# Patient Record
Sex: FEMALE | Race: WHITE | Employment: OTHER | URBAN - METROPOLITAN AREA
[De-identification: names, ages, dates, MRNs, and addresses within clinical notes are randomized per-mention and may not be internally consistent; named-entity substitution may affect disease eponyms.]

---

## 2017-07-06 PROBLEM — R09.89 DECREASED RADIAL PULSE: Status: ACTIVE | Noted: 2017-07-06

## 2017-12-07 PROBLEM — N39.0 URINARY TRACT INFECTION WITHOUT HEMATURIA: Status: ACTIVE | Noted: 2017-12-07

## 2017-12-07 PROBLEM — R35.0 URINARY FREQUENCY: Status: ACTIVE | Noted: 2017-12-07

## 2017-12-07 PROBLEM — R30.0 DYSURIA: Status: ACTIVE | Noted: 2017-12-07

## 2018-05-16 ENCOUNTER — TELEPHONE (OUTPATIENT)
Dept: FAMILY MEDICINE CLINIC | Age: 73
End: 2018-05-16

## 2018-05-16 DIAGNOSIS — N39.0 URINARY TRACT INFECTION WITHOUT HEMATURIA, SITE UNSPECIFIED: Primary | ICD-10-CM

## 2018-05-16 RX ORDER — CEPHALEXIN 500 MG/1
500 CAPSULE ORAL 3 TIMES DAILY
Qty: 30 CAPSULE | Refills: 0 | Status: SHIPPED | OUTPATIENT
Start: 2018-05-16 | End: 2018-05-21

## 2018-05-21 ENCOUNTER — OFFICE VISIT (OUTPATIENT)
Dept: FAMILY MEDICINE CLINIC | Age: 73
End: 2018-05-21
Payer: MEDICAID

## 2018-05-21 VITALS
SYSTOLIC BLOOD PRESSURE: 122 MMHG | OXYGEN SATURATION: 98 % | BODY MASS INDEX: 30.55 KG/M2 | DIASTOLIC BLOOD PRESSURE: 74 MMHG | HEART RATE: 76 BPM | TEMPERATURE: 97.6 F | HEIGHT: 60 IN | WEIGHT: 155.6 LBS

## 2018-05-21 DIAGNOSIS — R30.0 DYSURIA: ICD-10-CM

## 2018-05-21 DIAGNOSIS — N39.0 URINARY TRACT INFECTION WITHOUT HEMATURIA, SITE UNSPECIFIED: Primary | ICD-10-CM

## 2018-05-21 DIAGNOSIS — R35.0 URINARY FREQUENCY: ICD-10-CM

## 2018-05-21 DIAGNOSIS — M06.9 RHEUMATOID ARTHRITIS INVOLVING BOTH HANDS, UNSPECIFIED RHEUMATOID FACTOR PRESENCE: ICD-10-CM

## 2018-05-21 LAB
BILIRUBIN, POC: NORMAL
BLOOD URINE, POC: NORMAL
CLARITY, POC: CLEAR
COLOR, POC: YELLOW
GLUCOSE URINE, POC: NORMAL
KETONES, POC: NORMAL
LEUKOCYTE EST, POC: NORMAL
NITRITE, POC: NORMAL
PH, POC: 5.5
PROTEIN, POC: 30
SPECIFIC GRAVITY, POC: 1.03
UROBILINOGEN, POC: 0.2

## 2018-05-21 PROCEDURE — 81003 URINALYSIS AUTO W/O SCOPE: CPT | Performed by: FAMILY MEDICINE

## 2018-05-21 PROCEDURE — 99213 OFFICE O/P EST LOW 20 MIN: CPT | Performed by: FAMILY MEDICINE

## 2018-05-21 RX ORDER — CHLORAL HYDRATE 500 MG
CAPSULE ORAL
Refills: 0 | Status: ON HOLD | COMMUNITY
Start: 2018-04-21 | End: 2019-11-03 | Stop reason: HOSPADM

## 2018-05-21 RX ORDER — AMOXICILLIN 250 MG/1
250 CAPSULE ORAL 3 TIMES DAILY
Qty: 30 CAPSULE | Refills: 0 | Status: SHIPPED | OUTPATIENT
Start: 2018-05-21 | End: 2018-05-31

## 2018-05-25 ASSESSMENT — ENCOUNTER SYMPTOMS
ORTHOPNEA: 0
BLURRED VISION: 0
HEARTBURN: 0
EYE PAIN: 0
EYE DISCHARGE: 0
EYE REDNESS: 0
DIARRHEA: 0
STRIDOR: 0
PHOTOPHOBIA: 0
CONSTIPATION: 0
HEMOPTYSIS: 0
SPUTUM PRODUCTION: 0
WHEEZING: 0
NAUSEA: 0
SHORTNESS OF BREATH: 0
SINUS PAIN: 0
SORE THROAT: 0
DOUBLE VISION: 0
BLOOD IN STOOL: 0
BACK PAIN: 0
ABDOMINAL PAIN: 0
VOMITING: 0
RESPIRATORY NEGATIVE: 1
EYES NEGATIVE: 1
COUGH: 0
GASTROINTESTINAL NEGATIVE: 1

## 2018-08-02 ENCOUNTER — OFFICE VISIT (OUTPATIENT)
Dept: VASCULAR SURGERY | Age: 73
End: 2018-08-02
Payer: MEDICAID

## 2018-08-02 DIAGNOSIS — R09.89 BRUIT OF LEFT CAROTID ARTERY: Primary | Chronic | ICD-10-CM

## 2018-08-02 DIAGNOSIS — Z98.890 S/P CAROTID ENDARTERECTOMY: ICD-10-CM

## 2018-08-02 PROCEDURE — 99213 OFFICE O/P EST LOW 20 MIN: CPT | Performed by: SURGERY

## 2018-08-02 NOTE — PROGRESS NOTES
Chief Complaint:   Chief Complaint   Patient presents with    1 Year Follow Up     STEPHANIE         HPI:  Patient came to the office, for evaluation of carotid bruit, post carotid endarterectomy, doing well, can walk short distances slowly, 1-2 blocks at a time, limited partly because of arthritis, denies symptoms of rest pain      Patient denies any focal lateralizing neurological symptoms like loss of speech, vision or loss of function of extremity        Allergies   Allergen Reactions    Iodine Shortness Of Breath    Sulfa Antibiotics Shortness Of Breath    Macrodantin [Nitrofurantoin]        Current Outpatient Prescriptions   Medication Sig Dispense Refill    adalimumab (HUMIRA) 40 MG/0.8ML injection Inject 40 mg into the skin once      metoprolol succinate (TOPROL XL) 50 MG extended release tablet TAKE 1 TABLET BY MOUTH DAILY 30 tablet 3    gabapentin (NEURONTIN) 300 MG capsule TAKE ONE CAPSULE BY MOUTH TWICE A  capsule 1    loratadine (CLARITIN) 10 MG tablet TAKE 1 TABLET BY MOUTH EVERY DAY 90 tablet 1    ASPIRIN LOW DOSE 81 MG EC tablet TAKE 1 TABLET BY MOUTH DAILY 30 tablet 9    hydroxychloroquine (PLAQUENIL) 200 MG tablet Take 200 mg by mouth 2 times daily       [employer]S CALCIUM 1500 (600 Ca) MG TABS tablet Take 600 mg by mouth 2 times daily       Cholecalciferol (VITAMIN D) 2000 units TABS tablet Take 2,000 Units by mouth daily       latanoprost (XALATAN) 0.005 % ophthalmic solution INSTILL 1 DROP INTO BOTH EYES EVERY DAY 2 Bottle 1    Omega-3 Fatty Acids (FISH OIL) 1000 MG CAPS TAKE ONE CAPSULE BY MOUTH DAILY  0    ranitidine (ZANTAC) 150 MG tablet TAKE 1 TABLET BY MOUTH NIGHTLY 90 tablet 1    alendronate (FOSAMAX) 70 MG tablet Take 70 mg by mouth every 7 days       fluticasone (FLONASE) 50 MCG/ACT nasal spray 1 spray by Nasal route daily 1 Bottle 3    brinzolamide (AZOPT) 1 % ophthalmic suspension Place 1 drop into the left eye 3 times daily 1 Bottle 2     No current facility-administered tea daily    Drug use: No    Sexual activity: Not on file     Other Topics Concern    Not on file     Social History Narrative    No narrative on file       Review of Systems:    Eyes:  No blurring, diplopia or vision loss  Respiratory:  No cough, pleuritic chest pain, dyspnea, or wheezing  Cardiovascular: No angina, palpitations   Musculoskeletal:  No arthritis or weakness  Neurologic:  No paralysis, paresis, paresthesia, seizures or headach        Physical Exam:  General appearance:  Alert, awake, oriented x 3. No distress. Eyes:  Conjunctivae appear normal; PERRL  Neck:  No jugular venous distention, lymphadenopathy or thyromegaly. Carotid bruit noted  Lungs:  Clear to ausculation bilaterally. No rhonchi, crackles, wheezes  Heart:  Regular rate and rhythm. No rub or murmur  Abdomen:  Soft, non-tender. No masses, organomegaly. Musculoskeletal : No joint effusions, tenderness swelling    Neuro: Speech is intact. Moving all extremities. No focal motor or sensory deficits      Extremities:  Both feet are warm to touch. The color of both feet is normal.        Pulses Right  Left    Brachial 3 3    Radial    3=normal   Femoral 2 2  2=diminished   Popliteal    1=barely palpable   Dorsalis pedis    0=absent   Posterior tibial    4=aneurysmal             Other pertinent information:1. The past medical records were reviewed. Assessment:    1. Bruit of left carotid artery    2. S/P carotid endarterectomy              Plan:       Discussed with the patient, as the patient underwent endarterectomy, 6 years ago, has only mild disease in stable, recommended follow-up every 2 years, with instructions for the patient call me when necessary if she has any focal lateralizing neurological symptoms          Patient was instructed to continue walking program and to call if any worsening of symptoms and to call if any focal lateralizing neurological symptoms like loss of speech, vision or loss of function of extremity.

## 2018-09-14 ENCOUNTER — HOSPITAL ENCOUNTER (OUTPATIENT)
Dept: CARDIOLOGY | Age: 73
Discharge: HOME | End: 2018-09-14
Payer: MEDICAID

## 2018-09-14 DIAGNOSIS — R09.89 BRUIT OF LEFT CAROTID ARTERY: Chronic | ICD-10-CM

## 2018-09-14 PROCEDURE — 93880 EXTRACRANIAL BILAT STUDY: CPT

## 2018-09-17 ENCOUNTER — TELEPHONE (OUTPATIENT)
Dept: VASCULAR SURGERY | Age: 73
End: 2018-09-17

## 2018-09-17 ENCOUNTER — TELEPHONE (OUTPATIENT)
Dept: FAMILY MEDICINE CLINIC | Age: 73
End: 2018-09-17

## 2018-09-17 RX ORDER — BRINZOLAMIDE 10 MG/ML
1 SUSPENSION/ DROPS OPHTHALMIC 3 TIMES DAILY
Qty: 1 BOTTLE | Refills: 2 | Status: SHIPPED
Start: 2018-09-17 | End: 2022-10-23

## 2018-09-20 NOTE — PROCEDURES
[address]                   [address] [facility],  [facility]                                  VASCULAR REPORT    PATIENT NAME: Chun Crain                  :        1945  MED REC NO:   [id number]                            ROOM:  ACCOUNT NO:   [de-identified]                           ADMIT DATE: 2018  PROVIDER:     Marta Santiago MD    DATE OF PROCEDURE:  2018    CAROTID ULTRASOUND    INDICATION:  Carotid bruit post carotid endarterectomy. FINDINGS:  Duplex scanning of the right carotid artery revealed the patient  has mild intimal thickening with a peak internal carotid velocity of 957,  diastolic velocity of 30 cm/sec with a widely patent right carotid artery. Duplex scanning of the left carotid artery revealed moderate thickening  with a peak internal carotid velocity of 993, diastolic velocity of 40  cm/sec with 30% stenosis. IMPRESSION:  Widely patent right carotid artery associated with 30%  stenosis on the left side.         Solange Vazquez MD    D: 2018 7:19:57       T: 2018 13:02:50     [email or web address]  Job#: [id number]     Doc#: [id number]    CC:  Renetta Stein DO

## 2018-10-02 DIAGNOSIS — H26.9 CATARACT, UNSPECIFIED CATARACT TYPE, UNSPECIFIED LATERALITY: ICD-10-CM

## 2018-10-02 RX ORDER — LATANOPROST 50 UG/ML
SOLUTION/ DROPS OPHTHALMIC
Qty: 2 BOTTLE | Refills: 1 | Status: SHIPPED | OUTPATIENT
Start: 2018-10-02 | End: 2018-12-01 | Stop reason: SDUPTHER

## 2018-10-09 ENCOUNTER — HOSPITAL ENCOUNTER (OUTPATIENT)
Age: 73
Discharge: HOME | End: 2018-10-11
Payer: MEDICAID

## 2018-10-09 ENCOUNTER — OFFICE VISIT (OUTPATIENT)
Dept: FAMILY MEDICINE CLINIC | Age: 73
End: 2018-10-09
Payer: MEDICAID

## 2018-10-09 VITALS
HEART RATE: 74 BPM | HEIGHT: 60 IN | DIASTOLIC BLOOD PRESSURE: 64 MMHG | OXYGEN SATURATION: 97 % | RESPIRATION RATE: 18 BRPM | SYSTOLIC BLOOD PRESSURE: 110 MMHG | WEIGHT: 164.2 LBS | BODY MASS INDEX: 32.24 KG/M2

## 2018-10-09 DIAGNOSIS — M06.9 RHEUMATOID ARTHRITIS INVOLVING BOTH HANDS, UNSPECIFIED RHEUMATOID FACTOR PRESENCE: ICD-10-CM

## 2018-10-09 DIAGNOSIS — R53.83 FATIGUE, UNSPECIFIED TYPE: ICD-10-CM

## 2018-10-09 DIAGNOSIS — E78.9 LIPID DISORDER: Chronic | ICD-10-CM

## 2018-10-09 DIAGNOSIS — R73.01 IFG (IMPAIRED FASTING GLUCOSE): ICD-10-CM

## 2018-10-09 DIAGNOSIS — N39.0 URINARY TRACT INFECTION WITHOUT HEMATURIA, SITE UNSPECIFIED: ICD-10-CM

## 2018-10-09 DIAGNOSIS — Z12.31 SCREENING MAMMOGRAM, ENCOUNTER FOR: ICD-10-CM

## 2018-10-09 DIAGNOSIS — I10 ESSENTIAL HYPERTENSION: Chronic | ICD-10-CM

## 2018-10-09 DIAGNOSIS — M06.9 RHEUMATOID ARTHRITIS INVOLVING BOTH HANDS, UNSPECIFIED RHEUMATOID FACTOR PRESENCE: Primary | ICD-10-CM

## 2018-10-09 DIAGNOSIS — Z12.11 SCREEN FOR COLON CANCER: ICD-10-CM

## 2018-10-09 DIAGNOSIS — Z00.00 HEALTHCARE MAINTENANCE: ICD-10-CM

## 2018-10-09 LAB
ALBUMIN SERPL-MCNC: 4.1 G/DL (ref 3.5–5.2)
ALP BLD-CCNC: 66 U/L (ref 35–104)
ALT SERPL-CCNC: 11 U/L (ref 0–32)
ANION GAP SERPL CALCULATED.3IONS-SCNC: 18 MMOL/L (ref 7–16)
AST SERPL-CCNC: 19 U/L (ref 0–31)
BASOPHILS ABSOLUTE: 0.07 E9/L (ref 0–0.2)
BASOPHILS RELATIVE PERCENT: 1 % (ref 0–2)
BILIRUB SERPL-MCNC: 0.3 MG/DL (ref 0–1.2)
BILIRUBIN, POC: NEGATIVE
BLOOD URINE, POC: NEGATIVE
BUN BLDV-MCNC: 16 MG/DL (ref 8–23)
CALCIUM SERPL-MCNC: 9.1 MG/DL (ref 8.6–10.2)
CHLORIDE BLD-SCNC: 105 MMOL/L (ref 98–107)
CHOLESTEROL, TOTAL: 274 MG/DL (ref 0–199)
CLARITY, POC: CLEAR
CO2: 22 MMOL/L (ref 22–29)
COLOR, POC: YELLOW
CREAT SERPL-MCNC: 0.8 MG/DL (ref 0.5–1)
EOSINOPHILS ABSOLUTE: 0.17 E9/L (ref 0.05–0.5)
EOSINOPHILS RELATIVE PERCENT: 2.5 % (ref 0–6)
GFR AFRICAN AMERICAN: >60
GFR NON-AFRICAN AMERICAN: >60 ML/MIN/1.73
GLUCOSE BLD-MCNC: 96 MG/DL (ref 74–109)
GLUCOSE URINE, POC: NEGATIVE
HBA1C MFR BLD: 5.4 % (ref 4–5.6)
HBA1C MFR BLD: 5.5 %
HCT VFR BLD CALC: 41.6 % (ref 34–48)
HDLC SERPL-MCNC: 65 MG/DL
HEMOGLOBIN: 12.7 G/DL (ref 11.5–15.5)
IMMATURE GRANULOCYTES #: 0.01 E9/L
IMMATURE GRANULOCYTES %: 0.1 % (ref 0–5)
KETONES, POC: NEGATIVE
LDL CHOLESTEROL CALCULATED: 177 MG/DL (ref 0–99)
LEUKOCYTE EST, POC: NORMAL
LYMPHOCYTES ABSOLUTE: 2.45 E9/L (ref 1.5–4)
LYMPHOCYTES RELATIVE PERCENT: 36 % (ref 20–42)
MCH RBC QN AUTO: 29.7 PG (ref 26–35)
MCHC RBC AUTO-ENTMCNC: 30.5 % (ref 32–34.5)
MCV RBC AUTO: 97.2 FL (ref 80–99.9)
MONOCYTES ABSOLUTE: 0.69 E9/L (ref 0.1–0.95)
MONOCYTES RELATIVE PERCENT: 10.1 % (ref 2–12)
NEUTROPHILS ABSOLUTE: 3.42 E9/L (ref 1.8–7.3)
NEUTROPHILS RELATIVE PERCENT: 50.3 % (ref 43–80)
NITRITE, POC: NEGATIVE
PDW BLD-RTO: 15.1 FL (ref 11.5–15)
PH, POC: 5.5
PLATELET # BLD: 130 E9/L (ref 130–450)
PMV BLD AUTO: 12 FL (ref 7–12)
POTASSIUM SERPL-SCNC: 4.3 MMOL/L (ref 3.5–5)
PROTEIN, POC: NORMAL
RBC # BLD: 4.28 E12/L (ref 3.5–5.5)
SODIUM BLD-SCNC: 145 MMOL/L (ref 132–146)
SPECIFIC GRAVITY, POC: 1.02
TOTAL PROTEIN: 6.9 G/DL (ref 6.4–8.3)
TRIGL SERPL-MCNC: 160 MG/DL (ref 0–149)
TSH SERPL DL<=0.05 MIU/L-ACNC: 2.21 UIU/ML (ref 0.27–4.2)
UROBILINOGEN, POC: 0.2
VLDLC SERPL CALC-MCNC: 32 MG/DL
WBC # BLD: 6.8 E9/L (ref 4.5–11.5)

## 2018-10-09 PROCEDURE — 85025 COMPLETE CBC W/AUTO DIFF WBC: CPT

## 2018-10-09 PROCEDURE — 80061 LIPID PANEL: CPT

## 2018-10-09 PROCEDURE — 84443 ASSAY THYROID STIM HORMONE: CPT

## 2018-10-09 PROCEDURE — 81003 URINALYSIS AUTO W/O SCOPE: CPT | Performed by: FAMILY MEDICINE

## 2018-10-09 PROCEDURE — 99214 OFFICE O/P EST MOD 30 MIN: CPT | Performed by: FAMILY MEDICINE

## 2018-10-09 PROCEDURE — 83036 HEMOGLOBIN GLYCOSYLATED A1C: CPT

## 2018-10-09 PROCEDURE — 83036 HEMOGLOBIN GLYCOSYLATED A1C: CPT | Performed by: FAMILY MEDICINE

## 2018-10-09 PROCEDURE — 80053 COMPREHEN METABOLIC PANEL: CPT

## 2018-10-09 RX ORDER — CEPHALEXIN 500 MG/1
500 CAPSULE ORAL 3 TIMES DAILY
Qty: 30 CAPSULE | Refills: 0 | Status: SHIPPED | OUTPATIENT
Start: 2018-10-09 | End: 2019-03-13 | Stop reason: ALTCHOICE

## 2018-10-09 ASSESSMENT — PATIENT HEALTH QUESTIONNAIRE - PHQ9
1. LITTLE INTEREST OR PLEASURE IN DOING THINGS: 0
2. FEELING DOWN, DEPRESSED OR HOPELESS: 0
SUM OF ALL RESPONSES TO PHQ QUESTIONS 1-9: 0
SUM OF ALL RESPONSES TO PHQ QUESTIONS 1-9: 0
SUM OF ALL RESPONSES TO PHQ9 QUESTIONS 1 & 2: 0

## 2018-10-09 ASSESSMENT — ENCOUNTER SYMPTOMS
EYE PAIN: 0
WHEEZING: 0
SORE THROAT: 0
COUGH: 0
BACK PAIN: 0
SINUS PAIN: 0
HEMOPTYSIS: 0
RESPIRATORY NEGATIVE: 1
EYE DISCHARGE: 0
GASTROINTESTINAL NEGATIVE: 1
BLOOD IN STOOL: 0
DIARRHEA: 0
PHOTOPHOBIA: 0
EYES NEGATIVE: 1
CONSTIPATION: 0
ABDOMINAL PAIN: 0
BLURRED VISION: 0
ORTHOPNEA: 0
SHORTNESS OF BREATH: 0
EYE REDNESS: 0
SPUTUM PRODUCTION: 0
STRIDOR: 0
DOUBLE VISION: 0
HEARTBURN: 0

## 2018-10-09 NOTE — PATIENT INSTRUCTIONS
from front to back. When should you call for help? Call your doctor now or seek immediate medical care if:    · Symptoms such as fever, chills, nausea, or vomiting get worse or appear for the first time.     · You have new pain in your back just below your rib cage. This is called flank pain.     · There is new blood or pus in your urine.     · You have any problems with your antibiotic medicine.    Watch closely for changes in your health, and be sure to contact your doctor if:    · You are not getting better after taking an antibiotic for 2 days.     · Your symptoms go away but then come back. Where can you learn more? Go to [email or web address] and sign in to your [employer] account. Enter D933 in the [employer] box to learn more about \"Urinary Tract Infection in Women: Care Instructions. \"     If you do not have an account, please click on the \"Sign Up Now\" link. Current as of: May 12, 2017  Content Version: 11.7  © 2709-5669 [employer]. Care instructions adapted under license by [facility] ([facility]). If you have questions about a medical condition or this instruction, always ask your healthcare professional. [place] [address] any warranty or liability for your use of this information.

## 2018-10-09 NOTE — PROGRESS NOTES
distal, mid and proximal RCA.    HYSTERECTOMY      JOINT REPLACEMENT      right knee twice, left knee    JOINT REPLACEMENT Right     hip    KNEE ARTHROPLASTY Left 4-11-13    KNEE SURGERY      right knee x 2    SKIN BIOPSY      TONSILLECTOMY         Past Family Hx:  Reviewed with patient  History reviewed. No pertinent family history. Social Hx:  Reviewed with patient  Social History   Substance Use Topics    Smoking status: Former Smoker     Packs/day: 2.50     Years: 40.00     Types: Cigarettes     Quit date: 9/3/2000    Smokeless tobacco: Never Used    Alcohol use No      Comment: drinks 3 cups of tea daily       OBJECTIVE  /64   Pulse 74   Resp 18   Ht 5' (1.524 m)   Wt 164 lb 3.2 oz (74.5 kg)   SpO2 97%   Breastfeeding? No   BMI 32.07 kg/m²     Problem List:  Ruben Banerjee  does not have any pertinent problems on file. PHYS EX:  Physical Exam   Constitutional: She is oriented to person, place, and time. She appears well-developed and well-nourished. No distress. HENT:   Head: Normocephalic and atraumatic. Right Ear: External ear normal.   Left Ear: External ear normal.   Nose: Nose normal.   Mouth/Throat: Oropharynx is clear and moist. No oropharyngeal exudate. Eyes: Pupils are equal, round, and reactive to light. Conjunctivae and EOM are normal. Right eye exhibits no discharge. Left eye exhibits no discharge. No scleral icterus. Neck: Normal range of motion. Neck supple. No JVD present. No tracheal deviation present. No thyromegaly present. Cardiovascular: Normal rate, regular rhythm, normal heart sounds and intact distal pulses. Exam reveals no gallop and no friction rub. No murmur heard. Pulmonary/Chest: Effort normal and breath sounds normal. No stridor. No respiratory distress. She has no wheezes. She has no rales. She exhibits no tenderness. Abdominal: Soft. Bowel sounds are normal. She exhibits no distension and no mass. There is no tenderness.  There is no rebound TABLET BY MOUTH DAILY 30 tablet 9    brinzolamide (AZOPT) 1 % ophthalmic suspension Place 1 drop into the left eye 3 times daily 1 Bottle 2    metoprolol succinate (TOPROL XL) 50 MG extended release tablet TAKE 1 TABLET BY MOUTH DAILY 30 tablet 3    gabapentin (NEURONTIN) 300 MG capsule TAKE ONE CAPSULE BY MOUTH TWICE A  capsule 0    Omega-3 Fatty Acids (FISH OIL) 1000 MG CAPS TAKE ONE CAPSULE BY MOUTH DAILY  0    adalimumab (HUMIRA) 40 MG/0.8ML injection Inject 40 mg into the skin once      ranitidine (ZANTAC) 150 MG tablet TAKE 1 TABLET BY MOUTH NIGHTLY 90 tablet 1    loratadine (CLARITIN) 10 MG tablet TAKE 1 TABLET BY MOUTH EVERY DAY 90 tablet 1    hydroxychloroquine (PLAQUENIL) 200 MG tablet Take 200 mg by mouth 2 times daily       CVS CALCIUM 1500 (600 Ca) MG TABS tablet Take 600 mg by mouth 2 times daily       Cholecalciferol (VITAMIN D) 2000 units TABS tablet Take 2,000 Units by mouth daily       alendronate (FOSAMAX) 70 MG tablet Take 70 mg by mouth every 7 days       fluticasone (FLONASE) 50 MCG/ACT nasal spray 1 spray by Nasal route daily 1 Bottle 3     No facility-administered encounter medications on file as of 10/9/2018. Return in about 4 weeks (around 11/6/2018).         Reviewed recent labs related to Hien's current problems      Discussed importance of regular Health Maintenance follow up  Health Maintenance   Topic    DTaP/Tdap/Td vaccine (1 - Tdap)    Shingles Vaccine (1 of 2 - 2 Dose Series)    Colon Cancer Screen FIT/FOBT     Breast cancer screen     Flu vaccine (1)    Lipid screen     DEXA (modify frequency per FRAX score)     Pneumococcal low/med risk

## 2018-10-23 DIAGNOSIS — Z12.11 SCREEN FOR COLON CANCER: ICD-10-CM

## 2018-10-23 LAB
CONTROL: NORMAL
HEMOCCULT STL QL: NEGATIVE

## 2018-10-23 PROCEDURE — 82274 ASSAY TEST FOR BLOOD FECAL: CPT | Performed by: FAMILY MEDICINE

## 2018-11-01 ENCOUNTER — TELEPHONE (OUTPATIENT)
Dept: FAMILY MEDICINE CLINIC | Age: 73
End: 2018-11-01

## 2018-11-01 RX ORDER — AMOXICILLIN 500 MG/1
500 CAPSULE ORAL 3 TIMES DAILY
Qty: 30 CAPSULE | Refills: 0 | Status: SHIPPED | OUTPATIENT
Start: 2018-11-01 | End: 2018-11-11

## 2018-11-20 ENCOUNTER — TELEPHONE (OUTPATIENT)
Dept: CARDIOLOGY CLINIC | Age: 73
End: 2018-11-20

## 2018-12-01 DIAGNOSIS — H26.9 CATARACT, UNSPECIFIED CATARACT TYPE, UNSPECIFIED LATERALITY: ICD-10-CM

## 2018-12-03 RX ORDER — LATANOPROST 50 UG/ML
SOLUTION/ DROPS OPHTHALMIC
Qty: 2.5 ML | Refills: 1 | Status: SHIPPED | OUTPATIENT
Start: 2018-12-03 | End: 2019-01-24 | Stop reason: SDUPTHER

## 2019-01-02 ENCOUNTER — TELEPHONE (OUTPATIENT)
Dept: FAMILY MEDICINE CLINIC | Age: 74
End: 2019-01-02

## 2019-01-24 DIAGNOSIS — H26.9 CATARACT, UNSPECIFIED CATARACT TYPE, UNSPECIFIED LATERALITY: ICD-10-CM

## 2019-01-24 RX ORDER — LATANOPROST 50 UG/ML
SOLUTION/ DROPS OPHTHALMIC
Qty: 2.5 ML | Refills: 1 | Status: SHIPPED | OUTPATIENT
Start: 2019-01-24 | End: 2019-03-21 | Stop reason: SDUPTHER

## 2019-02-17 ENCOUNTER — APPOINTMENT (OUTPATIENT)
Dept: CT IMAGING | Age: 74
End: 2019-02-17
Payer: MEDICARE

## 2019-02-17 ENCOUNTER — HOSPITAL ENCOUNTER (EMERGENCY)
Age: 74
Discharge: HOME | End: 2019-02-17
Attending: EMERGENCY MEDICINE
Payer: MEDICARE

## 2019-02-17 VITALS
RESPIRATION RATE: 16 BRPM | BODY MASS INDEX: 29.45 KG/M2 | WEIGHT: 150 LBS | OXYGEN SATURATION: 97 % | SYSTOLIC BLOOD PRESSURE: 188 MMHG | TEMPERATURE: 97.6 F | HEART RATE: 72 BPM | DIASTOLIC BLOOD PRESSURE: 86 MMHG | HEIGHT: 60 IN

## 2019-02-17 DIAGNOSIS — N21.8 CALCULUS OF OTHER LOWER URINARY TRACT LOCATION: Primary | ICD-10-CM

## 2019-02-17 LAB
ALBUMIN SERPL-MCNC: 4 G/DL (ref 3.5–5.2)
ALP BLD-CCNC: 77 U/L (ref 35–104)
ALT SERPL-CCNC: 12 U/L (ref 0–32)
ANION GAP SERPL CALCULATED.3IONS-SCNC: 12 MMOL/L (ref 7–16)
AST SERPL-CCNC: 29 U/L (ref 0–31)
BACTERIA: NORMAL /HPF
BASOPHILS ABSOLUTE: 0.04 E9/L (ref 0–0.2)
BASOPHILS RELATIVE PERCENT: 0.5 % (ref 0–2)
BILIRUB SERPL-MCNC: 0.4 MG/DL (ref 0–1.2)
BILIRUBIN URINE: NEGATIVE
BLOOD, URINE: NORMAL
BUN BLDV-MCNC: 17 MG/DL (ref 8–23)
CALCIUM SERPL-MCNC: 9 MG/DL (ref 8.6–10.2)
CHLORIDE BLD-SCNC: 107 MMOL/L (ref 98–107)
CLARITY: CLEAR
CO2: 22 MMOL/L (ref 22–29)
COLOR: YELLOW
CREAT SERPL-MCNC: 1.1 MG/DL (ref 0.5–1)
EOSINOPHILS ABSOLUTE: 0.15 E9/L (ref 0.05–0.5)
EOSINOPHILS RELATIVE PERCENT: 2 % (ref 0–6)
GFR AFRICAN AMERICAN: 59
GFR NON-AFRICAN AMERICAN: 48 ML/MIN/1.73
GLUCOSE BLD-MCNC: 109 MG/DL (ref 74–99)
GLUCOSE URINE: NEGATIVE MG/DL
HCT VFR BLD CALC: 38.9 % (ref 34–48)
HEMOGLOBIN: 12.3 G/DL (ref 11.5–15.5)
IMMATURE GRANULOCYTES #: 0.02 E9/L
IMMATURE GRANULOCYTES %: 0.3 % (ref 0–5)
KETONES, URINE: NEGATIVE MG/DL
LACTIC ACID: 1.2 MMOL/L (ref 0.5–2.2)
LEUKOCYTE ESTERASE, URINE: NEGATIVE
LIPASE: 25 U/L (ref 13–60)
LYMPHOCYTES ABSOLUTE: 1.34 E9/L (ref 1.5–4)
LYMPHOCYTES RELATIVE PERCENT: 18.3 % (ref 20–42)
MCH RBC QN AUTO: 30.4 PG (ref 26–35)
MCHC RBC AUTO-ENTMCNC: 31.6 % (ref 32–34.5)
MCV RBC AUTO: 96 FL (ref 80–99.9)
MONOCYTES ABSOLUTE: 0.62 E9/L (ref 0.1–0.95)
MONOCYTES RELATIVE PERCENT: 8.4 % (ref 2–12)
NEUTROPHILS ABSOLUTE: 5.17 E9/L (ref 1.8–7.3)
NEUTROPHILS RELATIVE PERCENT: 70.5 % (ref 43–80)
NITRITE, URINE: NEGATIVE
PDW BLD-RTO: 14.2 FL (ref 11.5–15)
PH UA: 5.5 (ref 5–9)
PLATELET # BLD: 145 E9/L (ref 130–450)
PMV BLD AUTO: 12.1 FL (ref 7–12)
POTASSIUM REFLEX MAGNESIUM: 5 MMOL/L (ref 3.5–5)
PROTEIN UA: NEGATIVE MG/DL
RBC # BLD: 4.05 E12/L (ref 3.5–5.5)
RBC UA: NORMAL /HPF (ref 0–2)
SODIUM BLD-SCNC: 141 MMOL/L (ref 132–146)
SPECIFIC GRAVITY UA: 1.01 (ref 1–1.03)
TOTAL PROTEIN: 7.2 G/DL (ref 6.4–8.3)
TROPONIN: <0.01 NG/ML (ref 0–0.03)
UROBILINOGEN, URINE: 0.2 E.U./DL
WBC # BLD: 7.3 E9/L (ref 4.5–11.5)
WBC UA: NORMAL /HPF (ref 0–5)

## 2019-02-17 PROCEDURE — 96374 THER/PROPH/DIAG INJ IV PUSH: CPT

## 2019-02-17 PROCEDURE — 99284 EMERGENCY DEPT VISIT MOD MDM: CPT

## 2019-02-17 PROCEDURE — 36415 COLL VENOUS BLD VENIPUNCTURE: CPT

## 2019-02-17 PROCEDURE — 83605 ASSAY OF LACTIC ACID: CPT

## 2019-02-17 PROCEDURE — 74176 CT ABD & PELVIS W/O CONTRAST: CPT

## 2019-02-17 PROCEDURE — 6360000002 HC RX W HCPCS: Performed by: EMERGENCY MEDICINE

## 2019-02-17 PROCEDURE — 81001 URINALYSIS AUTO W/SCOPE: CPT

## 2019-02-17 PROCEDURE — 87088 URINE BACTERIA CULTURE: CPT

## 2019-02-17 PROCEDURE — 80053 COMPREHEN METABOLIC PANEL: CPT

## 2019-02-17 PROCEDURE — 85025 COMPLETE CBC W/AUTO DIFF WBC: CPT

## 2019-02-17 PROCEDURE — 84484 ASSAY OF TROPONIN QUANT: CPT

## 2019-02-17 PROCEDURE — 83690 ASSAY OF LIPASE: CPT

## 2019-02-17 PROCEDURE — 2580000003 HC RX 258: Performed by: EMERGENCY MEDICINE

## 2019-02-17 RX ORDER — NAPROXEN 500 MG/1
500 TABLET ORAL 2 TIMES DAILY
Qty: 10 TABLET | Refills: 0 | Status: SHIPPED | OUTPATIENT
Start: 2019-02-17 | End: 2019-03-13 | Stop reason: CLARIF

## 2019-02-17 RX ORDER — HYDROCODONE BITARTRATE AND ACETAMINOPHEN 5; 325 MG/1; MG/1
1 TABLET ORAL EVERY 6 HOURS PRN
Qty: 12 TABLET | Refills: 0 | Status: SHIPPED | OUTPATIENT
Start: 2019-02-17 | End: 2019-02-20

## 2019-02-17 RX ORDER — KETOROLAC TROMETHAMINE 30 MG/ML
15 INJECTION, SOLUTION INTRAMUSCULAR; INTRAVENOUS ONCE
Status: COMPLETED | OUTPATIENT
Start: 2019-02-17 | End: 2019-02-17

## 2019-02-17 RX ORDER — 0.9 % SODIUM CHLORIDE 0.9 %
1000 INTRAVENOUS SOLUTION INTRAVENOUS ONCE
Status: COMPLETED | OUTPATIENT
Start: 2019-02-17 | End: 2019-02-17

## 2019-02-17 RX ADMIN — SODIUM CHLORIDE 1000 ML: 9 INJECTION, SOLUTION INTRAVENOUS at 14:00

## 2019-02-17 RX ADMIN — KETOROLAC TROMETHAMINE 15 MG: 30 INJECTION, SOLUTION INTRAMUSCULAR at 14:00

## 2019-02-17 ASSESSMENT — PAIN DESCRIPTION - PAIN TYPE: TYPE: ACUTE PAIN

## 2019-02-17 ASSESSMENT — PAIN SCALES - GENERAL
PAINLEVEL_OUTOF10: 5
PAINLEVEL_OUTOF10: 7

## 2019-02-17 ASSESSMENT — PAIN DESCRIPTION - DESCRIPTORS: DESCRIPTORS: BURNING

## 2019-02-17 ASSESSMENT — PAIN DESCRIPTION - ORIENTATION: ORIENTATION: RIGHT

## 2019-02-17 ASSESSMENT — PAIN DESCRIPTION - LOCATION: LOCATION: FLANK

## 2019-02-19 LAB — URINE CULTURE, ROUTINE: NORMAL

## 2019-02-21 LAB
EKG ATRIAL RATE: 66 BPM
EKG P AXIS: 58 DEGREES
EKG P-R INTERVAL: 124 MS
EKG Q-T INTERVAL: 432 MS
EKG QRS DURATION: 128 MS
EKG QTC CALCULATION (BAZETT): 452 MS
EKG R AXIS: 137 DEGREES
EKG T AXIS: 49 DEGREES
EKG VENTRICULAR RATE: 66 BPM

## 2019-03-13 ENCOUNTER — OFFICE VISIT (OUTPATIENT)
Dept: FAMILY MEDICINE CLINIC | Age: 74
End: 2019-03-13
Payer: MEDICARE

## 2019-03-13 VITALS
BODY MASS INDEX: 31.22 KG/M2 | TEMPERATURE: 97.6 F | HEART RATE: 68 BPM | RESPIRATION RATE: 18 BRPM | OXYGEN SATURATION: 96 % | SYSTOLIC BLOOD PRESSURE: 116 MMHG | DIASTOLIC BLOOD PRESSURE: 78 MMHG | WEIGHT: 159 LBS | HEIGHT: 60 IN

## 2019-03-13 DIAGNOSIS — Z00.00 MEDICARE ANNUAL WELLNESS VISIT, INITIAL: Primary | ICD-10-CM

## 2019-03-13 DIAGNOSIS — I10 ESSENTIAL HYPERTENSION: Chronic | ICD-10-CM

## 2019-03-13 DIAGNOSIS — M06.9 RHEUMATOID ARTHRITIS INVOLVING BOTH HANDS, UNSPECIFIED RHEUMATOID FACTOR PRESENCE: ICD-10-CM

## 2019-03-13 DIAGNOSIS — Z00.00 ROUTINE GENERAL MEDICAL EXAMINATION AT A HEALTH CARE FACILITY: ICD-10-CM

## 2019-03-13 DIAGNOSIS — M79.7 FIBROMYALGIA: ICD-10-CM

## 2019-03-13 DIAGNOSIS — I65.23 BILATERAL CAROTID ARTERY STENOSIS: ICD-10-CM

## 2019-03-13 PROBLEM — R30.0 DYSURIA: Status: RESOLVED | Noted: 2017-12-07 | Resolved: 2019-03-13

## 2019-03-13 PROCEDURE — G0402 INITIAL PREVENTIVE EXAM: HCPCS | Performed by: FAMILY MEDICINE

## 2019-03-13 PROCEDURE — G8598 ASA/ANTIPLAT THER USED: HCPCS | Performed by: FAMILY MEDICINE

## 2019-03-13 PROCEDURE — 4040F PNEUMOC VAC/ADMIN/RCVD: CPT | Performed by: FAMILY MEDICINE

## 2019-03-13 RX ORDER — GABAPENTIN 300 MG/1
CAPSULE ORAL
Qty: 180 CAPSULE | Refills: 0 | Status: SHIPPED | OUTPATIENT
Start: 2019-03-13 | End: 2019-05-22 | Stop reason: SDUPTHER

## 2019-03-13 RX ORDER — METOPROLOL SUCCINATE 50 MG/1
50 TABLET, EXTENDED RELEASE ORAL DAILY
Qty: 90 TABLET | Refills: 1 | Status: ON HOLD
Start: 2019-03-13 | End: 2020-07-02 | Stop reason: HOSPADM

## 2019-03-13 ASSESSMENT — PATIENT HEALTH QUESTIONNAIRE - PHQ9
SUM OF ALL RESPONSES TO PHQ QUESTIONS 1-9: 2
1. LITTLE INTEREST OR PLEASURE IN DOING THINGS: 1
SUM OF ALL RESPONSES TO PHQ QUESTIONS 1-9: 2
SUM OF ALL RESPONSES TO PHQ QUESTIONS 1-9: 2
2. FEELING DOWN, DEPRESSED OR HOPELESS: 1
SUM OF ALL RESPONSES TO PHQ9 QUESTIONS 1 & 2: 2
SUM OF ALL RESPONSES TO PHQ QUESTIONS 1-9: 2

## 2019-03-13 ASSESSMENT — ANXIETY QUESTIONNAIRES: GAD7 TOTAL SCORE: 2

## 2019-03-13 ASSESSMENT — LIFESTYLE VARIABLES: HOW OFTEN DO YOU HAVE A DRINK CONTAINING ALCOHOL: 0

## 2019-03-21 DIAGNOSIS — H26.9 CATARACT, UNSPECIFIED CATARACT TYPE, UNSPECIFIED LATERALITY: ICD-10-CM

## 2019-03-21 RX ORDER — LATANOPROST 50 UG/ML
SOLUTION/ DROPS OPHTHALMIC
Qty: 2.5 ML | Refills: 1 | Status: SHIPPED | OUTPATIENT
Start: 2019-03-21 | End: 2019-05-26 | Stop reason: SDUPTHER

## 2019-05-22 DIAGNOSIS — M79.7 FIBROMYALGIA: ICD-10-CM

## 2019-05-23 RX ORDER — GABAPENTIN 300 MG/1
CAPSULE ORAL
Qty: 180 CAPSULE | Refills: 0 | Status: ON HOLD
Start: 2019-05-23 | End: 2020-06-30

## 2019-05-26 DIAGNOSIS — H26.9 CATARACT, UNSPECIFIED CATARACT TYPE, UNSPECIFIED LATERALITY: ICD-10-CM

## 2019-05-28 RX ORDER — LATANOPROST 50 UG/ML
SOLUTION/ DROPS OPHTHALMIC
Qty: 2.5 ML | Refills: 1 | Status: SHIPPED | OUTPATIENT
Start: 2019-05-28 | End: 2019-07-18 | Stop reason: SDUPTHER

## 2019-07-18 DIAGNOSIS — H26.9 CATARACT, UNSPECIFIED CATARACT TYPE, UNSPECIFIED LATERALITY: ICD-10-CM

## 2019-07-18 RX ORDER — LATANOPROST 50 UG/ML
SOLUTION/ DROPS OPHTHALMIC
Qty: 2.5 ML | Refills: 1 | Status: SHIPPED | OUTPATIENT
Start: 2019-07-18 | End: 2019-09-19 | Stop reason: SDUPTHER

## 2019-08-19 ENCOUNTER — TELEPHONE (OUTPATIENT)
Dept: FAMILY MEDICINE CLINIC | Age: 74
End: 2019-08-19

## 2019-08-19 DIAGNOSIS — N20.0 KIDNEY STONE: Primary | ICD-10-CM

## 2019-08-19 RX ORDER — TRAMADOL HYDROCHLORIDE 50 MG/1
50 TABLET ORAL EVERY 6 HOURS PRN
Qty: 28 TABLET | Refills: 0 | Status: SHIPPED | OUTPATIENT
Start: 2019-08-19 | End: 2019-08-26

## 2019-08-20 ENCOUNTER — TELEPHONE (OUTPATIENT)
Dept: FAMILY MEDICINE CLINIC | Age: 74
End: 2019-08-20

## 2019-08-20 NOTE — TELEPHONE ENCOUNTER
MA scheduled pt. She would like to know if anything at all can be called in to help with discomfort before her appointment.

## 2019-08-28 ENCOUNTER — OFFICE VISIT (OUTPATIENT)
Dept: FAMILY MEDICINE CLINIC | Age: 74
End: 2019-08-28
Payer: COMMERCIAL

## 2019-08-28 VITALS
WEIGHT: 162.2 LBS | DIASTOLIC BLOOD PRESSURE: 74 MMHG | HEART RATE: 69 BPM | BODY MASS INDEX: 31.84 KG/M2 | TEMPERATURE: 98.4 F | HEIGHT: 60 IN | SYSTOLIC BLOOD PRESSURE: 132 MMHG | OXYGEN SATURATION: 98 %

## 2019-08-28 DIAGNOSIS — R73.01 IFG (IMPAIRED FASTING GLUCOSE): ICD-10-CM

## 2019-08-28 DIAGNOSIS — M06.9 RHEUMATOID ARTHRITIS INVOLVING BOTH HANDS, UNSPECIFIED RHEUMATOID FACTOR PRESENCE: Primary | ICD-10-CM

## 2019-08-28 DIAGNOSIS — E78.2 MIXED HYPERLIPIDEMIA: ICD-10-CM

## 2019-08-28 DIAGNOSIS — N39.0 URINARY TRACT INFECTION WITH HEMATURIA, SITE UNSPECIFIED: ICD-10-CM

## 2019-08-28 DIAGNOSIS — R31.9 URINARY TRACT INFECTION WITH HEMATURIA, SITE UNSPECIFIED: ICD-10-CM

## 2019-08-28 DIAGNOSIS — I63.9 CEREBROVASCULAR ACCIDENT (CVA), UNSPECIFIED MECHANISM (HCC): ICD-10-CM

## 2019-08-28 DIAGNOSIS — R53.83 FATIGUE, UNSPECIFIED TYPE: ICD-10-CM

## 2019-08-28 DIAGNOSIS — I10 ESSENTIAL HYPERTENSION: ICD-10-CM

## 2019-08-28 LAB
BILIRUBIN, POC: NORMAL
BLOOD URINE, POC: NORMAL
CLARITY, POC: CLEAR
COLOR, POC: YELLOW
GLUCOSE URINE, POC: NORMAL
KETONES, POC: NORMAL
LEUKOCYTE EST, POC: NORMAL
NITRITE, POC: NORMAL
PH, POC: 5.5
PROTEIN, POC: 100
SPECIFIC GRAVITY, POC: 1.03
UROBILINOGEN, POC: 0.2

## 2019-08-28 PROCEDURE — 1090F PRES/ABSN URINE INCON ASSESS: CPT | Performed by: FAMILY MEDICINE

## 2019-08-28 PROCEDURE — G8400 PT W/DXA NO RESULTS DOC: HCPCS | Performed by: FAMILY MEDICINE

## 2019-08-28 PROCEDURE — 1123F ACP DISCUSS/DSCN MKR DOCD: CPT | Performed by: FAMILY MEDICINE

## 2019-08-28 PROCEDURE — G8598 ASA/ANTIPLAT THER USED: HCPCS | Performed by: FAMILY MEDICINE

## 2019-08-28 PROCEDURE — G8427 DOCREV CUR MEDS BY ELIG CLIN: HCPCS | Performed by: FAMILY MEDICINE

## 2019-08-28 PROCEDURE — G8417 CALC BMI ABV UP PARAM F/U: HCPCS | Performed by: FAMILY MEDICINE

## 2019-08-28 PROCEDURE — 81003 URINALYSIS AUTO W/O SCOPE: CPT | Performed by: FAMILY MEDICINE

## 2019-08-28 PROCEDURE — 1036F TOBACCO NON-USER: CPT | Performed by: FAMILY MEDICINE

## 2019-08-28 PROCEDURE — 3017F COLORECTAL CA SCREEN DOC REV: CPT | Performed by: FAMILY MEDICINE

## 2019-08-28 PROCEDURE — 4040F PNEUMOC VAC/ADMIN/RCVD: CPT | Performed by: FAMILY MEDICINE

## 2019-08-28 PROCEDURE — 99214 OFFICE O/P EST MOD 30 MIN: CPT | Performed by: FAMILY MEDICINE

## 2019-08-28 RX ORDER — PHENAZOPYRIDINE HYDROCHLORIDE 200 MG/1
200 TABLET, FILM COATED ORAL 3 TIMES DAILY PRN
Qty: 15 TABLET | Refills: 0 | Status: SHIPPED | OUTPATIENT
Start: 2019-08-28 | End: 2019-08-31

## 2019-08-28 RX ORDER — CEPHALEXIN 500 MG/1
500 CAPSULE ORAL 2 TIMES DAILY
Qty: 14 CAPSULE | Refills: 0 | Status: SHIPPED | OUTPATIENT
Start: 2019-08-28 | End: 2019-09-04

## 2019-08-28 ASSESSMENT — ENCOUNTER SYMPTOMS
APNEA: 0
CHEST TIGHTNESS: 0
CONSTIPATION: 0
TROUBLE SWALLOWING: 0
ABDOMINAL PAIN: 0
CHOKING: 0
SORE THROAT: 0
WHEEZING: 0
SINUS PRESSURE: 0
ALLERGIC/IMMUNOLOGIC NEGATIVE: 1
BLOOD IN STOOL: 0
RECTAL PAIN: 0
BACK PAIN: 1
EYE DISCHARGE: 0
GASTROINTESTINAL NEGATIVE: 1
STRIDOR: 0
COLOR CHANGE: 0
COUGH: 0
DIARRHEA: 0
ANAL BLEEDING: 0
VOICE CHANGE: 0
EYE PAIN: 0
EYE ITCHING: 0
VOMITING: 0
RHINORRHEA: 0
SINUS PAIN: 0
RESPIRATORY NEGATIVE: 1
PHOTOPHOBIA: 0
EYE REDNESS: 0
NAUSEA: 0
SHORTNESS OF BREATH: 0
FACIAL SWELLING: 0
ABDOMINAL DISTENTION: 0

## 2019-08-28 ASSESSMENT — PATIENT HEALTH QUESTIONNAIRE - PHQ9
SUM OF ALL RESPONSES TO PHQ QUESTIONS 1-9: 2
SUM OF ALL RESPONSES TO PHQ QUESTIONS 1-9: 2
SUM OF ALL RESPONSES TO PHQ9 QUESTIONS 1 & 2: 2
2. FEELING DOWN, DEPRESSED OR HOPELESS: 1
1. LITTLE INTEREST OR PLEASURE IN DOING THINGS: 1

## 2019-08-28 NOTE — PROGRESS NOTES
Vahid Prasad is a 75 y.o. female. HPI/Chief C/O:  Chief Complaint   Patient presents with    Nephrolithiasis     patient here to discuss kidney stones and urology referral; patient states she has pelvic pressure, not sure if its from the kidney stones or not     Allergies   Allergen Reactions    Iodine Shortness Of Breath    Sulfa Antibiotics Shortness Of Breath    Macrodantin [Nitrofurantoin]    The patient is here for a medication list and treatment planning review  We will go over our care planning goals as well as take care of all refills  We will set up labs as well     Hypertension   This is a chronic problem. The current episode started more than 1 year ago. The problem is controlled. Associated symptoms include malaise/fatigue. Pertinent negatives include no anxiety, blurred vision, chest pain, headaches, neck pain, orthopnea, palpitations, peripheral edema, PND, shortness of breath or sweats. Risk factors for coronary artery disease include post-menopausal state, family history and smoking/tobacco exposure. Past treatments include lifestyle changes and beta blockers. The current treatment provides significant improvement. Compliance problems include exercise and diet. Hypertensive end-organ damage includes CAD/MI and CVA. There is no history of angina, kidney disease, heart failure, left ventricular hypertrophy or PVD (H/O bilateral carotid stenosis ). There is no history of chronic renal disease, coarctation of the aorta, hyperaldosteronism, hypercortisolism, hyperparathyroidism, a hypertension causing med, pheochromocytoma, renovascular disease, sleep apnea or a thyroid problem. ROS:  Review of Systems   Constitutional: Positive for fatigue and malaise/fatigue. Negative for activity change, appetite change, chills, diaphoresis, fever and unexpected weight change. HENT: Negative.   Negative for congestion, dental problem, drooling, ear discharge, ear pain, facial swelling, hearing loss, mouth sores, nosebleeds, postnasal drip, rhinorrhea, sinus pressure, sinus pain, sneezing, sore throat, tinnitus, trouble swallowing and voice change. Eyes: Negative for blurred vision, photophobia, pain, discharge, redness, itching and visual disturbance. Respiratory: Negative. Negative for apnea, cough, choking, chest tightness, shortness of breath, wheezing and stridor. Cardiovascular: Negative. Negative for chest pain, palpitations, orthopnea, leg swelling and PND. Gastrointestinal: Negative. Negative for abdominal distention, abdominal pain, anal bleeding, blood in stool, constipation, diarrhea, nausea, rectal pain and vomiting. Endocrine: Negative. Negative for cold intolerance, heat intolerance, polydipsia, polyphagia and polyuria. Genitourinary: Positive for dysuria and frequency. Negative for decreased urine volume, difficulty urinating, enuresis, flank pain, genital sores, hematuria and urgency. Musculoskeletal: Positive for arthralgias, back pain, gait problem, joint swelling and myalgias. Negative for neck pain and neck stiffness. Skin: Negative. Negative for color change, pallor, rash and wound. Allergic/Immunologic: Negative. Negative for environmental allergies, food allergies and immunocompromised state. Neurological: Positive for weakness. Negative for dizziness, tremors, seizures, syncope, facial asymmetry, speech difficulty, light-headedness, numbness and headaches. Hematological: Negative. Negative for adenopathy. Does not bruise/bleed easily. Psychiatric/Behavioral: Negative. Past Medical/Surgical Hx;  Reviewed with patient      Diagnosis Date    Allergy     Arthritis     Bladder prolapse     CAD (coronary artery disease)     MI  2011    Carotid artery stenosis     Right carotid stenosis.     Carotid bruit 1/10/2013    Carotid stenosis, left 12/13/2012    Cerebrovascular accident (stroke) ([facility] [address]     pt states 13 yrs ago    Decreased distress. HENT:   Head: Normocephalic and atraumatic. Right Ear: External ear normal.   Left Ear: External ear normal.   Nose: Nose normal.   Mouth/Throat: Oropharynx is clear and moist. No oropharyngeal exudate. Eyes: Pupils are equal, round, and reactive to light. Conjunctivae and EOM are normal. Right eye exhibits no discharge. Left eye exhibits no discharge. No scleral icterus. Neck: Normal range of motion. Neck supple. No JVD present. No tracheal deviation present. No thyromegaly present. Cardiovascular: Normal rate, regular rhythm, normal heart sounds and intact distal pulses. Exam reveals no gallop and no friction rub. No murmur heard. Pulmonary/Chest: Effort normal and breath sounds normal. No stridor. No respiratory distress. She has no wheezes. She has no rales. She exhibits no tenderness. Abdominal: Soft. Bowel sounds are normal. She exhibits no distension and no mass. There is no tenderness. There is no rebound and no guarding. No hernia. Musculoskeletal: She exhibits tenderness and deformity. She exhibits no edema. Pt has RA hands. Lymphadenopathy:     She has no cervical adenopathy. Neurological: She is alert and oriented to person, place, and time. She has normal reflexes. She displays normal reflexes. A sensory deficit is present. No cranial nerve deficit. She exhibits normal muscle tone. Coordination normal.   Skin: Skin is warm. No rash noted. She is not diaphoretic. No erythema. No pallor. Nursing note and vitals reviewed. ASSESSMENT/PLAN  Kiley Nair was seen today for nephrolithiasis. Diagnoses and all orders for this visit:    Rheumatoid arthritis involving both hands, unspecified rheumatoid factor presence ([facility] [address]  -     Basic Metabolic Panel; Future  -     CBC Auto Differential; Future  --stable on current care planning  -- continue treatment as we are meeting goals       Essential hypertension . comnt  --patient is instructed on low to moderate sodium ( 2 to 2.5

## 2019-08-29 ASSESSMENT — ENCOUNTER SYMPTOMS
BLURRED VISION: 0
ORTHOPNEA: 0

## 2019-08-30 DIAGNOSIS — N18.9 CHRONIC KIDNEY DISEASE, UNSPECIFIED CKD STAGE: Primary | ICD-10-CM

## 2019-08-30 NOTE — PROGRESS NOTES
Tyrel Ferreira Hollins, DO  [place], [place]   Caller: Unspecified (Yesterday,  1:07 PM)             dontrell Shi   Set up renal sonogram both kidney

## 2019-09-11 ENCOUNTER — HOSPITAL ENCOUNTER (EMERGENCY)
Age: 74
Discharge: HOME | End: 2019-09-11
Payer: COMMERCIAL

## 2019-09-11 ENCOUNTER — HOSPITAL ENCOUNTER (OUTPATIENT)
Age: 74
Discharge: HOME | End: 2019-09-11
Payer: COMMERCIAL

## 2019-09-11 VITALS
HEART RATE: 92 BPM | WEIGHT: 160 LBS | TEMPERATURE: 98.1 F | DIASTOLIC BLOOD PRESSURE: 84 MMHG | BODY MASS INDEX: 31.41 KG/M2 | HEIGHT: 60 IN | RESPIRATION RATE: 16 BRPM | OXYGEN SATURATION: 95 % | SYSTOLIC BLOOD PRESSURE: 120 MMHG

## 2019-09-11 DIAGNOSIS — M06.9 RHEUMATOID ARTHRITIS INVOLVING BOTH HANDS, UNSPECIFIED RHEUMATOID FACTOR PRESENCE: ICD-10-CM

## 2019-09-11 DIAGNOSIS — I10 ESSENTIAL HYPERTENSION: ICD-10-CM

## 2019-09-11 DIAGNOSIS — E78.2 MIXED HYPERLIPIDEMIA: ICD-10-CM

## 2019-09-11 DIAGNOSIS — R73.01 IFG (IMPAIRED FASTING GLUCOSE): ICD-10-CM

## 2019-09-11 DIAGNOSIS — I63.9 CEREBROVASCULAR ACCIDENT (CVA), UNSPECIFIED MECHANISM (HCC): ICD-10-CM

## 2019-09-11 DIAGNOSIS — N30.00 ACUTE CYSTITIS WITHOUT HEMATURIA: Primary | ICD-10-CM

## 2019-09-11 DIAGNOSIS — R53.83 FATIGUE, UNSPECIFIED TYPE: ICD-10-CM

## 2019-09-11 LAB
ANION GAP SERPL CALCULATED.3IONS-SCNC: 11 MMOL/L (ref 7–16)
BACTERIA: ABNORMAL /HPF
BASOPHILS ABSOLUTE: 0.04 E9/L (ref 0–0.2)
BASOPHILS RELATIVE PERCENT: 0.7 % (ref 0–2)
BILIRUBIN URINE: ABNORMAL
BLOOD, URINE: NEGATIVE
BUN BLDV-MCNC: 14 MG/DL (ref 8–23)
CALCIUM SERPL-MCNC: 9.6 MG/DL (ref 8.6–10.2)
CHLORIDE BLD-SCNC: 107 MMOL/L (ref 98–107)
CHOLESTEROL, TOTAL: 316 MG/DL (ref 0–199)
CHP ED QC CHECK: YES
CLARITY: CLEAR
CO2: 26 MMOL/L (ref 22–29)
COLOR: YELLOW
CREAT SERPL-MCNC: 0.8 MG/DL (ref 0.5–1)
EOSINOPHILS ABSOLUTE: 0.14 E9/L (ref 0.05–0.5)
EOSINOPHILS RELATIVE PERCENT: 2.6 % (ref 0–6)
EPITHELIAL CELLS, UA: ABNORMAL /HPF
GFR AFRICAN AMERICAN: >60
GFR NON-AFRICAN AMERICAN: >60 ML/MIN/1.73
GLUCOSE BLD-MCNC: 101 MG/DL (ref 74–99)
GLUCOSE BLD-MCNC: 90 MG/DL
GLUCOSE URINE: 100 MG/DL
HBA1C MFR BLD: 5.1 % (ref 4–5.6)
HCT VFR BLD CALC: 39.6 % (ref 34–48)
HDLC SERPL-MCNC: 61 MG/DL
HEMOGLOBIN: 12.1 G/DL (ref 11.5–15.5)
IMMATURE GRANULOCYTES #: 0.01 E9/L
IMMATURE GRANULOCYTES %: 0.2 % (ref 0–5)
KETONES, URINE: NEGATIVE MG/DL
LDL CHOLESTEROL CALCULATED: 218 MG/DL (ref 0–99)
LEUKOCYTE ESTERASE, URINE: ABNORMAL
LYMPHOCYTES ABSOLUTE: 2.35 E9/L (ref 1.5–4)
LYMPHOCYTES RELATIVE PERCENT: 43.6 % (ref 20–42)
MCH RBC QN AUTO: 30.3 PG (ref 26–35)
MCHC RBC AUTO-ENTMCNC: 30.6 % (ref 32–34.5)
MCV RBC AUTO: 99 FL (ref 80–99.9)
METER GLUCOSE: 90 MG/DL (ref 74–99)
MONOCYTES ABSOLUTE: 0.46 E9/L (ref 0.1–0.95)
MONOCYTES RELATIVE PERCENT: 8.5 % (ref 2–12)
NEUTROPHILS ABSOLUTE: 2.39 E9/L (ref 1.8–7.3)
NEUTROPHILS RELATIVE PERCENT: 44.4 % (ref 43–80)
NITRITE, URINE: POSITIVE
PDW BLD-RTO: 14.1 FL (ref 11.5–15)
PH UA: 5.5 (ref 5–9)
PLATELET # BLD: 240 E9/L (ref 130–450)
PMV BLD AUTO: 10.7 FL (ref 7–12)
POTASSIUM SERPL-SCNC: 4.2 MMOL/L (ref 3.5–5)
PROTEIN UA: 30 MG/DL
RBC # BLD: 4 E12/L (ref 3.5–5.5)
RBC UA: ABNORMAL /HPF (ref 0–2)
RENAL EPITHELIAL, UA: ABNORMAL /HPF
SODIUM BLD-SCNC: 144 MMOL/L (ref 132–146)
SPECIFIC GRAVITY UA: >=1.03 (ref 1–1.03)
TRIGL SERPL-MCNC: 184 MG/DL (ref 0–149)
TSH SERPL DL<=0.05 MIU/L-ACNC: 1.67 UIU/ML (ref 0.27–4.2)
UROBILINOGEN, URINE: 4 E.U./DL
VLDLC SERPL CALC-MCNC: 37 MG/DL
WBC # BLD: 5.4 E9/L (ref 4.5–11.5)
WBC UA: >20 /HPF (ref 0–5)

## 2019-09-11 PROCEDURE — 99283 EMERGENCY DEPT VISIT LOW MDM: CPT

## 2019-09-11 PROCEDURE — 87077 CULTURE AEROBIC IDENTIFY: CPT

## 2019-09-11 PROCEDURE — 82962 GLUCOSE BLOOD TEST: CPT

## 2019-09-11 PROCEDURE — 87186 SC STD MICRODIL/AGAR DIL: CPT

## 2019-09-11 PROCEDURE — 83036 HEMOGLOBIN GLYCOSYLATED A1C: CPT

## 2019-09-11 PROCEDURE — 36415 COLL VENOUS BLD VENIPUNCTURE: CPT

## 2019-09-11 PROCEDURE — 87088 URINE BACTERIA CULTURE: CPT

## 2019-09-11 PROCEDURE — 84443 ASSAY THYROID STIM HORMONE: CPT

## 2019-09-11 PROCEDURE — 80061 LIPID PANEL: CPT

## 2019-09-11 PROCEDURE — 85025 COMPLETE CBC W/AUTO DIFF WBC: CPT

## 2019-09-11 PROCEDURE — 80048 BASIC METABOLIC PNL TOTAL CA: CPT

## 2019-09-11 PROCEDURE — 81001 URINALYSIS AUTO W/SCOPE: CPT

## 2019-09-11 RX ORDER — CEFDINIR 300 MG/1
300 CAPSULE ORAL 2 TIMES DAILY
Qty: 14 CAPSULE | Refills: 0 | Status: SHIPPED | OUTPATIENT
Start: 2019-09-11 | End: 2019-09-18

## 2019-09-11 ASSESSMENT — PAIN SCALES - GENERAL: PAINLEVEL_OUTOF10: 7

## 2019-09-11 ASSESSMENT — PAIN DESCRIPTION - ORIENTATION: ORIENTATION: LOWER

## 2019-09-11 ASSESSMENT — PAIN DESCRIPTION - PAIN TYPE: TYPE: ACUTE PAIN

## 2019-09-11 ASSESSMENT — PAIN - FUNCTIONAL ASSESSMENT: PAIN_FUNCTIONAL_ASSESSMENT: ACTIVITIES ARE NOT PREVENTED

## 2019-09-11 ASSESSMENT — PAIN DESCRIPTION - LOCATION: LOCATION: ABDOMEN

## 2019-09-11 ASSESSMENT — PAIN DESCRIPTION - PROGRESSION: CLINICAL_PROGRESSION: GRADUALLY WORSENING

## 2019-09-11 ASSESSMENT — PAIN DESCRIPTION - FREQUENCY: FREQUENCY: CONTINUOUS

## 2019-09-11 ASSESSMENT — PAIN DESCRIPTION - DESCRIPTORS: DESCRIPTORS: BURNING

## 2019-09-11 NOTE — ED PROVIDER NOTES
using voice recognition software.  Every effort was made to ensure accuracy; however, inadvertent computerized transcription errors may be present     Candace Lin PA-C  09/11/19 5040

## 2019-09-13 LAB
ORGANISM: ABNORMAL
URINE CULTURE, ROUTINE: ABNORMAL

## 2019-09-13 NOTE — RESULT ENCOUNTER NOTE
Urine culture report reviewed by Dr. Carver Severance. Patient notified of need to stop Omnicef and start Levaquin. Patient verbalized understanding.   Prescription for Levaquin 750 mg PO daily x 5 days called in to [facility] pharmacy by Dr. Carver Severance

## 2019-09-21 DIAGNOSIS — E78.2 MIXED HYPERLIPIDEMIA: Primary | ICD-10-CM

## 2019-09-21 RX ORDER — ROSUVASTATIN CALCIUM 10 MG/1
10 TABLET, COATED ORAL DAILY
Qty: 90 TABLET | Refills: 1 | Status: SHIPPED | OUTPATIENT
Start: 2019-09-21 | End: 2019-12-17 | Stop reason: SDUPTHER

## 2019-11-01 ENCOUNTER — APPOINTMENT (OUTPATIENT)
Dept: ULTRASOUND IMAGING | Age: 74
DRG: 694 | End: 2019-11-01
Payer: COMMERCIAL

## 2019-11-01 ENCOUNTER — HOSPITAL ENCOUNTER (OUTPATIENT)
Age: 74
Discharge: HOME | End: 2019-11-01
Payer: COMMERCIAL

## 2019-11-01 ENCOUNTER — APPOINTMENT (OUTPATIENT)
Dept: CT IMAGING | Age: 74
DRG: 694 | End: 2019-11-01
Payer: COMMERCIAL

## 2019-11-01 ENCOUNTER — HOSPITAL ENCOUNTER (INPATIENT)
Age: 74
LOS: 2 days | Discharge: HOME HEALTH | DRG: 694 | End: 2019-11-03
Attending: INTERNAL MEDICINE | Admitting: INTERNAL MEDICINE
Payer: COMMERCIAL

## 2019-11-01 DIAGNOSIS — M05.742 RHEUMATOID ARTHRITIS INVOLVING BOTH HANDS WITH POSITIVE RHEUMATOID FACTOR (HCC): Primary | ICD-10-CM

## 2019-11-01 DIAGNOSIS — M05.741 RHEUMATOID ARTHRITIS INVOLVING BOTH HANDS WITH POSITIVE RHEUMATOID FACTOR (HCC): Primary | ICD-10-CM

## 2019-11-01 DIAGNOSIS — N13.2 HYDRONEPHROSIS WITH URINARY OBSTRUCTION DUE TO RENAL CALCULUS: ICD-10-CM

## 2019-11-01 DIAGNOSIS — M79.605 LEFT LEG PAIN: ICD-10-CM

## 2019-11-01 PROBLEM — N13.9 ACUTE UNILATERAL OBSTRUCTIVE UROPATHY: Status: ACTIVE | Noted: 2019-11-01

## 2019-11-01 LAB
ALBUMIN SERPL-MCNC: 4.9 G/DL (ref 3.5–5.2)
ALP BLD-CCNC: 67 U/L (ref 35–104)
ALT SERPL-CCNC: 23 U/L (ref 0–32)
ANION GAP SERPL CALCULATED.3IONS-SCNC: 13 MMOL/L (ref 7–16)
AST SERPL-CCNC: 35 U/L (ref 0–31)
BACTERIA: ABNORMAL /HPF
BASOPHILS ABSOLUTE: 0.03 E9/L (ref 0–0.2)
BASOPHILS RELATIVE PERCENT: 0.6 % (ref 0–2)
BILIRUB SERPL-MCNC: 0.5 MG/DL (ref 0–1.2)
BILIRUBIN URINE: NEGATIVE
BLOOD, URINE: ABNORMAL
BUN BLDV-MCNC: 12 MG/DL (ref 8–23)
CALCIUM SERPL-MCNC: 10.2 MG/DL (ref 8.6–10.2)
CHLORIDE BLD-SCNC: 106 MMOL/L (ref 98–107)
CLARITY: ABNORMAL
CO2: 26 MMOL/L (ref 22–29)
COLOR: YELLOW
CREAT SERPL-MCNC: 0.8 MG/DL (ref 0.5–1)
EOSINOPHILS ABSOLUTE: 0.06 E9/L (ref 0.05–0.5)
EOSINOPHILS RELATIVE PERCENT: 1.2 % (ref 0–6)
EPITHELIAL CELLS, UA: ABNORMAL /HPF
GFR AFRICAN AMERICAN: >60
GFR NON-AFRICAN AMERICAN: >60 ML/MIN/1.73
GLUCOSE BLD-MCNC: 118 MG/DL (ref 74–99)
GLUCOSE URINE: NEGATIVE MG/DL
HCT VFR BLD CALC: 37.5 % (ref 34–48)
HEMOGLOBIN: 12 G/DL (ref 11.5–15.5)
IMMATURE GRANULOCYTES #: 0.01 E9/L
IMMATURE GRANULOCYTES %: 0.2 % (ref 0–5)
KETONES, URINE: NEGATIVE MG/DL
LEUKOCYTE ESTERASE, URINE: NEGATIVE
LYMPHOCYTES ABSOLUTE: 1.61 E9/L (ref 1.5–4)
LYMPHOCYTES RELATIVE PERCENT: 31.4 % (ref 20–42)
MCH RBC QN AUTO: 30.7 PG (ref 26–35)
MCHC RBC AUTO-ENTMCNC: 32 % (ref 32–34.5)
MCV RBC AUTO: 95.9 FL (ref 80–99.9)
MONOCYTES ABSOLUTE: 0.54 E9/L (ref 0.1–0.95)
MONOCYTES RELATIVE PERCENT: 10.5 % (ref 2–12)
NEUTROPHILS ABSOLUTE: 2.87 E9/L (ref 1.8–7.3)
NEUTROPHILS RELATIVE PERCENT: 56.1 % (ref 43–80)
NITRITE, URINE: NEGATIVE
PDW BLD-RTO: 14 FL (ref 11.5–15)
PH UA: 5.5 (ref 5–9)
PLATELET # BLD: 171 E9/L (ref 130–450)
PMV BLD AUTO: 10.6 FL (ref 7–12)
POTASSIUM REFLEX MAGNESIUM: 4.2 MMOL/L (ref 3.5–5)
PROTEIN UA: ABNORMAL MG/DL
RBC # BLD: 3.91 E12/L (ref 3.5–5.5)
RBC UA: >20 /HPF (ref 0–2)
SODIUM BLD-SCNC: 145 MMOL/L (ref 132–146)
SPECIFIC GRAVITY UA: 1.01 (ref 1–1.03)
TOTAL PROTEIN: 8.2 G/DL (ref 6.4–8.3)
UROBILINOGEN, URINE: 0.2 E.U./DL
WBC # BLD: 5.1 E9/L (ref 4.5–11.5)
WBC UA: ABNORMAL /HPF (ref 0–5)

## 2019-11-01 PROCEDURE — 80053 COMPREHEN METABOLIC PANEL: CPT

## 2019-11-01 PROCEDURE — A0425 GROUND MILEAGE: HCPCS

## 2019-11-01 PROCEDURE — G0378 HOSPITAL OBSERVATION PER HR: HCPCS

## 2019-11-01 PROCEDURE — 99285 EMERGENCY DEPT VISIT HI MDM: CPT

## 2019-11-01 PROCEDURE — 96365 THER/PROPH/DIAG IV INF INIT: CPT

## 2019-11-01 PROCEDURE — 36415 COLL VENOUS BLD VENIPUNCTURE: CPT

## 2019-11-01 PROCEDURE — 81001 URINALYSIS AUTO W/SCOPE: CPT

## 2019-11-01 PROCEDURE — 6360000002 HC RX W HCPCS: Performed by: PHYSICIAN ASSISTANT

## 2019-11-01 PROCEDURE — A0428 BLS: HCPCS

## 2019-11-01 PROCEDURE — 85025 COMPLETE CBC W/AUTO DIFF WBC: CPT

## 2019-11-01 PROCEDURE — 2580000003 HC RX 258: Performed by: PHYSICIAN ASSISTANT

## 2019-11-01 PROCEDURE — 2060000000 HC ICU INTERMEDIATE R&B

## 2019-11-01 PROCEDURE — 74176 CT ABD & PELVIS W/O CONTRAST: CPT

## 2019-11-01 PROCEDURE — 93971 EXTREMITY STUDY: CPT

## 2019-11-01 RX ORDER — ASPIRIN 81 MG/1
81 TABLET ORAL DAILY
Status: DISCONTINUED | OUTPATIENT
Start: 2019-11-02 | End: 2019-11-03 | Stop reason: HOSPADM

## 2019-11-01 RX ORDER — SODIUM CHLORIDE 0.9 % (FLUSH) 0.9 %
10 SYRINGE (ML) INJECTION EVERY 12 HOURS SCHEDULED
Status: DISCONTINUED | OUTPATIENT
Start: 2019-11-01 | End: 2019-11-03 | Stop reason: HOSPADM

## 2019-11-01 RX ORDER — POTASSIUM CHLORIDE 7.45 MG/ML
10 INJECTION INTRAVENOUS PRN
Status: DISCONTINUED | OUTPATIENT
Start: 2019-11-01 | End: 2019-11-03 | Stop reason: HOSPADM

## 2019-11-01 RX ORDER — MAGNESIUM SULFATE 1 G/100ML
1 INJECTION INTRAVENOUS PRN
Status: DISCONTINUED | OUTPATIENT
Start: 2019-11-01 | End: 2019-11-03 | Stop reason: HOSPADM

## 2019-11-01 RX ORDER — DORZOLAMIDE HCL 20 MG/ML
1 SOLUTION/ DROPS OPHTHALMIC 3 TIMES DAILY
Status: DISCONTINUED | OUTPATIENT
Start: 2019-11-01 | End: 2019-11-03 | Stop reason: HOSPADM

## 2019-11-01 RX ORDER — HYDROXYCHLOROQUINE SULFATE 200 MG/1
200 TABLET, FILM COATED ORAL 2 TIMES DAILY
Status: DISCONTINUED | OUTPATIENT
Start: 2019-11-01 | End: 2019-11-03 | Stop reason: HOSPADM

## 2019-11-01 RX ORDER — POTASSIUM CHLORIDE 20 MEQ/1
40 TABLET, EXTENDED RELEASE ORAL PRN
Status: DISCONTINUED | OUTPATIENT
Start: 2019-11-01 | End: 2019-11-03 | Stop reason: HOSPADM

## 2019-11-01 RX ORDER — ROSUVASTATIN CALCIUM 10 MG/1
10 TABLET, COATED ORAL NIGHTLY
Status: DISCONTINUED | OUTPATIENT
Start: 2019-11-01 | End: 2019-11-03 | Stop reason: HOSPADM

## 2019-11-01 RX ORDER — ONDANSETRON 2 MG/ML
4 INJECTION INTRAMUSCULAR; INTRAVENOUS EVERY 6 HOURS PRN
Status: DISCONTINUED | OUTPATIENT
Start: 2019-11-01 | End: 2019-11-03 | Stop reason: HOSPADM

## 2019-11-01 RX ORDER — SODIUM CHLORIDE 0.9 % (FLUSH) 0.9 %
10 SYRINGE (ML) INJECTION PRN
Status: DISCONTINUED | OUTPATIENT
Start: 2019-11-01 | End: 2019-11-03 | Stop reason: HOSPADM

## 2019-11-01 RX ORDER — LATANOPROST 50 UG/ML
1 SOLUTION/ DROPS OPHTHALMIC DAILY
Status: DISCONTINUED | OUTPATIENT
Start: 2019-11-02 | End: 2019-11-03 | Stop reason: HOSPADM

## 2019-11-01 RX ORDER — GABAPENTIN 300 MG/1
300 CAPSULE ORAL 2 TIMES DAILY
Status: DISCONTINUED | OUTPATIENT
Start: 2019-11-01 | End: 2019-11-03 | Stop reason: HOSPADM

## 2019-11-01 RX ORDER — CEFTRIAXONE 1 G/1
INJECTION, POWDER, FOR SOLUTION INTRAMUSCULAR; INTRAVENOUS
Status: COMPLETED
Start: 2019-11-01 | End: 2019-11-02

## 2019-11-01 RX ORDER — FAMOTIDINE 20 MG/1
40 TABLET, FILM COATED ORAL DAILY
Status: DISCONTINUED | OUTPATIENT
Start: 2019-11-02 | End: 2019-11-03 | Stop reason: HOSPADM

## 2019-11-01 RX ORDER — METOPROLOL SUCCINATE 50 MG/1
50 TABLET, EXTENDED RELEASE ORAL DAILY
Status: DISCONTINUED | OUTPATIENT
Start: 2019-11-02 | End: 2019-11-03 | Stop reason: HOSPADM

## 2019-11-01 RX ORDER — ACETAMINOPHEN 325 MG/1
650 TABLET ORAL EVERY 4 HOURS PRN
Status: DISCONTINUED | OUTPATIENT
Start: 2019-11-01 | End: 2019-11-03 | Stop reason: HOSPADM

## 2019-11-01 RX ADMIN — CEFTRIAXONE SODIUM 1 G: 1 INJECTION, POWDER, FOR SOLUTION INTRAMUSCULAR; INTRAVENOUS at 20:43

## 2019-11-01 ASSESSMENT — PAIN - FUNCTIONAL ASSESSMENT: PAIN_FUNCTIONAL_ASSESSMENT: PREVENTS OR INTERFERES SOME ACTIVE ACTIVITIES AND ADLS

## 2019-11-01 ASSESSMENT — PAIN DESCRIPTION - ORIENTATION: ORIENTATION: RIGHT;LEFT;MID;LOWER

## 2019-11-01 ASSESSMENT — PAIN DESCRIPTION - PAIN TYPE: TYPE: ACUTE PAIN

## 2019-11-01 ASSESSMENT — PAIN DESCRIPTION - DESCRIPTORS: DESCRIPTORS: THROBBING;ACHING;DISCOMFORT

## 2019-11-01 ASSESSMENT — PAIN SCALES - GENERAL: PAINLEVEL_OUTOF10: 7

## 2019-11-01 ASSESSMENT — PAIN DESCRIPTION - PROGRESSION: CLINICAL_PROGRESSION: GRADUALLY WORSENING

## 2019-11-01 ASSESSMENT — PAIN DESCRIPTION - FREQUENCY: FREQUENCY: INTERMITTENT

## 2019-11-01 ASSESSMENT — PAIN DESCRIPTION - LOCATION: LOCATION: BACK

## 2019-11-01 ASSESSMENT — PAIN DESCRIPTION - ONSET: ONSET: ON-GOING

## 2019-11-02 ENCOUNTER — APPOINTMENT (OUTPATIENT)
Dept: GENERAL RADIOLOGY | Age: 74
DRG: 694 | End: 2019-11-02
Payer: COMMERCIAL

## 2019-11-02 LAB
ANION GAP SERPL CALCULATED.3IONS-SCNC: 19 MMOL/L (ref 7–16)
BASOPHILS ABSOLUTE: 0.04 E9/L (ref 0–0.2)
BASOPHILS RELATIVE PERCENT: 0.6 % (ref 0–2)
BUN BLDV-MCNC: 11 MG/DL (ref 8–23)
C-REACTIVE PROTEIN: <0.1 MG/DL (ref 0–0.4)
CALCIUM SERPL-MCNC: 9.8 MG/DL (ref 8.6–10.2)
CHLORIDE BLD-SCNC: 103 MMOL/L (ref 98–107)
CO2: 21 MMOL/L (ref 22–29)
CREAT SERPL-MCNC: 0.9 MG/DL (ref 0.5–1)
EOSINOPHILS ABSOLUTE: 0.09 E9/L (ref 0.05–0.5)
EOSINOPHILS RELATIVE PERCENT: 1.4 % (ref 0–6)
GFR AFRICAN AMERICAN: >60
GFR NON-AFRICAN AMERICAN: >60 ML/MIN/1.73
GLUCOSE BLD-MCNC: 137 MG/DL (ref 74–99)
HCT VFR BLD CALC: 37.2 % (ref 34–48)
HEMOGLOBIN: 11.7 G/DL (ref 11.5–15.5)
IMMATURE GRANULOCYTES #: 0.02 E9/L
IMMATURE GRANULOCYTES %: 0.3 % (ref 0–5)
LYMPHOCYTES ABSOLUTE: 2.73 E9/L (ref 1.5–4)
LYMPHOCYTES RELATIVE PERCENT: 41.6 % (ref 20–42)
MAGNESIUM: 2 MG/DL (ref 1.6–2.6)
MCH RBC QN AUTO: 30.1 PG (ref 26–35)
MCHC RBC AUTO-ENTMCNC: 31.5 % (ref 32–34.5)
MCV RBC AUTO: 95.6 FL (ref 80–99.9)
MONOCYTES ABSOLUTE: 0.63 E9/L (ref 0.1–0.95)
MONOCYTES RELATIVE PERCENT: 9.6 % (ref 2–12)
NEUTROPHILS ABSOLUTE: 3.06 E9/L (ref 1.8–7.3)
NEUTROPHILS RELATIVE PERCENT: 46.5 % (ref 43–80)
PDW BLD-RTO: 14.1 FL (ref 11.5–15)
PLATELET # BLD: 170 E9/L (ref 130–450)
PMV BLD AUTO: 11.5 FL (ref 7–12)
POTASSIUM REFLEX MAGNESIUM: 3.5 MMOL/L (ref 3.5–5)
RBC # BLD: 3.89 E12/L (ref 3.5–5.5)
SEDIMENTATION RATE, ERYTHROCYTE: 25 MM/HR (ref 0–20)
SODIUM BLD-SCNC: 143 MMOL/L (ref 132–146)
WBC # BLD: 6.6 E9/L (ref 4.5–11.5)

## 2019-11-02 PROCEDURE — G0378 HOSPITAL OBSERVATION PER HR: HCPCS

## 2019-11-02 PROCEDURE — 2580000003 HC RX 258: Performed by: NURSE PRACTITIONER

## 2019-11-02 PROCEDURE — 6370000000 HC RX 637 (ALT 250 FOR IP): Performed by: NURSE PRACTITIONER

## 2019-11-02 PROCEDURE — 85025 COMPLETE CBC W/AUTO DIFF WBC: CPT

## 2019-11-02 PROCEDURE — 80048 BASIC METABOLIC PNL TOTAL CA: CPT

## 2019-11-02 PROCEDURE — 85651 RBC SED RATE NONAUTOMATED: CPT

## 2019-11-02 PROCEDURE — 6360000002 HC RX W HCPCS: Performed by: NURSE PRACTITIONER

## 2019-11-02 PROCEDURE — 6360000002 HC RX W HCPCS

## 2019-11-02 PROCEDURE — 86140 C-REACTIVE PROTEIN: CPT

## 2019-11-02 PROCEDURE — 2580000003 HC RX 258

## 2019-11-02 PROCEDURE — 96372 THER/PROPH/DIAG INJ SC/IM: CPT

## 2019-11-02 PROCEDURE — 83735 ASSAY OF MAGNESIUM: CPT

## 2019-11-02 PROCEDURE — 1200000000 HC SEMI PRIVATE

## 2019-11-02 PROCEDURE — 36415 COLL VENOUS BLD VENIPUNCTURE: CPT

## 2019-11-02 PROCEDURE — 74018 RADEX ABDOMEN 1 VIEW: CPT

## 2019-11-02 RX ADMIN — DORZOLAMIDE HYDROCHLORIDE 1 DROP: 20 SOLUTION/ DROPS OPHTHALMIC at 15:55

## 2019-11-02 RX ADMIN — ACETAMINOPHEN 650 MG: 325 TABLET, FILM COATED ORAL at 03:13

## 2019-11-02 RX ADMIN — Medication 10 ML: at 12:08

## 2019-11-02 RX ADMIN — METOPROLOL SUCCINATE 50 MG: 50 TABLET, EXTENDED RELEASE ORAL at 12:07

## 2019-11-02 RX ADMIN — HYDROXYCHLOROQUINE SULFATE 200 MG: 200 TABLET ORAL at 12:06

## 2019-11-02 RX ADMIN — GABAPENTIN 300 MG: 300 CAPSULE ORAL at 22:11

## 2019-11-02 RX ADMIN — DORZOLAMIDE HYDROCHLORIDE 1 DROP: 20 SOLUTION/ DROPS OPHTHALMIC at 01:17

## 2019-11-02 RX ADMIN — CEFTRIAXONE: 1 INJECTION, POWDER, FOR SOLUTION INTRAMUSCULAR; INTRAVENOUS at 03:04

## 2019-11-02 RX ADMIN — DORZOLAMIDE HYDROCHLORIDE 1 DROP: 20 SOLUTION/ DROPS OPHTHALMIC at 12:07

## 2019-11-02 RX ADMIN — WATER: 1 INJECTION INTRAMUSCULAR; INTRAVENOUS; SUBCUTANEOUS at 03:04

## 2019-11-02 RX ADMIN — ASPIRIN 81 MG: 81 TABLET, COATED ORAL at 12:06

## 2019-11-02 RX ADMIN — GABAPENTIN 300 MG: 300 CAPSULE ORAL at 01:17

## 2019-11-02 RX ADMIN — ROSUVASTATIN CALCIUM 10 MG: 10 TABLET, FILM COATED ORAL at 22:11

## 2019-11-02 RX ADMIN — POTASSIUM CHLORIDE 40 MEQ: 20 TABLET, EXTENDED RELEASE ORAL at 16:01

## 2019-11-02 RX ADMIN — Medication 10 ML: at 01:17

## 2019-11-02 RX ADMIN — DORZOLAMIDE HYDROCHLORIDE 1 DROP: 20 SOLUTION/ DROPS OPHTHALMIC at 22:11

## 2019-11-02 RX ADMIN — ENOXAPARIN SODIUM 40 MG: 40 INJECTION SUBCUTANEOUS at 12:07

## 2019-11-02 RX ADMIN — ACETAMINOPHEN 650 MG: 325 TABLET, FILM COATED ORAL at 22:15

## 2019-11-02 RX ADMIN — ACETAMINOPHEN 650 MG: 325 TABLET, FILM COATED ORAL at 15:55

## 2019-11-02 RX ADMIN — Medication 10 ML: at 22:16

## 2019-11-02 RX ADMIN — GABAPENTIN 300 MG: 300 CAPSULE ORAL at 12:07

## 2019-11-02 RX ADMIN — FAMOTIDINE 40 MG: 20 TABLET ORAL at 12:07

## 2019-11-02 ASSESSMENT — PAIN SCALES - GENERAL
PAINLEVEL_OUTOF10: 5
PAINLEVEL_OUTOF10: 5
PAINLEVEL_OUTOF10: 7
PAINLEVEL_OUTOF10: 10
PAINLEVEL_OUTOF10: 8

## 2019-11-02 ASSESSMENT — PAIN DESCRIPTION - LOCATION
LOCATION: BACK;SHOULDER
LOCATION: BACK;ARM;SHOULDER

## 2019-11-02 ASSESSMENT — PAIN DESCRIPTION - ORIENTATION
ORIENTATION: RIGHT
ORIENTATION: RIGHT

## 2019-11-02 ASSESSMENT — ENCOUNTER SYMPTOMS
ABDOMINAL PAIN: 0
DIARRHEA: 0
RESPIRATORY NEGATIVE: 1
COLOR CHANGE: 1
VOMITING: 0
CONSTIPATION: 0
EYES NEGATIVE: 1

## 2019-11-02 ASSESSMENT — PAIN DESCRIPTION - PAIN TYPE
TYPE: ACUTE PAIN
TYPE: ACUTE PAIN

## 2019-11-02 ASSESSMENT — PAIN DESCRIPTION - DESCRIPTORS
DESCRIPTORS: ACHING;DISCOMFORT;THROBBING
DESCRIPTORS: ACHING;DISCOMFORT

## 2019-11-02 ASSESSMENT — PAIN - FUNCTIONAL ASSESSMENT: PAIN_FUNCTIONAL_ASSESSMENT: PREVENTS OR INTERFERES SOME ACTIVE ACTIVITIES AND ADLS

## 2019-11-02 ASSESSMENT — PAIN DESCRIPTION - ONSET
ONSET: ON-GOING
ONSET: ON-GOING

## 2019-11-02 ASSESSMENT — PAIN DESCRIPTION - PROGRESSION: CLINICAL_PROGRESSION: GRADUALLY WORSENING

## 2019-11-02 ASSESSMENT — PAIN DESCRIPTION - FREQUENCY: FREQUENCY: INTERMITTENT

## 2019-11-03 VITALS
DIASTOLIC BLOOD PRESSURE: 65 MMHG | HEIGHT: 60 IN | RESPIRATION RATE: 16 BRPM | BODY MASS INDEX: 31.41 KG/M2 | OXYGEN SATURATION: 96 % | TEMPERATURE: 97 F | HEART RATE: 58 BPM | SYSTOLIC BLOOD PRESSURE: 127 MMHG | WEIGHT: 160 LBS

## 2019-11-03 LAB
ANION GAP SERPL CALCULATED.3IONS-SCNC: 15 MMOL/L (ref 7–16)
BASOPHILS ABSOLUTE: 0.04 E9/L (ref 0–0.2)
BASOPHILS RELATIVE PERCENT: 0.8 % (ref 0–2)
BUN BLDV-MCNC: 11 MG/DL (ref 8–23)
CALCIUM SERPL-MCNC: 9.7 MG/DL (ref 8.6–10.2)
CHLORIDE BLD-SCNC: 107 MMOL/L (ref 98–107)
CO2: 21 MMOL/L (ref 22–29)
CREAT SERPL-MCNC: 0.8 MG/DL (ref 0.5–1)
EOSINOPHILS ABSOLUTE: 0.12 E9/L (ref 0.05–0.5)
EOSINOPHILS RELATIVE PERCENT: 2.3 % (ref 0–6)
GFR AFRICAN AMERICAN: >60
GFR NON-AFRICAN AMERICAN: >60 ML/MIN/1.73
GLUCOSE BLD-MCNC: 96 MG/DL (ref 74–99)
HCT VFR BLD CALC: 39.9 % (ref 34–48)
HEMOGLOBIN: 12.5 G/DL (ref 11.5–15.5)
IMMATURE GRANULOCYTES #: 0.01 E9/L
IMMATURE GRANULOCYTES %: 0.2 % (ref 0–5)
LYMPHOCYTES ABSOLUTE: 1.87 E9/L (ref 1.5–4)
LYMPHOCYTES RELATIVE PERCENT: 35.3 % (ref 20–42)
MCH RBC QN AUTO: 30.2 PG (ref 26–35)
MCHC RBC AUTO-ENTMCNC: 31.3 % (ref 32–34.5)
MCV RBC AUTO: 96.4 FL (ref 80–99.9)
MONOCYTES ABSOLUTE: 0.47 E9/L (ref 0.1–0.95)
MONOCYTES RELATIVE PERCENT: 8.9 % (ref 2–12)
NEUTROPHILS ABSOLUTE: 2.78 E9/L (ref 1.8–7.3)
NEUTROPHILS RELATIVE PERCENT: 52.5 % (ref 43–80)
PDW BLD-RTO: 14.3 FL (ref 11.5–15)
PLATELET # BLD: 169 E9/L (ref 130–450)
PMV BLD AUTO: 11.2 FL (ref 7–12)
POTASSIUM REFLEX MAGNESIUM: 3.7 MMOL/L (ref 3.5–5)
RBC # BLD: 4.14 E12/L (ref 3.5–5.5)
SODIUM BLD-SCNC: 143 MMOL/L (ref 132–146)
WBC # BLD: 5.3 E9/L (ref 4.5–11.5)

## 2019-11-03 PROCEDURE — 80048 BASIC METABOLIC PNL TOTAL CA: CPT

## 2019-11-03 PROCEDURE — 6370000000 HC RX 637 (ALT 250 FOR IP): Performed by: NURSE PRACTITIONER

## 2019-11-03 PROCEDURE — 6360000002 HC RX W HCPCS: Performed by: NURSE PRACTITIONER

## 2019-11-03 PROCEDURE — 85025 COMPLETE CBC W/AUTO DIFF WBC: CPT

## 2019-11-03 PROCEDURE — G0378 HOSPITAL OBSERVATION PER HR: HCPCS

## 2019-11-03 PROCEDURE — 96372 THER/PROPH/DIAG INJ SC/IM: CPT

## 2019-11-03 PROCEDURE — 2580000003 HC RX 258: Performed by: NURSE PRACTITIONER

## 2019-11-03 PROCEDURE — 36415 COLL VENOUS BLD VENIPUNCTURE: CPT

## 2019-11-03 RX ORDER — FAMOTIDINE 40 MG/1
40 TABLET, FILM COATED ORAL DAILY
Qty: 60 TABLET | Refills: 3 | Status: SHIPPED | OUTPATIENT
Start: 2019-11-04

## 2019-11-03 RX ADMIN — Medication 10 ML: at 08:18

## 2019-11-03 RX ADMIN — FAMOTIDINE 40 MG: 20 TABLET ORAL at 08:18

## 2019-11-03 RX ADMIN — ACETAMINOPHEN 650 MG: 325 TABLET, FILM COATED ORAL at 09:45

## 2019-11-03 RX ADMIN — DORZOLAMIDE HYDROCHLORIDE 1 DROP: 20 SOLUTION/ DROPS OPHTHALMIC at 08:25

## 2019-11-03 RX ADMIN — ENOXAPARIN SODIUM 40 MG: 40 INJECTION SUBCUTANEOUS at 08:17

## 2019-11-03 RX ADMIN — ASPIRIN 81 MG: 81 TABLET, COATED ORAL at 08:17

## 2019-11-03 RX ADMIN — METOPROLOL SUCCINATE 50 MG: 50 TABLET, EXTENDED RELEASE ORAL at 08:18

## 2019-11-03 RX ADMIN — HYDROXYCHLOROQUINE SULFATE 200 MG: 200 TABLET ORAL at 08:17

## 2019-11-03 RX ADMIN — GABAPENTIN 300 MG: 300 CAPSULE ORAL at 08:17

## 2019-11-03 ASSESSMENT — ENCOUNTER SYMPTOMS
NAUSEA: 0
BACK PAIN: 1
ABDOMINAL PAIN: 0
RESPIRATORY NEGATIVE: 1
VOMITING: 0

## 2019-11-03 ASSESSMENT — PAIN SCALES - GENERAL: PAINLEVEL_OUTOF10: 0

## 2019-11-04 ENCOUNTER — PREP FOR PROCEDURE (OUTPATIENT)
Dept: UROLOGY | Age: 74
End: 2019-11-04

## 2019-11-04 ENCOUNTER — CARE COORDINATION (OUTPATIENT)
Dept: CARE COORDINATION | Age: 74
End: 2019-11-04

## 2019-11-04 ENCOUNTER — TELEPHONE (OUTPATIENT)
Dept: FAMILY MEDICINE CLINIC | Age: 74
End: 2019-11-04

## 2019-11-04 RX ORDER — SODIUM CHLORIDE 0.9 % (FLUSH) 0.9 %
10 SYRINGE (ML) INJECTION EVERY 12 HOURS SCHEDULED
Status: CANCELLED | OUTPATIENT
Start: 2019-11-04

## 2019-11-04 RX ORDER — SODIUM CHLORIDE 9 MG/ML
INJECTION, SOLUTION INTRAVENOUS CONTINUOUS
Status: CANCELLED | OUTPATIENT
Start: 2019-11-04

## 2019-11-04 RX ORDER — SODIUM CHLORIDE 0.9 % (FLUSH) 0.9 %
10 SYRINGE (ML) INJECTION PRN
Status: CANCELLED | OUTPATIENT
Start: 2019-11-04

## 2019-11-05 RX ORDER — OMEGA-3 FATTY ACIDS CAP DELAYED RELEASE 1000 MG 1000 MG
1000 CAPSULE DELAYED RELEASE ORAL DAILY
COMMUNITY

## 2019-11-07 ENCOUNTER — ANESTHESIA (OUTPATIENT)
Dept: OPERATING ROOM | Age: 74
End: 2019-11-07
Payer: COMMERCIAL

## 2019-11-07 ENCOUNTER — ANESTHESIA EVENT (OUTPATIENT)
Dept: OPERATING ROOM | Age: 74
End: 2019-11-07
Payer: COMMERCIAL

## 2019-11-07 ENCOUNTER — HOSPITAL ENCOUNTER (OUTPATIENT)
Age: 74
Setting detail: OUTPATIENT SURGERY
Discharge: HOME | End: 2019-11-07
Attending: UROLOGY | Admitting: UROLOGY
Payer: COMMERCIAL

## 2019-11-07 VITALS
WEIGHT: 160 LBS | TEMPERATURE: 97.2 F | DIASTOLIC BLOOD PRESSURE: 91 MMHG | BODY MASS INDEX: 31.41 KG/M2 | RESPIRATION RATE: 20 BRPM | HEART RATE: 92 BPM | HEIGHT: 60 IN | SYSTOLIC BLOOD PRESSURE: 152 MMHG | OXYGEN SATURATION: 99 %

## 2019-11-07 VITALS
SYSTOLIC BLOOD PRESSURE: 128 MMHG | RESPIRATION RATE: 5 BRPM | OXYGEN SATURATION: 100 % | DIASTOLIC BLOOD PRESSURE: 85 MMHG

## 2019-11-07 DIAGNOSIS — N13.2 HYDRONEPHROSIS CONCURRENT WITH AND DUE TO CALCULI OF KIDNEY AND URETER: ICD-10-CM

## 2019-11-07 DIAGNOSIS — Z01.818 PREOP TESTING: Primary | ICD-10-CM

## 2019-11-07 LAB
EKG ATRIAL RATE: 64 BPM
EKG P AXIS: 52 DEGREES
EKG P-R INTERVAL: 132 MS
EKG Q-T INTERVAL: 452 MS
EKG QRS DURATION: 122 MS
EKG QTC CALCULATION (BAZETT): 466 MS
EKG R AXIS: 78 DEGREES
EKG T AXIS: 21 DEGREES
EKG VENTRICULAR RATE: 64 BPM

## 2019-11-07 PROCEDURE — 7100000011 HC PHASE II RECOVERY - ADDTL 15 MIN: Performed by: UROLOGY

## 2019-11-07 PROCEDURE — 3600000013 HC SURGERY LEVEL 3 ADDTL 15MIN: Performed by: UROLOGY

## 2019-11-07 PROCEDURE — 2500000003 HC RX 250 WO HCPCS

## 2019-11-07 PROCEDURE — 3700000000 HC ANESTHESIA ATTENDED CARE: Performed by: UROLOGY

## 2019-11-07 PROCEDURE — 6360000002 HC RX W HCPCS: Performed by: NURSE PRACTITIONER

## 2019-11-07 PROCEDURE — 7100000000 HC PACU RECOVERY - FIRST 15 MIN: Performed by: UROLOGY

## 2019-11-07 PROCEDURE — 87088 URINE BACTERIA CULTURE: CPT

## 2019-11-07 PROCEDURE — 93005 ELECTROCARDIOGRAM TRACING: CPT | Performed by: NURSE PRACTITIONER

## 2019-11-07 PROCEDURE — 2580000003 HC RX 258

## 2019-11-07 PROCEDURE — C1758 CATHETER, URETERAL: HCPCS | Performed by: UROLOGY

## 2019-11-07 PROCEDURE — 3700000001 HC ADD 15 MINUTES (ANESTHESIA): Performed by: UROLOGY

## 2019-11-07 PROCEDURE — 93010 ELECTROCARDIOGRAM REPORT: CPT | Performed by: INTERNAL MEDICINE

## 2019-11-07 PROCEDURE — 7100000010 HC PHASE II RECOVERY - FIRST 15 MIN: Performed by: UROLOGY

## 2019-11-07 PROCEDURE — 7100000001 HC PACU RECOVERY - ADDTL 15 MIN: Performed by: UROLOGY

## 2019-11-07 PROCEDURE — 3600000003 HC SURGERY LEVEL 3 BASE: Performed by: UROLOGY

## 2019-11-07 PROCEDURE — C2617 STENT, NON-COR, TEM W/O DEL: HCPCS | Performed by: UROLOGY

## 2019-11-07 PROCEDURE — 6360000002 HC RX W HCPCS

## 2019-11-07 PROCEDURE — 6370000000 HC RX 637 (ALT 250 FOR IP): Performed by: UROLOGY

## 2019-11-07 PROCEDURE — 2500000003 HC RX 250 WO HCPCS: Performed by: ANESTHESIOLOGY

## 2019-11-07 DEVICE — UNIVERSA FIRM URETERAL STENT AND POSITIONER WITH HYDROPHILIC COATING
Type: IMPLANTABLE DEVICE | Site: URETER | Status: FUNCTIONAL
Brand: UNIVERSA

## 2019-11-07 RX ORDER — MEPERIDINE HYDROCHLORIDE 50 MG/ML
12.5 INJECTION INTRAMUSCULAR; INTRAVENOUS; SUBCUTANEOUS EVERY 5 MIN PRN
Status: DISCONTINUED | OUTPATIENT
Start: 2019-11-07 | End: 2019-11-07 | Stop reason: HOSPADM

## 2019-11-07 RX ORDER — DEXAMETHASONE SODIUM PHOSPHATE 10 MG/ML
INJECTION INTRAMUSCULAR; INTRAVENOUS PRN
Status: DISCONTINUED | OUTPATIENT
Start: 2019-11-07 | End: 2019-11-07 | Stop reason: SDUPTHER

## 2019-11-07 RX ORDER — EPHEDRINE SULFATE/0.9% NACL/PF 50 MG/5 ML
SYRINGE (ML) INTRAVENOUS PRN
Status: DISCONTINUED | OUTPATIENT
Start: 2019-11-07 | End: 2019-11-07 | Stop reason: SDUPTHER

## 2019-11-07 RX ORDER — SODIUM CHLORIDE 0.9 % (FLUSH) 0.9 %
10 SYRINGE (ML) INJECTION EVERY 12 HOURS SCHEDULED
Status: DISCONTINUED | OUTPATIENT
Start: 2019-11-07 | End: 2019-11-07 | Stop reason: HOSPADM

## 2019-11-07 RX ORDER — MORPHINE SULFATE 2 MG/ML
1 INJECTION, SOLUTION INTRAMUSCULAR; INTRAVENOUS EVERY 5 MIN PRN
Status: DISCONTINUED | OUTPATIENT
Start: 2019-11-07 | End: 2019-11-07 | Stop reason: HOSPADM

## 2019-11-07 RX ORDER — OXYCODONE HYDROCHLORIDE AND ACETAMINOPHEN 5; 325 MG/1; MG/1
1 TABLET ORAL EVERY 6 HOURS PRN
Status: DISCONTINUED | OUTPATIENT
Start: 2019-11-07 | End: 2019-11-07 | Stop reason: HOSPADM

## 2019-11-07 RX ORDER — PROPOFOL 10 MG/ML
INJECTION, EMULSION INTRAVENOUS PRN
Status: DISCONTINUED | OUTPATIENT
Start: 2019-11-07 | End: 2019-11-07 | Stop reason: SDUPTHER

## 2019-11-07 RX ORDER — HYDROCODONE BITARTRATE AND ACETAMINOPHEN 5; 325 MG/1; MG/1
1 TABLET ORAL EVERY 4 HOURS PRN
Qty: 10 TABLET | Refills: 0 | Status: SHIPPED | OUTPATIENT
Start: 2019-11-07 | End: 2019-11-14

## 2019-11-07 RX ORDER — LABETALOL HYDROCHLORIDE 5 MG/ML
10 INJECTION, SOLUTION INTRAVENOUS ONCE
Status: COMPLETED | OUTPATIENT
Start: 2019-11-07 | End: 2019-11-07

## 2019-11-07 RX ORDER — ONDANSETRON 2 MG/ML
4 INJECTION INTRAMUSCULAR; INTRAVENOUS EVERY 6 HOURS PRN
Status: CANCELLED | OUTPATIENT
Start: 2019-11-07

## 2019-11-07 RX ORDER — SODIUM CHLORIDE 0.9 % (FLUSH) 0.9 %
10 SYRINGE (ML) INJECTION PRN
Status: DISCONTINUED | OUTPATIENT
Start: 2019-11-07 | End: 2019-11-07 | Stop reason: HOSPADM

## 2019-11-07 RX ORDER — GLYCOPYRROLATE 1 MG/5 ML
SYRINGE (ML) INTRAVENOUS PRN
Status: DISCONTINUED | OUTPATIENT
Start: 2019-11-07 | End: 2019-11-07 | Stop reason: SDUPTHER

## 2019-11-07 RX ORDER — HYDROCODONE BITARTRATE AND ACETAMINOPHEN 5; 325 MG/1; MG/1
2 TABLET ORAL PRN
Status: DISCONTINUED | OUTPATIENT
Start: 2019-11-07 | End: 2019-11-07 | Stop reason: HOSPADM

## 2019-11-07 RX ORDER — CEPHALEXIN 500 MG/1
500 CAPSULE ORAL 2 TIMES DAILY
Qty: 10 CAPSULE | Refills: 1 | Status: SHIPPED | OUTPATIENT
Start: 2019-11-07 | End: 2019-11-12

## 2019-11-07 RX ORDER — MORPHINE SULFATE 2 MG/ML
2 INJECTION, SOLUTION INTRAMUSCULAR; INTRAVENOUS EVERY 4 HOURS PRN
Status: DISCONTINUED | OUTPATIENT
Start: 2019-11-07 | End: 2019-11-07 | Stop reason: HOSPADM

## 2019-11-07 RX ORDER — SUCCINYLCHOLINE/SOD CL,ISO/PF 100 MG/5ML
SYRINGE (ML) INTRAVENOUS PRN
Status: DISCONTINUED | OUTPATIENT
Start: 2019-11-07 | End: 2019-11-07 | Stop reason: SDUPTHER

## 2019-11-07 RX ORDER — SODIUM CHLORIDE 9 MG/ML
INJECTION, SOLUTION INTRAVENOUS CONTINUOUS PRN
Status: DISCONTINUED | OUTPATIENT
Start: 2019-11-07 | End: 2019-11-07 | Stop reason: SDUPTHER

## 2019-11-07 RX ORDER — FENTANYL CITRATE 50 UG/ML
INJECTION, SOLUTION INTRAMUSCULAR; INTRAVENOUS PRN
Status: DISCONTINUED | OUTPATIENT
Start: 2019-11-07 | End: 2019-11-07 | Stop reason: SDUPTHER

## 2019-11-07 RX ORDER — CEFAZOLIN SODIUM 2 G/50ML
2 SOLUTION INTRAVENOUS
Status: COMPLETED | OUTPATIENT
Start: 2019-11-07 | End: 2019-11-07

## 2019-11-07 RX ORDER — ONDANSETRON 2 MG/ML
INJECTION INTRAMUSCULAR; INTRAVENOUS PRN
Status: DISCONTINUED | OUTPATIENT
Start: 2019-11-07 | End: 2019-11-07 | Stop reason: SDUPTHER

## 2019-11-07 RX ORDER — ATROPA BELLADONNA AND OPIUM 16.2; 6 MG/1; MG/1
SUPPOSITORY RECTAL PRN
Status: DISCONTINUED | OUTPATIENT
Start: 2019-11-07 | End: 2019-11-07 | Stop reason: ALTCHOICE

## 2019-11-07 RX ORDER — LABETALOL HYDROCHLORIDE 5 MG/ML
INJECTION, SOLUTION INTRAVENOUS
Status: COMPLETED
Start: 2019-11-07 | End: 2019-11-07

## 2019-11-07 RX ORDER — PHENAZOPYRIDINE HYDROCHLORIDE 200 MG/1
200 TABLET, FILM COATED ORAL 2 TIMES DAILY PRN
Qty: 10 TABLET | Refills: 2 | Status: ON HOLD
Start: 2019-11-07 | End: 2020-07-02 | Stop reason: HOSPADM

## 2019-11-07 RX ORDER — MORPHINE SULFATE 2 MG/ML
2 INJECTION, SOLUTION INTRAMUSCULAR; INTRAVENOUS EVERY 5 MIN PRN
Status: DISCONTINUED | OUTPATIENT
Start: 2019-11-07 | End: 2019-11-07 | Stop reason: HOSPADM

## 2019-11-07 RX ORDER — SODIUM CHLORIDE 9 MG/ML
INJECTION, SOLUTION INTRAVENOUS CONTINUOUS
Status: DISCONTINUED | OUTPATIENT
Start: 2019-11-07 | End: 2019-11-07 | Stop reason: HOSPADM

## 2019-11-07 RX ORDER — PROMETHAZINE HYDROCHLORIDE 25 MG/ML
6.25 INJECTION, SOLUTION INTRAMUSCULAR; INTRAVENOUS EVERY 10 MIN PRN
Status: DISCONTINUED | OUTPATIENT
Start: 2019-11-07 | End: 2019-11-07 | Stop reason: HOSPADM

## 2019-11-07 RX ORDER — HYDROCODONE BITARTRATE AND ACETAMINOPHEN 5; 325 MG/1; MG/1
1 TABLET ORAL PRN
Status: DISCONTINUED | OUTPATIENT
Start: 2019-11-07 | End: 2019-11-07 | Stop reason: HOSPADM

## 2019-11-07 RX ADMIN — PROPOFOL 150 MG: 10 INJECTION, EMULSION INTRAVENOUS at 11:04

## 2019-11-07 RX ADMIN — LABETALOL HYDROCHLORIDE 10 MG: 5 INJECTION INTRAVENOUS at 09:35

## 2019-11-07 RX ADMIN — Medication 0.2 MG: at 11:41

## 2019-11-07 RX ADMIN — CEFAZOLIN SODIUM 2 G: 2 SOLUTION INTRAVENOUS at 11:00

## 2019-11-07 RX ADMIN — ONDANSETRON HYDROCHLORIDE 4 MG: 2 INJECTION, SOLUTION INTRAMUSCULAR; INTRAVENOUS at 11:04

## 2019-11-07 RX ADMIN — Medication 20 MG: at 11:16

## 2019-11-07 RX ADMIN — FENTANYL CITRATE 50 MCG: 50 INJECTION, SOLUTION INTRAMUSCULAR; INTRAVENOUS at 11:04

## 2019-11-07 RX ADMIN — FENTANYL CITRATE 50 MCG: 50 INJECTION, SOLUTION INTRAMUSCULAR; INTRAVENOUS at 11:35

## 2019-11-07 RX ADMIN — SODIUM CHLORIDE: 9 INJECTION, SOLUTION INTRAVENOUS at 11:00

## 2019-11-07 RX ADMIN — LABETALOL HYDROCHLORIDE 10 MG: 5 INJECTION, SOLUTION INTRAVENOUS at 09:35

## 2019-11-07 RX ADMIN — Medication 10 MG: at 11:37

## 2019-11-07 RX ADMIN — Medication 20 MG: at 11:25

## 2019-11-07 RX ADMIN — Medication 60 MG: at 11:05

## 2019-11-07 RX ADMIN — DEXAMETHASONE SODIUM PHOSPHATE 10 MG: 10 INJECTION INTRAMUSCULAR; INTRAVENOUS at 11:04

## 2019-11-07 RX ADMIN — LABETALOL HYDROCHLORIDE 10 MG: 5 INJECTION INTRAVENOUS at 13:30

## 2019-11-07 ASSESSMENT — PAIN SCALES - GENERAL
PAINLEVEL_OUTOF10: 2
PAINLEVEL_OUTOF10: 3
PAINLEVEL_OUTOF10: 2
PAINLEVEL_OUTOF10: 8

## 2019-11-07 ASSESSMENT — PAIN DESCRIPTION - PAIN TYPE
TYPE: SURGICAL PAIN
TYPE: SURGICAL PAIN
TYPE: CHRONIC PAIN
TYPE: SURGICAL PAIN

## 2019-11-07 ASSESSMENT — PULMONARY FUNCTION TESTS
PIF_VALUE: 23
PIF_VALUE: 20
PIF_VALUE: 10
PIF_VALUE: 0
PIF_VALUE: 0
PIF_VALUE: 19
PIF_VALUE: 11
PIF_VALUE: 11
PIF_VALUE: 1
PIF_VALUE: 19
PIF_VALUE: 11
PIF_VALUE: 11
PIF_VALUE: 19
PIF_VALUE: 0
PIF_VALUE: 11
PIF_VALUE: 0
PIF_VALUE: 17
PIF_VALUE: 11
PIF_VALUE: 2
PIF_VALUE: 21
PIF_VALUE: 17
PIF_VALUE: 1
PIF_VALUE: 0
PIF_VALUE: 11
PIF_VALUE: 17
PIF_VALUE: 11
PIF_VALUE: 0
PIF_VALUE: 0
PIF_VALUE: 1
PIF_VALUE: 11
PIF_VALUE: 10
PIF_VALUE: 11
PIF_VALUE: 0
PIF_VALUE: 17
PIF_VALUE: 11
PIF_VALUE: 0
PIF_VALUE: 0
PIF_VALUE: 11
PIF_VALUE: 0
PIF_VALUE: 11
PIF_VALUE: 11
PIF_VALUE: 17
PIF_VALUE: 0
PIF_VALUE: 11
PIF_VALUE: 2
PIF_VALUE: 11
PIF_VALUE: 11
PIF_VALUE: 17
PIF_VALUE: 10
PIF_VALUE: 11
PIF_VALUE: 0
PIF_VALUE: 11
PIF_VALUE: 17
PIF_VALUE: 15
PIF_VALUE: 0
PIF_VALUE: 11
PIF_VALUE: 17
PIF_VALUE: 15
PIF_VALUE: 2
PIF_VALUE: 1
PIF_VALUE: 11
PIF_VALUE: 21
PIF_VALUE: 1
PIF_VALUE: 11
PIF_VALUE: 17
PIF_VALUE: 0
PIF_VALUE: 11
PIF_VALUE: 17

## 2019-11-07 ASSESSMENT — PAIN DESCRIPTION - ONSET: ONSET: ON-GOING

## 2019-11-07 ASSESSMENT — PAIN DESCRIPTION - ORIENTATION
ORIENTATION: LOWER
ORIENTATION: LEFT
ORIENTATION: LOWER
ORIENTATION: LOWER

## 2019-11-07 ASSESSMENT — PAIN DESCRIPTION - FREQUENCY: FREQUENCY: CONTINUOUS

## 2019-11-07 ASSESSMENT — PAIN DESCRIPTION - DESCRIPTORS
DESCRIPTORS: DISCOMFORT;PRESSURE
DESCRIPTORS: PRESSURE;DISCOMFORT
DESCRIPTORS: DISCOMFORT;CONSTANT

## 2019-11-07 ASSESSMENT — PAIN DESCRIPTION - LOCATION
LOCATION: ABDOMEN
LOCATION: ARM
LOCATION: ABDOMEN

## 2019-11-07 ASSESSMENT — ENCOUNTER SYMPTOMS: SHORTNESS OF BREATH: 1

## 2019-11-07 ASSESSMENT — PAIN DESCRIPTION - PROGRESSION: CLINICAL_PROGRESSION: NOT CHANGED

## 2019-11-09 LAB — URINE CULTURE, ROUTINE: NORMAL

## 2019-11-28 DIAGNOSIS — H26.9 CATARACT, UNSPECIFIED CATARACT TYPE, UNSPECIFIED LATERALITY: ICD-10-CM

## 2019-12-02 RX ORDER — LATANOPROST 50 UG/ML
SOLUTION/ DROPS OPHTHALMIC
Qty: 2.5 ML | Refills: 1 | Status: SHIPPED | OUTPATIENT
Start: 2019-12-02 | End: 2020-01-23

## 2020-02-11 ENCOUNTER — TELEPHONE (OUTPATIENT)
Dept: FAMILY MEDICINE CLINIC | Age: 75
End: 2020-02-11

## 2020-02-11 RX ORDER — CALCIUM CARBONATE 160(400)MG
1 TABLET,CHEWABLE ORAL CONTINUOUS
Qty: 1 EACH | Refills: 0 | Status: SHIPPED | OUTPATIENT
Start: 2020-02-11

## 2020-02-14 RX ORDER — LATANOPROST 50 UG/ML
SOLUTION/ DROPS OPHTHALMIC
Qty: 2.5 ML | Refills: 0 | Status: SHIPPED | OUTPATIENT
Start: 2020-02-14

## 2020-03-09 RX ORDER — LATANOPROST 50 UG/ML
SOLUTION/ DROPS OPHTHALMIC
Qty: 2.5 ML | Refills: 0 | OUTPATIENT
Start: 2020-03-09

## 2020-06-04 ENCOUNTER — OFFICE VISIT (OUTPATIENT)
Dept: VASCULAR SURGERY | Age: 75
End: 2020-06-04
Payer: COMMERCIAL

## 2020-06-04 ENCOUNTER — TELEPHONE (OUTPATIENT)
Dept: VASCULAR SURGERY | Age: 75
End: 2020-06-04

## 2020-06-04 VITALS — HEIGHT: 60 IN | RESPIRATION RATE: 16 BRPM | BODY MASS INDEX: 29.45 KG/M2 | WEIGHT: 150 LBS

## 2020-06-04 PROBLEM — I73.9 PVD (PERIPHERAL VASCULAR DISEASE) WITH CLAUDICATION (HCC): Status: ACTIVE | Noted: 2020-06-04

## 2020-06-04 PROCEDURE — 3017F COLORECTAL CA SCREEN DOC REV: CPT | Performed by: SURGERY

## 2020-06-04 PROCEDURE — 1123F ACP DISCUSS/DSCN MKR DOCD: CPT | Performed by: SURGERY

## 2020-06-04 PROCEDURE — 1090F PRES/ABSN URINE INCON ASSESS: CPT | Performed by: SURGERY

## 2020-06-04 PROCEDURE — G8417 CALC BMI ABV UP PARAM F/U: HCPCS | Performed by: SURGERY

## 2020-06-04 PROCEDURE — G8427 DOCREV CUR MEDS BY ELIG CLIN: HCPCS | Performed by: SURGERY

## 2020-06-04 PROCEDURE — 99215 OFFICE O/P EST HI 40 MIN: CPT | Performed by: SURGERY

## 2020-06-04 PROCEDURE — 1036F TOBACCO NON-USER: CPT | Performed by: SURGERY

## 2020-06-04 PROCEDURE — 4040F PNEUMOC VAC/ADMIN/RCVD: CPT | Performed by: SURGERY

## 2020-06-04 PROCEDURE — G8400 PT W/DXA NO RESULTS DOC: HCPCS | Performed by: SURGERY

## 2020-06-04 RX ORDER — DOXYCYCLINE HYCLATE 100 MG/1
100 CAPSULE ORAL 2 TIMES DAILY
Qty: 20 CAPSULE | Refills: 0 | Status: SHIPPED | OUTPATIENT
Start: 2020-06-04 | End: 2020-06-14

## 2020-06-04 RX ORDER — CILOSTAZOL 100 MG/1
100 TABLET ORAL 2 TIMES DAILY
Qty: 60 TABLET | Refills: 11 | Status: SHIPPED
Start: 2020-06-04 | End: 2021-10-12

## 2020-06-04 NOTE — PROGRESS NOTES
TABLET BY MOUTH EVERY DAY. 90 tablet 0    phenazopyridine (PYRIDIUM) 200 MG tablet Take 1 tablet by mouth 2 times daily as needed for Pain 10 tablet 2    Multiple Vitamins-Minerals (VITAMIN D3 COMPLETE PO) Take 2,000 Units by mouth      Omega-3 Fatty Acids (FISH OIL) 1000 MG CPDR Take 1,000 mg by mouth daily STOP PREOP MED      famotidine (PEPCID) 40 MG tablet Take 1 tablet by mouth daily 60 tablet 3    Tofacitinib Citrate (XELJANZ PO) Take 11 mg by mouth       aspirin 81 MG EC tablet TAKE 1 TABLET BY MOUTH DAILY 90 tablet 3    gabapentin (NEURONTIN) 300 MG capsule TAKE 1 CAPSULE BY MOUTH TWICE A  capsule 0    metoprolol succinate (TOPROL XL) 50 MG extended release tablet Take 1 tablet by mouth daily 90 tablet 1    brinzolamide (AZOPT) 1 % ophthalmic suspension Place 1 drop into the left eye 3 times daily 1 Bottle 2    hydroxychloroquine (PLAQUENIL) 200 MG tablet Take 200 mg by mouth 2 times daily       Cholecalciferol (VITAMIN D) 2000 units TABS tablet Take 2,000 Units by mouth daily        No current facility-administered medications for this visit. Past Medical History:   Diagnosis Date    Allergy     Arthritis     Bladder prolapse     CAD (coronary artery disease)     MI  2011    Carotid artery stenosis     Right carotid stenosis.     Carotid bruit 1/10/2013    Carotid stenosis, left 12/13/2012    Cerebrovascular accident (stroke) ([address]     pt states 13 yrs ago    Decreased radial pulse 7/6/2017    Gastric reflux     Glaucoma     History of blood transfusion     Hyperlipemia     Hypertension     Insomnia     PVD (peripheral vascular disease) with claudication ([address] 6/4/2020    Restless leg syndrome     S/P carotid endarterectomy 1/10/2013    Sinusitis        Past Surgical History:   Procedure Laterality Date    ANTERIOR FIXATION OF THORACIC SPINE      BACK SURGERY      cervical c5-c6    CAROTID ENDARTERECTOMY  1998    right    CAROTID ENDARTERECTOMY  12/18/2012 Both feet are warm to touch. The color of both feet is normal.    Patient has mild swelling of the left leg mainly below the knee with some rash particular over the anterolateral compartment, both the feet normal color and warm to touch, patient with clinical evaluation no significant compromise of blood flow noted    Pulses Right  Left    Brachial 3 3    Radial    3=normal   Femoral 1-2 1-2  2=diminished   Popliteal    1=barely palpable   Dorsalis pedis 1 0  0=absent   Posterior tibial    4=aneurysmal             Other pertinent information:1. The past medical records were reviewed. 2.  Venous ultrasound study, normal no evidence of deep vein thrombosis lower left leg    2. The ankle-brachial index on the Doppler study revealed on the right side, 0.67, left 0.27    Assessment:    1. Bruit of left carotid artery    2. PVD (peripheral vascular disease) with claudication ([address]    3.  S/P carotid endarterectomy              Plan:       I had a long detailed discussion with patient, all options, risks benefits and alternatives were explained, the clinical findings namely the foot being warm, normal color, does not correlate well with the decreased ankle-brachial index, patient was recommended complete formal lower extremity artery Doppler study with pulse volume recordings and toe tracings at [facility] in view of underlying known history of carotid artery disease with history of carotid enterectomy, patient also recommended carotid ultrasound and to call me PRN if any increasing symptoms    Patient also recommended, if the rash does not improve over the next few weeks, to discuss with he PCP, regarding possible dermatology evaluation    Because of slight erythema and tenderness, patient recommended doxycycline along with a trial of Pletal pending complete vascular work-up and to call me PRN if any increasing symptoms          Patient was instructed to continue walking program and to call if any

## 2020-06-29 ENCOUNTER — HOSPITAL ENCOUNTER (OUTPATIENT)
Age: 75
Setting detail: OBSERVATION
Discharge: HOME | End: 2020-07-02
Attending: EMERGENCY MEDICINE | Admitting: INTERNAL MEDICINE
Payer: COMMERCIAL

## 2020-06-29 ENCOUNTER — APPOINTMENT (OUTPATIENT)
Dept: INTERVENTIONAL RADIOLOGY/VASCULAR | Age: 75
End: 2020-06-29
Payer: COMMERCIAL

## 2020-06-29 ENCOUNTER — APPOINTMENT (OUTPATIENT)
Dept: ULTRASOUND IMAGING | Age: 75
End: 2020-06-29
Payer: COMMERCIAL

## 2020-06-29 PROBLEM — I73.9 PAD (PERIPHERAL ARTERY DISEASE) (HCC): Status: ACTIVE | Noted: 2020-06-29

## 2020-06-29 LAB
ALBUMIN SERPL-MCNC: 4.1 G/DL (ref 3.5–5.2)
ALP BLD-CCNC: 60 U/L (ref 35–104)
ALT SERPL-CCNC: 9 U/L (ref 0–32)
ANION GAP SERPL CALCULATED.3IONS-SCNC: 12 MMOL/L (ref 7–16)
APTT: 29.7 SEC (ref 24.5–35.1)
AST SERPL-CCNC: 23 U/L (ref 0–31)
BASOPHILS ABSOLUTE: 0.03 E9/L (ref 0–0.2)
BASOPHILS RELATIVE PERCENT: 0.6 % (ref 0–2)
BILIRUB SERPL-MCNC: 0.4 MG/DL (ref 0–1.2)
BUN BLDV-MCNC: 15 MG/DL (ref 8–23)
CALCIUM SERPL-MCNC: 9.4 MG/DL (ref 8.6–10.2)
CHLORIDE BLD-SCNC: 110 MMOL/L (ref 98–107)
CO2: 22 MMOL/L (ref 22–29)
CREAT SERPL-MCNC: 0.9 MG/DL (ref 0.5–1)
EOSINOPHILS ABSOLUTE: 0.11 E9/L (ref 0.05–0.5)
EOSINOPHILS RELATIVE PERCENT: 2.1 % (ref 0–6)
GFR AFRICAN AMERICAN: >60
GFR NON-AFRICAN AMERICAN: >60 ML/MIN/1.73
GLUCOSE BLD-MCNC: 121 MG/DL (ref 74–99)
HCT VFR BLD CALC: 32.3 % (ref 34–48)
HEMOGLOBIN: 10 G/DL (ref 11.5–15.5)
IMMATURE GRANULOCYTES #: 0.02 E9/L
IMMATURE GRANULOCYTES %: 0.4 % (ref 0–5)
INR BLD: 1.1
LACTIC ACID: 2 MMOL/L (ref 0.5–2.2)
LYMPHOCYTES ABSOLUTE: 0.76 E9/L (ref 1.5–4)
LYMPHOCYTES RELATIVE PERCENT: 14.5 % (ref 20–42)
MCH RBC QN AUTO: 29.4 PG (ref 26–35)
MCHC RBC AUTO-ENTMCNC: 31 % (ref 32–34.5)
MCV RBC AUTO: 95 FL (ref 80–99.9)
MONOCYTES ABSOLUTE: 0.41 E9/L (ref 0.1–0.95)
MONOCYTES RELATIVE PERCENT: 7.8 % (ref 2–12)
NEUTROPHILS ABSOLUTE: 3.91 E9/L (ref 1.8–7.3)
NEUTROPHILS RELATIVE PERCENT: 74.6 % (ref 43–80)
PDW BLD-RTO: 19.5 FL (ref 11.5–15)
PLATELET # BLD: 207 E9/L (ref 130–450)
PMV BLD AUTO: 9.7 FL (ref 7–12)
POTASSIUM REFLEX MAGNESIUM: 4.1 MMOL/L (ref 3.5–5)
PROTHROMBIN TIME: 12.5 SEC (ref 9.3–12.4)
RBC # BLD: 3.4 E12/L (ref 3.5–5.5)
SODIUM BLD-SCNC: 144 MMOL/L (ref 132–146)
TOTAL PROTEIN: 7 G/DL (ref 6.4–8.3)
WBC # BLD: 5.2 E9/L (ref 4.5–11.5)

## 2020-06-29 PROCEDURE — 80053 COMPREHEN METABOLIC PANEL: CPT

## 2020-06-29 PROCEDURE — 85610 PROTHROMBIN TIME: CPT

## 2020-06-29 PROCEDURE — 93923 UPR/LXTR ART STDY 3+ LVLS: CPT

## 2020-06-29 PROCEDURE — 85025 COMPLETE CBC W/AUTO DIFF WBC: CPT

## 2020-06-29 PROCEDURE — 93971 EXTREMITY STUDY: CPT

## 2020-06-29 PROCEDURE — 83605 ASSAY OF LACTIC ACID: CPT

## 2020-06-29 PROCEDURE — 85730 THROMBOPLASTIN TIME PARTIAL: CPT

## 2020-06-29 PROCEDURE — G0378 HOSPITAL OBSERVATION PER HR: HCPCS

## 2020-06-29 PROCEDURE — 99285 EMERGENCY DEPT VISIT HI MDM: CPT

## 2020-06-29 PROCEDURE — 99214 OFFICE O/P EST MOD 30 MIN: CPT | Performed by: SURGERY

## 2020-06-29 NOTE — ED NOTES
FIRST PROVIDER CONTACT ASSESSMENT NOTE      Department of Emergency Medicine   ED  First Provider Note   6/29/20  1:19 PM EDT    Chief Complaint: Foot Pain (left foot swelling/numbness x  a few months and rash x 1 month. )      History of Present Illness:    Cali Mccollum is a 75 y.o. female who presents to the ED by visiting nurse for swelling to LLE and redness. No fever or chills. No nausea or vomiting. ni injury/trauma. Focused Screening Exam:  Constitutional:  Alert, appears stated age and is in no distress.       *ALLERGIES*     Iodine; Macrodantin [nitrofurantoin]; and Sulfa antibiotics     ED Triage Vitals   BP Temp Temp Source Pulse Resp SpO2 Height Weight   06/29/20 1307 06/29/20 1301 06/29/20 1301 06/29/20 1301 06/29/20 1301 06/29/20 1301 06/29/20 1301 06/29/20 1301   (!) 161/84 97.9 °F (36.6 °C) Temporal 79 18 94 % 5' (1.524 m) 160 lb (72.6 kg)        Initial Plan of Care:  Initiate Treatment-Testing, Proceed toTreatment Area When Bed Available for ED Attending/MLP to Continue Care    -----------------END OF FIRST PROVIDER CONTACT ASSESSMENT NOTE--------------  Electronically signed by GRACE Granados CNP   DD: 6/29/20     GRACE Granados CNP  06/29/20 1322

## 2020-06-29 NOTE — ED NOTES
Bed: 18B-18  Expected date:   Expected time:   Means of arrival:   Comments:  triage     Jimena Martin RN  06/29/20 191

## 2020-06-29 NOTE — ED PROVIDER NOTES
HPI:  6/29/20,   Time: 7:27 PM EDT         Saeed Ceja is a 75 y.o. female presenting to the ED for Left Foot pain , beginning several months ago. The complaint has been persistent, severe in severity, and worsened by walking. Patient states she is having pain for several weeks to months is gotten worse over the last several days. She has known history of peripheral vascular disease restless leg syndrome. She was sent in by visiting physicians for evaluation of her numbness. She did have an outpatient order for arterial Dopplers of the legs. She denies any fevers or chills or shortness of breath. She has no nausea or vomiting. States symptoms are worse with walking. ROS:   Pertinent positives and negatives are stated within HPI, all other systems reviewed and are negative.  --------------------------------------------- PAST HISTORY ---------------------------------------------  Past Medical History:  has a past medical history of Allergy, Arthritis, Bladder prolapse, CAD (coronary artery disease), Carotid artery stenosis, Carotid bruit, Carotid stenosis, left, Cerebrovascular accident (stroke) ([facility] [address] Decreased radial pulse, Gastric reflux, Glaucoma, History of blood transfusion, Hyperlipemia, Hypertension, Insomnia, PVD (peripheral vascular disease) with claudication ([facility] [address] Restless leg syndrome, S/P carotid endarterectomy, and Sinusitis. Past Surgical History:  has a past surgical history that includes Cataract removal; Hysterectomy; knee surgery; skin biopsy; Carpal tunnel release; Dental surgery; Tonsillectomy; Coronary angioplasty with stent (09/06/2011); Carotid endarterectomy (1998); Diagnostic Cardiac Cath Lab Procedure (9/6/11); Anterior Fixation of Thoracic Spine; Cervical disc surgery (2002); Carotid endarterectomy (12/18/2012); Knee Arthroplasty (Left, 4-11-13); back surgery; Cystoscopy; joint replacement; joint replacement (Right);  Lithotripsy (Right, 11/7/2019); and Lithotripsy. Social History:  reports that she quit smoking about 19 years ago. Her smoking use included cigarettes. She has a 100.00 pack-year smoking history. She has never used smokeless tobacco. She reports that she does not drink alcohol or use drugs. Family History: family history is not on file. The patients home medications have been reviewed.     Allergies: Iodine; Macrodantin [nitrofurantoin]; and Sulfa antibiotics    -------------------------------------------------- RESULTS -------------------------------------------------  All laboratory and radiology results have been personally reviewed by myself   LABS:  Results for orders placed or performed during the hospital encounter of 06/29/20   CBC Auto Differential   Result Value Ref Range    WBC 5.2 4.5 - 11.5 E9/L    RBC 3.40 (L) 3.50 - 5.50 E12/L    Hemoglobin 10.0 (L) 11.5 - 15.5 g/dL    Hematocrit 32.3 (L) 34.0 - 48.0 %    MCV 95.0 80.0 - 99.9 fL    MCH 29.4 26.0 - 35.0 pg    MCHC 31.0 (L) 32.0 - 34.5 %    RDW 19.5 (H) 11.5 - 15.0 fL    Platelets 489 901 - 387 E9/L    MPV 9.7 7.0 - 12.0 fL    Neutrophils % 74.6 43.0 - 80.0 %    Immature Granulocytes % 0.4 0.0 - 5.0 %    Lymphocytes % 14.5 (L) 20.0 - 42.0 %    Monocytes % 7.8 2.0 - 12.0 %    Eosinophils % 2.1 0.0 - 6.0 %    Basophils % 0.6 0.0 - 2.0 %    Neutrophils Absolute 3.91 1.80 - 7.30 E9/L    Immature Granulocytes # 0.02 E9/L    Lymphocytes Absolute 0.76 (L) 1.50 - 4.00 E9/L    Monocytes Absolute 0.41 0.10 - 0.95 E9/L    Eosinophils Absolute 0.11 0.05 - 0.50 E9/L    Basophils Absolute 0.03 0.00 - 0.20 E9/L   Comprehensive Metabolic Panel w/ Reflex to MG   Result Value Ref Range    Sodium 144 132 - 146 mmol/L    Potassium reflex Magnesium 4.1 3.5 - 5.0 mmol/L    Chloride 110 (H) 98 - 107 mmol/L    CO2 22 22 - 29 mmol/L    Anion Gap 12 7 - 16 mmol/L    Glucose 121 (H) 74 - 99 mg/dL    BUN 15 8 - 23 mg/dL    CREATININE 0.9 0.5 - 1.0 mg/dL    GFR Non-African American >60 >=60 mL/min/1.73 GFR African American >60     Calcium 9.4 8.6 - 10.2 mg/dL    Total Protein 7.0 6.4 - 8.3 g/dL    Alb 4.1 3.5 - 5.2 g/dL    Total Bilirubin 0.4 0.0 - 1.2 mg/dL    Alkaline Phosphatase 60 35 - 104 U/L    ALT 9 0 - 32 U/L    AST 23 0 - 31 U/L   Protime-INR   Result Value Ref Range    Protime 12.5 (H) 9.3 - 12.4 sec    INR 1.1    APTT   Result Value Ref Range    aPTT 29.7 24.5 - 35.1 sec   Lactic Acid, Plasma   Result Value Ref Range    Lactic Acid 2.0 0.5 - 2.2 mmol/L       RADIOLOGY:  Interpreted by Radiologist.  VL LOWER EXTREMITY ARTERIAL SEGMENTAL PRESSURES W PPG BILATERAL   Final Result      US DUP LOWER EXTREMITY LEFT RUDI   Final Result   No evidence of deep venous thrombosis in the left lower extremity.             ------------------------- NURSING NOTES AND VITALS REVIEWED ---------------------------   The nursing notes within the ED encounter and vital signs as below have been reviewed. BP (!) 154/73   Pulse 78   Temp 97.6 °F (36.4 °C)   Resp 17   Ht 5' (1.524 m)   Wt 160 lb (72.6 kg)   SpO2 98%   BMI 31.25 kg/m²   Oxygen Saturation Interpretation: Normal      ---------------------------------------------------PHYSICAL EXAM--------------------------------------      Constitutional/General: Alert and oriented x3, well appearing, non toxic in NAD  Head: NC/AT  Eyes: PERRL, EOMI  Mouth: Oropharynx clear, handling secretions, no trismus  Neck: Supple, full ROM, no meningeal signs  Pulmonary: Lungs clear to auscultation bilaterally, no wheezes, rales, or rhonchi. Not in respiratory distress  Cardiovascular:  Regular rate and rhythm, no murmurs, gallops, or rubs. Abdomen: Soft, non tender, non distended,   Extremities: Moves all extremities x 4. Warm and well perfused, patient has a Doppler monophasic pulse to the dorsalis pedis the left leg. Skin: warm and dry without rash  Neurologic: GCS 15, no focal deficits patient is able to move her toes.   Psych: Normal Affect      ------------------------------ ED COURSE/MEDICAL DECISION MAKING----------------------  Medications - No data to display      Medical Decision Making:    Patient presents with chronic claudication symptoms in left leg. Arterial Doppler shows worsening ABIs. Consultation was made with on-call vascular recommend admission to medicine for arterial studies. This chronic problem patient does not require IV heparin. She is currently on aspirin and Pletal.     Not candidate for Critical Care     Counseling: The emergency provider has spoken with the patient and discussed todays results, in addition to providing specific details for the plan of care and counseling regarding the diagnosis and prognosis.   Questions are answered at this time and they are agreeable with the plan.      --------------------------------- IMPRESSION AND DISPOSITION ---------------------------------    IMPRESSION  1. PVD [facility]peripheral vascular disease) ([facility]    2. Claudication ([facility]        DISPOSITION  Disposition: Admit to telemetry  Patient condition is stable               Bhavik Shah DO  06/29/20 [id number]

## 2020-06-30 ENCOUNTER — APPOINTMENT (OUTPATIENT)
Dept: ULTRASOUND IMAGING | Age: 75
End: 2020-06-30
Payer: COMMERCIAL

## 2020-06-30 LAB
ANION GAP SERPL CALCULATED.3IONS-SCNC: 13 MMOL/L (ref 7–16)
BASOPHILS ABSOLUTE: 0.01 E9/L (ref 0–0.2)
BASOPHILS RELATIVE PERCENT: 0.2 % (ref 0–2)
BUN BLDV-MCNC: 13 MG/DL (ref 8–23)
CALCIUM SERPL-MCNC: 9.3 MG/DL (ref 8.6–10.2)
CHLORIDE BLD-SCNC: 109 MMOL/L (ref 98–107)
CO2: 22 MMOL/L (ref 22–29)
CREAT SERPL-MCNC: 0.8 MG/DL (ref 0.5–1)
EOSINOPHILS ABSOLUTE: 0.1 E9/L (ref 0.05–0.5)
EOSINOPHILS RELATIVE PERCENT: 2.2 % (ref 0–6)
GFR AFRICAN AMERICAN: >60
GFR NON-AFRICAN AMERICAN: >60 ML/MIN/1.73
GLUCOSE BLD-MCNC: 102 MG/DL (ref 74–99)
HCT VFR BLD CALC: 30.7 % (ref 34–48)
HEMOGLOBIN: 9.6 G/DL (ref 11.5–15.5)
IMMATURE GRANULOCYTES #: 0.01 E9/L
IMMATURE GRANULOCYTES %: 0.2 % (ref 0–5)
LYMPHOCYTES ABSOLUTE: 1.34 E9/L (ref 1.5–4)
LYMPHOCYTES RELATIVE PERCENT: 30 % (ref 20–42)
MCH RBC QN AUTO: 29.3 PG (ref 26–35)
MCHC RBC AUTO-ENTMCNC: 31.3 % (ref 32–34.5)
MCV RBC AUTO: 93.6 FL (ref 80–99.9)
MONOCYTES ABSOLUTE: 0.61 E9/L (ref 0.1–0.95)
MONOCYTES RELATIVE PERCENT: 13.6 % (ref 2–12)
NEUTROPHILS ABSOLUTE: 2.4 E9/L (ref 1.8–7.3)
NEUTROPHILS RELATIVE PERCENT: 53.8 % (ref 43–80)
PDW BLD-RTO: 19.4 FL (ref 11.5–15)
PLATELET # BLD: 185 E9/L (ref 130–450)
PMV BLD AUTO: 10 FL (ref 7–12)
POTASSIUM REFLEX MAGNESIUM: 3.8 MMOL/L (ref 3.5–5)
RBC # BLD: 3.28 E12/L (ref 3.5–5.5)
SODIUM BLD-SCNC: 144 MMOL/L (ref 132–146)
WBC # BLD: 4.5 E9/L (ref 4.5–11.5)

## 2020-06-30 PROCEDURE — 93880 EXTRACRANIAL BILAT STUDY: CPT

## 2020-06-30 PROCEDURE — 96372 THER/PROPH/DIAG INJ SC/IM: CPT

## 2020-06-30 PROCEDURE — G0378 HOSPITAL OBSERVATION PER HR: HCPCS

## 2020-06-30 PROCEDURE — 2580000003 HC RX 258: Performed by: INTERNAL MEDICINE

## 2020-06-30 PROCEDURE — 6370000000 HC RX 637 (ALT 250 FOR IP): Performed by: SURGERY

## 2020-06-30 PROCEDURE — 6360000002 HC RX W HCPCS: Performed by: INTERNAL MEDICINE

## 2020-06-30 PROCEDURE — 80048 BASIC METABOLIC PNL TOTAL CA: CPT

## 2020-06-30 PROCEDURE — 6370000000 HC RX 637 (ALT 250 FOR IP): Performed by: INTERNAL MEDICINE

## 2020-06-30 PROCEDURE — 85025 COMPLETE CBC W/AUTO DIFF WBC: CPT

## 2020-06-30 PROCEDURE — 36415 COLL VENOUS BLD VENIPUNCTURE: CPT

## 2020-06-30 RX ORDER — FAMOTIDINE 20 MG/1
40 TABLET, FILM COATED ORAL DAILY
Status: DISCONTINUED | OUTPATIENT
Start: 2020-06-30 | End: 2020-07-02 | Stop reason: HOSPADM

## 2020-06-30 RX ORDER — ROSUVASTATIN CALCIUM 10 MG/1
10 TABLET, COATED ORAL DAILY
Status: DISCONTINUED | OUTPATIENT
Start: 2020-06-30 | End: 2020-07-02 | Stop reason: HOSPADM

## 2020-06-30 RX ORDER — ASPIRIN 81 MG/1
81 TABLET ORAL DAILY
Status: DISCONTINUED | OUTPATIENT
Start: 2020-06-30 | End: 2020-07-02 | Stop reason: HOSPADM

## 2020-06-30 RX ORDER — LOPERAMIDE HYDROCHLORIDE 2 MG/1
2 CAPSULE ORAL 4 TIMES DAILY PRN
Status: DISCONTINUED | OUTPATIENT
Start: 2020-06-30 | End: 2020-07-02 | Stop reason: HOSPADM

## 2020-06-30 RX ORDER — PROMETHAZINE HYDROCHLORIDE 25 MG/1
12.5 TABLET ORAL EVERY 6 HOURS PRN
Status: DISCONTINUED | OUTPATIENT
Start: 2020-06-30 | End: 2020-07-02 | Stop reason: HOSPADM

## 2020-06-30 RX ORDER — GABAPENTIN 300 MG/1
300 CAPSULE ORAL 3 TIMES DAILY
COMMUNITY
End: 2022-10-23

## 2020-06-30 RX ORDER — TOFACITINIB 11 MG/1
11 TABLET, FILM COATED, EXTENDED RELEASE ORAL DAILY
COMMUNITY

## 2020-06-30 RX ORDER — ONDANSETRON 2 MG/ML
4 INJECTION INTRAMUSCULAR; INTRAVENOUS EVERY 6 HOURS PRN
Status: DISCONTINUED | OUTPATIENT
Start: 2020-06-30 | End: 2020-07-02 | Stop reason: HOSPADM

## 2020-06-30 RX ORDER — POLYETHYLENE GLYCOL 3350 17 G/17G
17 POWDER, FOR SOLUTION ORAL DAILY PRN
Status: DISCONTINUED | OUTPATIENT
Start: 2020-06-30 | End: 2020-07-02 | Stop reason: HOSPADM

## 2020-06-30 RX ORDER — METOPROLOL SUCCINATE 50 MG/1
50 TABLET, EXTENDED RELEASE ORAL DAILY
Status: DISCONTINUED | OUTPATIENT
Start: 2020-06-30 | End: 2020-07-01

## 2020-06-30 RX ORDER — GABAPENTIN 300 MG/1
300 CAPSULE ORAL 2 TIMES DAILY
Status: DISCONTINUED | OUTPATIENT
Start: 2020-06-30 | End: 2020-07-01

## 2020-06-30 RX ORDER — ACETAMINOPHEN 650 MG/1
650 SUPPOSITORY RECTAL EVERY 6 HOURS PRN
Status: DISCONTINUED | OUTPATIENT
Start: 2020-06-30 | End: 2020-07-02 | Stop reason: HOSPADM

## 2020-06-30 RX ORDER — CILOSTAZOL 100 MG/1
100 TABLET ORAL 2 TIMES DAILY
Status: DISCONTINUED | OUTPATIENT
Start: 2020-06-30 | End: 2020-07-02 | Stop reason: HOSPADM

## 2020-06-30 RX ORDER — HYDROXYCHLOROQUINE SULFATE 200 MG/1
200 TABLET, FILM COATED ORAL 2 TIMES DAILY
Status: DISCONTINUED | OUTPATIENT
Start: 2020-06-30 | End: 2020-07-02 | Stop reason: HOSPADM

## 2020-06-30 RX ORDER — ACETAMINOPHEN 325 MG/1
650 TABLET ORAL EVERY 6 HOURS PRN
Status: DISCONTINUED | OUTPATIENT
Start: 2020-06-30 | End: 2020-07-02 | Stop reason: HOSPADM

## 2020-06-30 RX ORDER — SODIUM CHLORIDE 0.9 % (FLUSH) 0.9 %
10 SYRINGE (ML) INJECTION PRN
Status: DISCONTINUED | OUTPATIENT
Start: 2020-06-30 | End: 2020-07-02 | Stop reason: HOSPADM

## 2020-06-30 RX ORDER — SODIUM CHLORIDE 0.9 % (FLUSH) 0.9 %
10 SYRINGE (ML) INJECTION EVERY 12 HOURS SCHEDULED
Status: DISCONTINUED | OUTPATIENT
Start: 2020-06-30 | End: 2020-07-02 | Stop reason: HOSPADM

## 2020-06-30 RX ADMIN — PREDNISONE 50 MG: 20 TABLET ORAL at 19:34

## 2020-06-30 RX ADMIN — METOPROLOL SUCCINATE 50 MG: 50 TABLET, EXTENDED RELEASE ORAL at 08:38

## 2020-06-30 RX ADMIN — CILOSTAZOL 100 MG: 100 TABLET ORAL at 08:38

## 2020-06-30 RX ADMIN — FAMOTIDINE 40 MG: 20 TABLET, FILM COATED ORAL at 08:37

## 2020-06-30 RX ADMIN — GABAPENTIN 300 MG: 300 CAPSULE ORAL at 08:38

## 2020-06-30 RX ADMIN — Medication 10 ML: at 08:38

## 2020-06-30 RX ADMIN — ROSUVASTATIN 10 MG: 10 TABLET, FILM COATED ORAL at 20:48

## 2020-06-30 RX ADMIN — ENOXAPARIN SODIUM 40 MG: 40 INJECTION SUBCUTANEOUS at 08:37

## 2020-06-30 RX ADMIN — ASPIRIN 81 MG: 81 TABLET ORAL at 08:38

## 2020-06-30 RX ADMIN — GABAPENTIN 300 MG: 300 CAPSULE ORAL at 20:48

## 2020-06-30 RX ADMIN — HYDROXYCHLOROQUINE SULFATE 200 MG: 200 TABLET, FILM COATED ORAL at 08:37

## 2020-06-30 RX ADMIN — Medication 10 ML: at 20:51

## 2020-06-30 RX ADMIN — HYDROXYCHLOROQUINE SULFATE 200 MG: 200 TABLET, FILM COATED ORAL at 20:48

## 2020-06-30 ASSESSMENT — PAIN SCALES - GENERAL
PAINLEVEL_OUTOF10: 10
PAINLEVEL_OUTOF10: 9
PAINLEVEL_OUTOF10: 9

## 2020-06-30 ASSESSMENT — PAIN DESCRIPTION - DESCRIPTORS
DESCRIPTORS: ACHING
DESCRIPTORS: ACHING

## 2020-06-30 ASSESSMENT — PAIN DESCRIPTION - FREQUENCY
FREQUENCY: CONTINUOUS

## 2020-06-30 ASSESSMENT — PAIN DESCRIPTION - ONSET
ONSET: ON-GOING

## 2020-06-30 ASSESSMENT — PAIN DESCRIPTION - PAIN TYPE
TYPE: CHRONIC PAIN

## 2020-06-30 ASSESSMENT — PAIN DESCRIPTION - ORIENTATION
ORIENTATION: LEFT

## 2020-06-30 ASSESSMENT — PAIN DESCRIPTION - PROGRESSION
CLINICAL_PROGRESSION: NOT CHANGED

## 2020-06-30 ASSESSMENT — PAIN DESCRIPTION - LOCATION
LOCATION: ARM;SHOULDER
LOCATION: SHOULDER
LOCATION: SHOULDER

## 2020-06-30 ASSESSMENT — PAIN - FUNCTIONAL ASSESSMENT
PAIN_FUNCTIONAL_ASSESSMENT: ACTIVITIES ARE NOT PREVENTED
PAIN_FUNCTIONAL_ASSESSMENT: PREVENTS OR INTERFERES SOME ACTIVE ACTIVITIES AND ADLS
PAIN_FUNCTIONAL_ASSESSMENT: ACTIVITIES ARE NOT PREVENTED

## 2020-06-30 NOTE — H&P
[id number] [facility]  History and Physical      CHIEF COMPLAINT: Left foot pain      HISTORY OF PRESENT ILLNESS:      The patient is a 75 y.o. female patient of Dr. LEYVA history of CAD, PVD, hypertension who presents with left foot pain. Patient notes months of worsening left foot pain. This been getting particularly bad the last few days. Patient has history of PAD and restless leg syndrome. She was sent in by her PCP for further evaluation and treatment. Worse at night with tingling burning pain. No relief. States No fevers chills vomiting diarrhea. No cold exposure. Past Medical History:    Past Medical History:   Diagnosis Date    Allergy     Arthritis     Bladder prolapse     CAD (coronary artery disease)     MI  2011    Carotid artery stenosis     Right carotid stenosis.     Carotid bruit 1/10/2013    Carotid stenosis, left 12/13/2012    Cerebrovascular accident (stroke) ([address]     pt states 13 yrs ago    Decreased radial pulse 7/6/2017    Gastric reflux     Glaucoma     History of blood transfusion     Hyperlipemia     Hypertension     Insomnia     PVD [facility]peripheral vascular disease) [facility]audication [facility] 6/4/2020    Restless leg syndrome     S/P carotid endarterectomy 1/10/2013    Sinusitis        Past Surgical History:    Past Surgical History:   Procedure Laterality Date    ANTERIOR FIXATION OF THORACIC SPINE      BACK SURGERY      cervical c5-c6    CAROTID ENDARTERECTOMY  1998    right    CAROTID ENDARTERECTOMY  12/18/2012    left carotid endarterectomy done by Dr. Nic Stubbs      left wrist    CATARACT REMOVAL      CERVICAL [address] SURGERY  2002    C5 and C6    CORONARY ANGIOPLASTY WITH STENT PLACEMENT  09/06/2011    Dr. Kimo Santa      in past for kidney stones    DENTAL SURGERY      DIAGNOSTIC CARDIAC CATH LAB PROCEDURE  9/6/11    Emergent cath/PTCA with deployment of 2 overlapping DE stents to the distal, mid and Iodine; Macrodantin [nitrofurantoin]; and Sulfa antibiotics    Social History:    reports that she quit smoking about 19 years ago. Her smoking use included cigarettes. She has a 100.00 pack-year smoking history. She has never used smokeless tobacco. She reports that she does not drink alcohol or use drugs. Family History:   family history is not on file. REVIEW OF SYSTEMS:  As above in the HPI, otherwise negative    PHYSICAL EXAM:    Vitals:  BP (!) 178/100 Comment: andriy  Pulse 73   Temp 98.3 °F (36.8 °C) (Temporal)   Resp 16   Ht 5' (1.524 m)   Wt 165 lb (74.8 kg)   SpO2 95%   BMI 32.22 kg/m²     General:  Awake, alert, oriented X 3. Well developed, well nourished, well groomed. No apparent distress. HEENT:  Normocephalic, atraumatic. Pupils equal, round, reactive to light. No scleral icterus. No conjunctival injection. Normal lips, teeth, and gums. No nasal discharge. Neck:  Supple  Heart:  RRR, no murmurs, gallops, rubs  Lungs:  CTA bilaterally, bilat symmetrical expansion, no wheeze, rales, or rhonchi  Abdomen:   Bowel sounds present, soft, nontender, no masses, no organomegaly, no peritoneal signs  Extremities:  No clubbing, cyanosis, or edema  Skin:  Warm and dry, no open lesions or rash  Neuro:  Cranial nerves 2-12 intact, no focal deficits  Breast: deferred  Rectal: deferred  Genitalia:  deferred    LABS:    CBC with Differential:    Lab Results   Component Value Date    WBC 4.5 06/30/2020    RBC 3.28 06/30/2020    HGB 9.6 06/30/2020    HCT 30.7 06/30/2020     06/30/2020    MCV 93.6 06/30/2020    MCH 29.3 06/30/2020    MCHC 31.3 06/30/2020    RDW 19.4 06/30/2020    SEGSPCT 74 10/09/2013    LYMPHOPCT 30.0 06/30/2020    MONOPCT 13.6 06/30/2020    BASOPCT 0.2 06/30/2020    MONOSABS 0.61 06/30/2020    LYMPHSABS 1.34 06/30/2020    EOSABS 0.10 06/30/2020    BASOSABS 0.01 06/30/2020     CMP:    Lab Results   Component Value Date     06/30/2020    K 3.8 06/30/2020     06/30/2020    CO2 22 06/30/2020    BUN 13 06/30/2020    CREATININE 0.8 06/30/2020    GFRAA >60 06/30/2020    LABGLOM >60 06/30/2020    GLUCOSE 102 06/30/2020    GLUCOSE 92 09/07/2011    PROT 7.0 06/29/2020    LABALBU 4.1 06/29/2020    CALCIUM 9.3 06/30/2020    BILITOT 0.4 06/29/2020    ALKPHOS 60 06/29/2020    AST 23 06/29/2020    ALT 9 06/29/2020     Magnesium:    Lab Results   Component Value Date    MG 2.0 11/02/2019     Phosphorus:  No results found for: PHOS  PT/INR:    Lab Results   Component Value Date    PROTIME 12.5 06/29/2020    INR 1.1 06/29/2020     Last 3 Troponin:    Lab Results   Component Value Date    TROPONINI <0.01 02/17/2019    TROPONINI <0.01 10/09/2013    TROPONINI <0.01 12/06/2012    TROPONINI <0.01 12/05/2012    TROPONINI 0.54 09/06/2011     U/A:    Lab Results   Component Value Date    NITRITE neg 08/28/2019    COLORU Yellow 11/01/2019    PROTEINU TRACE 11/01/2019    PHUR 5.5 11/01/2019    WBCUA NONE 11/01/2019    WBCUA 10-20 11/04/2011    RBCUA >20 11/01/2019    RBCUA NONE 04/04/2013    BACTERIA RARE 11/01/2019    CLARITYU SL CLOUDY 11/01/2019    SPECGRAV 1.015 11/01/2019    LEUKOCYTESUR Negative 11/01/2019    UROBILINOGEN 0.2 11/01/2019    BILIRUBINUR Negative 11/01/2019    BILIRUBINUR neg 08/28/2019    BILIRUBINUR NEGATIVE 11/04/2011    BLOODU LARGE 11/01/2019    GLUCOSEU Negative 11/01/2019    GLUCOSEU NEGATIVE 11/04/2011     ABG:    Lab Results   Component Value Date    HCO3 21.6 12/18/2012    BE -3.0 12/18/2012    O2SAT 100.0 12/18/2012     HgBA1c:    Lab Results   Component Value Date    LABA1C 5.1 09/11/2019     FLP:    Lab Results   Component Value Date    TRIG 184 09/11/2019    HDL 61 09/11/2019    LDLCALC 218 09/11/2019    LABVLDL 37 09/11/2019     TSH:    Lab Results   Component Value Date    TSH 1.670 09/11/2019       VL LOWER EXTREMITY ARTERIAL SEGMENTAL PRESSURES W PPG BILATERAL   Final Result      US DUP LOWER EXTREMITY LEFT RUDI   Final Result   No evidence of deep venous thrombosis in the left lower extremity. VL DUP CAROTID BILATERAL    (Results Pending)       ASSESSMENT:      Active Problems:    Hypertension    Neuropathy    Incontinence    PAD (peripheral artery disease) ([facility] [address]  Resolved Problems:    * No resolved hospital problems.  *      PLAN:    39-year-old female history of PAD, CAD, hypertension admitted with PAD    Pain control  Left lower extremity duplex negative  Bilateral lower extremity arterial ultrasound demonstrating significant PAD left greater than right  Vascular surgery consult   Medications for other co morbidities cont as appropriate w dosage adjustments as necessary   PT/OT  DVT PPx  DC planning         Electronically signed by Hemanth Jordan MD on 6/30/2020 at 9:37 AM

## 2020-06-30 NOTE — CONSULTS
Vascular Surgery Consultation Note    Reason for Consult:  Left foot pain and neuropathy    HPI :    This is a 75 y.o. female who is admitted to the hospital for treatment of left foot swelling and erythema of of foot. Vascular surgery is consulted for evaluation and treatment of peripheral arterial disease. Pt has had neuropathy for the past year and erythema for over a month. They have 3/10 pain associated with this problem. She is known to Dr. Luna Allan for PAD, last seen in the office 6/4/2020. She was to get ABIs. She was started on Pletal and continues on a statin. She was given a course of antibiotics for the erythema for possible cellulitis without improvement. Denies fevers or chills. She has difficulty walking long distances 2/2 pain. Also c/o chronic low back pain. ABIs shows 0.53 on RLE and 0.28 on LLE, with diminished toe pressures  BLE DVT US is negative for DVT today. ROS : Negative if blank [], Positive if [x]  General Vascular   [] Fevers [x] Claudication (Blocks)   [] Chills [] Rest Pain   [] Weight Loss [] Tissue Loss   [] Chest Pain [] Clotting Disorder    [] SOB at rest [x] Leg Swelling   [] SOB with exertion [] DVT/PE      [] Nausea    [] Vomitting [] Stroke/TIA   [] Abdominal Pain [] Focal weakness   [] Melena [] Slurred Speech   [] Hematochezia [] Vision Changes   [] Hematuria    [] Dysuria [] Hx of Central Catheters   [] Wears Glasses/Contacts  [] Dialysis and If so date initiated   [] Blindness     [x] Right Hand Dominant   [] Difficulty swallowing        Past Medical History:   Diagnosis Date    Allergy     Arthritis     Bladder prolapse     CAD (coronary artery disease)     MI  2011    Carotid artery stenosis     Right carotid stenosis.     Carotid bruit 1/10/2013    Carotid stenosis, left 12/13/2012    Cerebrovascular accident (stroke) ([facility] [address]     pt states 13 yrs ago    Decreased radial pulse 7/6/2017    Gastric reflux     Glaucoma     History of blood transfusion     Hyperlipemia     Hypertension     Insomnia     PVD (peripheral vascular disease) with claudication ([facility] 6/4/2020    Restless leg syndrome     S/P carotid endarterectomy 1/10/2013    Sinusitis         Past Surgical History:   Procedure Laterality Date    ANTERIOR FIXATION OF THORACIC SPINE      BACK SURGERY      cervical c5-c6    CAROTID ENDARTERECTOMY  1998    right    CAROTID ENDARTERECTOMY  12/18/2012    left carotid endarterectomy done by Dr. Kamran Bond      left wrist    CATARACT REMOVAL      CERVICAL [address] SURGERY  2002    C5 and C6    CORONARY ANGIOPLASTY WITH STENT PLACEMENT  09/06/2011    Dr. Seferino Castro      in past for kidney stones    DENTAL SURGERY      DIAGNOSTIC CARDIAC CATH LAB PROCEDURE  9/6/11    Emergent cath/PTCA with deployment of 2 overlapping DE stents to the distal, mid and proximal RCA.    HYSTERECTOMY      JOINT REPLACEMENT      right knee twice, left knee    JOINT REPLACEMENT Right     hip    KNEE ARTHROPLASTY Left 4-11-13    KNEE SURGERY      right knee x 2    LITHOTRIPSY Right 11/7/2019    RIGHT ESWL EXTRACORPOREAL SHOCK WAVE LITHOTRIPSY CYSTOSCOPY STENT INSERTION performed by Facundo Royal MD at [id number] [facility]       Current Medications:               Allergies:  Iodine; Macrodantin [nitrofurantoin]; and Sulfa antibiotics    Social History     Socioeconomic History    Marital status:      Spouse name: Not on file    Number of children: Not on file    Years of education: Not on file    Highest education level: Not on file   Occupational History    Not on file   Social Needs    Financial resource strain: Not on file    Food insecurity     Worry: Not on file     Inability: Not on file    Transportation needs     Medical: Not on file     Non-medical: Not on file   Tobacco Use    Smoking status: Former Smoker     Packs/day: 2.50     Years: 40.00     Pack Varicose veins absent    Wounds absent, delayed ~ 3 secondscaprefill   4/5 Strength, neuropathy is present, in toes of L foot    R brachial  L brachial    R radial 1 L radial 1   R femoral 1 L femoral 1   R popliteal  L popliteal    R posterior tibial biphasic L posterior tibial monophasic   R dorsalis pedis biphasic L dorsalis pedis monophasic     LABS:    Lab Results   Component Value Date    WBC 5.2 06/29/2020    HGB 10.0 (L) 06/29/2020    HCT 32.3 (L) 06/29/2020     06/29/2020    PROTIME 12.5 (H) 06/29/2020    INR 1.1 06/29/2020    K 4.1 06/29/2020    BUN 15 06/29/2020    CREATININE 0.9 06/29/2020       RADIOLOGY:    Assesment/Plan  - Chronic PAD  Cont statin, Pletal  JAMEE reviewed  No acute surgical intervention indicated at this time. May need angiography    Plan to be d/w Dr. Albertina Barnhart who is on call for Dr. Viral Bautista DO    Patient was seen and examined. Agree as above. General: She is awake she is alert she is answering questions appropriately she is in no acute distress. Skin: Is warm and dry no change in turgor no jaundice no scleral icterus. No significant leg swelling. She has a little bit of a rash on the left leg. According to her history this is been present now for about a year she denies any current ulcers. HEENT head is normocephalic atraumatic trach is midline no jugular venous distention may be a faint right carotid bruit  Cardiac: Is currently regular rate and rhythm no murmur rub or gallop. Pulmonary: Clear to auscultation bilaterally no wheezes rales or rhonchi  Abdomen: Soft nontender no rebound or guarding. Positive bowel sounds  Extremities: Bilateral palpable brachial radial pulses. There are slightly weaker about 1+. Bilateral femorals are difficult to feel. She has signals present in the DPs and PTs bilaterally. Motor and sensation are intact. See skin examination no wounds are noted.   Neuro: Is grossly intact bilateral any gross cranial nerve deficit  Musculoskeletal: Normal range of motion no joint deformities. Assessment and plan. #1 peripheral vascular disease I reviewed her ABIs left is worse than right. These are indicative of multilevel disease. She has weakly palpable femoral pulses if it is even palpable at all in the left side. Were to get a CT angiography of the abdomen pelvis and runoff to further evaluate her inflow bilaterally. She will need to have a steroid prep secondary to her iodine allergy. I will also order carotid ultrasound secondary to finding of a bruit. This was discussed with the patient at length. She understands and wishes to proceed.     Jefry Mendez MD

## 2020-06-30 NOTE — PROGRESS NOTES
Notified Ho Garza, [id number] regarding new cardiology consult. Will await who is covering.   Karla White RN

## 2020-06-30 NOTE — PLAN OF CARE
Problem: Falls - Risk of:  Goal: Will remain free from falls  Description: Will remain free from falls  Outcome: Met This Shift  Goal: Absence of physical injury  Description: Absence of physical injury  Outcome: Met This Shift     Problem: Pain:  Goal: Control of chronic pain  Description: Control of chronic pain  Outcome: Ongoing

## 2020-07-01 ENCOUNTER — APPOINTMENT (OUTPATIENT)
Dept: NUCLEAR MEDICINE | Age: 75
End: 2020-07-01
Payer: COMMERCIAL

## 2020-07-01 ENCOUNTER — APPOINTMENT (OUTPATIENT)
Dept: NON INVASIVE DIAGNOSTICS | Age: 75
End: 2020-07-01
Payer: COMMERCIAL

## 2020-07-01 ENCOUNTER — APPOINTMENT (OUTPATIENT)
Dept: CT IMAGING | Age: 75
End: 2020-07-01
Payer: COMMERCIAL

## 2020-07-01 PROBLEM — I70.0 AORTO-ILIAC ATHEROSCLEROSIS (HCC): Status: ACTIVE | Noted: 2020-07-01

## 2020-07-01 PROBLEM — I70.8 AORTO-ILIAC ATHEROSCLEROSIS (HCC): Status: ACTIVE | Noted: 2020-07-01

## 2020-07-01 LAB
ABO/RH: NORMAL
ANTIBODY SCREEN: NORMAL
EKG ATRIAL RATE: 99 BPM
EKG P AXIS: 62 DEGREES
EKG P-R INTERVAL: 164 MS
EKG Q-T INTERVAL: 408 MS
EKG QRS DURATION: 130 MS
EKG QTC CALCULATION (BAZETT): 523 MS
EKG R AXIS: 93 DEGREES
EKG T AXIS: 4 DEGREES
EKG VENTRICULAR RATE: 99 BPM
LV EF: 91 %
LVEF MODALITY: NORMAL

## 2020-07-01 PROCEDURE — 93005 ELECTROCARDIOGRAM TRACING: CPT | Performed by: NURSE PRACTITIONER

## 2020-07-01 PROCEDURE — 97530 THERAPEUTIC ACTIVITIES: CPT

## 2020-07-01 PROCEDURE — 6370000000 HC RX 637 (ALT 250 FOR IP): Performed by: SURGERY

## 2020-07-01 PROCEDURE — 6360000004 HC RX CONTRAST MEDICATION: Performed by: RADIOLOGY

## 2020-07-01 PROCEDURE — 99205 OFFICE O/P NEW HI 60 MIN: CPT | Performed by: INTERNAL MEDICINE

## 2020-07-01 PROCEDURE — A9500 TC99M SESTAMIBI: HCPCS | Performed by: RADIOLOGY

## 2020-07-01 PROCEDURE — APPSS60 APP SPLIT SHARED TIME 46-60 MINUTES: Performed by: NURSE PRACTITIONER

## 2020-07-01 PROCEDURE — 97535 SELF CARE MNGMENT TRAINING: CPT

## 2020-07-01 PROCEDURE — 36415 COLL VENOUS BLD VENIPUNCTURE: CPT

## 2020-07-01 PROCEDURE — 6360000002 HC RX W HCPCS: Performed by: INTERNAL MEDICINE

## 2020-07-01 PROCEDURE — 97165 OT EVAL LOW COMPLEX 30 MIN: CPT

## 2020-07-01 PROCEDURE — 78452 HT MUSCLE IMAGE SPECT MULT: CPT

## 2020-07-01 PROCEDURE — G0378 HOSPITAL OBSERVATION PER HR: HCPCS

## 2020-07-01 PROCEDURE — 86901 BLOOD TYPING SEROLOGIC RH(D): CPT

## 2020-07-01 PROCEDURE — 3430000000 HC RX DIAGNOSTIC RADIOPHARMACEUTICAL: Performed by: RADIOLOGY

## 2020-07-01 PROCEDURE — 86900 BLOOD TYPING SEROLOGIC ABO: CPT

## 2020-07-01 PROCEDURE — 96372 THER/PROPH/DIAG INJ SC/IM: CPT

## 2020-07-01 PROCEDURE — 93017 CV STRESS TEST TRACING ONLY: CPT

## 2020-07-01 PROCEDURE — 75635 CT ANGIO ABDOMINAL ARTERIES: CPT

## 2020-07-01 PROCEDURE — 97161 PT EVAL LOW COMPLEX 20 MIN: CPT

## 2020-07-01 PROCEDURE — 93016 CV STRESS TEST SUPVJ ONLY: CPT | Performed by: INTERNAL MEDICINE

## 2020-07-01 PROCEDURE — 93010 ELECTROCARDIOGRAM REPORT: CPT | Performed by: INTERNAL MEDICINE

## 2020-07-01 PROCEDURE — 93018 CV STRESS TEST I&R ONLY: CPT | Performed by: INTERNAL MEDICINE

## 2020-07-01 PROCEDURE — 6370000000 HC RX 637 (ALT 250 FOR IP): Performed by: INTERNAL MEDICINE

## 2020-07-01 PROCEDURE — 2580000003 HC RX 258: Performed by: INTERNAL MEDICINE

## 2020-07-01 PROCEDURE — 86850 RBC ANTIBODY SCREEN: CPT

## 2020-07-01 PROCEDURE — 99214 OFFICE O/P EST MOD 30 MIN: CPT | Performed by: SURGERY

## 2020-07-01 RX ORDER — METOPROLOL SUCCINATE 100 MG/1
100 TABLET, EXTENDED RELEASE ORAL DAILY
Status: DISCONTINUED | OUTPATIENT
Start: 2020-07-02 | End: 2020-07-02 | Stop reason: HOSPADM

## 2020-07-01 RX ORDER — AMLODIPINE BESYLATE 5 MG/1
5 TABLET ORAL DAILY
Status: DISCONTINUED | OUTPATIENT
Start: 2020-07-01 | End: 2020-07-02 | Stop reason: HOSPADM

## 2020-07-01 RX ORDER — METOPROLOL SUCCINATE 50 MG/1
50 TABLET, EXTENDED RELEASE ORAL ONCE
Status: DISCONTINUED | OUTPATIENT
Start: 2020-07-01 | End: 2020-07-02 | Stop reason: HOSPADM

## 2020-07-01 RX ORDER — GABAPENTIN 300 MG/1
300 CAPSULE ORAL 3 TIMES DAILY
Status: DISCONTINUED | OUTPATIENT
Start: 2020-07-01 | End: 2020-07-02 | Stop reason: HOSPADM

## 2020-07-01 RX ADMIN — GABAPENTIN 300 MG: 300 CAPSULE ORAL at 16:55

## 2020-07-01 RX ADMIN — Medication 10 MILLICURIE: at 12:37

## 2020-07-01 RX ADMIN — ASPIRIN 81 MG: 81 TABLET ORAL at 09:53

## 2020-07-01 RX ADMIN — Medication 10 ML: at 20:07

## 2020-07-01 RX ADMIN — METOPROLOL SUCCINATE 50 MG: 50 TABLET, EXTENDED RELEASE ORAL at 09:48

## 2020-07-01 RX ADMIN — Medication 35 MILLICURIE: at 15:27

## 2020-07-01 RX ADMIN — Medication 10 ML: at 09:47

## 2020-07-01 RX ADMIN — IOPAMIDOL 120 ML: 755 INJECTION, SOLUTION INTRAVENOUS at 08:11

## 2020-07-01 RX ADMIN — HYDROXYCHLOROQUINE SULFATE 200 MG: 200 TABLET, FILM COATED ORAL at 09:47

## 2020-07-01 RX ADMIN — FAMOTIDINE 40 MG: 20 TABLET, FILM COATED ORAL at 09:48

## 2020-07-01 RX ADMIN — ROSUVASTATIN 10 MG: 10 TABLET, FILM COATED ORAL at 09:48

## 2020-07-01 RX ADMIN — GABAPENTIN 300 MG: 300 CAPSULE ORAL at 20:07

## 2020-07-01 RX ADMIN — AMLODIPINE BESYLATE 5 MG: 5 TABLET ORAL at 09:47

## 2020-07-01 RX ADMIN — ENOXAPARIN SODIUM 40 MG: 40 INJECTION SUBCUTANEOUS at 09:47

## 2020-07-01 RX ADMIN — CILOSTAZOL 100 MG: 100 TABLET ORAL at 20:07

## 2020-07-01 RX ADMIN — REGADENOSON 0.4 MG: 0.08 INJECTION, SOLUTION INTRAVENOUS at 14:30

## 2020-07-01 RX ADMIN — CILOSTAZOL 100 MG: 100 TABLET ORAL at 09:48

## 2020-07-01 RX ADMIN — HYDROXYCHLOROQUINE SULFATE 200 MG: 200 TABLET, FILM COATED ORAL at 20:08

## 2020-07-01 RX ADMIN — PREDNISONE 50 MG: 20 TABLET ORAL at 01:13

## 2020-07-01 RX ADMIN — PREDNISONE 50 MG: 20 TABLET ORAL at 07:02

## 2020-07-01 ASSESSMENT — PAIN SCALES - GENERAL: PAINLEVEL_OUTOF10: 0

## 2020-07-01 NOTE — CONSULTS
ULTRA-LIGHT) MISC 1 Device by Does not apply route continuous 2/11/20   Tay Dull Catterlin, DO   phenazopyridine (PYRIDIUM) 200 MG tablet Take 1 tablet by mouth 2 times daily as needed for Pain 11/7/19   Cris Whittington MD   Multiple Vitamins-Minerals (VITAMIN D3 COMPLETE PO) Take 2,000 Units by mouth    Historical Provider, MD   Omega-3 Fatty Acids (FISH OIL) 1000 MG CPDR Take 1,000 mg by mouth daily STOP PREOP MED    Historical Provider, MD   aspirin 81 MG EC tablet TAKE 1 TABLET BY MOUTH DAILY 7/19/19   Geronimo Ge MD   Cholecalciferol (VITAMIN D) 2000 units TABS tablet Take 2,000 Units by mouth daily  7/13/17   Historical Provider, MD       Current Medications:    Current Facility-Administered Medications: aspirin EC tablet 81 mg, 81 mg, Oral, Daily  cilostazol (PLETAL) tablet 100 mg, 100 mg, Oral, BID  famotidine (PEPCID) tablet 40 mg, 40 mg, Oral, Daily  gabapentin (NEURONTIN) capsule 300 mg, 300 mg, Oral, BID  hydroxychloroquine (PLAQUENIL) tablet 200 mg, 200 mg, Oral, BID  metoprolol succinate (TOPROL XL) extended release tablet 50 mg, 50 mg, Oral, Daily  rosuvastatin (CRESTOR) tablet 10 mg, 10 mg, Oral, Daily  sodium chloride flush 0.9 % injection 10 mL, 10 mL, Intravenous, 2 times per day  sodium chloride flush 0.9 % injection 10 mL, 10 mL, Intravenous, PRN  acetaminophen (TYLENOL) tablet 650 mg, 650 mg, Oral, Q6H PRN **OR** acetaminophen (TYLENOL) suppository 650 mg, 650 mg, Rectal, Q6H PRN  polyethylene glycol (GLYCOLAX) packet 17 g, 17 g, Oral, Daily PRN  promethazine (PHENERGAN) tablet 12.5 mg, 12.5 mg, Oral, Q6H PRN **OR** ondansetron (ZOFRAN) injection 4 mg, 4 mg, Intravenous, Q6H PRN  enoxaparin (LOVENOX) injection 40 mg, 40 mg, Subcutaneous, Daily  loperamide (IMODIUM) capsule 2 mg, 2 mg, Oral, 4x Daily PRN    Allergies:  Iodine; Macrodantin [nitrofurantoin]; Sulfa antibiotics; and 5-alpha reductase inhibitors   Iodine: SOB  Prednisone: SOB    Social History:    Ex heavy smoker Up to  2/12 ppd x 40 years quit in   ETOH: rarely  [employer] Drugs: Uses CBD oil from health store  Activity: Lives alone in 2 story home. Difficulty climbing steps due to back, hip and leg pain. No CO or SOB climbing steps. Ambulates with cane/rollator. Has aide come 2 times a week. Shops, runs errands, cleans, and does laundry  Code Status: Full Code      Family History:   Sister  age 76 in her sleep. Had CAD s/p PCI in her late 63's, tobacco use    REVIEW OF SYSTEMS:     · Constitutional: Denies fatigue, fevers, chills or night sweats  · Eyes: Denies visual changes or drainage  · ENT: Denies headaches or hearing loss. No mouth sores or sore throat. No epistaxis   · Cardiovascular: Denies chest pain, pressure or palpitations. LLE pedal swelling and pain. · Respiratory: Denies BENAVIDES, cough, orthopnea or PND. No hemoptysis   · Gastrointestinal: Denies hematemesis or anorexia. No hematochezia or melena    · Genitourinary: Denies urgency, dysuria or hematuria. · Musculoskeletal: Denies gait disturbance, weakness or joint complaints  · Integumentary: + rash LLE (ankel)Denies rash, hives or pruritis   · Neurological: Denies dizziness, headaches or seizures. No numbness or tingling  · Psychiatric: Denies anxiety or depression. · Endocrine: Denies temperature intolerance. No recent weight change. .  · Hematologic/Lymphatic: Denies abnormal bruising or bleeding. No swollen lymph nodes    PHYSICAL EXAM:   BP (!) 192/79   Pulse 88   Temp 99.2 °F (37.3 °C) (Temporal)   Resp 18   Ht 5' (1.524 m)   Wt 165 lb (74.8 kg)   SpO2 96%   BMI 32.22 kg/m²   CONST:  Well developed, obese elderly  female  who appears of stated age. Awake, alert and cooperative. No apparent distress. HEENT:   Head- Normocephalic, atraumatic   Eyes- Conjunctivae pink, anicteric  Throat- Oral mucosa pink and moist  Neck-  No stridor, trachea midline, no jugular venous distention. + carotid bruit.     CHEST: Chest symmetrical and non-tender to palpation. No accessory muscle use or intercostal retractions  RESPIRATORY: Lung sounds - crackles in the bases, on RA   CARDIOVASCULAR:     Heart Ausculation- Regular rate and rhythm, no murmur. No s3, s4 or rub   PV: No lower extremity edema. No varicosities. Pedal pulses palpable, no clubbing or cyanosis   ABDOMEN: Soft, non-tender to light palpation. Bowel sounds present. No palpable masses no organomegaly; no abdominal bruit  MS: Good muscle strength and tone. No atrophy or abnormal movements. : Deferred  SKIN: Warm and dry no statis dermatitis or ulcers   NEURO / PSYCH: Oriented to person, place and time. Speech clear and appropriate. Follows all commands. Pleasant affect     DATA:    ECG as per Dr Jacobs's interpretation  Tele strips: Non monitored    Diagnostic:    LLE Doppler Study 6/30/2020: No evidence of deep venous thrombosis in the left lower extremity    JAMEE's 6/30/2020: LLE 0.53 and RLE 0.28    Bilateral carotid Doppers 6/30/2020: Bilateral 50-69%    Intake/Output Summary (Last 24 hours) at 7/1/2020 0811  Last data filed at 7/1/2020 0058  Gross per 24 hour   Intake 210 ml   Output 0 ml   Net 210 ml       Labs:   CBC:   Recent Labs     06/29/20  1328 06/30/20  0431   WBC 5.2 4.5   HGB 10.0* 9.6*   HCT 32.3* 30.7*    185     BMP:   Recent Labs     06/29/20  1328 06/30/20  0431    144   K 4.1 3.8   CO2 22 22   BUN 15 13   CREATININE 0.9 0.8   LABGLOM >60 >60   CALCIUM 9.4 9.3     HgA1c:   Lab Results   Component Value Date    LABA1C 5.1 09/11/2019     PT/INR:   Recent Labs     06/29/20  1328   PROTIME 12.5*   INR 1.1     APTT:  Recent Labs     06/29/20  1328   APTT 29.7     FASTING LIPID PANEL:  Lab Results   Component Value Date    CHOL 316 09/11/2019    HDL 61 09/11/2019    LDLCALC 218 09/11/2019    TRIG 184 09/11/2019     LIVER PROFILE:  Recent Labs     06/29/20  1328   AST 23   ALT 9   LABALBU 4.1   A&P per Dr Jessy Gomes  Electronically signed by [facility].  BERNA Hand on 7/1/2020 at 8:11 AM     I personally and independently saw and examined patient and reviewed all done pertinent laboratory data, imaging studies, ECGs and rhythm strips. I have reviewed and agree with the APN history and physical exam as documented in the above note. Electronically signed by Leticia Bass MD on 7/1/2020 at 4:20 PM     Consulted for Cardiac evaluation prior to anticipated vascular surgery, may need iliac, femoral surgery.     IMPRESSION:  1. Class II Risk risk of major cardiac event  2. CAD with history of distal RCA stenting 2011 and negative stress in 2016 with normal EF (65%)  3. Severe PAD (JAMEE 0.53 on right and 0.28 on the left) with history of claudication and now with LLE symptoms at rest (LLE numbness)  4. Bilateral carotid disease s/p bilateral CEA's. Hx CVA. 5. Ex smoker quit in 2000  6. HTN: not adequately controlled  7. HLD on statin therapy  8. Anemia  9. Hx cervical diskectomy  10. Chronic lower back with history of injections  11. Arthritis on Plaquenil  12. RLS      PLAN:   1. Suggest adding Plavix  2. Check Lipid profile: intensify statin therapy  3. Increase Toprol XL to 100 mg QD  4. Agree with adding Norvasc  5. Monitor BP: consider adding ACE/ARB  6. Lexiscan MPS  7.  Further recommendations to follow     Electronically signed by Leticia Bass MD on 7/1/2020 at 10:58 AM

## 2020-07-01 NOTE — PROGRESS NOTES
Stress test negative ( as reviewed by me ( Dr. Isiah Cole )  [facility] for surgery from cardiology stand point.     Electronically signed by Jean Pierre King MD on 7/1/2020 at 4:45 PM

## 2020-07-01 NOTE — PROGRESS NOTES
Tolerance Fair  Good   Visual/  Perceptual Glasses: no  No complaints                Hand dominance: R   Strength ROM Additional Info:    RUE  Distal: 4-/5  Proximal: 3-/5  WFL   good  and wfl FMC/dexterity noted during ADL tasks       LUE Distal: 4-/5  Proximal: 2+/5 WFL   good  and wfl FMC/dexterity noted during ADL tasks       Hearing: WFL   Sensation: c/o numbness/tingling L foot  Tone: WFL   Edema: none noted                   Treatment: Upon arrival, patient lying in bed. Pt agreeable to OT session. Facilitation of bed mobility, functional transfers (various surfaces-EOB, chair, toilet w/ cues for safety/hand placement), standing tolerance tasks (addressing posture, balance and activity tolerance) and functional ambulation task with SPC (education/cuing on posture, AD management, body mechanics and safety). Therapist facilitated self-care retraining: UB/LB self-care tasks (gown, socks), simulated toileting task and standing grooming task while educating pt on modified techniques, posture, safety and energy conservation techniques. Skilled monitoring of HR, O2 sats and pts response to treatment. At end of session, patient seated in chair with call light and phone within reach, all lines and tubes intact. Comments: Pt demonstrated decreased independence w/ self-care tasks and functional mobility secondary to decreased activity tolerance, generalized weakness and mild safety awareness deficits. Pt would benefit from continued skilled OT to increase functional independence and quality of life. Eval Complexity: Low    Evaluation time includes thorough review of current medical information, gathering information on past medical history/social history and prior level of function, completion of standardized testing/informal observation of tasks, assessment of data, and development of POC/Goals.       Assessment of current deficits   Functional mobility [x]  ADLs [x] Strength [x]  Cognition []  Functional transfers  [x] IADLs [] Safety Awareness [x]  Endurance [x]  Fine Motor Coordination [] Balance [x] Vision/perception [] Sensation []   Gross Motor Coordination [] ROM [x] Delirium []                  Motor Control []    Plan of Care:   5-7 days  ADL retraining [x]   Equipment needs [x]   Neuromuscular re-education [x] Energy Conservation Techniques [x]  Functional Transfer training [x] Patient and/or Family Education [x]  Functional Mobility training [x]  Environmental Modifications [x]  Cognitive re-training []   Compensatory techniques for ADLs [x]  Splinting Needs []   Positioning to improve overall function [x]   Therapeutic Activity [x]  Therapeutic Exercise  [x]  Visual/Perceptual: []    Delirium prevention/treatment  []   Other:  []    Rehab Potential: Good for established goals    Patient / Family Goal:  Not stated     Patient and/or family were instructed on diagnosis, prognosis/goals and plan of care. Demonstrated fair+ understanding. [] Malnutrition indicators have been identified and nursing has been notified to ensure a dietitian consult is ordered.        Low Evaluation completed +    Treatment Time In:09:19            Treatment Time Out:09:29              Treatment Charges: Mins Units   Ther Ex  81880     Manual Therapy 29006     Thera Activities 44089     ADL/Home Mgt 16967 10 1   Neuro Re-ed 09854     Group Therapy      Orthotic manage/training  70771     Non-Billable Time     Total Timed Treatment 10 [address], OTR/L #[id number]

## 2020-07-01 NOTE — PROGRESS NOTES
Was called emergently to CT scan at 8431 for patient potentially having a reaction to iodinated contrast dye. Patient was found transferred back to her stretcher, sitting up, alert and oriented. Patient was premedicated with prednisone on the floor as she has a known allergy. She was identified with 2 identifiers. Patient complained of chest tightness that did not radiate anywhere. Lungs were at baseline, diminished bilaterally upon auscultation. See flow sheets for vital signs. Patient was hooked up to BP, pulse oximeter, and EKG leads and was oxygenating well on room air and sinus tachy. Patient states that the last time she had prednisone she had similar chest tightness as well. At 0830 patient stated the discomfort/tightness was dissipating. Patient denied nausea, urticaria, dyspnea, rigor, erythema, edema, within the 20 minutes of observation prior to her nurse, Mindy Flores RN coming down to also evaluate the patient. Patient was talking clearly, had no problems swallowing and chest tightness was her only complaint. Vital signs returned to baseline after 20 minutes of observation post CT scan. Bedside report was given to Mindy Flores RN and the patient left the CT suite in stable condition.

## 2020-07-01 NOTE — PROGRESS NOTES
Vascular        Chief complaint : Patient seen for evaluation of multiple vascular issues, came to the hospital for evaluation of vascular status of the legs, was found to have significant peripheral vascular disease based upon the ankle-brachial index that was done as an outpatient, in fact seen by me in the office  On 4 June, and ankle-brachial index done elsewhere, was abnormal, had some symptoms of early rest pain like tingling in the left foot but also had a rash of the left leg above ankle for the last several months, was recently seen by her PA, recommended her to go to the hospital emergency room and vascular service was consulted, patient was seen yesterday by Dr. Gabriel Corona, he reviewed the recent arterial Doppler study, recommended had a CTA of the aorta with runoff, because of history of contrast reaction, is being premedicated and finally did undergo CTA of aorta with runoff this morning, subsequently had some chest discomfort, tells me sometimes happens after IV contrast was given, seen by cardiology service, undergoing stress test in the next few hours    When I came to see the patient, patient was resting comfortably denies any pain in the left foot, concerned about the rash that has been going on for long time    Patient denies any chest pain or shortness of breath      Subjective: No new C/O    Objective:    BP (!) 159/78   Pulse 98   Temp 97.6 °F (36.4 °C) (Temporal)   Resp 18   Ht 5' (1.524 m)   Wt 165 lb (74.8 kg)   SpO2 97%   BMI 32.22 kg/m²     General: alert and oriented. Neck:  Carotid bruits: Right No  Left Yes    Respiratory:  No rales or rhonchi . Cardiovascular:  Normal Sinus Rhythm. No murmur. Abdomen:  Soft, no masses palpable. No tenderness. Extremities:  No lower extremity edema. Both the feet are warm to touch.     Patient does have a rash involving the skin just above the ankle, is not related to any vascular compromise etiology undetermined, remotely possible, vasculitis from rheumatoid arthritis    Pulses Right  Left    Radial       Brachial 3 3  3=normal   Femoral 1-2 0-1  2=diminished   Popliteal    1=barely palpable   Dorsalis pedis    0=absent   Posterior tibial 0 0  4=aneurysmal         Neuro: The speech is intact. Moving all extremities. No evidence of any acute focal lateralizing neurological deficit. Other pertinent information:     1. The history physical and cardiology consultation notes and the vascular consultation by Dr. Faustino Trent reviewed    2. The carotid ultrasound that was done was personally reviewed by me, significant atherosclerotic plaque involving right common carotid, and velocities consistent with 50% per stenosis in the right, again moderate atherosclerotic disease on the left side, consistent with a proximal 50% stenosis    3. The lower extremity artery Doppler study was personally reviewed by me    Bilateral iliac and femoral popliteal arterial occlusive disease,   severe on the left side, with an ankle-brachial index of 0.53 on the   right and 0.28 on the left, with significantly diminished pulse volume   recordings over the metatarsals bilaterally and markedly diminished   pulse volume recordings over the toes bilaterally, on the left side   being flat         4. The CTA of the aorta with runoff was personally reviewed, patient is a small aorta, complete occlusion of the left, iliac and external iliac artery with reconstitution of the external iliac just above the inguinal ligament, on the right side patient has approximately 40 to 50% areas of narrowing of the external iliac artery, bilaterally moderately severe femoral-popliteal arterial occlusive disease noted    Overall though, patient has small arteries including the aorta    5. Cardiac stress test is scheduled later today      Assessment:    1.   Significant peripheral vascular disease left more than the right side with complete occlusion left common iliac and left external iliac arteries with moderate right external carotid disease on the right side with moderately severe femoral-popliteal arterial occlusive disease with symptoms of early rest symptoms, tingling in the toes of the left foot    2. Rash, left leg above ankle and the distal calf etiology undetermined    3. Moderate bilateral carotid artery disease, 50% plus on the right, approximately 40 to 50% on the left, post left carotid enterectomy done many years ago    4.   History of tobacco use in the past, used to smoke 2 packs/day    Plan:    I had a long detailed discussion the patient, all options, risks benefits and alternatives explained, informed her, on my preliminary evaluation of the CTA of aorta with runoff, not a good situation for simple procedure at angioplasty and stent placement, may need bypass procedures and may make a decision once the results of the stress test are available    She was also informed of the results of the carotid ultrasound, we recommended once a year carotid ultrasound to monitor the carotid artery disease and call immediately if she has any focal lateralizing neurologic symptoms, explained    Again she was informed, the rash she has of the left leg above the ankle and the distal calf, not related to vascular issue    At the present time her vascular status is no worse than when I saw her a few weeks ago, as such will wait for results of cardiac input before making the decision    All her questions were answered      Carole Wolfe

## 2020-07-01 NOTE — CARE COORDINATION
SOCIAL WORK/DISCHARGE PLANNING;  Met with pt in room. She is alert and oriented, very pleasant. Pt reported that she lives alone in a one floor plan apartment. There are 6-7 steps up. Pt states there is a handrail  And she \"pulls\"herself up the steps. Per pt, she does not go out much and usually has someone with her when she does the steps. Pt has family in the area who are supportive. She is on the [employer] program and has services through [facility]. Pt has an aide that comes in twice a week, Tues&Thursday's and is there for about two hours to assist with chores as well as running errands and assisting with showers. Pt has a wheeled walker, cane, shower chair, CR,ERS unit and has a grab bar on the tub. She has [employer] and a nurse also comes out thru [facility]. Pt anticipates return home when medically ready. I spoke with her [address] and advised her of pt's admission. SW/CM will follow.     Pattie Pollard [facility]  [phone]

## 2020-07-01 NOTE — PROGRESS NOTES
Physical Therapy  Physical Therapy Initial Assessment     Name: Luz Elena Ramirez  :   MRN: [id number]    Referring Provider: Jovan Gonzales MD    Date of Service: 2020    Evaluating PT: Demi Santoyo PT, DPT, [id number]    Room #: 2244/5345-E  Diagnosis: PAD  PMHx/PSHx: Restless leg syndrome, HTN, glaucoma, OA, CVA, carotid stenosis, CAD, PVD  Precautions: Fall risk    SUBJECTIVE:    Pt lives alone in a single story apartment unit on the second floor. 3 stairs and 1 rail to enter building. 6 stairs and 2 rails to apartment level. Pt ambulated with [facility] or  prior to admission. OBJECTIVE:   Initial Evaluation  Date: 20 Treatment Date: Short Term/ Long Term   Goals   AM-PAC 6 Clicks      Was pt agreeable to Eval/treatment? Yes     Does pt have pain? 10/10 L arm pain     Bed Mobility  Rolling: NT  Supine to sit: SBA  Sit to supine: NT  Scooting: SBA to EOB  Rolling: Independent   Supine to sit: Independent   Sit to supine: Independent   Scooting: Independent    Transfers Sit to stand: SBA  Stand to sit: SBA  Stand pivot: SBA with [employer]  Sit to stand: Independent   Stand to sit: Independent   Stand pivot: Mod Independent with [employer]   Ambulation   100 feet with [employer] with SBA  400 feet with [employer] Mod Independent    Stair negotiation: ascended and descended Declined  6 step(s) with 1 rail(s) Mod Independent    ROM BUE: Refer to OT note  BLE: WFL     Strength BUE: Refer to OT note  BLE: WFL     Balance Sitting EOB: Independent   Dynamic Standing: SBA with [employer]  Dynamic Standing: Mod Independent with [employer]     Pt is A & O x: 4 to person, place, month/year, and situation. Sensation: Pt reports L foot numbness and tingling. Edema: Unremarkable. Patient education  Pt educated on PT role in acute care setting.     Patient response to education:   Pt verbalized understanding Pt demonstrated skill Pt requires further education in this area   Yes NA No     ASSESSMENT:    Comments:   Pt was supine in bed upon room entry, agreeable to PT evaluation with OT collaboration. Pt ambulates with slow gait speed and fair steadiness. Pt ambulated short distance in hallway. Pt has fair activity tolerance. Pt was encouraged to attempt stair negotiation however pt adamantly declined despite encouragement. Pt ambulated back to room and was seated in chair. Pt was left seated with all needs met at conclusion of session. Treatment:  Patient practiced and was instructed in the following treatment:     Therapeutic activities: Pt completed all therapeutic activities noted above. Pt was cued for hand placement during transfers. Pt was cued for [employer] approximation and technique when ambulating and pivoting. Pt's/family goals:   1. To return home. Patient and or family understand(s) diagnosis, prognosis, and plan of care. Yes. PLAN:    PT care will be provided in accordance with the objectives noted above. Exercises and functional mobility practice will be used as well as appropriate assistive devices or modalities to obtain goals. Patient and family education will also be administered as needed. Frequency of treatments: 2-5x/week x 2-3 days. Time in: 0920  Time out: 0940    Total Treatment Time 10 minutes     Evaluation Time includes thorough review of current medical information, gathering information on past medical history/social history and prior level of function, completion of standardized testing/informal observation of tasks, assessment of data and education on plan of care and goals.     CPT codes:  [x] Low Complexity PT evaluation 19399  [] Moderate Complexity PT evaluation 15958  [] High Complexity PT evaluation 79485  [] PT Re-evaluation 88457  [] Gait training 26757 0 minutes  [] Manual therapy 00114 0 minutes  [x] Therapeutic activities 46001 10 minutes  [] Therapeutic exercises 79867 0 minutes  [] Neuromuscular reeducation 41011 0 minutes     Anabell Chavez, PT, DPT  [id number]

## 2020-07-01 NOTE — CONSULTS
Patient seen and examined. Chart, labs, imaging studies, EKG and rhythm strips reviewed. Full consult to follow. Consulted for Cardiac evaluation prior to anticipated vascular surgery, may need iliac, femoral surgery. IMPRESSION:  1. Class II Risk risk of major cardiac event  2. CAD with history of distal RCA stenting 2011 and negative stress in 2016 with normal EF (65%)  3. Severe PAD (JAMEE 0.53 on right and 0.28 on the left) with history of claudication and now with LLE symptoms at rest (LLE numbness)  4. Bilateral carotid disease s/p bilateral CEA's. Hx CVA. 5. Ex smoker quit in 2000  6. HTN: not adequately controlled  7. HLD on statin therapy  8. Anemia  9. Hx cervical diskectomy  10. Chronic lower back with history of injections  11. Arthritis on Plaquenil  12. RLS     PLAN:   1. Suggest adding Plavix  2. Check Lipid profile: intensify statin therapy  3. Increase Toprol XL to 100 mg QD  4. Agree with adding Norvasc  5. Monitor BP: consider adding ACE/ARB  6. Lexiscan MPS  7.  Further recommendations to follow    Electronically signed by Geronimo Ge MD on 7/1/2020 at 10:58 AM

## 2020-07-01 NOTE — PROCEDURES
[facility] Nuclear Stress Test:    Cardiologist: Dr. Burnett Brain Scrocco    Date: 7/1/2020    Indications for study: Chest pain    1. No chest pain  2. No new arrhythmias  3. Non-diagnostic stress EKG  4.  Nuclear images pending    Chau Leslie MD  [facility]) Cardiology

## 2020-07-02 VITALS
OXYGEN SATURATION: 97 % | TEMPERATURE: 97.8 F | HEART RATE: 78 BPM | DIASTOLIC BLOOD PRESSURE: 68 MMHG | WEIGHT: 165 LBS | SYSTOLIC BLOOD PRESSURE: 118 MMHG | RESPIRATION RATE: 18 BRPM | HEIGHT: 60 IN | BODY MASS INDEX: 32.39 KG/M2

## 2020-07-02 PROCEDURE — 99214 OFFICE O/P EST MOD 30 MIN: CPT | Performed by: SURGERY

## 2020-07-02 PROCEDURE — 6370000000 HC RX 637 (ALT 250 FOR IP): Performed by: INTERNAL MEDICINE

## 2020-07-02 PROCEDURE — 2580000003 HC RX 258: Performed by: INTERNAL MEDICINE

## 2020-07-02 PROCEDURE — G0378 HOSPITAL OBSERVATION PER HR: HCPCS

## 2020-07-02 RX ORDER — AMLODIPINE BESYLATE 5 MG/1
5 TABLET ORAL DAILY
Qty: 30 TABLET | Refills: 3 | Status: SHIPPED | OUTPATIENT
Start: 2020-07-03

## 2020-07-02 RX ORDER — METOPROLOL SUCCINATE 100 MG/1
100 TABLET, EXTENDED RELEASE ORAL DAILY
Qty: 30 TABLET | Refills: 3 | Status: SHIPPED | OUTPATIENT
Start: 2020-07-03

## 2020-07-02 RX ADMIN — Medication 10 ML: at 09:40

## 2020-07-02 RX ADMIN — GABAPENTIN 300 MG: 300 CAPSULE ORAL at 14:54

## 2020-07-02 RX ADMIN — HYDROXYCHLOROQUINE SULFATE 200 MG: 200 TABLET, FILM COATED ORAL at 09:52

## 2020-07-02 RX ADMIN — FAMOTIDINE 40 MG: 20 TABLET, FILM COATED ORAL at 09:52

## 2020-07-02 RX ADMIN — GABAPENTIN 300 MG: 300 CAPSULE ORAL at 09:52

## 2020-07-02 RX ADMIN — AMLODIPINE BESYLATE 5 MG: 5 TABLET ORAL at 09:52

## 2020-07-02 RX ADMIN — METOPROLOL SUCCINATE 100 MG: 100 TABLET, EXTENDED RELEASE ORAL at 09:40

## 2020-07-02 RX ADMIN — ACETAMINOPHEN 650 MG: 325 TABLET ORAL at 09:52

## 2020-07-02 RX ADMIN — ROSUVASTATIN 10 MG: 10 TABLET, FILM COATED ORAL at 09:40

## 2020-07-02 ASSESSMENT — PAIN SCALES - GENERAL
PAINLEVEL_OUTOF10: 8
PAINLEVEL_OUTOF10: 6

## 2020-07-02 NOTE — PROGRESS NOTES
Vascular        Chief complaint : Patient seen for evaluation of multiple vascular issues, came to the hospital for evaluation of vascular status of the legs, was found to have significant peripheral vascular disease based upon the ankle-brachial index that was done as an outpatient, in fact seen by me in the office  On 4 June, and ankle-brachial index done elsewhere, was abnormal, had some symptoms of early rest pain like tingling in the left foot but also had a rash of the left leg above ankle for the last several months, was recently seen by her PA, recommended her to go to the hospital emergency room and vascular service was consulted, patient was seen yesterday by Dr. Srikanth Fuentes, he reviewed the recent arterial Doppler study, recommended had a CTA of the aorta with runoff, because of history of contrast reaction, is being premedicated and finally did undergo CTA of aorta with runoff this morning, subsequently had some chest discomfort, tells me sometimes happens after IV contrast was given, seen by cardiology service, undergoing stress test in the next few hours    When I came to see the patient, patient was resting comfortably denies any pain in the left foot, concerned about the rash that has been going on for long time    Patient denies any chest pain or shortness of breath      7/2/2020  · Patient overall doing well, did undergo a cardiac stress test yesterday    Patient is somewhat anxious regarding the results of CTA, what needs to be done in view of extensive peripheral vascular disease    Denies any significant symptoms involving left foot, today did describe some discomfort of the left hip joint area, in the past has undergone right hip surgery as well as bilateral total knee joint replacement surgery    Subjective: No new C/O    Objective:    /68   Pulse 78   Temp 97.8 °F (36.6 °C) (Temporal)   Resp 18   Ht 5' (1.524 m)   Wt 165 lb (74.8 kg)   SpO2 97%   BMI 32.22 kg/m² side patient has approximately 40 to 50% areas of narrowing of the external iliac artery, bilaterally moderately severe femoral-popliteal arterial occlusive disease noted    Overall though, patient has small arteries including the aorta    5. Cardiac stress test  results, satisfactory, normal, cardiology note reviewed, okay to proceed with vascular surgery      Assessment:    1. Significant peripheral vascular disease left more than the right side with complete occlusion left common iliac and left external iliac arteries with moderate right external carotid disease on the right side with moderately severe femoral-popliteal arterial occlusive disease with symptoms of early rest symptoms, tingling in the toes of the left foot    2. Rash, left leg above ankle and the distal calf etiology undetermined    3. Moderate bilateral carotid artery disease, 50% plus on the right, approximately 40 to 50% on the left, post left carotid enterectomy done many years ago    4.   History of tobacco use in the past, used to smoke 2 packs/day    Plan:    I had a long detailed discussion the patient, all options, risks benefits and alternatives explained, informed her, on my preliminary evaluation of the CTA of aorta with runoff, not a good situation for simple procedure at angioplasty and stent placement, may need open extensive bypass procedures She was also informed of the results of the carotid ultrasound, we recommended once a year carotid ultrasound to monitor the carotid artery disease and call immediately if she has any focal lateralizing neurologic symptoms, explained    I explained to her, I have reviewed the CTA of the aorta with runoff with Dr. Nahum Allen, unfortunately not a good situation with simple angioplasty stent placement, one option is right common femoral artery access, and attempt to cannulate the left common iliac artery next iliac artery, if the guidewire can be introduced into the true lumen into the common femoral artery on the left, consider placement of a long stent from just above the circumflex branches on the left all the way to the proximal common iliac artery    If not, patient will require axillofemoral bypass surgery versus aorto iliac, femoral bypass surgery    Patient has unusually small vessels include the distal aorta and its very small common femoral arteries bilaterally    I have informed her, that I will review the CTA again reviewed the situation with Dr. Nahum Allen make it recommendations    Again she was informed, the rash she has of the left leg above the ankle and the distal calf, not related to vascular issue    At the present time her vascular status is no worse than when I saw her a few weeks ago, as such, patient may be discharged and followed as an outpatient, earlier today I have discussed with hospitalist Dr. Yohannes Zuleta    Patient was instructed to call me early next week regarding follow-up arrangements and to call me immediately if any worsening of symptoms involving the left leg    All her questions were answered    I have also discussed extensively, from the patient's room with the patient's daughter, Alexandra Rankin who is a  explained her all the above findings, all her questions were answered    Kait Nieves

## 2020-07-02 NOTE — PROGRESS NOTES
Subjective:  Feeling better   No CP or SOB  No fever or chills   No uncontrolled pain  No vomiting or diarrhea     Objective:    /68   Pulse 78   Temp 97.8 °F (36.6 °C) (Temporal)   Resp 18   Ht 5' (1.524 m)   Wt 165 lb (74.8 kg)   SpO2 97%   BMI 32.22 kg/m²     24HR INTAKE/OUTPUT:      Intake/Output Summary (Last 24 hours) at 2020 1004  Last data filed at 2020 2113  Gross per 24 hour   Intake 480 ml   Output --   Net 480 ml       General appearance: NAD, conversant  Neck: FROM, supple   Lungs: Clear bilaterally no wheezes, no rhonchi, no crackles  CV: RRR, no MRGs; no Bruits  Abdomen: Soft, non-tender; no masses or HSM  Extremities: No edema, no cyanosis, no clubbing  Skin: Intact no rash, no lesions, no ulcers    Psych: Alert and oriented normal affect  Neuro: Nonfocal  Most Recent Labs  Lab Results   Component Value Date    WBC 4.5 2020    HGB 9.6 (L) 2020    HCT 30.7 (L) 2020     2020     2020    K 3.8 2020     (H) 2020    CREATININE 0.8 2020    BUN 13 2020    CO2 22 2020    GLUCOSE 102 (H) 2020    ALT 9 2020    AST 23 2020    INR 1.1 2020    TSH 1.670 2019    LABA1C 5.1 2019     No results for input(s): MG in the last 72 hours. Lab Results   Component Value Date    CALCIUM 9.3 2020        NM Cardiac Stress Test Nuclear Imaging   Final Result      No evidence of stress-induced left ventricular myocardial ischemia. CTA Abdominal Aorta W Bilat Runoff W Contrast   Final Result   1. Atherosclerotic cardiovascular disease involving aorta, renal,   visible and bilateral lower extremity arteries. 2. Right le-90% maximal stenosis of the right SFA. 2 vessel   runoff on the right via the anterior tibial and peroneal arteries. 3. Left leg: Proximal occlusion of the common iliac artery with   reconstitution of the left common femoral artery.  Multisegmental

## 2020-07-02 NOTE — DISCHARGE SUMMARY
Per Dr. Mcleod patient does not need to keep follow up with him. Left message for patient.    Physician Discharge Summary     Patient ID:  Juni Llanos  [id number]  66 y.o.  1945    Admit date: 6/29/2020    Discharge date and time:  7/2/2020    Discharge Diagnoses: Active Problems:    Hypertension    Neuropathy    Bilateral carotid artery disease (HCC)    S/P carotid endarterectomy    Carotid bruit    Incontinence    PVD (peripheral vascular disease) with claudication (HCC)    PVD (peripheral vascular disease) (HCC)    Aorto-iliac atherosclerosis (HCC)    Precordial pain  Resolved Problems:    * No resolved hospital problems. *      Consults: IP CONSULT TO VASCULAR SURGERY  IP CONSULT TO PRIMARY CARE PROVIDER  IP CONSULT TO CARDIOLOGY    Procedures: See below    Hospital Course: 79-year-old female history of PAD, CAD, hypertension admitted with PAD     Pain control  Left lower extremity duplex negative  Bilateral lower extremity arterial ultrasound demonstrating significant PAD left greater than right  Vascular surgery Consult appreciated --discussed case will discharge home with possible intervention in the future as outpatient  Cardiology cs-- stress test  Medications for other co morbidities cont as appropriate w dosage adjustments as necessary   PT/OT  DVT PPx  DC planning     Discharge Exam:  See progress note from today    Condition:  Stable    Disposition: home    Patient Instructions:   Current Discharge Medication List      START taking these medications    Details   amLODIPine (NORVASC) 5 MG tablet Take 1 tablet by mouth daily  Qty: 30 tablet, Refills: 3         CONTINUE these medications which have CHANGED    Details   metoprolol succinate (TOPROL XL) 100 MG extended release tablet Take 1 tablet by mouth daily  Qty: 30 tablet, Refills: 3         CONTINUE these medications which have NOT CHANGED    Details   gabapentin (NEURONTIN) 300 MG capsule Take 300 mg by mouth 3 times daily.       Tofacitinib Citrate ER (XELJANZ XR) 11 MG TB24 Take 11 mg by mouth Daily      cilostazol (PLETAL) 100 MG tablet Take 1 tablet by mouth 2 times daily  Qty: 60 tablet, Refills: 11      latanoprost (XALATAN) 0.005 % ophthalmic solution INSTILL 1 DROP INTO BOTH EYES EVERY DAY  Qty: 2.5 mL, Refills: 0    Associated Diagnoses: Cataract, unspecified cataract type, unspecified laterality      rosuvastatin (CRESTOR) 10 MG tablet TAKE 1 TABLET BY MOUTH EVERY DAY  Qty: 90 tablet, Refills: 0    Associated Diagnoses: Mixed hyperlipidemia      loratadine (CLARITIN) 10 MG tablet TAKE ONE TABLET BY MOUTH EVERY DAY. Qty: 90 tablet, Refills: 0    Associated Diagnoses: Sinusitis, unspecified chronicity, unspecified location      famotidine (PEPCID) 40 MG tablet Take 1 tablet by mouth daily  Qty: 60 tablet, Refills: 3      brinzolamide (AZOPT) 1 % ophthalmic suspension Place 1 drop into the left eye 3 times daily  Qty: 1 Bottle, Refills: 2      hydroxychloroquine (PLAQUENIL) 200 MG tablet Take 200 mg by mouth 2 times daily       Misc. Devices (ROLLATOR ULTRA-LIGHT) MISC 1 Device by Does not apply route continuous  Qty: 1 each, Refills: 0    Associated Diagnoses: Cerebrovascular accident (CVA), unspecified mechanism ([address] History of total knee replacement, unspecified laterality;  Neuropathy; Rheumatoid arthritis involving both hands with positive rheumatoid factor (HCC)      Multiple Vitamins-Minerals (VITAMIN D3 COMPLETE PO) Take 2,000 Units by mouth      Omega-3 Fatty Acids (FISH OIL) 1000 MG CPDR Take 1,000 mg by mouth daily STOP PREOP MED      aspirin 81 MG EC tablet TAKE 1 TABLET BY MOUTH DAILY  Qty: 90 tablet, Refills: 3      Cholecalciferol (VITAMIN D) 2000 units TABS tablet Take 2,000 Units by mouth daily          STOP taking these medications       phenazopyridine (PYRIDIUM) 200 MG tablet Comments:   Reason for Stopping:         Tofacitinib Citrate (XELJANZ PO) Comments:   Reason for Stopping:             Activity: activity as tolerated  Diet: cardiac diet    Follow-up with 1 week PCP 2 weeks vascular surgery      Signed:  Dio Yost MD  7/2/2020  3:48 PM

## 2020-07-02 NOTE — PROGRESS NOTES
Dr. Lauro Sommers notified that pt is cleared for surgery from a cardiology standpoint    Herlene Hodgkin, RN

## 2020-07-12 ENCOUNTER — TELEPHONE (OUTPATIENT)
Dept: VASCULAR SURGERY | Age: 75
End: 2020-07-12

## 2020-07-13 ENCOUNTER — TELEPHONE (OUTPATIENT)
Dept: VASCULAR SURGERY | Age: 75
End: 2020-07-13

## 2020-07-13 NOTE — TELEPHONE ENCOUNTER
Discussed the patient again today, informed that she does have an appointment to see me, but she will be bringing her daughter Nando Lynn

## 2020-07-16 ENCOUNTER — OFFICE VISIT (OUTPATIENT)
Dept: VASCULAR SURGERY | Age: 75
End: 2020-07-16
Payer: COMMERCIAL

## 2020-07-16 VITALS — RESPIRATION RATE: 16 BRPM | BODY MASS INDEX: 29.45 KG/M2 | HEIGHT: 60 IN | WEIGHT: 150 LBS

## 2020-07-16 PROBLEM — M79.605 ACUTE PAIN OF LEFT LOWER EXTREMITY: Status: RESOLVED | Noted: 2019-11-01 | Resolved: 2020-07-16

## 2020-07-16 PROBLEM — I70.229 ATHEROSCLEROSIS OF NATIVE ARTERIES OF EXTREMITY WITH REST PAIN (HCC): Status: ACTIVE | Noted: 2020-07-16

## 2020-07-16 PROBLEM — R35.0 URINARY FREQUENCY: Status: RESOLVED | Noted: 2017-12-07 | Resolved: 2020-07-16

## 2020-07-16 PROBLEM — N39.0 URINARY TRACT INFECTION WITHOUT HEMATURIA: Status: RESOLVED | Noted: 2017-12-07 | Resolved: 2020-07-16

## 2020-07-16 PROCEDURE — 1123F ACP DISCUSS/DSCN MKR DOCD: CPT | Performed by: SURGERY

## 2020-07-16 PROCEDURE — 99214 OFFICE O/P EST MOD 30 MIN: CPT | Performed by: SURGERY

## 2020-07-16 PROCEDURE — 3017F COLORECTAL CA SCREEN DOC REV: CPT | Performed by: SURGERY

## 2020-07-16 PROCEDURE — 1036F TOBACCO NON-USER: CPT | Performed by: SURGERY

## 2020-07-16 PROCEDURE — G8417 CALC BMI ABV UP PARAM F/U: HCPCS | Performed by: SURGERY

## 2020-07-16 PROCEDURE — 1090F PRES/ABSN URINE INCON ASSESS: CPT | Performed by: SURGERY

## 2020-07-16 PROCEDURE — 4040F PNEUMOC VAC/ADMIN/RCVD: CPT | Performed by: SURGERY

## 2020-07-16 PROCEDURE — G8400 PT W/DXA NO RESULTS DOC: HCPCS | Performed by: SURGERY

## 2020-07-16 PROCEDURE — G8427 DOCREV CUR MEDS BY ELIG CLIN: HCPCS | Performed by: SURGERY

## 2020-07-16 NOTE — PROGRESS NOTES
Chief Complaint:   Chief Complaint   Patient presents with    Follow-Up from Hospital         HPI: Patient came to the office, with her daughter, Mindy Flores, who is a schoolteacher, to discuss the results of the CTA of the aorta with runoff, and also treatment options available for increasing symptoms of pain and discomfort in the left foot and leg on and off with increasing difficulty walking, numbness mid to see the rest pain, but at nighttime has tingling of the foot and the toes    Patient can walk short distance slowly up to 3 to 4 minutes at a time    Patient denies any chest pain or shortness of breath    Patient is to smoke about 2 packs/day, quit smoking more than 10 years ago, has seen Dr. Rossy Dutton in the past      Patient denies any focal lateralizing neurological symptoms like loss of speech, vision or loss of function of extremity        Allergies   Allergen Reactions    Iodine Shortness Of Breath    Macrodantin [Nitrofurantoin] Shortness Of Breath    Prednisone Shortness Of Breath and Palpitations    Sulfa Antibiotics Shortness Of Breath    5-Alpha Reductase Inhibitors        Current Outpatient Medications   Medication Sig Dispense Refill    metoprolol succinate (TOPROL XL) 100 MG extended release tablet Take 1 tablet by mouth daily 30 tablet 3    amLODIPine (NORVASC) 5 MG tablet Take 1 tablet by mouth daily 30 tablet 3    gabapentin (NEURONTIN) 300 MG capsule Take 300 mg by mouth 3 times daily.  Tofacitinib Citrate ER (XELJANZ XR) 11 MG TB24 Take 11 mg by mouth Daily      cilostazol (PLETAL) 100 MG tablet Take 1 tablet by mouth 2 times daily 60 tablet 11    latanoprost (XALATAN) 0.005 % ophthalmic solution INSTILL 1 DROP INTO BOTH EYES EVERY DAY 2.5 mL 0    Misc.  Devices (ROLLATOR ULTRA-LIGHT) MISC 1 Device by Does not apply route continuous 1 each 0    rosuvastatin (CRESTOR) 10 MG tablet TAKE 1 TABLET BY MOUTH EVERY DAY 90 tablet 0    loratadine (CLARITIN) 10 MG tablet TAKE ONE TABLET BY MOUTH EVERY DAY. 90 tablet 0    Multiple Vitamins-Minerals (VITAMIN D3 COMPLETE PO) Take 2,000 Units by mouth      Omega-3 Fatty Acids (FISH OIL) 1000 MG CPDR Take 1,000 mg by mouth daily STOP PREOP MED      famotidine (PEPCID) 40 MG tablet Take 1 tablet by mouth daily 60 tablet 3    aspirin 81 MG EC tablet TAKE 1 TABLET BY MOUTH DAILY 90 tablet 3    brinzolamide (AZOPT) 1 % ophthalmic suspension Place 1 drop into the left eye 3 times daily 1 Bottle 2    hydroxychloroquine (PLAQUENIL) 200 MG tablet Take 200 mg by mouth 2 times daily       Cholecalciferol (VITAMIN D) 2000 units TABS tablet Take 2,000 Units by mouth daily        No current facility-administered medications for this visit. Past Medical History:   Diagnosis Date    Allergy     Aorto-iliac atherosclerosis ([address] 7/1/2020    Arthritis     Bladder prolapse     CAD (coronary artery disease)     MI  2011    Carotid artery stenosis     Right carotid stenosis.     Carotid bruit 1/10/2013    Carotid stenosis, left 12/13/2012    Cerebrovascular accident (stroke) ([address]     pt states 13 yrs ago    Decreased radial pulse 7/6/2017    Gastric reflux     Glaucoma     History of blood transfusion     Hyperlipemia     Hypertension     Insomnia     PVD [facility]peripheral vascular disease) [facility]audication [facility]) 6/4/2020    Restless leg syndrome     S/P carotid endarterectomy 1/10/2013    Sinusitis        Past Surgical History:   Procedure Laterality Date    ANTERIOR FIXATION OF THORACIC SPINE      BACK SURGERY      cervical c5-c6    CAROTID ENDARTERECTOMY  1998    right    CAROTID ENDARTERECTOMY  12/18/2012    left carotid endarterectomy done by Dr. Lupe Mann      left wrist    CATARACT REMOVAL      CERVICAL [address] SURGERY  2002    C5 and C6    CORONARY ANGIOPLASTY WITH STENT PLACEMENT  09/06/2011    Dr. Graham Poe      in past for kidney stones   [address] CATH LAB PROCEDURE  11    Emergent cath/PTCA with deployment of 2 overlapping DE stents to the distal, mid and proximal RCA.    HYSTERECTOMY      JOINT REPLACEMENT      right knee twice, left knee    JOINT REPLACEMENT Right     hip    KNEE ARTHROPLASTY Left 13    KNEE SURGERY      right knee x 2    LITHOTRIPSY Right 2019    RIGHT ESWL EXTRACORPOREAL SHOCK WAVE LITHOTRIPSY CYSTOSCOPY STENT INSERTION performed by Taras Salas MD at [address] [facility]         History reviewed. No pertinent family history.     Social History     Socioeconomic History    Marital status:      Spouse name: Not on file    Number of children: Not on file    Years of education: Not on file    Highest education level: Not on file   Occupational History    Not on file   Social Needs    Financial resource strain: Not on file    Food insecurity     Worry: Not on file     Inability: Not on file    Transportation needs     Medical: Not on file     Non-medical: Not on file   Tobacco Use    Smoking status: Former Smoker     Packs/day: 2.50     Years: 40.00     Pack years: 100.00     Types: Cigarettes     Last attempt to quit: 9/3/2000     Years since quittin.8    Smokeless tobacco: Never Used   Substance and Sexual Activity    Alcohol use: No     Comment: drinks 3 cups of tea daily    Drug use: No    Sexual activity: Not on file   Lifestyle    Physical activity     Days per week: Not on file     Minutes per session: Not on file    Stress: Not on file   Relationships    Social connections     Talks on phone: Not on file     Gets together: Not on file     Attends [employer] service: Not on file     Active member of club or organization: Not on file     Attends meetings of clubs or organizations: Not on file     Relationship status: Not on file    Intimate partner violence     Fear of current or ex partner: Not on file     Emotionally abused: Not on file     Physically abused: Not on file     Forced sexual activity: Not on file   Other Topics Concern    Not on file   Social History Narrative    Not on file       Review of Systems:  Skin:  No abnormal pigmentation or rash  Eyes:  No blurring, diplopia or vision loss  Ears/Nose/Throat:  No hearing loss or vertigo  Respiratory:  No cough, pleuritic chest pain, dyspnea, or wheezing. Cardiovascular: No angina, palpitations . Coronary artery disease with history of angioplasty and stent placement hypertension, hyperlipidemia, recently did undergo a stress test, normal  Gastrointestinal:  No nausea or vomiting; no abdominal pain or rectal bleeding  Musculoskeletal:  No arthritis or weakness. Neurologic:  No paralysis, paresis, paresthesia, seizures or headaches. History of stroke in the past, previous history of carotid endarterectomy  Hematologic/Lymphatic/Immunologic:  No anemia, abnormal bleeding/bruising, fever, chills or night sweats. Endocrine:  No heat or cold intolerance. No polyphagia, polydipsia or polyuria. Physical Exam:  General appearance:  Alert, awake, oriented x 3. No distress. Skin:  Warm and dry  Head:  Normocephalic. No masses, lesions or tenderness  Eyes:  Conjunctivae appear normal; PERRL  Ears:  External ears normal  Nose/Sinuses:  Septum midline, mucosa normal; no drainage  Oropharynx:  Clear, no exudate noted  Neck:  No jugular venous distention, lymphadenopathy or thyromegaly. Carotid bruit noted  Lungs:  Clear to ausculation bilaterally. No rhonchi, crackles, wheezes  Heart:  Regular rate and rhythm. No rub or murmur  Abdomen:  Soft, non-tender. No masses, organomegaly. Musculoskeletal : No joint effusions, tenderness swelling    Neuro: Speech is intact. Moving all extremities. No focal motor or sensory deficits      Extremities:  Both feet are warm to touch.  The color of both feet is normal.    Patient does have a slight dependent rubor of the left foot compared to the right side    Pulses

## 2020-07-24 RX ORDER — LATANOPROST 50 UG/ML
SOLUTION/ DROPS OPHTHALMIC
Qty: 2.5 ML | Refills: 0 | OUTPATIENT
Start: 2020-07-24

## 2020-09-01 ENCOUNTER — TELEPHONE (OUTPATIENT)
Dept: VASCULAR SURGERY | Age: 75
End: 2020-09-01

## 2020-09-03 RX ORDER — TRIAMCINOLONE ACETONIDE 1 MG/G
1 CREAM TOPICAL 2 TIMES DAILY
COMMUNITY
Start: 2020-08-04 | End: 2022-10-23

## 2020-09-03 NOTE — PROGRESS NOTES
[address] PRE-ADMISSION TESTING GENERAL INSTRUCTIONS- [facility]-phone number:[phone]    GENERAL INSTRUCTIONS  [x] Antibacterial Soap shower Night before and/or AM of Surgery  [x] Nothing by mouth after midnight, including gum, candy, mints, or water. [x] You may brush your teeth, gargle, but do NOT swallow water. [x] Jewelry, valuables or body piercing's should not be brought to the hospital. All body and/or tongue piercing's must be removed prior to arriving to hospital.  ALL hair pins must be removed. [x] Do not wear makeup, lotions, powders, deodorant. Nail polish as directed by the nurse. REMOVE ALL POLISH. [x] Bring insurance card and photo ID. [x] Transfusion Bracelet: Please bring with you to hospital, day of surgery     PARKING INSTRUCTIONS:   [x] Arrival Time: 5AM, SEPT. 13. Park on [address] [place] [address]. Enter the Main entrance. ONE person may come with you. WEAR A Mask. [x] To reach the [facility] from [address] [place] [address], upon entering the hospital, take elevator B to the 3rd floor. EDUCATION INSTRUCTIONS:      [x] Pre-admission Testing educational folder given  [x] Incentive Spirometry,coughing & deep breathing exercises reviewed. [x]Medication information sheet(s)   [x]Fluoroscopy-Xray used in surgery reviewed with patient. Educational pamphlet placed in chart. [x]Pain: Post-op pain is normal and to be expected. You will be asked to rate your pain from 0-10(a zero is not acceptable-education is needed). Your post-op pain goal is:5  [x] Ask your nurse for your pain medication. [x] Other: wear the same clothes to and home from hospital, loose, comfortable. MEDICATION INSTRUCTIONS:   [x]Bring a complete list of your medications, please write the last time you took the medicine, give this list to the nurse. [x] Take the following medications the morning of surgery with 1-2 ounces of water: AMLODIPINE, PEPCID, GABAPENTIN, METOPROLOL. USE EYE DROPS.     BRING EYE DROPS

## 2020-09-04 ENCOUNTER — HOSPITAL ENCOUNTER (OUTPATIENT)
Dept: PREADMISSION TESTING | Age: 75
Discharge: HOME | End: 2020-09-04
Payer: COMMERCIAL

## 2020-09-04 ENCOUNTER — HOSPITAL ENCOUNTER (OUTPATIENT)
Dept: GENERAL RADIOLOGY | Age: 75
Discharge: HOME | End: 2020-09-06
Payer: COMMERCIAL

## 2020-09-04 ENCOUNTER — PREP FOR PROCEDURE (OUTPATIENT)
Dept: VASCULAR SURGERY | Age: 75
End: 2020-09-04

## 2020-09-04 VITALS
WEIGHT: 160 LBS | BODY MASS INDEX: 31.41 KG/M2 | SYSTOLIC BLOOD PRESSURE: 164 MMHG | HEIGHT: 60 IN | DIASTOLIC BLOOD PRESSURE: 84 MMHG | HEART RATE: 76 BPM | RESPIRATION RATE: 20 BRPM | TEMPERATURE: 97.9 F | OXYGEN SATURATION: 100 %

## 2020-09-04 DIAGNOSIS — I70.8 ATHEROSCLEROSIS OF AORTIC BIFURCATION AND COMMON ILIAC ARTERIES (HCC): Primary | ICD-10-CM

## 2020-09-04 DIAGNOSIS — I70.0 ATHEROSCLEROSIS OF AORTIC BIFURCATION AND COMMON ILIAC ARTERIES (HCC): Primary | ICD-10-CM

## 2020-09-04 LAB
ABO/RH: NORMAL
ALBUMIN SERPL-MCNC: 4.8 G/DL (ref 3.5–5.2)
ALP BLD-CCNC: 57 U/L (ref 35–104)
ALT SERPL-CCNC: 13 U/L (ref 0–32)
ANION GAP SERPL CALCULATED.3IONS-SCNC: 12 MMOL/L (ref 7–16)
ANTIBODY SCREEN: NORMAL
APTT: 29.5 SEC (ref 24.5–35.1)
AST SERPL-CCNC: 24 U/L (ref 0–31)
BILIRUB SERPL-MCNC: 0.4 MG/DL (ref 0–1.2)
BUN BLDV-MCNC: 11 MG/DL (ref 8–23)
CALCIUM SERPL-MCNC: 10 MG/DL (ref 8.6–10.2)
CHLORIDE BLD-SCNC: 107 MMOL/L (ref 98–107)
CO2: 24 MMOL/L (ref 22–29)
CREAT SERPL-MCNC: 0.8 MG/DL (ref 0.5–1)
GFR AFRICAN AMERICAN: >60
GFR NON-AFRICAN AMERICAN: >60 ML/MIN/1.73
GLUCOSE BLD-MCNC: 107 MG/DL (ref 74–99)
HCT VFR BLD CALC: 34.8 % (ref 34–48)
HEMOGLOBIN: 11 G/DL (ref 11.5–15.5)
INR BLD: 1.1
MCH RBC QN AUTO: 31.3 PG (ref 26–35)
MCHC RBC AUTO-ENTMCNC: 31.6 % (ref 32–34.5)
MCV RBC AUTO: 98.9 FL (ref 80–99.9)
PDW BLD-RTO: 14.6 FL (ref 11.5–15)
PLATELET # BLD: 219 E9/L (ref 130–450)
PMV BLD AUTO: 10.3 FL (ref 7–12)
POTASSIUM REFLEX MAGNESIUM: 4.1 MMOL/L (ref 3.5–5)
PROTHROMBIN TIME: 12 SEC (ref 9.3–12.4)
RBC # BLD: 3.52 E12/L (ref 3.5–5.5)
SODIUM BLD-SCNC: 143 MMOL/L (ref 132–146)
TOTAL PROTEIN: 7.5 G/DL (ref 6.4–8.3)
WBC # BLD: 5.6 E9/L (ref 4.5–11.5)

## 2020-09-04 PROCEDURE — 86901 BLOOD TYPING SEROLOGIC RH(D): CPT

## 2020-09-04 PROCEDURE — 87081 CULTURE SCREEN ONLY: CPT

## 2020-09-04 PROCEDURE — 85730 THROMBOPLASTIN TIME PARTIAL: CPT

## 2020-09-04 PROCEDURE — 86850 RBC ANTIBODY SCREEN: CPT

## 2020-09-04 PROCEDURE — 85610 PROTHROMBIN TIME: CPT

## 2020-09-04 PROCEDURE — 36415 COLL VENOUS BLD VENIPUNCTURE: CPT

## 2020-09-04 PROCEDURE — 86900 BLOOD TYPING SEROLOGIC ABO: CPT

## 2020-09-04 PROCEDURE — 80053 COMPREHEN METABOLIC PANEL: CPT

## 2020-09-04 PROCEDURE — 85027 COMPLETE CBC AUTOMATED: CPT

## 2020-09-04 PROCEDURE — 71046 X-RAY EXAM CHEST 2 VIEWS: CPT

## 2020-09-04 PROCEDURE — 99214 OFFICE O/P EST MOD 30 MIN: CPT | Performed by: SURGERY

## 2020-09-04 RX ORDER — IPRATROPIUM BROMIDE AND ALBUTEROL SULFATE 2.5; .5 MG/3ML; MG/3ML
1 SOLUTION RESPIRATORY (INHALATION) ONCE
Status: CANCELLED | OUTPATIENT
Start: 2020-09-15

## 2020-09-04 RX ORDER — SODIUM CHLORIDE 0.9 % (FLUSH) 0.9 %
10 SYRINGE (ML) INJECTION EVERY 12 HOURS SCHEDULED
Status: CANCELLED | OUTPATIENT
Start: 2020-09-04

## 2020-09-04 RX ORDER — SODIUM CHLORIDE 0.9 % (FLUSH) 0.9 %
10 SYRINGE (ML) INJECTION PRN
Status: CANCELLED | OUTPATIENT
Start: 2020-09-04

## 2020-09-04 RX ORDER — SODIUM CHLORIDE 9 MG/ML
INJECTION, SOLUTION INTRAVENOUS CONTINUOUS
Status: CANCELLED | OUTPATIENT
Start: 2020-09-04

## 2020-09-04 NOTE — PROGRESS NOTES
CHEST XRAY DONE IN Located within [facility] TODAY ROUTED TO DR HILLIARD [facility].

## 2020-09-04 NOTE — H&P
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 Dotty Medina, not a good situation for particular angioplasty and stent placement, will require open bypass surgery, aorto iliac to aortofemoral bypass surgery, would recommend complete cardiac and pulmonary evaluation in anticipation of open major intra-abdominal surgery    Patient was seen by me, at the time of preadmission testing, for detail evaluation, explanation of the procedure 1 more time the patient and the family    Patient underwent cardiac evaluation including stress test by  and associates, pool to proceed with vascular intervention    Patient underwent pulmonary evaluation by Dr. Maurilio Ramírez and associates    Patient denies any focal lateralizing neurological symptoms like loss of speech, vision or loss of function of extremity    Patient can walk short distances slowly, and denies any symptoms of rest pain    Allergies   Allergen Reactions    Iodine Shortness Of Breath    Macrodantin [Nitrofurantoin] Shortness Of Breath    Prednisone Shortness Of Breath and Palpitations    Sulfa Antibiotics Shortness Of Breath    5-Alpha Reductase Inhibitors        No current facility-administered medications for this encounter.       Current Outpatient Medications   Medication Sig Dispense Refill    metoprolol succinate (TOPROL XL) 100 MG extended release tablet Take 1 tablet by mouth daily 30 tablet 3    amLODIPine (NORVASC) 5 MG tablet Take 1 tablet by mouth daily 30 tablet 3    gabapentin (NEURONTIN) 300 MG capsule Take 300 mg by mouth 3 times daily.  Tofacitinib Citrate ER (XELJANZ XR) 11 MG TB24 Take 11 mg by mouth Daily      cilostazol (PLETAL) 100 MG tablet Take 1 tablet by mouth 2 times daily 60 tablet 11    latanoprost (XALATAN) 0.005 % ophthalmic solution INSTILL 1 DROP INTO BOTH EYES EVERY DAY 2.5 mL 0    rosuvastatin (CRESTOR) 10 MG tablet TAKE 1 TABLET BY MOUTH EVERY DAY 90 tablet 0    famotidine (PEPCID) 40 MG tablet Take 1 tablet by mouth daily (Patient taking differently: Take 40 mg by mouth daily TAKES PRN) 60 tablet 3    aspirin 81 MG EC tablet TAKE 1 TABLET BY MOUTH DAILY 90 tablet 3    brinzolamide (AZOPT) 1 % ophthalmic suspension Place 1 drop into the left eye 3 times daily 1 Bottle 2    hydroxychloroquine (PLAQUENIL) 200 MG tablet Take 200 mg by mouth 2 times daily TAKES ONLY DAILY      aspirin 325 MG EC tablet Take 1 tablet by mouth daily      triamcinolone (KENALOG) 0.1 % cream Apply 1 applicator topically 2 times daily      Misc. Devices (ROLLATOR ULTRA-LIGHT) MISC 1 Device by Does not apply route continuous 1 each 0    loratadine (CLARITIN) 10 MG tablet TAKE ONE TABLET BY MOUTH EVERY DAY. 90 tablet 0    Multiple Vitamins-Minerals (VITAMIN D3 COMPLETE PO) Take 2,000 Units by mouth      Omega-3 Fatty Acids (FISH OIL) 1000 MG CPDR Take 1,000 mg by mouth daily STOP PREOP MED      Cholecalciferol (VITAMIN D) 2000 units TABS tablet Take 2,000 Units by mouth daily          Past Medical History:   Diagnosis Date    Aorto-iliac atherosclerosis ([address] 7/1/2020    Arthritis     Atherosclerosis of native arteries of extremity with rest pain ([address] 7/16/2020    Bladder prolapse     CAD (coronary artery disease)     MI  2011    Carotid artery stenosis     Right carotid stenosis.     Carotid bruit 1/10/2013    Carotid stenosis, left 12/13/2012    Cerebrovascular accident (stroke) ([address]     pt states 13 yrs ago    Decreased radial pulse 7/6/2017    Gastric reflux  Glaucoma     History of blood transfusion     Hyperlipemia     Hypertension     Insomnia     PVD (peripheral vascular disease) with claudication ([facility] 6/4/2020    Restless leg syndrome     Sinusitis        Past Surgical History:   Procedure Laterality Date    ANTERIOR FIXATION OF THORACIC SPINE      BACK SURGERY      cervical c5-c6    CARDIAC SURGERY      CAROTID ENDARTERECTOMY  1998    right    CAROTID ENDARTERECTOMY  12/18/2012    left carotid endarterectomy done by Dr. Olivia Beasley      left wrist    CATARACT REMOVAL      CERVICAL [address] SURGERY  2002    C5 and C6    CORONARY ANGIOPLASTY WITH STENT PLACEMENT  09/06/2011    Dr. Nguyen Cast      in past for kidney stones    DENTAL SURGERY      DIAGNOSTIC CARDIAC CATH LAB PROCEDURE  9/6/11    Emergent cath/PTCA with deployment of 2 overlapping DE stents to the distal, mid and proximal RCA.    HYSTERECTOMY      JOINT REPLACEMENT      right knee twice, left knee    JOINT REPLACEMENT Right     hip    KNEE ARTHROPLASTY Left 4-11-13    KNEE SURGERY      right knee x 2    LITHOTRIPSY Right 11/7/2019    RIGHT ESWL EXTRACORPOREAL SHOCK WAVE LITHOTRIPSY CYSTOSCOPY STENT INSERTION performed by Marcos Davidson MD at [id number] [facility]         History reviewed. No pertinent family history.     Social History     Socioeconomic History    Marital status:      Spouse name: Not on file    Number of children: Not on file    Years of education: Not on file    Highest education level: Not on file   Occupational History    Occupation: disabled- floral shop   Social Needs    Financial resource strain: Not on file    Food insecurity     Worry: Not on file     Inability: Not on file    Transportation needs     Medical: Not on file     Non-medical: Not on file   Tobacco Use    Smoking status: Former Smoker     Packs/day: 2.50     Years: 40.00     Pack years: 100.00 Types: Cigarettes     Last attempt to quit: 9/3/2000     Years since quittin.0    Smokeless tobacco: Never Used   Substance and Sexual Activity    Alcohol use: No     Comment: drinks 3 cups of tea daily    Drug use: No    Sexual activity: Not on file   Lifestyle    Physical activity     Days per week: Not on file     Minutes per session: Not on file    Stress: Not on file   Relationships    Social connections     Talks on phone: Not on file     Gets together: Not on file     Attends Restorationism service: Not on file     Active member of club or organization: Not on file     Attends meetings of clubs or organizations: Not on file     Relationship status: Not on file    Intimate partner violence     Fear of current or ex partner: Not on file     Emotionally abused: Not on file     Physically abused: Not on file     Forced sexual activity: Not on file   Other Topics Concern    Not on file   Social History Narrative    Not on file       Review of Systems:  Skin:  No abnormal pigmentation or rash  Eyes:  No blurring, diplopia or vision loss  Ears/Nose/Throat:  No hearing loss or vertigo  Respiratory:  No cough, pleuritic chest pain, dyspnea, or wheezing. Chronic obstructive lung disease, history of tobacco use  Cardiovascular: No angina, palpitations . Hypertension, hyperlipidemia, coronary artery disease with history of angioplasty and stent placement      gastrointestinal:  No nausea or vomiting; no abdominal pain or rectal bleeding  Musculoskeletal:  No arthritis or weakness. Neurologic:  No paralysis, paresis, paresthesia, seizures or headaches  Hematologic/Lymphatic/Immunologic:  No anemia, abnormal bleeding/bruising, fever, chills or night sweats. Endocrine:  No heat or cold intolerance. No polyphagia, polydipsia or polyuria. Physical Exam:  General appearance:  Alert, awake, oriented x 3. No distress. Skin:  Warm and dry  Head:  Normocephalic.   No masses, lesions or tenderness  Eyes: Conjunctivae appear normal; PERRL  Ears:  External ears normal  Nose/Sinuses:  Septum midline, mucosa normal; no drainage  Oropharynx:  Clear, no exudate noted  Neck:  No jugular venous distention, lymphadenopathy or thyromegaly. Carotid bruit noted  Lungs:  Clear to ausculation bilaterally. No rhonchi, crackles, wheezes  Heart:  Regular rate and rhythm. No rub or murmur  Abdomen:  Soft, non-tender. No masses, organomegaly. Musculoskeletal : No joint effusions, tenderness swelling    Neuro: Speech is intact. Moving all extremities. No focal motor or sensory deficits      Extremities:  Both feet are warm to touch. The color of both feet is normal.    Dependent rubor noted  Pulses Right  Left    Brachial 3 3    Radial    3=normal   Femoral 1-2 0  2=diminished   Popliteal    1=barely palpable   Dorsalis pedis 0 9  0=absent   Posterior tibial    4=aneurysmal             Other pertinent information:1. The past medical records were reviewed. 2.  Lower extremity artery Doppler study was personally reviewed    3 with a CT of the aorta with runoff was reviewed, complete occlusion left common iliac, external leg arteries with femoral-popliteal arterial occlusive disease bilaterally associate with moderate right external iliac artery occlusive disease on the right side, overall the all the arteries are small in size    Assessment:    1. Diffuse aortoiliac and femoral-popliteal arterial occlusive disease with symptoms of rest pain in the left foot    2. Coronary artery disease    3. Hypertension    4.   Chronic obstructive lung disease with history of tobacco use in the past          Plan:       I had a long detailed discussion the patient and her daughter David Potts, who came with the patient, all options, risks benefits and alternatives were explained, informed him, that have personally reviewed her CTA of the aorta with runoff, with Dr. Lidia Sheridan, not a good situation for long-term durable patency with angioplasty and stent placement particular with because of diffusely small vessels including distal small aorta    Option of axillofemoral bypass was discussed, finally as the patient was found stable from a cardiac and pulmonary perspective, decided to proceed with the aorto iliac possible by femoral bypass surgery    Clearly explained to them it is a major procedure, with the risks benefits alternatives and chances of major complications and patient does not want to lose leg, wants to proceed with the surgery      All the risks including bleeding, clotting, infection, arterial, venous, cardiopulmonary, gastrointestinal, renal, neurological complications etc. that can potentially lead to paralysis, loss of limb, loss of life, etc. were explained which they understand and consent for surgery. All questions answered. Patient was instructed to continue walking program and to call if any worsening of symptoms and to call if any focal lateralizing neurological symptoms like loss of speech, vision or loss of function of extremity. All the questions were answered.             Addendum:    No changes noted in the history and physical examination the patient since my last last evaluation of the patient    Rediscussed with the patient, CT revealed a very small aorta, infrarenally 10 mm, discussed with the nursing staff, regarding the availability of a smaller bifurcated graft    Kayli Chesterutant

## 2020-09-04 NOTE — PROGRESS NOTES
DR Derik Granger TO PAT TO SEE , EVALUATE AND SPEAK TO PATIENT. PATIENTS MEDICATIONS REVIEWED WITH DR. PATIENT GIVEN INSTRUCTIONS TO HOLD ASPIRIN AND PLETAL FOR 1 WEEK PRIOR TO OR. PATIENT ALSO INSTRUCTED TO HOLD XELJANZ 5 DAYS PRE OP. INSTRUCTED SHE MAY TAKE TYLENOL PRN FOR PAIN. PATIENTS DAUGHTER JOEY AND PATIENT GIVEN VERBAL AND WRITTEN INSTRUCTIONS. UNDERSTANDING VERBALIZED.

## 2020-09-06 LAB — MRSA CULTURE ONLY: NORMAL

## 2020-09-10 ENCOUNTER — HOSPITAL ENCOUNTER (OUTPATIENT)
Age: 75
Discharge: HOME | End: 2020-09-12
Payer: COMMERCIAL

## 2020-09-10 PROCEDURE — U0003 INFECTIOUS AGENT DETECTION BY NUCLEIC ACID (DNA OR RNA); SEVERE ACUTE RESPIRATORY SYNDROME CORONAVIRUS 2 (SARS-COV-2) (CORONAVIRUS DISEASE [COVID-19]), AMPLIFIED PROBE TECHNIQUE, MAKING USE OF HIGH THROUGHPUT TECHNOLOGIES AS DESCRIBED BY CMS-2020-01-R: HCPCS

## 2020-09-12 LAB
SARS-COV-2: NOT DETECTED
SOURCE: NORMAL

## 2020-09-14 ENCOUNTER — ANESTHESIA EVENT (OUTPATIENT)
Dept: OPERATING ROOM | Age: 75
DRG: 270 | End: 2020-09-14
Payer: COMMERCIAL

## 2020-09-15 ENCOUNTER — HOSPITAL ENCOUNTER (INPATIENT)
Age: 75
LOS: 9 days | Discharge: NURSING, REHAB OR LONG-TERM CARE | DRG: 270 | End: 2020-09-24
Attending: SURGERY | Admitting: SURGERY
Payer: COMMERCIAL

## 2020-09-15 ENCOUNTER — ANESTHESIA (OUTPATIENT)
Dept: OPERATING ROOM | Age: 75
DRG: 270 | End: 2020-09-15
Payer: COMMERCIAL

## 2020-09-15 ENCOUNTER — APPOINTMENT (OUTPATIENT)
Dept: GENERAL RADIOLOGY | Age: 75
DRG: 270 | End: 2020-09-15
Attending: SURGERY
Payer: COMMERCIAL

## 2020-09-15 VITALS — SYSTOLIC BLOOD PRESSURE: 135 MMHG | DIASTOLIC BLOOD PRESSURE: 72 MMHG | OXYGEN SATURATION: 98 % | TEMPERATURE: 92.1 F

## 2020-09-15 PROBLEM — I70.222 ATHEROSCLEROSIS OF NATIVE ARTERIES OF EXTREMITIES WITH REST PAIN, LEFT LEG (HCC): Status: ACTIVE | Noted: 2020-09-15

## 2020-09-15 LAB
ANION GAP SERPL CALCULATED.3IONS-SCNC: 11 MMOL/L (ref 7–16)
ANISOCYTOSIS: ABNORMAL
B.E.: -12.8 MMOL/L (ref -3–3)
B.E.: -12.9 MMOL/L (ref -3–3)
B.E.: -2.3 MMOL/L (ref -3–0)
B.E.: -9.5 MMOL/L (ref -3–3)
BASOPHILS ABSOLUTE: 0.02 E9/L (ref 0–0.2)
BASOPHILS RELATIVE PERCENT: 0.3 % (ref 0–2)
BUN BLDV-MCNC: 11 MG/DL (ref 8–23)
BURR CELLS: ABNORMAL
CALCIUM IONIZED: 1.04 MMOL/L (ref 1.15–1.33)
CALCIUM SERPL-MCNC: 6.8 MG/DL (ref 8.6–10.2)
CARDIOPULMONARY BYPASS: NO
CHLORIDE BLD-SCNC: 115 MMOL/L (ref 98–107)
CO2: 16 MMOL/L (ref 22–29)
COHB: 0.3 % (ref 0–1.5)
COHB: 0.3 % (ref 0–1.5)
COHB: 0.7 % (ref 0–1.5)
CREAT SERPL-MCNC: 0.7 MG/DL (ref 0.5–1)
CRITICAL: ABNORMAL
DATE ANALYZED: ABNORMAL
DATE OF COLLECTION: ABNORMAL
DEVICE: ABNORMAL
EOSINOPHILS ABSOLUTE: 0.01 E9/L (ref 0.05–0.5)
EOSINOPHILS RELATIVE PERCENT: 0.2 % (ref 0–6)
GFR AFRICAN AMERICAN: >60
GFR NON-AFRICAN AMERICAN: >60 ML/MIN/1.73
GLUCOSE BLD-MCNC: 151 MG/DL (ref 74–99)
HCO3 ARTERIAL: 23.4 MMOL/L (ref 22–26)
HCO3: 14.5 MMOL/L (ref 22–26)
HCO3: 15.1 MMOL/L (ref 22–26)
HCO3: 16.8 MMOL/L (ref 22–26)
HCT (EST): 27 % (ref 34–48)
HCT VFR BLD CALC: 25.4 % (ref 34–48)
HEMOGLOBIN: 7.8 G/DL (ref 11.5–15.5)
HGB, (EST): 9 G/DL (ref 11.5–15.5)
HHB: 1.9 % (ref 0–5)
HHB: 2.1 % (ref 0–5)
HHB: 3.4 % (ref 0–5)
IMMATURE GRANULOCYTES #: 0.03 E9/L
IMMATURE GRANULOCYTES %: 0.5 % (ref 0–5)
LAB: ABNORMAL
LACTIC ACID: 2.4 MMOL/L (ref 0.5–2.2)
LYMPHOCYTES ABSOLUTE: 0.43 E9/L (ref 1.5–4)
LYMPHOCYTES RELATIVE PERCENT: 7.5 % (ref 20–42)
Lab: ABNORMAL
MAGNESIUM: 1.5 MG/DL (ref 1.6–2.6)
MCH RBC QN AUTO: 31.3 PG (ref 26–35)
MCHC RBC AUTO-ENTMCNC: 30.7 % (ref 32–34.5)
MCV RBC AUTO: 102 FL (ref 80–99.9)
METER GLUCOSE: 182 MG/DL (ref 74–99)
METHB: 0.3 % (ref 0–1.5)
METHB: 0.4 % (ref 0–1.5)
METHB: 0.6 % (ref 0–1.5)
MODE: ABNORMAL
MONOCYTES ABSOLUTE: 0.45 E9/L (ref 0.1–0.95)
MONOCYTES RELATIVE PERCENT: 7.9 % (ref 2–12)
NEUTROPHILS ABSOLUTE: 4.78 E9/L (ref 1.8–7.3)
NEUTROPHILS RELATIVE PERCENT: 83.6 % (ref 43–80)
O2 SATURATION: 92.5 % (ref 92–98.5)
O2 SATURATION: 97.2 % (ref 92–98.5)
O2 SATURATION: 98.5 % (ref 92–98.5)
O2 SATURATION: 98.7 % (ref 92–98.5)
O2HB: 95.7 % (ref 94–97)
O2HB: 97 % (ref 94–97)
O2HB: 97.3 % (ref 94–97)
OPERATOR ID: 7278
OPERATOR ID: ABNORMAL
OVALOCYTES: ABNORMAL
PATIENT TEMP: 37 C
PCO2 ARTERIAL: 42.9 MMHG (ref 35–45)
PCO2: 38.4 MMHG (ref 35–45)
PCO2: 40 MMHG (ref 35–45)
PCO2: 43 MMHG (ref 35–45)
PDW BLD-RTO: 14.8 FL (ref 11.5–15)
PH BLOOD GAS: 7.16 (ref 7.35–7.45)
PH BLOOD GAS: 7.18 (ref 7.35–7.45)
PH BLOOD GAS: 7.26 (ref 7.35–7.45)
PH BLOOD GAS: 7.34 (ref 7.35–7.45)
PHOSPHORUS: 3.4 MG/DL (ref 2.5–4.5)
PLATELET # BLD: 108 E9/L (ref 130–450)
PMV BLD AUTO: 10.3 FL (ref 7–12)
PO2 ARTERIAL: 69.1 MMHG (ref 60–80)
PO2: 118.1 MMHG (ref 75–100)
PO2: 141 MMHG (ref 75–100)
PO2: 165.5 MMHG (ref 75–100)
POIKILOCYTES: ABNORMAL
POLYCHROMASIA: ABNORMAL
POTASSIUM SERPL-SCNC: 3.3 MMOL/L (ref 3.5–5.5)
POTASSIUM SERPL-SCNC: 3.58 MMOL/L (ref 3.5–5)
POTASSIUM SERPL-SCNC: 3.7 MMOL/L (ref 3.5–5)
RBC # BLD: 2.49 E12/L (ref 3.5–5.5)
SODIUM BLD-SCNC: 142 MMOL/L (ref 132–146)
SOURCE, BLOOD GAS: ABNORMAL
THB: 7.5 G/DL (ref 11.5–16.5)
THB: 9.1 G/DL (ref 11.5–16.5)
THB: 9.8 G/DL (ref 11.5–16.5)
TIME ANALYZED: 1426
TIME ANALYZED: 1555
TIME ANALYZED: 1855
WBC # BLD: 5.7 E9/L (ref 4.5–11.5)

## 2020-09-15 PROCEDURE — 94664 DEMO&/EVAL PT USE INHALER: CPT

## 2020-09-15 PROCEDURE — 88304 TISSUE EXAM BY PATHOLOGIST: CPT

## 2020-09-15 PROCEDURE — 86923 COMPATIBILITY TEST ELECTRIC: CPT

## 2020-09-15 PROCEDURE — 3600000005 HC SURGERY LEVEL 5 BASE: Performed by: SURGERY

## 2020-09-15 PROCEDURE — 2500000003 HC RX 250 WO HCPCS: Performed by: SURGERY

## 2020-09-15 PROCEDURE — C9248 INJ, CLEVIDIPINE BUTYRATE: HCPCS

## 2020-09-15 PROCEDURE — 3600000015 HC SURGERY LEVEL 5 ADDTL 15MIN: Performed by: SURGERY

## 2020-09-15 PROCEDURE — 84100 ASSAY OF PHOSPHORUS: CPT

## 2020-09-15 PROCEDURE — 2500000003 HC RX 250 WO HCPCS

## 2020-09-15 PROCEDURE — 83735 ASSAY OF MAGNESIUM: CPT

## 2020-09-15 PROCEDURE — 041C0J0 BYPASS RIGHT COMMON ILIAC ARTERY TO ABDOMINAL AORTA WITH SYNTHETIC SUBSTITUTE, OPEN APPROACH: ICD-10-PCS | Performed by: SURGERY

## 2020-09-15 PROCEDURE — P9041 ALBUMIN (HUMAN),5%, 50ML: HCPCS

## 2020-09-15 PROCEDURE — 2720000010 HC SURG SUPPLY STERILE: Performed by: SURGERY

## 2020-09-15 PROCEDURE — 6370000000 HC RX 637 (ALT 250 FOR IP): Performed by: SURGERY

## 2020-09-15 PROCEDURE — 82805 BLOOD GASES W/O2 SATURATION: CPT

## 2020-09-15 PROCEDURE — 82803 BLOOD GASES ANY COMBINATION: CPT

## 2020-09-15 PROCEDURE — 2580000003 HC RX 258: Performed by: SURGERY

## 2020-09-15 PROCEDURE — 3700000000 HC ANESTHESIA ATTENDED CARE: Performed by: SURGERY

## 2020-09-15 PROCEDURE — 2500000003 HC RX 250 WO HCPCS: Performed by: INTERNAL MEDICINE

## 2020-09-15 PROCEDURE — 3700000001 HC ADD 15 MINUTES (ANESTHESIA): Performed by: SURGERY

## 2020-09-15 PROCEDURE — 94150 VITAL CAPACITY TEST: CPT

## 2020-09-15 PROCEDURE — 82330 ASSAY OF CALCIUM: CPT

## 2020-09-15 PROCEDURE — 85025 COMPLETE CBC W/AUTO DIFF WBC: CPT

## 2020-09-15 PROCEDURE — 80048 BASIC METABOLIC PNL TOTAL CA: CPT

## 2020-09-15 PROCEDURE — 6360000002 HC RX W HCPCS

## 2020-09-15 PROCEDURE — 94640 AIRWAY INHALATION TREATMENT: CPT

## 2020-09-15 PROCEDURE — 6360000002 HC RX W HCPCS: Performed by: INTERNAL MEDICINE

## 2020-09-15 PROCEDURE — 2580000003 HC RX 258

## 2020-09-15 PROCEDURE — 71045 X-RAY EXAM CHEST 1 VIEW: CPT

## 2020-09-15 PROCEDURE — 2709999900 HC NON-CHARGEABLE SUPPLY: Performed by: SURGERY

## 2020-09-15 PROCEDURE — 83605 ASSAY OF LACTIC ACID: CPT

## 2020-09-15 PROCEDURE — 041C0JD BYPASS RIGHT COMMON ILIAC ARTERY TO RIGHT EXTERNAL ILIAC ARTERY WITH SYNTHETIC SUBSTITUTE, OPEN APPROACH: ICD-10-PCS | Performed by: SURGERY

## 2020-09-15 PROCEDURE — 36415 COLL VENOUS BLD VENIPUNCTURE: CPT

## 2020-09-15 PROCEDURE — 84132 ASSAY OF SERUM POTASSIUM: CPT

## 2020-09-15 PROCEDURE — APPSS60 APP SPLIT SHARED TIME 46-60 MINUTES: Performed by: NURSE PRACTITIONER

## 2020-09-15 PROCEDURE — 6360000002 HC RX W HCPCS: Performed by: SURGERY

## 2020-09-15 PROCEDURE — 88311 DECALCIFY TISSUE: CPT

## 2020-09-15 PROCEDURE — 2000000000 HC ICU R&B

## 2020-09-15 PROCEDURE — 2580000003 HC RX 258: Performed by: INTERNAL MEDICINE

## 2020-09-15 PROCEDURE — P9016 RBC LEUKOCYTES REDUCED: HCPCS

## 2020-09-15 PROCEDURE — C1768 GRAFT, VASCULAR: HCPCS | Performed by: SURGERY

## 2020-09-15 PROCEDURE — 02HV33Z INSERTION OF INFUSION DEVICE INTO SUPERIOR VENA CAVA, PERCUTANEOUS APPROACH: ICD-10-PCS | Performed by: ANESTHESIOLOGY

## 2020-09-15 PROCEDURE — 85347 COAGULATION TIME ACTIVATED: CPT

## 2020-09-15 PROCEDURE — 93005 ELECTROCARDIOGRAM TRACING: CPT | Performed by: SURGERY

## 2020-09-15 PROCEDURE — 82962 GLUCOSE BLOOD TEST: CPT

## 2020-09-15 PROCEDURE — 35638 BPG AORTOBI-ILIAC: CPT | Performed by: SURGERY

## 2020-09-15 PROCEDURE — 36430 TRANSFUSION BLD/BLD COMPNT: CPT

## 2020-09-15 DEVICE — GRAFT VASC SYNTH KNT DBL 14X7MMX50CM: Type: IMPLANTABLE DEVICE | Site: AORTA | Status: FUNCTIONAL

## 2020-09-15 RX ORDER — SODIUM CHLORIDE 9 MG/ML
250 INJECTION, SOLUTION INTRAVENOUS ONCE
Status: DISCONTINUED | OUTPATIENT
Start: 2020-09-15 | End: 2020-09-17

## 2020-09-15 RX ORDER — ONDANSETRON 2 MG/ML
INJECTION INTRAMUSCULAR; INTRAVENOUS PRN
Status: DISCONTINUED | OUTPATIENT
Start: 2020-09-15 | End: 2020-09-15 | Stop reason: SDUPTHER

## 2020-09-15 RX ORDER — SODIUM CHLORIDE 0.9 % (FLUSH) 0.9 %
10 SYRINGE (ML) INJECTION PRN
Status: DISCONTINUED | OUTPATIENT
Start: 2020-09-15 | End: 2020-09-15 | Stop reason: HOSPADM

## 2020-09-15 RX ORDER — MORPHINE SULFATE 4 MG/ML
4 INJECTION, SOLUTION INTRAMUSCULAR; INTRAVENOUS
Status: DISCONTINUED | OUTPATIENT
Start: 2020-09-15 | End: 2020-09-18

## 2020-09-15 RX ORDER — METOPROLOL SUCCINATE 50 MG/1
100 TABLET, EXTENDED RELEASE ORAL DAILY
Status: DISCONTINUED | OUTPATIENT
Start: 2020-09-16 | End: 2020-09-16

## 2020-09-15 RX ORDER — ASPIRIN 300 MG/1
300 SUPPOSITORY RECTAL DAILY
Status: DISCONTINUED | OUTPATIENT
Start: 2020-09-16 | End: 2020-09-19

## 2020-09-15 RX ORDER — IPRATROPIUM BROMIDE AND ALBUTEROL SULFATE 2.5; .5 MG/3ML; MG/3ML
1 SOLUTION RESPIRATORY (INHALATION) ONCE
Status: COMPLETED | OUTPATIENT
Start: 2020-09-15 | End: 2020-09-15

## 2020-09-15 RX ORDER — SODIUM CHLORIDE 9 MG/ML
INJECTION, SOLUTION INTRAVENOUS CONTINUOUS
Status: DISCONTINUED | OUTPATIENT
Start: 2020-09-15 | End: 2020-09-15

## 2020-09-15 RX ORDER — VECURONIUM BROMIDE 1 MG/ML
INJECTION, POWDER, LYOPHILIZED, FOR SOLUTION INTRAVENOUS PRN
Status: DISCONTINUED | OUTPATIENT
Start: 2020-09-15 | End: 2020-09-15 | Stop reason: SDUPTHER

## 2020-09-15 RX ORDER — MAGNESIUM SULFATE 1 G/100ML
1 INJECTION INTRAVENOUS PRN
Status: DISCONTINUED | OUTPATIENT
Start: 2020-09-15 | End: 2020-09-20

## 2020-09-15 RX ORDER — ALBUMIN, HUMAN INJ 5% 5 %
SOLUTION INTRAVENOUS PRN
Status: DISCONTINUED | OUTPATIENT
Start: 2020-09-15 | End: 2020-09-15 | Stop reason: SDUPTHER

## 2020-09-15 RX ORDER — HYDROMORPHONE HCL 110MG/55ML
PATIENT CONTROLLED ANALGESIA SYRINGE INTRAVENOUS PRN
Status: DISCONTINUED | OUTPATIENT
Start: 2020-09-15 | End: 2020-09-15 | Stop reason: SDUPTHER

## 2020-09-15 RX ORDER — IPRATROPIUM BROMIDE AND ALBUTEROL SULFATE 2.5; .5 MG/3ML; MG/3ML
1 SOLUTION RESPIRATORY (INHALATION)
Status: DISCONTINUED | OUTPATIENT
Start: 2020-09-15 | End: 2020-09-15 | Stop reason: SDUPTHER

## 2020-09-15 RX ORDER — EPHEDRINE SULFATE/0.9% NACL/PF 50 MG/5 ML
SYRINGE (ML) INTRAVENOUS PRN
Status: DISCONTINUED | OUTPATIENT
Start: 2020-09-15 | End: 2020-09-15 | Stop reason: SDUPTHER

## 2020-09-15 RX ORDER — FENTANYL CITRATE 50 UG/ML
INJECTION, SOLUTION INTRAMUSCULAR; INTRAVENOUS PRN
Status: DISCONTINUED | OUTPATIENT
Start: 2020-09-15 | End: 2020-09-15 | Stop reason: SDUPTHER

## 2020-09-15 RX ORDER — POTASSIUM CHLORIDE 29.8 MG/ML
20 INJECTION INTRAVENOUS PRN
Status: DISCONTINUED | OUTPATIENT
Start: 2020-09-15 | End: 2020-09-20

## 2020-09-15 RX ORDER — MIDAZOLAM HYDROCHLORIDE 1 MG/ML
INJECTION INTRAMUSCULAR; INTRAVENOUS PRN
Status: DISCONTINUED | OUTPATIENT
Start: 2020-09-15 | End: 2020-09-15 | Stop reason: SDUPTHER

## 2020-09-15 RX ORDER — LATANOPROST 50 UG/ML
1 SOLUTION/ DROPS OPHTHALMIC NIGHTLY
Status: DISCONTINUED | OUTPATIENT
Start: 2020-09-15 | End: 2020-09-24 | Stop reason: HOSPADM

## 2020-09-15 RX ORDER — OXYCODONE HYDROCHLORIDE AND ACETAMINOPHEN 5; 325 MG/1; MG/1
2 TABLET ORAL EVERY 4 HOURS PRN
Status: DISCONTINUED | OUTPATIENT
Start: 2020-09-15 | End: 2020-09-18

## 2020-09-15 RX ORDER — LIDOCAINE HYDROCHLORIDE 20 MG/ML
INJECTION, SOLUTION INTRAVENOUS PRN
Status: DISCONTINUED | OUTPATIENT
Start: 2020-09-15 | End: 2020-09-15 | Stop reason: SDUPTHER

## 2020-09-15 RX ORDER — PROPOFOL 10 MG/ML
INJECTION, EMULSION INTRAVENOUS PRN
Status: DISCONTINUED | OUTPATIENT
Start: 2020-09-15 | End: 2020-09-15 | Stop reason: SDUPTHER

## 2020-09-15 RX ORDER — DORZOLAMIDE HCL 20 MG/ML
1 SOLUTION/ DROPS OPHTHALMIC 3 TIMES DAILY
Status: DISCONTINUED | OUTPATIENT
Start: 2020-09-15 | End: 2020-09-24 | Stop reason: HOSPADM

## 2020-09-15 RX ORDER — MORPHINE SULFATE 2 MG/ML
2 INJECTION, SOLUTION INTRAMUSCULAR; INTRAVENOUS
Status: DISCONTINUED | OUTPATIENT
Start: 2020-09-15 | End: 2020-09-20

## 2020-09-15 RX ORDER — LABETALOL HYDROCHLORIDE 5 MG/ML
10 INJECTION, SOLUTION INTRAVENOUS
Status: DISCONTINUED | OUTPATIENT
Start: 2020-09-15 | End: 2020-09-24 | Stop reason: HOSPADM

## 2020-09-15 RX ORDER — DEXAMETHASONE SODIUM PHOSPHATE 10 MG/ML
INJECTION INTRAMUSCULAR; INTRAVENOUS PRN
Status: DISCONTINUED | OUTPATIENT
Start: 2020-09-15 | End: 2020-09-15 | Stop reason: SDUPTHER

## 2020-09-15 RX ORDER — SODIUM CHLORIDE 9 MG/ML
INJECTION, SOLUTION INTRAVENOUS CONTINUOUS PRN
Status: DISCONTINUED | OUTPATIENT
Start: 2020-09-15 | End: 2020-09-15 | Stop reason: SDUPTHER

## 2020-09-15 RX ORDER — SODIUM CHLORIDE 0.9 % (FLUSH) 0.9 %
10 SYRINGE (ML) INJECTION EVERY 12 HOURS SCHEDULED
Status: DISCONTINUED | OUTPATIENT
Start: 2020-09-15 | End: 2020-09-15 | Stop reason: HOSPADM

## 2020-09-15 RX ORDER — IPRATROPIUM BROMIDE AND ALBUTEROL SULFATE 2.5; .5 MG/3ML; MG/3ML
1 SOLUTION RESPIRATORY (INHALATION)
Status: DISCONTINUED | OUTPATIENT
Start: 2020-09-15 | End: 2020-09-24 | Stop reason: HOSPADM

## 2020-09-15 RX ORDER — 0.9 % SODIUM CHLORIDE 0.9 %
500 INTRAVENOUS SOLUTION INTRAVENOUS ONCE
Status: COMPLETED | OUTPATIENT
Start: 2020-09-15 | End: 2020-09-15

## 2020-09-15 RX ORDER — PHENYLEPHRINE HCL IN 0.9% NACL 1 MG/10 ML
SYRINGE (ML) INTRAVENOUS PRN
Status: DISCONTINUED | OUTPATIENT
Start: 2020-09-15 | End: 2020-09-15 | Stop reason: SDUPTHER

## 2020-09-15 RX ORDER — OXYCODONE HYDROCHLORIDE AND ACETAMINOPHEN 5; 325 MG/1; MG/1
1 TABLET ORAL EVERY 4 HOURS PRN
Status: DISCONTINUED | OUTPATIENT
Start: 2020-09-15 | End: 2020-09-24 | Stop reason: HOSPADM

## 2020-09-15 RX ORDER — SODIUM CHLORIDE 0.9 % (FLUSH) 0.9 %
10 SYRINGE (ML) INJECTION PRN
Status: DISCONTINUED | OUTPATIENT
Start: 2020-09-15 | End: 2020-09-24 | Stop reason: HOSPADM

## 2020-09-15 RX ORDER — ACETAMINOPHEN 325 MG/1
650 TABLET ORAL EVERY 4 HOURS PRN
Status: DISCONTINUED | OUTPATIENT
Start: 2020-09-15 | End: 2020-09-24 | Stop reason: HOSPADM

## 2020-09-15 RX ORDER — SODIUM CHLORIDE AND POTASSIUM CHLORIDE .9; .15 G/100ML; G/100ML
SOLUTION INTRAVENOUS CONTINUOUS
Status: DISCONTINUED | OUTPATIENT
Start: 2020-09-15 | End: 2020-09-15

## 2020-09-15 RX ORDER — HEPARIN SODIUM 10000 [USP'U]/ML
INJECTION, SOLUTION INTRAVENOUS; SUBCUTANEOUS PRN
Status: DISCONTINUED | OUTPATIENT
Start: 2020-09-15 | End: 2020-09-15 | Stop reason: SDUPTHER

## 2020-09-15 RX ORDER — HYDRALAZINE HYDROCHLORIDE 20 MG/ML
10 INJECTION INTRAMUSCULAR; INTRAVENOUS
Status: DISCONTINUED | OUTPATIENT
Start: 2020-09-15 | End: 2020-09-20

## 2020-09-15 RX ORDER — PROTAMINE SULFATE 10 MG/ML
INJECTION, SOLUTION INTRAVENOUS PRN
Status: DISCONTINUED | OUTPATIENT
Start: 2020-09-15 | End: 2020-09-15 | Stop reason: SDUPTHER

## 2020-09-15 RX ORDER — ALBUTEROL SULFATE 2.5 MG/3ML
2.5 SOLUTION RESPIRATORY (INHALATION) EVERY 6 HOURS PRN
Status: DISCONTINUED | OUTPATIENT
Start: 2020-09-15 | End: 2020-09-24 | Stop reason: HOSPADM

## 2020-09-15 RX ORDER — CEFAZOLIN SODIUM 1 G/3ML
INJECTION, POWDER, FOR SOLUTION INTRAMUSCULAR; INTRAVENOUS PRN
Status: DISCONTINUED | OUTPATIENT
Start: 2020-09-15 | End: 2020-09-15 | Stop reason: SDUPTHER

## 2020-09-15 RX ORDER — SODIUM CHLORIDE 0.9 % (FLUSH) 0.9 %
10 SYRINGE (ML) INJECTION EVERY 12 HOURS SCHEDULED
Status: DISCONTINUED | OUTPATIENT
Start: 2020-09-15 | End: 2020-09-24 | Stop reason: HOSPADM

## 2020-09-15 RX ORDER — NITROGLYCERIN 5 MG/ML
INJECTION, SOLUTION INTRAVENOUS PRN
Status: DISCONTINUED | OUTPATIENT
Start: 2020-09-15 | End: 2020-09-15 | Stop reason: SDUPTHER

## 2020-09-15 RX ORDER — AMLODIPINE BESYLATE 5 MG/1
5 TABLET ORAL DAILY
Status: DISCONTINUED | OUTPATIENT
Start: 2020-09-16 | End: 2020-09-24 | Stop reason: HOSPADM

## 2020-09-15 RX ADMIN — PROTAMINE SULFATE 50 MG: 10 INJECTION, SOLUTION INTRAVENOUS at 12:36

## 2020-09-15 RX ADMIN — Medication 5 MG: at 11:14

## 2020-09-15 RX ADMIN — FENTANYL CITRATE 100 MCG: 50 INJECTION, SOLUTION INTRAMUSCULAR; INTRAVENOUS at 07:44

## 2020-09-15 RX ADMIN — CLEVIPIDINE 1 MG/HR: 0.5 EMULSION INTRAVENOUS at 08:59

## 2020-09-15 RX ADMIN — DORZOLAMIDE HYDROCHLORIDE 1 DROP: 20 SOLUTION/ DROPS OPHTHALMIC at 21:08

## 2020-09-15 RX ADMIN — POTASSIUM CHLORIDE: 2 INJECTION, SOLUTION, CONCENTRATE INTRAVENOUS at 16:51

## 2020-09-15 RX ADMIN — Medication 5 MG: at 11:13

## 2020-09-15 RX ADMIN — HEPARIN SODIUM 2000 UNITS: 10000 INJECTION, SOLUTION INTRAVENOUS; SUBCUTANEOUS at 12:00

## 2020-09-15 RX ADMIN — FENTANYL CITRATE 50 MCG: 50 INJECTION, SOLUTION INTRAMUSCULAR; INTRAVENOUS at 09:48

## 2020-09-15 RX ADMIN — FENTANYL CITRATE 50 MCG: 50 INJECTION, SOLUTION INTRAMUSCULAR; INTRAVENOUS at 09:26

## 2020-09-15 RX ADMIN — Medication 5 MG: at 08:19

## 2020-09-15 RX ADMIN — Medication 100 MCG: at 12:48

## 2020-09-15 RX ADMIN — IPRATROPIUM BROMIDE AND ALBUTEROL SULFATE 1 AMPULE: 2.5; .5 SOLUTION RESPIRATORY (INHALATION) at 20:02

## 2020-09-15 RX ADMIN — Medication 5 MG: at 11:24

## 2020-09-15 RX ADMIN — Medication 10 ML: at 21:08

## 2020-09-15 RX ADMIN — SODIUM CHLORIDE: 9 INJECTION, SOLUTION INTRAVENOUS at 10:59

## 2020-09-15 RX ADMIN — Medication 100 MCG: at 13:44

## 2020-09-15 RX ADMIN — DORZOLAMIDE HYDROCHLORIDE 1 DROP: 20 SOLUTION/ DROPS OPHTHALMIC at 16:51

## 2020-09-15 RX ADMIN — MANNITOL 30 G: 15 INJECTION, SOLUTION INTRAVENOUS at 10:07

## 2020-09-15 RX ADMIN — Medication 100 MCG: at 12:26

## 2020-09-15 RX ADMIN — LIDOCAINE HYDROCHLORIDE 80 MG: 20 INJECTION, SOLUTION INTRAVENOUS at 07:44

## 2020-09-15 RX ADMIN — PROPOFOL 100 MG: 10 INJECTION, EMULSION INTRAVENOUS at 07:44

## 2020-09-15 RX ADMIN — IPRATROPIUM BROMIDE AND ALBUTEROL SULFATE 1 AMPULE: 2.5; .5 SOLUTION RESPIRATORY (INHALATION) at 14:41

## 2020-09-15 RX ADMIN — SODIUM CHLORIDE: 9 INJECTION, SOLUTION INTRAVENOUS at 12:47

## 2020-09-15 RX ADMIN — NITROGLYCERIN 50 MCG: 5 INJECTION, SOLUTION INTRAVENOUS at 08:52

## 2020-09-15 RX ADMIN — NITROGLYCERIN 50 MCG: 5 INJECTION, SOLUTION INTRAVENOUS at 08:56

## 2020-09-15 RX ADMIN — LATANOPROST 1 DROP: 50 SOLUTION OPHTHALMIC at 21:08

## 2020-09-15 RX ADMIN — Medication 5 MG: at 11:28

## 2020-09-15 RX ADMIN — Medication 300 MCG: at 11:55

## 2020-09-15 RX ADMIN — DEXAMETHASONE SODIUM PHOSPHATE 10 MG: 10 INJECTION INTRAMUSCULAR; INTRAVENOUS at 08:16

## 2020-09-15 RX ADMIN — FENTANYL CITRATE 50 MCG: 50 INJECTION, SOLUTION INTRAMUSCULAR; INTRAVENOUS at 08:08

## 2020-09-15 RX ADMIN — HYDROMORPHONE HYDROCHLORIDE 0.25 MG: 2 INJECTION, SOLUTION INTRAMUSCULAR; INTRAVENOUS; SUBCUTANEOUS at 14:17

## 2020-09-15 RX ADMIN — HYDROMORPHONE HYDROCHLORIDE 0.25 MG: 2 INJECTION, SOLUTION INTRAMUSCULAR; INTRAVENOUS; SUBCUTANEOUS at 14:02

## 2020-09-15 RX ADMIN — SODIUM CHLORIDE: 9 INJECTION, SOLUTION INTRAVENOUS at 06:49

## 2020-09-15 RX ADMIN — Medication 5 MG: at 11:17

## 2020-09-15 RX ADMIN — MIDAZOLAM 2 MG: 1 INJECTION INTRAMUSCULAR; INTRAVENOUS at 07:29

## 2020-09-15 RX ADMIN — VECURONIUM BROMIDE 10 MG: 10 INJECTION, POWDER, LYOPHILIZED, FOR SOLUTION INTRAVENOUS at 07:44

## 2020-09-15 RX ADMIN — Medication 50 MCG: at 11:44

## 2020-09-15 RX ADMIN — Medication 100 MCG: at 12:45

## 2020-09-15 RX ADMIN — SODIUM CHLORIDE: 9 INJECTION, SOLUTION INTRAVENOUS at 07:47

## 2020-09-15 RX ADMIN — FENTANYL CITRATE 50 MCG: 50 INJECTION, SOLUTION INTRAMUSCULAR; INTRAVENOUS at 08:36

## 2020-09-15 RX ADMIN — IPRATROPIUM BROMIDE AND ALBUTEROL SULFATE 1 AMPULE: 2.5; .5 SOLUTION RESPIRATORY (INHALATION) at 06:52

## 2020-09-15 RX ADMIN — FENTANYL CITRATE 50 MCG: 50 INJECTION, SOLUTION INTRAMUSCULAR; INTRAVENOUS at 08:39

## 2020-09-15 RX ADMIN — HEPARIN SODIUM 8000 UNITS: 10000 INJECTION, SOLUTION INTRAVENOUS; SUBCUTANEOUS at 10:12

## 2020-09-15 RX ADMIN — NITROGLYCERIN 25 MCG: 5 INJECTION, SOLUTION INTRAVENOUS at 08:46

## 2020-09-15 RX ADMIN — VECURONIUM BROMIDE 3 MG: 10 INJECTION, POWDER, LYOPHILIZED, FOR SOLUTION INTRAVENOUS at 10:36

## 2020-09-15 RX ADMIN — Medication 5 MG: at 11:36

## 2020-09-15 RX ADMIN — SODIUM CHLORIDE: 9 INJECTION, SOLUTION INTRAVENOUS at 07:14

## 2020-09-15 RX ADMIN — CEFAZOLIN 2000 MG: 1 INJECTION, POWDER, FOR SOLUTION INTRAMUSCULAR; INTRAVENOUS at 11:15

## 2020-09-15 RX ADMIN — ONDANSETRON HYDROCHLORIDE 4 MG: 2 INJECTION, SOLUTION INTRAMUSCULAR; INTRAVENOUS at 13:29

## 2020-09-15 RX ADMIN — NITROGLYCERIN 25 MCG: 5 INJECTION, SOLUTION INTRAVENOUS at 08:49

## 2020-09-15 RX ADMIN — VECURONIUM BROMIDE 3 MG: 10 INJECTION, POWDER, LYOPHILIZED, FOR SOLUTION INTRAVENOUS at 11:42

## 2020-09-15 RX ADMIN — HYDROMORPHONE HYDROCHLORIDE 0.5 MG: 2 INJECTION, SOLUTION INTRAMUSCULAR; INTRAVENOUS; SUBCUTANEOUS at 13:42

## 2020-09-15 RX ADMIN — Medication 100 MCG: at 12:27

## 2020-09-15 RX ADMIN — SUGAMMADEX 290 MG: 100 INJECTION, SOLUTION INTRAVENOUS at 13:39

## 2020-09-15 RX ADMIN — Medication 100 MCG: at 12:28

## 2020-09-15 RX ADMIN — Medication 5 MG: at 10:47

## 2020-09-15 RX ADMIN — POTASSIUM CHLORIDE: 2 INJECTION, SOLUTION, CONCENTRATE INTRAVENOUS at 20:18

## 2020-09-15 RX ADMIN — Medication 2 G: at 18:56

## 2020-09-15 RX ADMIN — SODIUM CHLORIDE 500 ML: 9 INJECTION, SOLUTION INTRAVENOUS at 15:01

## 2020-09-15 RX ADMIN — CEFAZOLIN 2000 MG: 1 INJECTION, POWDER, FOR SOLUTION INTRAMUSCULAR; INTRAVENOUS at 08:16

## 2020-09-15 RX ADMIN — ALBUMIN (HUMAN) 25 G: 12.5 INJECTION, SOLUTION INTRAVENOUS at 14:17

## 2020-09-15 ASSESSMENT — PULMONARY FUNCTION TESTS
PIF_VALUE: 24
PIF_VALUE: 2
PIF_VALUE: 25
PIF_VALUE: 27
PIF_VALUE: 20
PIF_VALUE: 23
PIF_VALUE: 25
PIF_VALUE: 27
PIF_VALUE: 26
PIF_VALUE: 25
PIF_VALUE: 23
PIF_VALUE: 23
PIF_VALUE: 25
PIF_VALUE: 26
PIF_VALUE: 26
PIF_VALUE: 2
PIF_VALUE: 26
PIF_VALUE: 26
PIF_VALUE: 25
PIF_VALUE: 26
PIF_VALUE: 28
PIF_VALUE: 22
PIF_VALUE: 2
PIF_VALUE: 25
PIF_VALUE: 27
PIF_VALUE: 23
PIF_VALUE: 26
PIF_VALUE: 26
PIF_VALUE: 23
PIF_VALUE: 26
PIF_VALUE: 26
PIF_VALUE: 27
PIF_VALUE: 26
PIF_VALUE: 25
PIF_VALUE: 11
PIF_VALUE: 3
PIF_VALUE: 20
PIF_VALUE: 23
PIF_VALUE: 27
PIF_VALUE: 22
PIF_VALUE: 21
PIF_VALUE: 21
PIF_VALUE: 24
PIF_VALUE: 26
PIF_VALUE: 1
PIF_VALUE: 27
PIF_VALUE: 29
PIF_VALUE: 24
PIF_VALUE: 23
PIF_VALUE: 25
PIF_VALUE: 24
PIF_VALUE: 23
PIF_VALUE: 20
PIF_VALUE: 23
PIF_VALUE: 24
PIF_VALUE: 26
PIF_VALUE: 20
PIF_VALUE: 27
PIF_VALUE: 19
PIF_VALUE: 25
PIF_VALUE: 21
PIF_VALUE: 25
PIF_VALUE: 21
PIF_VALUE: 27
PIF_VALUE: 0
PIF_VALUE: 24
PIF_VALUE: 2
PIF_VALUE: 27
PIF_VALUE: 24
PIF_VALUE: 25
PIF_VALUE: 24
PIF_VALUE: 21
PIF_VALUE: 25
PIF_VALUE: 26
PIF_VALUE: 23
PIF_VALUE: 24
PIF_VALUE: 23
PIF_VALUE: 24
PIF_VALUE: 26
PIF_VALUE: 25
PIF_VALUE: 22
PIF_VALUE: 23
PIF_VALUE: 23
PIF_VALUE: 22
PIF_VALUE: 23
PIF_VALUE: 23
PIF_VALUE: 25
PIF_VALUE: 27
PIF_VALUE: 25
PIF_VALUE: 24
PIF_VALUE: 25
PIF_VALUE: 22
PIF_VALUE: 22
PIF_VALUE: 24
PIF_VALUE: 26
PIF_VALUE: 3
PIF_VALUE: 2
PIF_VALUE: 24
PIF_VALUE: 23
PIF_VALUE: 22
PIF_VALUE: 22
PIF_VALUE: 23
PIF_VALUE: 24
PIF_VALUE: 22
PIF_VALUE: 29
PIF_VALUE: 26
PIF_VALUE: 23
PIF_VALUE: 21
PIF_VALUE: 23
PIF_VALUE: 19
PIF_VALUE: 26
PIF_VALUE: 22
PIF_VALUE: 21
PIF_VALUE: 24
PIF_VALUE: 24
PIF_VALUE: 28
PIF_VALUE: 25
PIF_VALUE: 25
PIF_VALUE: 27
PIF_VALUE: 20
PIF_VALUE: 24
PIF_VALUE: 24
PIF_VALUE: 27
PIF_VALUE: 24
PIF_VALUE: 23
PIF_VALUE: 24
PIF_VALUE: 23
PIF_VALUE: 22
PIF_VALUE: 26
PIF_VALUE: 22
PIF_VALUE: 23
PIF_VALUE: 2
PIF_VALUE: 23
PIF_VALUE: 24
PIF_VALUE: 20
PIF_VALUE: 18
PIF_VALUE: 19
PIF_VALUE: 24
PIF_VALUE: 23
PIF_VALUE: 23
PIF_VALUE: 25
PIF_VALUE: 24
PIF_VALUE: 18
PIF_VALUE: 2
PIF_VALUE: 24
PIF_VALUE: 24
PIF_VALUE: 0
PIF_VALUE: 25
PIF_VALUE: 23
PIF_VALUE: 23
PIF_VALUE: 25
PIF_VALUE: 24
PIF_VALUE: 24
PIF_VALUE: 27
PIF_VALUE: 24
PIF_VALUE: 22
PIF_VALUE: 24
PIF_VALUE: 26
PIF_VALUE: 25
PIF_VALUE: 25
PIF_VALUE: 23
PIF_VALUE: 25
PIF_VALUE: 22
PIF_VALUE: 23
PIF_VALUE: 23
PIF_VALUE: 26
PIF_VALUE: 19
PIF_VALUE: 21
PIF_VALUE: 25
PIF_VALUE: 22
PIF_VALUE: 26
PIF_VALUE: 23
PIF_VALUE: 23
PIF_VALUE: 28
PIF_VALUE: 3
PIF_VALUE: 28
PIF_VALUE: 27
PIF_VALUE: 23
PIF_VALUE: 24
PIF_VALUE: 12
PIF_VALUE: 26
PIF_VALUE: 25
PIF_VALUE: 22
PIF_VALUE: 29
PIF_VALUE: 5
PIF_VALUE: 26
PIF_VALUE: 23
PIF_VALUE: 26
PIF_VALUE: 23
PIF_VALUE: 20
PIF_VALUE: 21
PIF_VALUE: 23
PIF_VALUE: 26
PIF_VALUE: 25
PIF_VALUE: 23
PIF_VALUE: 27
PIF_VALUE: 22
PIF_VALUE: 27
PIF_VALUE: 25
PIF_VALUE: 25
PIF_VALUE: 20
PIF_VALUE: 24
PIF_VALUE: 26
PIF_VALUE: 20
PIF_VALUE: 27
PIF_VALUE: 23
PIF_VALUE: 24
PIF_VALUE: 26
PIF_VALUE: 27
PIF_VALUE: 20
PIF_VALUE: 24
PIF_VALUE: 24
PIF_VALUE: 27
PIF_VALUE: 23
PIF_VALUE: 26
PIF_VALUE: 26
PIF_VALUE: 25
PIF_VALUE: 9
PIF_VALUE: 23
PIF_VALUE: 25
PIF_VALUE: 28
PIF_VALUE: 25
PIF_VALUE: 27
PIF_VALUE: 0
PIF_VALUE: 26
PIF_VALUE: 25
PIF_VALUE: 23
PIF_VALUE: 24
PIF_VALUE: 24
PIF_VALUE: 25
PIF_VALUE: 24
PIF_VALUE: 26
PIF_VALUE: 23
PIF_VALUE: 2
PIF_VALUE: 21
PIF_VALUE: 22
PIF_VALUE: 23
PIF_VALUE: 21
PIF_VALUE: 23
PIF_VALUE: 24
PIF_VALUE: 23
PIF_VALUE: 19
PIF_VALUE: 23
PIF_VALUE: 2
PIF_VALUE: 23
PIF_VALUE: 2
PIF_VALUE: 24
PIF_VALUE: 23
PIF_VALUE: 23
PIF_VALUE: 27
PIF_VALUE: 19
PIF_VALUE: 27
PIF_VALUE: 25
PIF_VALUE: 27
PIF_VALUE: 22
PIF_VALUE: 27
PIF_VALUE: 27
PIF_VALUE: 19
PIF_VALUE: 26
PIF_VALUE: 3
PIF_VALUE: 22
PIF_VALUE: 23
PIF_VALUE: 23
PIF_VALUE: 27
PIF_VALUE: 23
PIF_VALUE: 22
PIF_VALUE: 24
PIF_VALUE: 11
PIF_VALUE: 25
PIF_VALUE: 16
PIF_VALUE: 19
PIF_VALUE: 19
PIF_VALUE: 26
PIF_VALUE: 28
PIF_VALUE: 25
PIF_VALUE: 23
PIF_VALUE: 23
PIF_VALUE: 24
PIF_VALUE: 23
PIF_VALUE: 23
PIF_VALUE: 27
PIF_VALUE: 25
PIF_VALUE: 26
PIF_VALUE: 2
PIF_VALUE: 23
PIF_VALUE: 26
PIF_VALUE: 23
PIF_VALUE: 25
PIF_VALUE: 23
PIF_VALUE: 23
PIF_VALUE: 2
PIF_VALUE: 24
PIF_VALUE: 26
PIF_VALUE: 20
PIF_VALUE: 27
PIF_VALUE: 19
PIF_VALUE: 23
PIF_VALUE: 22
PIF_VALUE: 2
PIF_VALUE: 22
PIF_VALUE: 24
PIF_VALUE: 26
PIF_VALUE: 15
PIF_VALUE: 26
PIF_VALUE: 23
PIF_VALUE: 26
PIF_VALUE: 23
PIF_VALUE: 23
PIF_VALUE: 1
PIF_VALUE: 14
PIF_VALUE: 24
PIF_VALUE: 23
PIF_VALUE: 21
PIF_VALUE: 25
PIF_VALUE: 21
PIF_VALUE: 3
PIF_VALUE: 28
PIF_VALUE: 26
PIF_VALUE: 3
PIF_VALUE: 22
PIF_VALUE: 19
PIF_VALUE: 2
PIF_VALUE: 27
PIF_VALUE: 24
PIF_VALUE: 26
PIF_VALUE: 24
PIF_VALUE: 28
PIF_VALUE: 23
PIF_VALUE: 23
PIF_VALUE: 26
PIF_VALUE: 24
PIF_VALUE: 25
PIF_VALUE: 23
PIF_VALUE: 1
PIF_VALUE: 23
PIF_VALUE: 20
PIF_VALUE: 26
PIF_VALUE: 25
PIF_VALUE: 42
PIF_VALUE: 26
PIF_VALUE: 23
PIF_VALUE: 25
PIF_VALUE: 26
PIF_VALUE: 21
PIF_VALUE: 26
PIF_VALUE: 11
PIF_VALUE: 23
PIF_VALUE: 20
PIF_VALUE: 22
PIF_VALUE: 21
PIF_VALUE: 23
PIF_VALUE: 21
PIF_VALUE: 25
PIF_VALUE: 22
PIF_VALUE: 24
PIF_VALUE: 24
PIF_VALUE: 19
PIF_VALUE: 24
PIF_VALUE: 24
PIF_VALUE: 25
PIF_VALUE: 25
PIF_VALUE: 11
PIF_VALUE: 25
PIF_VALUE: 26
PIF_VALUE: 27
PIF_VALUE: 26
PIF_VALUE: 26
PIF_VALUE: 18
PIF_VALUE: 24
PIF_VALUE: 23
PIF_VALUE: 23
PIF_VALUE: 24
PIF_VALUE: 20
PIF_VALUE: 23
PIF_VALUE: 25
PIF_VALUE: 18
PIF_VALUE: 26
PIF_VALUE: 23
PIF_VALUE: 11
PIF_VALUE: 1
PIF_VALUE: 23
PIF_VALUE: 19
PIF_VALUE: 21
PIF_VALUE: 26
PIF_VALUE: 25
PIF_VALUE: 21

## 2020-09-15 ASSESSMENT — PAIN DESCRIPTION - PAIN TYPE
TYPE: CHRONIC PAIN

## 2020-09-15 ASSESSMENT — PAIN SCALES - GENERAL
PAINLEVEL_OUTOF10: 6
PAINLEVEL_OUTOF10: 0
PAINLEVEL_OUTOF10: 3
PAINLEVEL_OUTOF10: 3
PAINLEVEL_OUTOF10: 6
PAINLEVEL_OUTOF10: 3

## 2020-09-15 ASSESSMENT — PAIN DESCRIPTION - ONSET
ONSET: ON-GOING

## 2020-09-15 ASSESSMENT — PAIN DESCRIPTION - LOCATION
LOCATION: BACK

## 2020-09-15 ASSESSMENT — PAIN DESCRIPTION - PROGRESSION
CLINICAL_PROGRESSION: NOT CHANGED

## 2020-09-15 ASSESSMENT — PAIN - FUNCTIONAL ASSESSMENT: PAIN_FUNCTIONAL_ASSESSMENT: 0-10

## 2020-09-15 NOTE — ANESTHESIA PROCEDURE NOTES
Central Venous Line:    A central venous line was placed using surface landmarks, in the OR for the following indication(s): central venous access. Sterility preparation included the following: hand hygiene performed prior to procedure, maximum sterile barriers used and sterile technique used to drape from head to toe. The patient was placed in Trendelenburg position. The subclavian vein was prepped. The site was prepped with Chloraprep. A 7.5 Fr (size), 20 (length), triple lumen was placed. During the procedure, the following specific steps were taken: target vein identified, needle advanced into vein and blood aspirated and guidewire advanced into vein. Intravenous verification was obtained by venous blood return. Post insertion care included: all ports aspirated, all ports flushed easily, guidewire removed intact, Biopatch applied, line sutured in place and dressing applied. During the procedure the patient experienced: patient tolerated procedure well with no complications.       Insertion site scrubbed per usage guidelines?: Yes  Skin prep agent dried for 3 minutes prior to procedure?:yes  Anesthesia type: general..No  Staffing  Anesthesiologist: Umer Rivera MD

## 2020-09-15 NOTE — BRIEF OP NOTE
Brief Postoperative Note      Patient: Josie Cushing  YOB: 1945  MRN: [id number]    Date of Procedure: 9/15/2020    Pre-Op Diagnosis: Aortoiliac, femoral-popliteal arterial occlusive disease with ischemic pain left foot    Post-Op Diagnosis: Same       Aorta to  common iliac bypass on the right, external iliac bypass on the left with 14 x 7 mm Dacron graft    Surgeon(s):  Nellie Morales MD    Assistant:  Surgical Assistant: Kings Raya    Anesthesia: General    Estimated Blood Loss (mL): 331    Complications: None    Specimens:   ID Type Source Tests Collected by Time Destination   A : aortic plaque Tissue Tissue SURGICAL PATHOLOGY Nellie Morales MD 9/15/2020 1323        Implants:  Implant Name Type Inv. Item Serial No.  Lot No. LRB No. Used Action   GRAFT VASC SYNTH KNT DBL [id number] - [id number] Vascular/Graft/Patch/Filter GRAFT VASC SYNTH KNT DBL [id number] GETINGE: MAQUET [id number] N/A 1 Implanted         Drains:   Urethral Catheter Non-latex;Straight-tip 16 fr (Active)   $ Urethral catheter insertion Inserted for procedure 09/15/20 0853   Catheter Indications Perioperative use in selected surgeries including but not limited to urologic, pelvic or need for intraoperative monitoring of urinary output due to prolonged surgery, large volume infusion or need for diuretic therapy in surgery 09/15/20 0853   Site Assessment Pink; No urethral drainage 09/15/20 0853   Urine Color Yellow 09/15/20 0853   Urine Appearance Clear 09/15/20 0853       [REMOVED] Ureteral Catheter Right ureter (Removed)       Findings: Densely calcified aorta and the iliac arteries, endarterectomy right was performed for suture placements    Electronically signed by Nellie Morales MD on 9/15/2020 at 2:18 PM

## 2020-09-15 NOTE — PROGRESS NOTES
Dr. Jodie Lester notified at [phone] of ABG results (see flowsheets) of blood gas ordered by Dr. Joshua Barreto. Dr. Jodie Lester is covering for [place]. No response or new orders. Contacted via perfect serve.

## 2020-09-15 NOTE — ANESTHESIA PRE PROCEDURE
Department of Anesthesiology  Preprocedure Note       Name:  Nick Alfaro   Age:  67 y.o.  :  1945                                          MRN:  [id number]         Date:  9/15/2020      Surgeon: Madison Alcazar):  Ashia Dorsey MD    Procedure: Procedure(s):  AORTOFEMORAL  BYPASS    Medications prior to admission:   Prior to Admission medications    Medication Sig Start Date End Date Taking? Authorizing Provider   metoprolol succinate (TOPROL XL) 100 MG extended release tablet Take 1 tablet by mouth daily 7/3/20  Yes Gilmar Ariza MD   amLODIPine [facility]) 5 MG tablet Take 1 tablet by mouth daily 7/3/20  Yes Gilmar Ariza MD   gabapentin (NEURONTIN) 300 MG capsule Take 300 mg by mouth 3 times daily. Yes Historical Provider, MD   Tofacitinib Citrate ER (XELJANZ XR) 11 MG TB24 Take 11 mg by mouth Daily   Yes Historical Provider, MD   cilostazol (PLETAL) 100 MG tablet Take 1 tablet by mouth 2 times daily 6/4/20 9/3/20 Yes Ashia Dorsey MD   latanoprost (XALATAN) 0.005 % ophthalmic solution INSTILL 1 DROP INTO BOTH EYES EVERY DAY 20  Yes SYSCO Catterlin, DO   rosuvastatin (CRESTOR) 10 MG tablet TAKE 1 TABLET BY MOUTH EVERY DAY 19  Yes SYSCO Catterlin, DO   aspirin 81 MG EC tablet TAKE 1 TABLET BY MOUTH DAILY 19  Yes Jeannie Egan MD   brinzolamide (AZOPT) 1 % ophthalmic suspension Place 1 drop into the left eye 3 times daily 18  Yes SYANNY Catterlin, DO   hydroxychloroquine (PLAQUENIL) 200 MG tablet Take 200 mg by mouth 2 times daily TAKES ONLY DAILY 17  Yes Historical Provider, MD   aspirin 325 MG EC tablet Take 1 tablet by mouth daily 20   Historical Provider, MD   triamcinolone (KENALOG) 0.1 % cream Apply 1 applicator topically 2 times daily 20   Historical Provider, MD   Misc.  Devices (ROLLATOR ULTRA-LIGHT) MISC 1 Device by Does not apply route continuous 20   Tyrel Díaz, DO   loratadine (CLARITIN) 10 MG tablet TAKE ONE TABLET BY MOUTH EVERY DAY. 12/2/19   Baxter Mix Catterlin, DO   Multiple Vitamins-Minerals (VITAMIN D3 COMPLETE PO) Take 2,000 Units by mouth    Historical Provider, MD   Omega-3 Fatty Acids (FISH OIL) 1000 MG CPDR Take 1,000 mg by mouth daily STOP PREOP MED    Historical Provider, MD   famotidine (PEPCID) 40 MG tablet Take 1 tablet by mouth daily  Patient taking differently: Take 40 mg by mouth daily TAKES PRN 11/4/19   Magaly Villarreal MD   Cholecalciferol (VITAMIN D) 2000 units TABS tablet Take 2,000 Units by mouth daily  7/13/17   Historical Provider, MD       Current medications:    Current Facility-Administered Medications   Medication Dose Route Frequency Provider Last Rate Last Dose    0.9 % sodium chloride infusion   Intravenous Continuous Noble Henry MD        ceFAZolin (ANCEF) 2 g in sterile water 20 mL IV syringe  2 g Intravenous On Call to [facility], MD        sodium chloride flush 0.9 % injection 10 mL  10 mL Intravenous 2 times per day Noble Henry MD        sodium chloride flush 0.9 % injection 10 mL  10 mL Intravenous PRN Noble Henry MD        ipratropium-albuterol (DUONEB) nebulizer solution 1 ampule  1 ampule Inhalation Once Noble Henry MD           Allergies:     Allergies   Allergen Reactions    Iodine Shortness Of Breath    Macrodantin [Nitrofurantoin] Shortness Of Breath    Prednisone Shortness Of Breath and Palpitations    Sulfa Antibiotics Shortness Of Breath    5-Alpha Reductase Inhibitors        Problem List:    Patient Active Problem List   Diagnosis Code    Hypertension I10    Glaucoma H40.9    Lipid disorder E78.9    Neuropathy G62.9    DJD (degenerative joint disease) M19.90    Bilateral carotid artery disease (HCC) I73.9    CAD (coronary artery disease) I25.10    Hyperlipemia E78.5    Cerebrovascular accident (stroke) ([facility] I63.9    Old myocardial infarction, greater than 8 weeks I25.2    Presence of drug coated stent in right coronary artery Z95.5    Accelerated hypertension I10    S/P carotid endarterectomy Z98.890    Carotid bruit R09.89    H/O total knee replacement Z96.659    Osteoarthritis of right hip M16.11    Fibromyalgia M79.7    Incontinence R32    Rheumatoid arthritis involving both hands ([facility]) M06.9    Diverticulosis of large intestine without diverticulitis K57.30    Second degree hemorrhoids K64.1    Decreased radial pulse R09.89    Acute unilateral obstructive uropathy N13.9    Hydronephrosis concurrent with and due to calculi of kidney and ureter N13.2    PVD (peripheral vascular disease) with claudication ([facility]) I73.9    Aorto-iliac atherosclerosis ([facility]) I70.0, I70.8    Atherosclerosis of native arteries of extremity with rest pain ([facility]) I70.229    Atherosclerosis of native arteries of extremities with rest pain, left leg ([facility]) F85.509       Past Medical History:        Diagnosis Date    Aorto-iliac atherosclerosis ([address] 7/1/2020    Arthritis     Atherosclerosis of native arteries of extremity with rest pain ([address] 7/16/2020    Bladder prolapse     CAD (coronary artery disease)     MI  2011    Carotid artery stenosis     Right carotid stenosis.     Carotid bruit 1/10/2013    Carotid stenosis, left 12/13/2012    Cerebrovascular accident (stroke) ([address]     pt states 13 yrs ago    Decreased radial pulse 7/6/2017    Gastric reflux     Glaucoma     History of blood transfusion     Hyperlipemia     Hypertension     Insomnia     PVD (peripheral vascular disease) with claudication ([address] 6/4/2020    Restless leg syndrome     Sinusitis        Past Surgical History:        Procedure Laterality Date    ANTERIOR FIXATION OF THORACIC SPINE      BACK SURGERY      cervical c5-c6    CARDIAC SURGERY      CAROTID ENDARTERECTOMY  1998    right    CAROTID ENDARTERECTOMY  12/18/2012    left carotid endarterectomy done by Dr. Jaylene Stiles      left wrist    CATARACT [address] SURGERY  2002    C5 and C6    CORONARY ANGIOPLASTY WITH STENT PLACEMENT  2011    Dr. Graham Poe      in past for kidney stones   [address] CATH LAB PROCEDURE  11    Emergent cath/PTCA with deployment of 2 overlapping DE stents to the distal, mid and proximal RCA.    HYSTERECTOMY      JOINT REPLACEMENT      right knee twice, left knee    JOINT REPLACEMENT Right     hip    KNEE ARTHROPLASTY Left 13    KNEE SURGERY      right knee x 2    LITHOTRIPSY Right 2019    RIGHT ESWL EXTRACORPOREAL SHOCK WAVE LITHOTRIPSY CYSTOSCOPY STENT INSERTION performed by Dionte Gonzalez MD at [address] [facility]         Social History:    Social History     Tobacco Use    Smoking status: Former Smoker     Packs/day: 2.50     Years: 40.00     Pack years: 100.00     Types: Cigarettes     Last attempt to quit: 9/3/2000     Years since quittin.0    Smokeless tobacco: Never Used   Substance Use Topics    Alcohol use: No     Comment: drinks 3 cups of tea daily                                Counseling given: Not Answered      Vital Signs (Current):   Vitals:    20 1224 09/15/20 0622 09/15/20 0627   BP:   (!) 187/78   Pulse:   68   Resp:   20   Temp:   36.6 °C (97.9 °F)   TempSrc:   Temporal   SpO2:   100%   Weight: 160 lb (72.6 kg) 160 lb (72.6 kg)    Height:  5' (1.524 m)                                               BP Readings from Last 3 Encounters:   09/15/20 (!) 187/78   20 (!) 164/84   20 122/60       NPO Status: Time of last liquid consumption: 2300                                                 Date of last liquid consumption: 20                        Date of last solid food consumption: 20    BMI:   Wt Readings from Last 3 Encounters:   09/15/20 160 lb (72.6 kg)   20 160 lb (72.6 kg)   20 163 lb (73.9 kg)     Body mass index is 31.25 kg/m².    CBC:   Lab Results   Component Value Date    WBC 5.6 2020    RBC 3.52 2020    HGB 11.0 2020    HCT 34.8 2020    MCV 98.9 2020    RDW 14.6 2020     2020       CMP:   Lab Results   Component Value Date     2020    K 4.1 2020     2020    CO2 24 2020    BUN 11 2020    CREATININE 0.8 2020    GFRAA >60 2020    LABGLOM >60 2020    GLUCOSE 107 2020    GLUCOSE 92 2011    PROT 7.5 2020    CALCIUM 10.0 2020    BILITOT 0.4 2020    ALKPHOS 57 2020    AST 24 2020    ALT 13 2020       POC Tests: No results for input(s): POCGLU, POCNA, POCK, POCCL, POCBUN, POCHEMO, POCHCT in the last 72 hours. Coags:   Lab Results   Component Value Date    PROTIME 12.0 2020    INR 1.1 2020    APTT 29.5 2020       HCG (If Applicable): No results found for: PREGTESTUR, PREGSERUM, HCG, HCGQUANT     ABGs: No results found for: PHART, PO2ART, FJZ9UVL, LKY6PFR, BEART, S1NSXNJG     Type & Screen (If Applicable):  No results found for: LABABO, LABRH    Drug/Infectious Status (If Applicable):  No results found for: HIV, HEPCAB    COVID-19 Screening (If Applicable):   Lab Results   Component Value Date    COVID19 Not Detected 09/10/2020     CXR 20  Impression         1. There may be a mild right lung bronchitis with a tiny focus of    atelectasis or infiltrate in the right lower lobe.              2. Chronic biapical pleural fibrosis. 3. Mild cardiomegaly. CTA Ab Aorta 20  Impression    1. Atherosclerotic cardiovascular disease involving aorta, renal,    visible and bilateral lower extremity arteries. 2. Right le-90% maximal stenosis of the right SFA. 2 vessel    runoff on the right via the anterior tibial and peroneal arteries. 3. Left leg: Proximal occlusion of the common iliac artery with    reconstitution of the left common femoral artery. Multisegmental    disease at the left SFA with maximum narrowing estimated 75% in the    mid calf. Three-vessel runoff on the left with diffuse disease at the    posterior tibial artery. 4. Suspected nonobstructing stones in both kidneys. 7/1/20 NM Myocardial Stress Test  FINDINGS:         There is a normal distribution of left ventricular myocardial activity    on both the LexiScan stress and rest SPECT myocardial perfusion    images. Gated study shows normal left ventricular wall motion and    myocardial thickening during systole. Resting left ventricular    ejection fraction is calculated at 91%.              Impression         No evidence of stress-induced left ventricular myocardial ischemia. EKG 7/1/20  Narrative & Impression     Normal sinus rhythm  Right bundle branch block  Abnormal ECG  When compared with ECG of 07-NOV-2019 09:01,  Significant changes have occurred  Confirmed by Onesimo Emerson ([id number]) on 7/1/2020 5:53:29 PM       US Carotid 6/30/20  Impression    Atherosclerotic disease. No hemodynamically significant stenosis is    identified    Estimated stenosis by NASCET criteria in the proximal right carotid    artery is between 50% to 69% . Estimated stenosis by NASCET criteria in the proximal left carotid    artery is between 50% to 69%.           Anesthesia Evaluation  Patient summary reviewed and Nursing notes reviewed no history of anesthetic complications:   Airway: Mallampati: III  TM distance: <3 FB   Neck ROM: limited  Mouth opening: < 3 FB Dental:    (+) upper dentures and lower dentures      Pulmonary:   (+) wheezes,  asthma:                           ROS comment: Pt currently receiving duo-neb tx, patients reports no SOB at this time    Cardiovascular:    (+) hypertension:, past MI: > 6 months, CAD:, CABG/stent (2011, stents x2 RCA):, hyperlipidemia      ECG reviewed  Rhythm: regular  Rate: normal  Echocardiogram reviewed  Stress test reviewed                Neuro/Psych: (+) CVA (pt states \"about 20 years ago\", pt states she lost some strenght left side, but has since regained): no interval change, neuromuscular disease (fibrmyalgia):,             GI/Hepatic/Renal:   (+) GERD:,           Endo/Other:    (+) blood dyscrasia (on aspirin, last taken 9/8/20): arthritis: rheumatoid. , .                 Abdominal:           Vascular:   + PVD, aortic or cerebral, . ROS comment: Aorto-iliac atherosclerosis   Atherosclerosis of native arteries of extremity with rest pain   Atherosclerosis of native arteries of extremities with rest pain, left leg    R CEA 1998  L CEA 2012    . Anesthesia Plan      general     ASA 4       Induction: intravenous. arterial line, CVP and BIS    Anesthetic plan and risks discussed with patient. Use of blood products discussed with patient whom. Plan discussed with CRNA and attending.                   Zeeshan Romero RN   9/15/2020

## 2020-09-15 NOTE — FLOWSHEET NOTE
Patient temperature increased by 1 degree. Pt was hypothermic upon [facility] admission and bear hugger was applied. Increase in temperature is needed and expected. No other signs or symptoms of transfusion reaction and no history of transfusion reaction with past blood transfusion. Transfusion continued.

## 2020-09-15 NOTE — CONSULTS
Inpatient Cardiology Post Operative Cardiac Consult Note    PATIENT IS BEING FOLLOWED FOR: Postoperative cardiac evaluation     Colton Cutler is a 67year old  female who was most recently seen in consultation with Dr. Darlene Aldrich on 07/01/2020. Her medical history includes ex heavy smoker quit in 2000, PVD with claudication, bilateral CEA's,  HTN, HLD, RLS, CAD s/p PCI in 2011, R carpal tunnel release, chronic back pain s/p injections, cervical discectomy 2002, PUD, and anemia. SUBJECTIVE: Denies chest pain and dyspnea. Complains of incisional pain. OBJECTIVE: No apparent distress     ROS:  Consist: Denies fevers, chills or night sweats  Heart: Denies chest pain, palpitations, lightheadedness, dizziness or syncope  Lungs: Denies SOB, cough, wheezing, orthopnea or PND  GI: Denies abdominal pain, vomiting or diarrhea    PHYSICAL EXAM:   /70   Pulse 54   Temp 97 °F (36.1 °C)   Resp 12   Ht 5' (1.524 m)   Wt 160 lb (72.6 kg)   SpO2 100%   BMI 31.25 kg/m²    CONST: Well developed, obese elderly female who appears stated age. Awake, alert and cooperative. No apparent distress  HEENT:   Head- Normocephalic, atraumatic   Eyes- Conjunctivae pink, anicteric  Throat- Oral mucosa pink and moist  Neck-  No stridor, trachea midline, NRB on, JVD not assessed. No carotid bruit  CHEST: Chest symmetrical and non-tender to palpation. No accessory muscle use or intercostal retractions  RESPIRATORY:  Lung sounds - clear throughout anterior and lateral fields   CARDIOVASCULAR:     Heart Inspection- shows no noted pulsations  Heart Palpation- no heaves or thrills; PMI is non-displaced   Heart Ausculation- Regular rate and rhythm, no murmur. No s3, s4 or rub   PV: No lower extremity edema. No varicosities. Pedal pulses palpable, no clubbing or cyanosis   ABDOMEN: Soft, obese, midline DSD CDI. Bowel sounds present. No palpable masses no organomegaly; no abdominal bruit  MS: Good muscle strength and tone.  No EKG: Ordered, not yet done      ASSESSMENT:   1. Aortoiliac, femoral-popliteal arterial occlusive disease with ischemic pain left foot S/p Aorta to  common iliac bypass on the right, external iliac bypass on the left with 14 x 7 mm Dacron graft (DOS)  2. CAD with history of distal RCA stenting 2011 and negative stress in 07/2020 with normal EF  3. Bilateral carotid disease s/p bilateral CEA's. Hx CVA. 4. Ex smoker quit in 2000  5. Post operative Hypotension, responding well to IVF and IV Albumin; Known Hx of HTN  6. HLD   7. Hx cervical diskectomy  8. Chronic lower back with history of injections  9. Arthritis on Plaquenil  10. RLS   11. Hypokalemia, supplemented with improvement            PLAN:  1. Continue current management per Vascular Surgery  2.  Will discuss case with Dr. Papa Galdamez     Electronically signed by GRACE Oliva CNP on 9/15/2020 at 3:23 PM

## 2020-09-15 NOTE — CONSULTS
Cristine Fay M.D.,[facility], D.O., F.A.C.O.I., Capo Aguilar M.D. Minal Bunch M.D., Michael Bocanegra M.D. Ketan Dang D.O. Patient:  Sera Parks 67 y.o. female MRN: [id number]     Date of Service: 9/15/2020      PULMONARY/CC CONSULTATION    Reason for Consultation: resp failure  Referring Physician: Shirlene Calero MD    No chief complaint on file. Code Status: Full Code        SUBJECTIVE:    HPI:  This is a 68 y/o F with hx of restrictive lung disease, kyphoscoliosis, previous tobacco use, vascular disease that is s/p elective aorto common iliac bypass on the right (9/15/20) with Dr. Carola Rivera. Patient asked to see for post-op pulmonary evaluation. Patient previously seen in office by my colleague, Dr. Tayo iLndsay, for pre-op clearance. After procedure patient admitted to CVICU. Currently on simple mask with ABG 7.16/43/118/15 97%. Intra-op blood loss 500 mL. Patient able to follow commands. Complaining of some back pain. Past Medical History:   Diagnosis Date    Aorto-iliac atherosclerosis ([address] 7/1/2020    Arthritis     Atherosclerosis of native arteries of extremity with rest pain ([address] 7/16/2020    Bladder prolapse     CAD (coronary artery disease)     MI  2011    Carotid artery stenosis     Right carotid stenosis.     Carotid bruit 1/10/2013    Carotid stenosis, left 12/13/2012    Cerebrovascular accident (stroke) ([address]     pt states 13 yrs ago    Decreased radial pulse 7/6/2017    Gastric reflux     Glaucoma     History of blood transfusion     Hyperlipemia     Hypertension     Insomnia     PVD (peripheral vascular disease) with claudication ([address] 6/4/2020    Restless leg syndrome     Sinusitis      Past Surgical History:   Procedure Laterality Date    ANTERIOR FIXATION OF THORACIC SPINE      BACK SURGERY      cervical c5-c6    CARDIAC SURGERY      CAROTID ENDARTERECTOMY  1998    right    CAROTID ENDARTERECTOMY  2012    left carotid endarterectomy done by Dr. Fartun Rehman      left wrist    CATARACT REMOVAL      CERVICAL [address] SURGERY  2002    C5 and C6    CORONARY ANGIOPLASTY WITH STENT PLACEMENT  2011    Dr. Huber Em      in past for kidney stones    DENTAL SURGERY      DIAGNOSTIC CARDIAC CATH LAB PROCEDURE  11    Emergent cath/PTCA with deployment of 2 overlapping DE stents to the distal, mid and proximal RCA.    HYSTERECTOMY      JOINT REPLACEMENT      right knee twice, left knee    JOINT REPLACEMENT Right     hip    KNEE ARTHROPLASTY Left 13    KNEE SURGERY      right knee x 2    LITHOTRIPSY Right 2019    RIGHT ESWL EXTRACORPOREAL SHOCK WAVE LITHOTRIPSY CYSTOSCOPY STENT INSERTION performed by Marlee Martin MD at [address] [facility]       History reviewed. No pertinent family history.       Social History:   Social History     Socioeconomic History    Marital status:      Spouse name: Not on file    Number of children: Not on file    Years of education: Not on file    Highest education level: Not on file   Occupational History    Occupation: disabled- floral shop   Social Needs    Financial resource strain: Not on file    Food insecurity     Worry: Not on file     Inability: Not on file    Transportation needs     Medical: Not on file     Non-medical: Not on file   Tobacco Use    Smoking status: Former Smoker     Packs/day: 2.50     Years: 40.00     Pack years: 100.00     Types: Cigarettes     Last attempt to quit: 9/3/2000     Years since quittin.0    Smokeless tobacco: Never Used   Substance and Sexual Activity    Alcohol use: No     Comment: drinks 3 cups of tea daily    Drug use: No    Sexual activity: Not on file   Lifestyle    Physical activity     Days per week: Not on file     Minutes per session: Not on file    Stress: Not on file   Relationships    Social connections     Talks on phone: Not on file     Gets together: Not on file     Attends Yazidism service: Not on file     Active member of club or organization: Not on file     Attends meetings of clubs or organizations: Not on file     Relationship status: Not on file    Intimate partner violence     Fear of current or ex partner: Not on file     Emotionally abused: Not on file     Physically abused: Not on file     Forced sexual activity: Not on file   Other Topics Concern    Not on file   Social History Narrative    Not on file         Vaccines:    Immunization History   Administered Date(s) Administered    Pneumococcal Conjugate 13-valent ([id number]) 11/20/2015    Pneumococcal Polysaccharide ([id number]) 06/17/2012        Home Meds: Medications Prior to Admission: metoprolol succinate (TOPROL XL) 100 MG extended release tablet, Take 1 tablet by mouth daily  amLODIPine (NORVASC) 5 MG tablet, Take 1 tablet by mouth daily  gabapentin (NEURONTIN) 300 MG capsule, Take 300 mg by mouth 3 times daily. Tofacitinib Citrate ER (XELJANZ XR) 11 MG TB24, Take 11 mg by mouth Daily  cilostazol (PLETAL) 100 MG tablet, Take 1 tablet by mouth 2 times daily  latanoprost (XALATAN) 0.005 % ophthalmic solution, INSTILL 1 DROP INTO BOTH EYES EVERY DAY  rosuvastatin (CRESTOR) 10 MG tablet, TAKE 1 TABLET BY MOUTH EVERY DAY  aspirin 81 MG EC tablet, TAKE 1 TABLET BY MOUTH DAILY  brinzolamide (AZOPT) 1 % ophthalmic suspension, Place 1 drop into the left eye 3 times daily  hydroxychloroquine (PLAQUENIL) 200 MG tablet, Take 200 mg by mouth 2 times daily TAKES ONLY DAILY  aspirin 325 MG EC tablet, Take 1 tablet by mouth daily  triamcinolone (KENALOG) 0.1 % cream, Apply 1 applicator topically 2 times daily  Misc. Devices (ROLLATOR ULTRA-LIGHT) MISC, 1 Device by Does not apply route continuous  loratadine (CLARITIN) 10 MG tablet, TAKE ONE TABLET BY MOUTH EVERY DAY.   Multiple Vitamins-Minerals (VITAMIN D3 COMPLETE PO), Take 2,000 Units by mouth  Omega-3 Fatty Acids (FISH OIL) 1000 MG CPDR, Take 1,000 mg by mouth daily STOP PREOP MED  famotidine (PEPCID) 40 MG tablet, Take 1 tablet by mouth daily (Patient taking differently: Take 40 mg by mouth daily TAKES PRN)  Cholecalciferol (VITAMIN D) 2000 units TABS tablet, Take 2,000 Units by mouth daily     CURRENT MEDS :  Scheduled Meds:   [START ON 9/16/2020] amLODIPine  5 mg Oral Daily    dorzolamide  1 drop Left Eye TID    latanoprost  1 drop Both Eyes Nightly    [START ON 9/16/2020] metoprolol succinate  100 mg Oral Daily    sodium chloride flush  10 mL Intravenous 2 times per day    ceFAZolin (ANCEF) IVPB  2 g Intravenous Q8H    ipratropium-albuterol  1 ampule Inhalation Q4H WA    [START ON 9/16/2020] aspirin  300 mg Rectal Daily       Continuous Infusions:   clevidipine      phenylephrine (MAJNU-SYNEPHRINE) 50mg/250mL infusion      0.9% NaCl with KCl 20 mEq         Allergies   Allergen Reactions    Iodine Shortness Of Breath    Macrodantin [Nitrofurantoin] Shortness Of Breath    Prednisone Shortness Of Breath and Palpitations    Sulfa Antibiotics Shortness Of Breath    5-Alpha Reductase Inhibitors        REVIEW OF SYSTEMS:  REVIEW OF SYMPTOMS:  Review of Systems   Unable to perform ROS: Acuity of condition          OBJECTIVE:   /70   Pulse 61   Temp 97 °F (36.1 °C)   Resp 16   Ht 5' (1.524 m)   Wt 160 lb (72.6 kg)   SpO2 100%   BMI 31.25 kg/m²   SpO2 Readings from Last 1 Encounters:   09/15/20 100%        I/O:    Intake/Output Summary (Last 24 hours) at 9/15/2020 1510  Last data filed at 9/15/2020 1343  Gross per 24 hour   Intake 3875 ml   Output 900 ml   Net 2975 ml     Vent Information  SpO2: 100 %                PHYSICAL EXAM:  Physical Exam  Constitutional:       Appearance: She is ill-appearing. HENT:      Head: Normocephalic and atraumatic. Eyes:      Conjunctiva/sclera: Conjunctivae normal.   Neck:      Musculoskeletal: Neck supple.       Trachea: No tracheal deviation. Cardiovascular:      Rate and Rhythm: Normal rate and regular rhythm. Heart sounds: Normal heart sounds. Pulmonary:      Effort: Pulmonary effort is normal.      Breath sounds: Decreased breath sounds present. Abdominal:      General: Bowel sounds are normal.      Palpations: Abdomen is soft. Tenderness: There is no abdominal tenderness. Musculoskeletal:         General: No swelling or deformity. Comments: +right femoral bandage   Lymphadenopathy:      Cervical: No cervical adenopathy. Skin:     General: Skin is warm and dry. Findings: No rash. Neurological:      General: No focal deficit present. Mental Status: She is lethargic. Psychiatric:         Behavior: Behavior normal.         Pulmonary Function Testing personally reviewed and interpreted. PERTINENT LAB RESULTS: Labs reviewed. DIAGNOSTICS: Pertinent imaging reviewed. ASSESSMENT:     1. S/p Aorto to common iliac bypass on right, external iliac bypass on the left (9/15/20)    2. Acute hypercapnic resp failure post-operative    3. Restrictive lung disease  *PFT (8/2020) mild to moderate ventilatory restriction with mild impairment in gas transfer    4. Metabolic and respiratory acidosis    5. Muscular deconditioning    6. kyphosis of cervicothoracic region    7. Hx of HTN, HLD, CAD, PAD, PVD      PLAN:      Repeat ABG, check BMP, CBC, lactic acid   Continue volume resuscitation, pending ABG and electrolytes may need bicarb gtt   Supplemental O2 for SpO2 > 92%   Pulmonary recruitment maneuvers with IS, EZPAP, PT/OT and ambulation when ok per surgery   Follow chest imaging   Bipap prn   Bronchodilators q4 WA and prn    GI/DVT - start pharmacologic when ok per surg    Discussed with Dr. Tamiko Jones. Will continue to follow along with you. Thank you for allowing me to participate in the care of Garrett Schmidt. Please feel free to call with questions.        CCT 35 min    Electronically signed by Shawanda Hobson DO on 9/15/2020 at 3:10 PM

## 2020-09-15 NOTE — PROGRESS NOTES
Admitted to Same Day Surgery. Preop instructions given to patient. COVID testing completed on: 9/10/20   Results of the test: not detected  The patient verbally confirms that they did adhere to the self-quarantine guidelines. No signs or symptoms expressed or observed.

## 2020-09-15 NOTE — PLAN OF CARE
Problem: Falls - Risk of:  Goal: Will remain free from falls  Description: Will remain free from falls  Outcome: Met This Shift  Goal: Absence of physical injury  Description: Absence of physical injury  Outcome: Met This Shift     Problem: Pain:  Goal: Pain level will decrease  Description: Pain level will decrease  Outcome: Not Met This Shift  Goal: Control of acute pain  Description: Control of acute pain  Outcome: Not Met This Shift  Goal: Control of chronic pain  Description: Control of chronic pain  Outcome: Not Met This Shift     Problem: Skin Integrity:  Goal: Will show no infection signs and symptoms  Description: Will show no infection signs and symptoms  Outcome: Met This Shift  Goal: Absence of new skin breakdown  Description: Absence of new skin breakdown  Outcome: Met This Shift

## 2020-09-15 NOTE — ANESTHESIA PROCEDURE NOTES
Arterial Line:    An arterial line was placed using surface landmarks, in the OR for the following indication(s): continuous blood pressure monitoring and blood sampling needed. A 20 gauge (size), 1 and 3/4 inch (length), Arrow (type) catheter was placed, Seldinger technique used, into the right radial artery, secured by tape and Tegaderm. Anesthesia type: Local  Local infiltration: Injection    Events:  patient tolerated procedure well with no complications.   Anesthesiologist: Narda Aguilar MD  Resident/CRNA: GRACE Alves CRNA  Performed: Resident/CRNA   Preanesthetic Checklist  Completed: patient identified, site marked, surgical consent, pre-op evaluation, timeout performed, IV checked, risks and benefits discussed, monitors and equipment checked, anesthesia consent given, oxygen available and patient being monitored

## 2020-09-16 ENCOUNTER — APPOINTMENT (OUTPATIENT)
Dept: GENERAL RADIOLOGY | Age: 75
DRG: 270 | End: 2020-09-16
Attending: SURGERY
Payer: COMMERCIAL

## 2020-09-16 PROBLEM — E83.42 HYPOMAGNESEMIA: Status: ACTIVE | Noted: 2020-09-16

## 2020-09-16 PROBLEM — J98.4 RESTRICTIVE LUNG DISEASE: Status: ACTIVE | Noted: 2020-09-16

## 2020-09-16 LAB
ALBUMIN SERPL-MCNC: 3.4 G/DL (ref 3.5–5.2)
ALP BLD-CCNC: 35 U/L (ref 35–104)
ALT SERPL-CCNC: 8 U/L (ref 0–32)
ANION GAP SERPL CALCULATED.3IONS-SCNC: 10 MMOL/L (ref 7–16)
ANION GAP SERPL CALCULATED.3IONS-SCNC: 13 MMOL/L (ref 7–16)
AST SERPL-CCNC: 23 U/L (ref 0–31)
B.E.: -4.7 MMOL/L (ref -3–3)
BILIRUB SERPL-MCNC: 0.4 MG/DL (ref 0–1.2)
BUN BLDV-MCNC: 10 MG/DL (ref 8–23)
BUN BLDV-MCNC: 10 MG/DL (ref 8–23)
CALCIUM IONIZED: 1.02 MMOL/L (ref 1.15–1.33)
CALCIUM SERPL-MCNC: 7 MG/DL (ref 8.6–10.2)
CALCIUM SERPL-MCNC: 7.2 MG/DL (ref 8.6–10.2)
CHLORIDE BLD-SCNC: 109 MMOL/L (ref 98–107)
CHLORIDE BLD-SCNC: 111 MMOL/L (ref 98–107)
CO2: 20 MMOL/L (ref 22–29)
CO2: 25 MMOL/L (ref 22–29)
COHB: 0.3 % (ref 0–1.5)
CREAT SERPL-MCNC: 0.7 MG/DL (ref 0.5–1)
CREAT SERPL-MCNC: 0.7 MG/DL (ref 0.5–1)
CRITICAL: ABNORMAL
DATE ANALYZED: ABNORMAL
DATE OF COLLECTION: ABNORMAL
EKG ATRIAL RATE: 35 BPM
EKG Q-T INTERVAL: 560 MS
EKG QRS DURATION: 80 MS
EKG QTC CALCULATION (BAZETT): 545 MS
EKG R AXIS: 68 DEGREES
EKG T AXIS: 75 DEGREES
EKG VENTRICULAR RATE: 57 BPM
GFR AFRICAN AMERICAN: >60
GFR AFRICAN AMERICAN: >60
GFR NON-AFRICAN AMERICAN: >60 ML/MIN/1.73
GFR NON-AFRICAN AMERICAN: >60 ML/MIN/1.73
GLUCOSE BLD-MCNC: 124 MG/DL (ref 74–99)
GLUCOSE BLD-MCNC: 148 MG/DL (ref 74–99)
HCO3: 19.4 MMOL/L (ref 22–26)
HCT VFR BLD CALC: 26.2 % (ref 34–48)
HEMOGLOBIN: 8.5 G/DL (ref 11.5–15.5)
HHB: 8 % (ref 0–5)
LAB: ABNORMAL
Lab: ABNORMAL
MAGNESIUM: 1.3 MG/DL (ref 1.6–2.6)
MAGNESIUM: 2.8 MG/DL (ref 1.6–2.6)
MCH RBC QN AUTO: 30.7 PG (ref 26–35)
MCHC RBC AUTO-ENTMCNC: 32.4 % (ref 32–34.5)
MCV RBC AUTO: 94.6 FL (ref 80–99.9)
METHB: 0.3 % (ref 0–1.5)
MODE: ABNORMAL
O2 CONTENT: 11.9 ML/DL
O2 SATURATION: 92 % (ref 92–98.5)
O2HB: 91.4 % (ref 94–97)
OPERATOR ID: ABNORMAL
PATIENT TEMP: 37 C
PCO2: 32.1 MMHG (ref 35–45)
PDW BLD-RTO: 16.2 FL (ref 11.5–15)
PH BLOOD GAS: 7.4 (ref 7.35–7.45)
PHOSPHORUS: 2.8 MG/DL (ref 2.5–4.5)
PLATELET # BLD: 110 E9/L (ref 130–450)
PMV BLD AUTO: 10.8 FL (ref 7–12)
PO2: 60.2 MMHG (ref 75–100)
POC ACT LR: 128 SECONDS
POC ACT LR: 147 SECONDS
POC ACT LR: 260 SECONDS
POC ACT LR: 283 SECONDS
POC ACT LR: 305 SECONDS
POC ACT LR: 319 SECONDS
POC ACT LR: 320 SECONDS
POTASSIUM SERPL-SCNC: 3.7 MMOL/L (ref 3.5–5)
POTASSIUM SERPL-SCNC: 3.7 MMOL/L (ref 3.5–5)
PRO-BNP: 1892 PG/ML (ref 0–450)
RBC # BLD: 2.77 E12/L (ref 3.5–5.5)
SODIUM BLD-SCNC: 144 MMOL/L (ref 132–146)
SODIUM BLD-SCNC: 144 MMOL/L (ref 132–146)
SOURCE, BLOOD GAS: ABNORMAL
THB: 9.2 G/DL (ref 11.5–16.5)
TIME ANALYZED: 428
TOTAL PROTEIN: 5 G/DL (ref 6.4–8.3)
WBC # BLD: 4.7 E9/L (ref 4.5–11.5)

## 2020-09-16 PROCEDURE — 6360000002 HC RX W HCPCS: Performed by: SURGERY

## 2020-09-16 PROCEDURE — 6360000002 HC RX W HCPCS: Performed by: INTERNAL MEDICINE

## 2020-09-16 PROCEDURE — 80048 BASIC METABOLIC PNL TOTAL CA: CPT

## 2020-09-16 PROCEDURE — 2580000003 HC RX 258: Performed by: SURGERY

## 2020-09-16 PROCEDURE — 83735 ASSAY OF MAGNESIUM: CPT

## 2020-09-16 PROCEDURE — 2000000000 HC ICU R&B

## 2020-09-16 PROCEDURE — 6370000000 HC RX 637 (ALT 250 FOR IP): Performed by: SURGERY

## 2020-09-16 PROCEDURE — 2580000003 HC RX 258: Performed by: INTERNAL MEDICINE

## 2020-09-16 PROCEDURE — 2500000003 HC RX 250 WO HCPCS: Performed by: INTERNAL MEDICINE

## 2020-09-16 PROCEDURE — P9045 ALBUMIN (HUMAN), 5%, 250 ML: HCPCS | Performed by: SURGERY

## 2020-09-16 PROCEDURE — 84100 ASSAY OF PHOSPHORUS: CPT

## 2020-09-16 PROCEDURE — 71045 X-RAY EXAM CHEST 1 VIEW: CPT

## 2020-09-16 PROCEDURE — 94640 AIRWAY INHALATION TREATMENT: CPT

## 2020-09-16 PROCEDURE — 82805 BLOOD GASES W/O2 SATURATION: CPT

## 2020-09-16 PROCEDURE — 36415 COLL VENOUS BLD VENIPUNCTURE: CPT

## 2020-09-16 PROCEDURE — 82330 ASSAY OF CALCIUM: CPT

## 2020-09-16 PROCEDURE — 85027 COMPLETE CBC AUTOMATED: CPT

## 2020-09-16 PROCEDURE — 94660 CPAP INITIATION&MGMT: CPT

## 2020-09-16 PROCEDURE — 80053 COMPREHEN METABOLIC PANEL: CPT

## 2020-09-16 PROCEDURE — 93010 ELECTROCARDIOGRAM REPORT: CPT | Performed by: INTERNAL MEDICINE

## 2020-09-16 PROCEDURE — 83880 ASSAY OF NATRIURETIC PEPTIDE: CPT

## 2020-09-16 RX ORDER — METOPROLOL TARTRATE 50 MG/1
50 TABLET, FILM COATED ORAL 2 TIMES DAILY
Status: DISCONTINUED | OUTPATIENT
Start: 2020-09-16 | End: 2020-09-24 | Stop reason: HOSPADM

## 2020-09-16 RX ORDER — ALBUMIN, HUMAN INJ 5% 5 %
25 SOLUTION INTRAVENOUS ONCE
Status: COMPLETED | OUTPATIENT
Start: 2020-09-16 | End: 2020-09-16

## 2020-09-16 RX ORDER — SODIUM CHLORIDE AND POTASSIUM CHLORIDE .9; .15 G/100ML; G/100ML
SOLUTION INTRAVENOUS CONTINUOUS
Status: DISCONTINUED | OUTPATIENT
Start: 2020-09-16 | End: 2020-09-17

## 2020-09-16 RX ORDER — 0.9 % SODIUM CHLORIDE 0.9 %
500 INTRAVENOUS SOLUTION INTRAVENOUS ONCE
Status: COMPLETED | OUTPATIENT
Start: 2020-09-16 | End: 2020-09-16

## 2020-09-16 RX ORDER — POTASSIUM CHLORIDE 29.8 MG/ML
20 INJECTION INTRAVENOUS ONCE
Status: COMPLETED | OUTPATIENT
Start: 2020-09-16 | End: 2020-09-16

## 2020-09-16 RX ADMIN — Medication 4 MG: at 01:05

## 2020-09-16 RX ADMIN — DORZOLAMIDE HYDROCHLORIDE 1 DROP: 20 SOLUTION/ DROPS OPHTHALMIC at 14:51

## 2020-09-16 RX ADMIN — POTASSIUM CHLORIDE: 2 INJECTION, SOLUTION, CONCENTRATE INTRAVENOUS at 05:40

## 2020-09-16 RX ADMIN — METOPROLOL TARTRATE 50 MG: 50 TABLET, FILM COATED ORAL at 12:21

## 2020-09-16 RX ADMIN — POTASSIUM CHLORIDE AND SODIUM CHLORIDE: 900; 150 INJECTION, SOLUTION INTRAVENOUS at 23:41

## 2020-09-16 RX ADMIN — Medication 4 MG: at 19:42

## 2020-09-16 RX ADMIN — Medication 2 G: at 03:45

## 2020-09-16 RX ADMIN — SODIUM CHLORIDE 500 ML: 9 INJECTION, SOLUTION INTRAVENOUS at 13:21

## 2020-09-16 RX ADMIN — Medication 4 MG: at 04:10

## 2020-09-16 RX ADMIN — Medication 4 MG: at 12:04

## 2020-09-16 RX ADMIN — MAGNESIUM SULFATE HEPTAHYDRATE 1 G: 1 INJECTION, SOLUTION INTRAVENOUS at 10:51

## 2020-09-16 RX ADMIN — SODIUM CHLORIDE, PRESERVATIVE FREE 10 ML: 5 INJECTION INTRAVENOUS at 21:24

## 2020-09-16 RX ADMIN — DORZOLAMIDE HYDROCHLORIDE 1 DROP: 20 SOLUTION/ DROPS OPHTHALMIC at 21:23

## 2020-09-16 RX ADMIN — ASPIRIN 300 MG: 300 SUPPOSITORY RECTAL at 09:45

## 2020-09-16 RX ADMIN — MAGNESIUM SULFATE HEPTAHYDRATE 1 G: 1 INJECTION, SOLUTION INTRAVENOUS at 07:53

## 2020-09-16 RX ADMIN — IPRATROPIUM BROMIDE AND ALBUTEROL SULFATE 1 AMPULE: 2.5; .5 SOLUTION RESPIRATORY (INHALATION) at 08:00

## 2020-09-16 RX ADMIN — POTASSIUM CHLORIDE AND SODIUM CHLORIDE: 900; 150 INJECTION, SOLUTION INTRAVENOUS at 15:51

## 2020-09-16 RX ADMIN — LATANOPROST 1 DROP: 50 SOLUTION OPHTHALMIC at 21:23

## 2020-09-16 RX ADMIN — SODIUM CHLORIDE, PRESERVATIVE FREE 10 ML: 5 INJECTION INTRAVENOUS at 06:52

## 2020-09-16 RX ADMIN — IPRATROPIUM BROMIDE AND ALBUTEROL SULFATE 1 AMPULE: 2.5; .5 SOLUTION RESPIRATORY (INHALATION) at 20:12

## 2020-09-16 RX ADMIN — Medication 10 ML: at 21:24

## 2020-09-16 RX ADMIN — SODIUM CHLORIDE 500 ML: 9 INJECTION, SOLUTION INTRAVENOUS at 21:40

## 2020-09-16 RX ADMIN — ALBUMIN (HUMAN) 25 G: 12.5 INJECTION, SOLUTION INTRAVENOUS at 17:34

## 2020-09-16 RX ADMIN — Medication 4 MG: at 07:11

## 2020-09-16 RX ADMIN — POTASSIUM CHLORIDE 20 MEQ: 400 INJECTION, SOLUTION INTRAVENOUS at 06:49

## 2020-09-16 RX ADMIN — Medication 2 G: at 10:57

## 2020-09-16 RX ADMIN — METOPROLOL TARTRATE 50 MG: 50 TABLET, FILM COATED ORAL at 21:26

## 2020-09-16 RX ADMIN — CALCIUM GLUCONATE 1 G: 98 INJECTION, SOLUTION INTRAVENOUS at 11:55

## 2020-09-16 RX ADMIN — MAGNESIUM SULFATE HEPTAHYDRATE 1 G: 1 INJECTION, SOLUTION INTRAVENOUS at 06:33

## 2020-09-16 RX ADMIN — DORZOLAMIDE HYDROCHLORIDE 1 DROP: 20 SOLUTION/ DROPS OPHTHALMIC at 09:53

## 2020-09-16 RX ADMIN — IPRATROPIUM BROMIDE AND ALBUTEROL SULFATE 1 AMPULE: 2.5; .5 SOLUTION RESPIRATORY (INHALATION) at 13:51

## 2020-09-16 RX ADMIN — MAGNESIUM SULFATE HEPTAHYDRATE 1 G: 1 INJECTION, SOLUTION INTRAVENOUS at 09:10

## 2020-09-16 ASSESSMENT — PAIN SCALES - GENERAL
PAINLEVEL_OUTOF10: 9
PAINLEVEL_OUTOF10: 4
PAINLEVEL_OUTOF10: 9
PAINLEVEL_OUTOF10: 9
PAINLEVEL_OUTOF10: 0
PAINLEVEL_OUTOF10: 8
PAINLEVEL_OUTOF10: 9
PAINLEVEL_OUTOF10: 7
PAINLEVEL_OUTOF10: 7

## 2020-09-16 ASSESSMENT — PAIN DESCRIPTION - ORIENTATION
ORIENTATION: RIGHT;UPPER
ORIENTATION: UPPER;MID
ORIENTATION: RIGHT;LEFT;OUTER;UPPER
ORIENTATION: MID;UPPER
ORIENTATION: MID;UPPER

## 2020-09-16 ASSESSMENT — PAIN DESCRIPTION - ONSET
ONSET: ON-GOING
ONSET: GRADUAL
ONSET: ON-GOING
ONSET: GRADUAL
ONSET: ON-GOING

## 2020-09-16 ASSESSMENT — PAIN DESCRIPTION - LOCATION
LOCATION: BACK
LOCATION: BACK;ABDOMEN
LOCATION: BACK;ABDOMEN
LOCATION: BACK
LOCATION: ABDOMEN

## 2020-09-16 ASSESSMENT — PAIN - FUNCTIONAL ASSESSMENT
PAIN_FUNCTIONAL_ASSESSMENT: PREVENTS OR INTERFERES SOME ACTIVE ACTIVITIES AND ADLS
PAIN_FUNCTIONAL_ASSESSMENT: PREVENTS OR INTERFERES WITH MANY ACTIVE NOT PASSIVE ACTIVITIES
PAIN_FUNCTIONAL_ASSESSMENT: PREVENTS OR INTERFERES SOME ACTIVE ACTIVITIES AND ADLS
PAIN_FUNCTIONAL_ASSESSMENT: PREVENTS OR INTERFERES WITH MANY ACTIVE NOT PASSIVE ACTIVITIES

## 2020-09-16 ASSESSMENT — PAIN DESCRIPTION - FREQUENCY
FREQUENCY: CONTINUOUS

## 2020-09-16 ASSESSMENT — PAIN DESCRIPTION - PAIN TYPE
TYPE: ACUTE PAIN
TYPE: CHRONIC PAIN
TYPE: SURGICAL PAIN;ACUTE PAIN
TYPE: SURGICAL PAIN;ACUTE PAIN

## 2020-09-16 ASSESSMENT — PAIN DESCRIPTION - DESCRIPTORS
DESCRIPTORS: ACHING;DISCOMFORT
DESCRIPTORS: ACHING;DISCOMFORT
DESCRIPTORS: ACHING;DISCOMFORT;SORE
DESCRIPTORS: ACHING;DISCOMFORT;SORE
DESCRIPTORS: ACHING;DISCOMFORT

## 2020-09-16 ASSESSMENT — PAIN DESCRIPTION - PROGRESSION
CLINICAL_PROGRESSION: GRADUALLY WORSENING
CLINICAL_PROGRESSION: NOT CHANGED
CLINICAL_PROGRESSION: GRADUALLY WORSENING
CLINICAL_PROGRESSION: NOT CHANGED

## 2020-09-16 NOTE — PROGRESS NOTES
Vascular:    Sequence of events noted    Patient alert oriented, and 2 L of oxygen, oxygen saturation 92%    Complaining of some incisional pain    Abdomen: Soft    Lower extremities both femoral pulses palpable    Both feet are warm to touch, range of motion ankles and toes is intact, stronger Doppler signals noted today over the dorsalis pedis bilaterally    Lab data reviewed, supplement magnesium, potassium as needed, monitor daily CBC CMP etc.    Discussed with the patient, all her questions were answered

## 2020-09-16 NOTE — OP NOTE
[address]                  [address] [place],  [facility]                                OPERATIVE REPORT    PATIENT NAME: Heather Graham                   :        1945  MED REC NO:   [id number]                            ROOM:       3813  ACCOUNT NO:   [de-identified]                           ADMIT DATE: 09/15/2020  PROVIDER:     José Johnson MD    DATE OF PROCEDURE:  09/15/2020    PREOPERATIVE DIAGNOSES:  Ischemic rest pain, left foot, associated with  severe iliac and femoropopliteal arterial occlusive disease. POSTOPERATIVE DIAGNOSES:  Ischemic rest pain, left foot, associated with  severe Aorta iliac and femoropopliteal arterial occlusive disease. OPERATION PERFORMED:  Aorta to right common iliac and aorta to left  external iliac bypass with 14 x 7 mm Dacron graft. SURGEON:  José Johnson MD    ASSISTANT:  China Rodriguez CFA, CST    OPERATIVE PROCEDURE:  With the patient in supine position, after  satisfactory induction of general endotracheal anesthesia and placement  of arterial line, Perez catheter and a central venous catheter, the  entire abdomen and both groins were prepped and draped in the usual  fashion. A longitudinal incision was made from the xiphoid down to  pubis, careful exploration revealed the patient had omental adhesion  stuck to the pelvis, which was carefully taken down. Next, the  patient's aorta was noted to be markedly, severely calcified. By blunt and sharp dissection, the aorta was dissected all the way up to  the renal vein and renal arteries and infrarenally, there was an area of  soft spot that could be applied for a clamping of the aorta; however,  distally, the aorta was severely calcified. The inferior mesenteric  artery was small, but patent. The left common iliac artery and the  external iliac arteries occluded.   Next, both the iliac arteries were  dissected free and the right common iliac artery was found to be soft  enough to be used for outflow. Even though the patient has some  external iliac artery disease, it is felt, considering her age and  multiple risk factors, to limit the procedure to least that will benefit  her the most and it was decided to use the left common iliac artery for  the outflow. The distal external iliac artery was mobilized all the way distally and  where there was approximately 2.5 to 3 cm artery available for  anastomosis and artery was dissected free. Distally, a large saphenous  vein that was coming off medially and another branch coming off  laterally were controlled. The patient was given heparin intravenously  along with mannitol. The aorta was cross-clamped infrarenally as well  as the occlusion of the right common iliac artery. A longitudinal  aortotomy was made proximal to the inferior mesenteric artery. The  aorta was found to be significantly and markedly calcified, partial  endarterectomy was performed. A 14 x 7 mm Dacron graft was selected,  was anastomosed end-to-side fashion with a running 2-0 Prolene suture. This was then being reinforced with Dacron pledgets. After the repair  was completed, the clamp was released and the anastomosis was found to  be intact. Next, the right limb of the graft was brought to the right  common iliac artery, it was anastomosed end-to-side fashion with a  running 4-0 Prolene suture and flow was restored and good pulse was felt  distally with good Doppler signal.  Next, the left limb of the graft was  brought to the distal external iliac artery where the external iliac  artery was occluded distal to the circumflex branches, longitudinal  arteriotomy of approximately 1.5 to 2 cm length was made. There was no  bleeding at all coming proximal in the external iliac artery because it  was completely occluded.   End-to-side anastomosis was performed with 5-0  Prolene suture and just prior to the completion of repair, the arteries  were flushed, repair was completed and flow was restored with good  Doppler signals noticed bilaterally in the external iliac arteries. The  color of bowel and colon was normal.  Doppler signal audible in the root  of mesentery of the colon. Hemostasis was secured. Incision was  thoroughly irrigated. The retroperitoneum was approximated using 2-0  Vicryl. The abdominal wall was closed in a single layered fashion using  #2 nylon suture which was interrupted in places, subcu with 3-0 Vicryl,  skin with staples. Estimated blood loss was approximately 500 mL and  some of the blood was re-transfused back to the patient that was  collected through a Cell Saver. At the end of the case, both feet were checked, the patient had improved  Doppler signals, particularly on the left side where it was hardly  audible, now has a stronger monophasic left dorsalis pedis Doppler  signal and color of the both feet is normal and warm to touch. The  patient was then transferred back to recovery room in stable condition. I have discussed with the patient's daughter, Floyd Stiles, in the waiting  area, explained of the situation, I explained to her that the patient  will be evaluated by her pulmonologist, cardiologist, and internal  medicine doctors also and will closely be monitored in the intensive  care unit. All other questions were answered.         Michele Milan MD    D: 09/15/2020 15:19:45       T: 09/15/2020 22:28:06     [email or web address]  Job#: [id number]     Doc#: [id number]    CC:

## 2020-09-16 NOTE — PLAN OF CARE
Problem: Falls - Risk of:  Goal: Will remain free from falls  Description: Will remain free from falls  Outcome: Met This Shift  Goal: Absence of physical injury  Description: Absence of physical injury  Outcome: Met This Shift     Problem: Pain:  Goal: Pain level will decrease  Description: Pain level will decrease  Outcome: Met This Shift  Goal: Control of acute pain  Description: Control of acute pain  Outcome: Met This Shift  Goal: Control of chronic pain  Description: Control of chronic pain  Outcome: Ongoing     Problem: Skin Integrity:  Goal: Will show no infection signs and symptoms  Description: Will show no infection signs and symptoms  Outcome: Ongoing  Goal: Absence of new skin breakdown  Description: Absence of new skin breakdown  Outcome: Ongoing

## 2020-09-16 NOTE — ANESTHESIA POSTPROCEDURE EVALUATION
Department of Anesthesiology  Postprocedure Note    Patient: Sravanthi Truong  MRN: [id number]  YOB: 1945  Date of evaluation: 9/15/2020  Time:  10:51 PM     Procedure Summary     Date:  09/15/20 Room / Location:  [address] [place] [address] / [facility]    Anesthesia Start:  0730 Anesthesia Stop:  9818    Procedure:  AORTO BIFEMORAL  BYPASS (N/A ) Diagnosis:  (PERIPHERAL VAS . DIS)    Surgeon:  Mallory Blackwell MD Responsible Provider:  Michelle Graham MD    Anesthesia Type:  general ASA Status:  4          Anesthesia Type: general    Pamela Phase I: Pamela Score: 10    Pamela Phase II:      Last vitals: Reviewed and per EMR flowsheets.        Anesthesia Post Evaluation    Patient location during evaluation: ICU  Patient participation: complete - patient participated  Level of consciousness: awake  Airway patency: patent  Nausea & Vomiting: no nausea and no vomiting  Complications: no  Cardiovascular status: hemodynamically stable  Respiratory status: acceptable  Hydration status: stable

## 2020-09-16 NOTE — CARE COORDINATION
Care coordination: Met with pt at bedside. She lives alone in a 2nd floor apt. There are approx 7-8 steps to apt. She has daughters in the area who assist her prn. Pt ambulates with a st cane and also has a ww. Currently on 2l o2. She does not have home o2, neb or cpap. She has [facility] aides through [facility]. Cm is Medora Ahumada [phone]. [facility] provide the aides q Tues and Thurs for 2 hrs. Per [facility], if skilled nursing or PT needed, it will depend on when pt is discharged as to if they can provide services. Discussed possible snf, but pt is not interested d/t covid pandemic. Pt states she already spoke with her kwasi. Yemi Nevarez and son in law Christine Allen, who she says he will assist her in getting up the steps to her apt when discharged. Will need therapy evals when medically appropriate to determine discharge needs.

## 2020-09-16 NOTE — PROGRESS NOTES
Pedro Luis Jolley M.D.,[facility]  Kinjal Muse D.O., F.A.C.O.I., Izzy Wise M.D. Alana Scott M.D., Isaac Millard M.D. Troy Abreu D.O. Daily Pulmonary Progress Note    Patient:  Nicole Morgan 67 y.o. female MRN: [id number]     Date of Service: 9/16/2020        Subjective      Patient was seen and examined. No events overnight. Patient did not sleep well, but otherwise doing okay. When taking off oxygen this morning dropped into 80s and placed back on 2 L. Objective   Vitals: BP (!) 133/56   Pulse 71   Temp 98.4 °F (36.9 °C) (Oral)   Resp 17   Ht 5' (1.524 m)   Wt 169 lb 12.8 oz (77 kg)   SpO2 96%   BMI 33.16 kg/m²     I/O:    Intake/Output Summary (Last 24 hours) at 9/16/2020 1102  Last data filed at 9/16/2020 0900  Gross per 24 hour   Intake 6814 ml   Output 1945 ml   Net 4869 ml       CURRENT MEDS :  Scheduled Meds:   calcium chloride IVPB  1 g Intravenous Once    amLODIPine  5 mg Oral Daily    dorzolamide  1 drop Left Eye TID    latanoprost  1 drop Both Eyes Nightly    metoprolol succinate  100 mg Oral Daily    sodium chloride flush  10 mL Intravenous 2 times per day    ipratropium-albuterol  1 ampule Inhalation Q4H WA    aspirin  300 mg Rectal Daily       Continuous Infusions:   clevidipine      phenylephrine (MANJU-SYNEPHRINE) 50mg/250mL infusion Stopped (09/15/20 1946)    sodium chloride      IV infusion builder 125 mL/hr at 09/16/20 0540       PRN Meds:  sodium chloride flush, acetaminophen, oxyCODONE-acetaminophen **OR** oxyCODONE-acetaminophen, morphine **OR** morphine, labetalol, hydrALAZINE, clevidipine, magnesium sulfate, potassium chloride, albuterol      Physical Exam:  Physical Exam  Constitutional:       Appearance: She is ill-appearing. HENT:      Head: Normocephalic and atraumatic. Eyes:      Conjunctiva/sclera: Conjunctivae normal.   Neck:      Musculoskeletal: Neck supple. Trachea: No tracheal deviation. on 9/16/2020 at 11:02 AM

## 2020-09-16 NOTE — CONSULTS
[address] [facility]  Initial Consult Note      REASON FOR CONSULTATION: Medical management    ATTENDING/ADMITTING PHYSICIAN: Dr Stephen Carr    HISTORY OF PRESENT ILLNESS:      The patient is a 67 y.o. female patient of Raymundo Sandhoff, APRN - CNP history of PAD, CAD, carotid stenosis, CVA, GERD who presents with PAD status post aorto to right common iliac and aorto to left external iliac bypass 9/15. Pt denies uncontrolled pain. No chest pain, trouble breathing, vomiting, or diarrhea. No fevers or chills. Patient states  chronic medical problems are well controlled. Patient states she is no longer smoking. .      Past Medical History:   Diagnosis Date    Aorto-iliac atherosclerosis ([address] 7/1/2020    Arthritis     Atherosclerosis of native arteries of extremity with rest pain ([address] 7/16/2020    Bladder prolapse     CAD (coronary artery disease)     MI  2011    Carotid artery stenosis     Right carotid stenosis.     Carotid bruit 1/10/2013    Carotid stenosis, left 12/13/2012    Cerebrovascular accident (stroke) ([address]     pt states 13 yrs ago    Decreased radial pulse 7/6/2017    Gastric reflux     Glaucoma     History of blood transfusion     Hyperlipemia     Hypertension     Insomnia     PVD (peripheral vascular disease) with claudication ([address] 6/4/2020    Restless leg syndrome     Sinusitis            Past Surgical History:   Procedure Laterality Date    ANTERIOR FIXATION OF THORACIC SPINE      AORTO-ILIAC BYPASS GRAFT N/A 9/15/2020    AORTO BIFEMORAL  BYPASS performed by Mehrdad Chandler MD at [facility]      cervical c5-c6   [facility]    right    CAROTID ENDARTERECTOMY  12/18/2012    left carotid endarterectomy done by Dr. Olivia Beasley      left wrist    CATARACT REMOVAL      CERVICAL [address] SURGERY  2002    C5 and C6    CORONARY ANGIOPLASTY WITH STENT PLACEMENT  09/06/2011    Dr. Mark Galdamez CYSTOSCOPY      in past for kidney stones    DENTAL SURGERY      DIAGNOSTIC CARDIAC CATH LAB PROCEDURE  9/6/11    Emergent cath/PTCA with deployment of 2 overlapping DE stents to the distal, mid and proximal RCA.    HYSTERECTOMY      JOINT REPLACEMENT      right knee twice, left knee    JOINT REPLACEMENT Right     hip    KNEE ARTHROPLASTY Left 4-11-13    KNEE SURGERY      right knee x 2    LITHOTRIPSY Right 11/7/2019    RIGHT ESWL EXTRACORPOREAL SHOCK WAVE LITHOTRIPSY CYSTOSCOPY STENT INSERTION performed by Christoph Alex MD at [address] [facility]         Medications Prior to Admission:    Medications Prior to Admission: metoprolol succinate (TOPROL XL) 100 MG extended release tablet, Take 1 tablet by mouth daily  amLODIPine (NORVASC) 5 MG tablet, Take 1 tablet by mouth daily  gabapentin (NEURONTIN) 300 MG capsule, Take 300 mg by mouth 3 times daily. Tofacitinib Citrate ER (XELJANZ XR) 11 MG TB24, Take 11 mg by mouth Daily  cilostazol (PLETAL) 100 MG tablet, Take 1 tablet by mouth 2 times daily  latanoprost (XALATAN) 0.005 % ophthalmic solution, INSTILL 1 DROP INTO BOTH EYES EVERY DAY  rosuvastatin (CRESTOR) 10 MG tablet, TAKE 1 TABLET BY MOUTH EVERY DAY  aspirin 81 MG EC tablet, TAKE 1 TABLET BY MOUTH DAILY  brinzolamide (AZOPT) 1 % ophthalmic suspension, Place 1 drop into the left eye 3 times daily  hydroxychloroquine (PLAQUENIL) 200 MG tablet, Take 200 mg by mouth 2 times daily TAKES ONLY DAILY  aspirin 325 MG EC tablet, Take 1 tablet by mouth daily  triamcinolone (KENALOG) 0.1 % cream, Apply 1 applicator topically 2 times daily  Misc. Devices (ROLLATOR ULTRA-LIGHT) MISC, 1 Device by Does not apply route continuous  loratadine (CLARITIN) 10 MG tablet, TAKE ONE TABLET BY MOUTH EVERY DAY.   Multiple Vitamins-Minerals (VITAMIN D3 COMPLETE PO), Take 2,000 Units by mouth  Omega-3 Fatty Acids (FISH OIL) 1000 MG CPDR, Take 1,000 mg by mouth daily STOP PREOP MCV 94.6 09/16/2020    MCH 30.7 09/16/2020    MCHC 32.4 09/16/2020    RDW 16.2 09/16/2020    SEGSPCT 74 10/09/2013    LYMPHOPCT 7.5 09/15/2020    MONOPCT 7.9 09/15/2020    BASOPCT 0.3 09/15/2020    MONOSABS 0.45 09/15/2020    LYMPHSABS 0.43 09/15/2020    EOSABS 0.01 09/15/2020    BASOSABS 0.02 09/15/2020     CMP:    Lab Results   Component Value Date     09/16/2020    K 3.7 09/16/2020    K 4.1 09/04/2020     09/16/2020    CO2 20 09/16/2020    BUN 10 09/16/2020    CREATININE 0.7 09/16/2020    GFRAA >60 09/16/2020    LABGLOM >60 09/16/2020    GLUCOSE 148 09/16/2020    GLUCOSE 92 09/07/2011    PROT 5.0 09/16/2020    LABALBU 3.4 09/16/2020    CALCIUM 7.0 09/16/2020    BILITOT 0.4 09/16/2020    ALKPHOS 35 09/16/2020    AST 23 09/16/2020    ALT 8 09/16/2020     Magnesium:    Lab Results   Component Value Date    MG 1.3 09/16/2020     Phosphorus:    Lab Results   Component Value Date    PHOS 2.8 09/16/2020     PT/INR:    Lab Results   Component Value Date    PROTIME 12.0 09/04/2020    INR 1.1 09/04/2020     PTT:    Lab Results   Component Value Date    APTT 29.5 09/04/2020   [APTT}  Last 3 Troponin:    Lab Results   Component Value Date    TROPONINI <0.01 02/17/2019    TROPONINI <0.01 10/09/2013    TROPONINI <0.01 12/06/2012    TROPONINI <0.01 12/05/2012    TROPONINI 0.54 09/06/2011     U/A:    Lab Results   Component Value Date    NITRITE neg 08/28/2019    COLORU Yellow 11/01/2019    PROTEINU TRACE 11/01/2019    PHUR 5.5 11/01/2019    WBCUA NONE 11/01/2019    WBCUA 10-20 11/04/2011    RBCUA >20 11/01/2019    RBCUA NONE 04/04/2013    BACTERIA RARE 11/01/2019    CLARITYU SL CLOUDY 11/01/2019    SPECGRAV 1.015 11/01/2019    LEUKOCYTESUR Negative 11/01/2019    UROBILINOGEN 0.2 11/01/2019    BILIRUBINUR Negative 11/01/2019    BILIRUBINUR neg 08/28/2019    BILIRUBINUR NEGATIVE 11/04/2011    BLOODU LARGE 11/01/2019    GLUCOSEU Negative 11/01/2019    GLUCOSEU NEGATIVE 11/04/2011     ABG:    Lab Results   Component Value Date    PH 7.400 09/16/2020    PCO2 32.1 09/16/2020    PO2 60.2 09/16/2020    HCO3 19.4 09/16/2020    BE -4.7 09/16/2020    O2SAT 92.0 09/16/2020     HgBA1c:    Lab Results   Component Value Date    LABA1C 5.1 09/11/2019     FLP:    Lab Results   Component Value Date    TRIG 184 09/11/2019    HDL 61 09/11/2019    LDLCALC 218 09/11/2019    LABVLDL 37 09/11/2019     TSH:    Lab Results   Component Value Date    TSH 1.670 09/11/2019       ASSESSMENT:      Principal Problem: Atherosclerosis of native arteries of extremity with rest pain ([address]  Active Problems:    Hypotension    CAD (coronary artery disease)    Aorto-iliac atherosclerosis (HCC)    Atherosclerosis of native arteries of extremities with rest pain, left leg (HCC)    Restrictive lung disease    Hypomagnesemia  Resolved Problems:    * No resolved hospital problems. *      PLAN:     67 y.o. female patient of GRACE Xiong - CNP history of PAD, CAD, carotid stenosis, CVA, GERD who presents with PAD status post aorto to right common iliac and aorto to left external iliac bypass 9/15. Supplemental O2 wean as tolerated  Nebulizers-  Replace electrolytes  IV fluid resuscitation  Pain control  Bowel regimen  Pressors wean as tolerated  Pulmonology consulted  Cardiology consulted  Medications for other co morbidities cont as appropriate w dosage adjustments as necessary     Thank you for allowing me to participate in the care of this patient.    Note that over 50 minutes was spent in evaluation of the patient, review of the chart and pertinent records, discussion with family/staff, etc    Paul Garvey MD  10:38 AM  9/16/2020

## 2020-09-17 ENCOUNTER — APPOINTMENT (OUTPATIENT)
Dept: GENERAL RADIOLOGY | Age: 75
DRG: 270 | End: 2020-09-17
Attending: SURGERY
Payer: COMMERCIAL

## 2020-09-17 ENCOUNTER — APPOINTMENT (OUTPATIENT)
Dept: INTERVENTIONAL RADIOLOGY/VASCULAR | Age: 75
DRG: 270 | End: 2020-09-17
Attending: SURGERY
Payer: COMMERCIAL

## 2020-09-17 LAB
ALBUMIN SERPL-MCNC: 3 G/DL (ref 3.5–5.2)
ALP BLD-CCNC: 31 U/L (ref 35–104)
ALT SERPL-CCNC: 6 U/L (ref 0–32)
ANION GAP SERPL CALCULATED.3IONS-SCNC: 8 MMOL/L (ref 7–16)
AST SERPL-CCNC: 29 U/L (ref 0–31)
BILIRUB SERPL-MCNC: 0.5 MG/DL (ref 0–1.2)
BUN BLDV-MCNC: 10 MG/DL (ref 8–23)
CALCIUM IONIZED: 1.06 MMOL/L (ref 1.15–1.33)
CALCIUM SERPL-MCNC: 7 MG/DL (ref 8.6–10.2)
CHLORIDE BLD-SCNC: 115 MMOL/L (ref 98–107)
CO2: 21 MMOL/L (ref 22–29)
CREAT SERPL-MCNC: 0.7 MG/DL (ref 0.5–1)
GFR AFRICAN AMERICAN: >60
GFR NON-AFRICAN AMERICAN: >60 ML/MIN/1.73
GLUCOSE BLD-MCNC: 110 MG/DL (ref 74–99)
HCT VFR BLD CALC: 22.1 % (ref 34–48)
HCT VFR BLD CALC: 26.1 % (ref 34–48)
HEMOGLOBIN: 6.8 G/DL (ref 11.5–15.5)
HEMOGLOBIN: 8 G/DL (ref 11.5–15.5)
MAGNESIUM: 2.6 MG/DL (ref 1.6–2.6)
MCH RBC QN AUTO: 30.1 PG (ref 26–35)
MCH RBC QN AUTO: 30.8 PG (ref 26–35)
MCHC RBC AUTO-ENTMCNC: 30.7 % (ref 32–34.5)
MCHC RBC AUTO-ENTMCNC: 30.8 % (ref 32–34.5)
MCV RBC AUTO: 100 FL (ref 80–99.9)
MCV RBC AUTO: 98.1 FL (ref 80–99.9)
PDW BLD-RTO: 17.3 FL (ref 11.5–15)
PDW BLD-RTO: 17.5 FL (ref 11.5–15)
PHOSPHORUS: 2 MG/DL (ref 2.5–4.5)
PLATELET # BLD: 73 E9/L (ref 130–450)
PLATELET # BLD: 83 E9/L (ref 130–450)
PLATELET CONFIRMATION: NORMAL
PLATELET CONFIRMATION: NORMAL
PMV BLD AUTO: 11.3 FL (ref 7–12)
PMV BLD AUTO: 9.3 FL (ref 7–12)
POTASSIUM SERPL-SCNC: 4.3 MMOL/L (ref 3.5–5)
PRO-BNP: 5998 PG/ML (ref 0–450)
RBC # BLD: 2.21 E12/L (ref 3.5–5.5)
RBC # BLD: 2.66 E12/L (ref 3.5–5.5)
SODIUM BLD-SCNC: 144 MMOL/L (ref 132–146)
TOTAL PROTEIN: 4.8 G/DL (ref 6.4–8.3)
WBC # BLD: 4.3 E9/L (ref 4.5–11.5)
WBC # BLD: 4.8 E9/L (ref 4.5–11.5)

## 2020-09-17 PROCEDURE — 83735 ASSAY OF MAGNESIUM: CPT

## 2020-09-17 PROCEDURE — 37799 UNLISTED PX VASCULAR SURGERY: CPT

## 2020-09-17 PROCEDURE — 6370000000 HC RX 637 (ALT 250 FOR IP): Performed by: SURGERY

## 2020-09-17 PROCEDURE — P9045 ALBUMIN (HUMAN), 5%, 250 ML: HCPCS | Performed by: SURGERY

## 2020-09-17 PROCEDURE — 6360000002 HC RX W HCPCS: Performed by: SURGERY

## 2020-09-17 PROCEDURE — 36415 COLL VENOUS BLD VENIPUNCTURE: CPT

## 2020-09-17 PROCEDURE — 99232 SBSQ HOSP IP/OBS MODERATE 35: CPT | Performed by: INTERNAL MEDICINE

## 2020-09-17 PROCEDURE — 80053 COMPREHEN METABOLIC PANEL: CPT

## 2020-09-17 PROCEDURE — 71045 X-RAY EXAM CHEST 1 VIEW: CPT

## 2020-09-17 PROCEDURE — 2700000000 HC OXYGEN THERAPY PER DAY

## 2020-09-17 PROCEDURE — 36430 TRANSFUSION BLD/BLD COMPNT: CPT

## 2020-09-17 PROCEDURE — 2500000003 HC RX 250 WO HCPCS: Performed by: SURGERY

## 2020-09-17 PROCEDURE — 83880 ASSAY OF NATRIURETIC PEPTIDE: CPT

## 2020-09-17 PROCEDURE — 6360000002 HC RX W HCPCS: Performed by: INTERNAL MEDICINE

## 2020-09-17 PROCEDURE — 93922 UPR/L XTREMITY ART 2 LEVELS: CPT

## 2020-09-17 PROCEDURE — 84100 ASSAY OF PHOSPHORUS: CPT

## 2020-09-17 PROCEDURE — 94640 AIRWAY INHALATION TREATMENT: CPT

## 2020-09-17 PROCEDURE — 2580000003 HC RX 258: Performed by: SURGERY

## 2020-09-17 PROCEDURE — 93308 TTE F-UP OR LMTD: CPT

## 2020-09-17 PROCEDURE — 94660 CPAP INITIATION&MGMT: CPT

## 2020-09-17 PROCEDURE — 2000000000 HC ICU R&B

## 2020-09-17 PROCEDURE — 82330 ASSAY OF CALCIUM: CPT

## 2020-09-17 PROCEDURE — 85027 COMPLETE CBC AUTOMATED: CPT

## 2020-09-17 RX ORDER — 0.9 % SODIUM CHLORIDE 0.9 %
20 INTRAVENOUS SOLUTION INTRAVENOUS ONCE
Status: DISCONTINUED | OUTPATIENT
Start: 2020-09-17 | End: 2020-09-20

## 2020-09-17 RX ORDER — ALBUMIN, HUMAN INJ 5% 5 %
25 SOLUTION INTRAVENOUS ONCE
Status: COMPLETED | OUTPATIENT
Start: 2020-09-17 | End: 2020-09-17

## 2020-09-17 RX ORDER — FUROSEMIDE 10 MG/ML
20 INJECTION INTRAMUSCULAR; INTRAVENOUS ONCE
Status: COMPLETED | OUTPATIENT
Start: 2020-09-17 | End: 2020-09-17

## 2020-09-17 RX ADMIN — IPRATROPIUM BROMIDE AND ALBUTEROL SULFATE 1 AMPULE: 2.5; .5 SOLUTION RESPIRATORY (INHALATION) at 19:54

## 2020-09-17 RX ADMIN — ALBUMIN (HUMAN) 25 G: 12.5 INJECTION, SOLUTION INTRAVENOUS at 13:06

## 2020-09-17 RX ADMIN — ALBUTEROL SULFATE 2.5 MG: 2.5 SOLUTION RESPIRATORY (INHALATION) at 23:43

## 2020-09-17 RX ADMIN — Medication 4 MG: at 00:14

## 2020-09-17 RX ADMIN — METOPROLOL TARTRATE 50 MG: 50 TABLET, FILM COATED ORAL at 20:24

## 2020-09-17 RX ADMIN — POTASSIUM CHLORIDE AND SODIUM CHLORIDE: 900; 150 INJECTION, SOLUTION INTRAVENOUS at 16:24

## 2020-09-17 RX ADMIN — Medication 10 ML: at 09:30

## 2020-09-17 RX ADMIN — POTASSIUM CHLORIDE AND SODIUM CHLORIDE: 900; 150 INJECTION, SOLUTION INTRAVENOUS at 09:11

## 2020-09-17 RX ADMIN — CALCIUM GLUCONATE 1 G: 98 INJECTION, SOLUTION INTRAVENOUS at 08:11

## 2020-09-17 RX ADMIN — Medication 4 MG: at 23:39

## 2020-09-17 RX ADMIN — AMLODIPINE BESYLATE 5 MG: 5 TABLET ORAL at 09:23

## 2020-09-17 RX ADMIN — DORZOLAMIDE HYDROCHLORIDE 1 DROP: 20 SOLUTION/ DROPS OPHTHALMIC at 09:23

## 2020-09-17 RX ADMIN — METOPROLOL TARTRATE 50 MG: 50 TABLET, FILM COATED ORAL at 09:23

## 2020-09-17 RX ADMIN — IPRATROPIUM BROMIDE AND ALBUTEROL SULFATE 1 AMPULE: 2.5; .5 SOLUTION RESPIRATORY (INHALATION) at 16:59

## 2020-09-17 RX ADMIN — LATANOPROST 1 DROP: 50 SOLUTION OPHTHALMIC at 21:00

## 2020-09-17 RX ADMIN — POTASSIUM PHOSPHATE, MONOBASIC AND POTASSIUM PHOSPHATE, DIBASIC: 224; 236 INJECTION, SOLUTION, CONCENTRATE INTRAVENOUS at 20:32

## 2020-09-17 RX ADMIN — Medication 4 MG: at 14:21

## 2020-09-17 RX ADMIN — Medication 10 ML: at 20:33

## 2020-09-17 RX ADMIN — FUROSEMIDE 20 MG: 10 INJECTION, SOLUTION INTRAMUSCULAR; INTRAVENOUS at 14:21

## 2020-09-17 RX ADMIN — DORZOLAMIDE HYDROCHLORIDE 1 DROP: 20 SOLUTION/ DROPS OPHTHALMIC at 13:57

## 2020-09-17 RX ADMIN — Medication 4 MG: at 09:30

## 2020-09-17 RX ADMIN — SODIUM CHLORIDE, PRESERVATIVE FREE 10 ML: 5 INJECTION INTRAVENOUS at 20:34

## 2020-09-17 RX ADMIN — IPRATROPIUM BROMIDE AND ALBUTEROL SULFATE 1 AMPULE: 2.5; .5 SOLUTION RESPIRATORY (INHALATION) at 08:35

## 2020-09-17 RX ADMIN — SODIUM CHLORIDE, PRESERVATIVE FREE 10 ML: 5 INJECTION INTRAVENOUS at 20:33

## 2020-09-17 RX ADMIN — ASPIRIN 300 MG: 300 SUPPOSITORY RECTAL at 09:23

## 2020-09-17 RX ADMIN — Medication 4 MG: at 20:24

## 2020-09-17 RX ADMIN — Medication 4 MG: at 03:38

## 2020-09-17 RX ADMIN — SODIUM CHLORIDE, PRESERVATIVE FREE 10 ML: 5 INJECTION INTRAVENOUS at 14:21

## 2020-09-17 ASSESSMENT — PAIN SCALES - GENERAL
PAINLEVEL_OUTOF10: 2
PAINLEVEL_OUTOF10: 6
PAINLEVEL_OUTOF10: 6
PAINLEVEL_OUTOF10: 8
PAINLEVEL_OUTOF10: 9
PAINLEVEL_OUTOF10: 2
PAINLEVEL_OUTOF10: 8
PAINLEVEL_OUTOF10: 7
PAINLEVEL_OUTOF10: 7
PAINLEVEL_OUTOF10: 6
PAINLEVEL_OUTOF10: 7
PAINLEVEL_OUTOF10: 0
PAINLEVEL_OUTOF10: 9
PAINLEVEL_OUTOF10: 0
PAINLEVEL_OUTOF10: 2

## 2020-09-17 ASSESSMENT — PAIN DESCRIPTION - DESCRIPTORS
DESCRIPTORS: DISCOMFORT
DESCRIPTORS: ACHING;DISCOMFORT
DESCRIPTORS: ACHING
DESCRIPTORS: ACHING;DISCOMFORT;SORE

## 2020-09-17 ASSESSMENT — PAIN DESCRIPTION - FREQUENCY
FREQUENCY: INTERMITTENT

## 2020-09-17 ASSESSMENT — PAIN DESCRIPTION - ORIENTATION
ORIENTATION: MID

## 2020-09-17 ASSESSMENT — PAIN DESCRIPTION - ONSET
ONSET: ON-GOING

## 2020-09-17 ASSESSMENT — PAIN DESCRIPTION - PROGRESSION
CLINICAL_PROGRESSION: NOT CHANGED
CLINICAL_PROGRESSION: NOT CHANGED
CLINICAL_PROGRESSION: GRADUALLY WORSENING

## 2020-09-17 ASSESSMENT — PAIN DESCRIPTION - LOCATION
LOCATION: ABDOMEN

## 2020-09-17 ASSESSMENT — PAIN DESCRIPTION - PAIN TYPE
TYPE: ACUTE PAIN;SURGICAL PAIN
TYPE: ACUTE PAIN
TYPE: ACUTE PAIN;SURGICAL PAIN
TYPE: ACUTE PAIN
TYPE: SURGICAL PAIN

## 2020-09-17 ASSESSMENT — PAIN - FUNCTIONAL ASSESSMENT
PAIN_FUNCTIONAL_ASSESSMENT: PREVENTS OR INTERFERES SOME ACTIVE ACTIVITIES AND ADLS

## 2020-09-17 NOTE — PLAN OF CARE
Problem: Falls - Risk of:  Goal: Will remain free from falls  Description: Will remain free from falls  9/17/2020 0503 by Maria L Lyon RN  Outcome: Met This Shift  9/16/2020 2011 by Pancho Bailey RN  Outcome: Met This Shift  Goal: Absence of physical injury  Description: Absence of physical injury  9/17/2020 0503 by Maria L Lyon RN  Outcome: Met This Shift  9/16/2020 2011 by Pancho Bailey RN  Outcome: Met This Shift     Problem: Pain:  Goal: Pain level will decrease  Description: Pain level will decrease  Outcome: Met This Shift  Goal: Control of acute pain  Description: Control of acute pain  9/17/2020 0503 by Maria L Lyon RN  Outcome: Met This Shift  9/16/2020 2011 by Pancho Bailey RN  Outcome: Not Met This Shift  Goal: Control of chronic pain  Description: Control of chronic pain  Outcome: Met This Shift     Problem: Skin Integrity:  Goal: Will show no infection signs and symptoms  Description: Will show no infection signs and symptoms  9/16/2020 2011 by Pancho Bailey RN  Outcome: Met This Shift  Goal: Absence of new skin breakdown  Description: Absence of new skin breakdown  9/16/2020 2011 by Pancho Bailey RN  Outcome: Met This Shift

## 2020-09-17 NOTE — PROGRESS NOTES
(Results Pending)   VL JAMEE BILATERAL LIMITED 1-2 LEVELS    (Results Pending)   XR CHEST PORTABLE    (Results Pending)       Lab Review   Lab Results   Component Value Date     09/17/2020    K 4.3 09/17/2020    K 4.1 09/04/2020     09/17/2020    CO2 21 09/17/2020    BUN 10 09/17/2020    CREATININE 0.7 09/17/2020    GLUCOSE 110 09/17/2020    GLUCOSE 92 09/07/2011    CALCIUM 7.0 09/17/2020     Lab Results   Component Value Date    WBC 4.8 09/17/2020    HGB 8.0 09/17/2020    HCT 26.1 09/17/2020    MCV 98.1 09/17/2020    PLT 73 09/17/2020     I have personally reviewed the laboratory, cardiac diagnostic and radiographic testing as outlined above:    Assessment:     1.  CAD: Status post PCI to RCA in 2011, doing fine, will continue current treatment  2. Peripheral vascular disease status post femoropopliteal bypass, bilateral CEAs  3. Hyperlipidemia: On statin  4. Anemia: Postop      Recommendations:     1.  will continue current treatment  2. Will transfuse to maintain hemoglobin more than 8 g/dL  3. Further cardiac recommendations will be forthcoming pending her clinical course and diagnostic test findings  Discussed with patient     electronically signed by Marcelina Argueta MD on 9/17/2020 at 2:18 PM  NOTE: This report was transcribed using voice recognition software.  Every effort was made to ensure accuracy; however, inadvertent computerized transcription errors may be present

## 2020-09-17 NOTE — PROGRESS NOTES
Sage Samano M.D.,[facility]  Donna Maldonado D.O., FGARCÍAOELIOT., Sena Murray M.D. Liset Michelle M.D., Lisa Leonard M.D. Phani Lopez D.O. Daily Pulmonary Progress Note    Patient:  Cali Mccollum 67 y.o. female MRN: [id number]     Date of Service: 9/17/2020        Subjective      No acute events   Ng in place with 150 cc out put  States shes tired, throat is sore from ng in place   Refused bipap       Objective   Vitals: BP 89/66   Pulse 80   Temp 97.8 °F (36.6 °C) (Oral)   Resp 19   Ht 5' (1.524 m)   Wt 181 lb 2 oz (82.2 kg)   SpO2 92%   BMI 35.37 kg/m²     I/O:    Intake/Output Summary (Last 24 hours) at 9/17/2020 1336  Last data filed at 9/17/2020 1200  Gross per 24 hour   Intake 5708.7 ml   Output 983 ml   Net 4725.7 ml       CURRENT MEDS :  Scheduled Meds:   sodium chloride  20 mL Intravenous Once    albumin human  25 g Intravenous Once    metoprolol tartrate  50 mg Oral BID    amLODIPine  5 mg Oral Daily    dorzolamide  1 drop Left Eye TID    latanoprost  1 drop Both Eyes Nightly    sodium chloride flush  10 mL Intravenous 2 times per day    ipratropium-albuterol  1 ampule Inhalation Q4H WA    aspirin  300 mg Rectal Daily       Continuous Infusions:   0.9% NaCl with KCl 20 mEq 135 mL/hr at 09/17/20 0911    clevidipine      phenylephrine (MANJU-SYNEPHRINE) 50mg/250mL infusion Stopped (09/15/20 1946)    IV infusion builder Stopped (09/16/20 1450)       PRN Meds:  sodium chloride flush, acetaminophen, oxyCODONE-acetaminophen **OR** oxyCODONE-acetaminophen, morphine **OR** morphine, labetalol, hydrALAZINE, clevidipine, magnesium sulfate, potassium chloride, albuterol      Physical Exam:    General:  Awake, alert, oriented X 3. Well developed, well nourished, well groomed. No apparent distress. HEENT:  Normocephalic, atraumatic. Pupils equal, round, reactive to light.   No scleral injection  Ng in place   Heart:  RRR, no murmurs, gallops, rubs  Lungs:  CTA bilaterally, bilat symmetrical expansion, no wheeze, rales, or rhonchi  Abdomen: Bowel sounds present, soft, nontender, no masses, no organomegaly, no peritoneal signs  Extremities:  No clubbing, cyanosis, or edema  Skin:  Warm and dry, no open lesions or rash  Neuro:  Cranial nerves 2-12 intact, no focal deficits    Pertinent/ New Labs and Imaging Studies         PERTINENT LAB RESULTS:     Bmp, cbc reviewed         DIAGNOSTICS:          Assessment:      1. S/p Aorto to common iliac bypass on right, external iliac bypass on the left (9/15/20)     2. Acute hypoxic and hypercapnic resp failure post-operative     3. Restrictive lung disease  *PFT (8/2020) mild to moderate ventilatory restriction with mild impairment in gas transfer     4. Hyperchloremic metabolic acidosis      5. Muscular deconditioning     6. kyphosis of cervicothoracic region     7. Hx of HTN, HLD, CAD, PAD, PVD      Plan:      Monitor bmp if acidosis recurs switch to 0.45% ns or lr to reduce nacl load   · Supplemental O2 for SpO2 > 92%  · Pulmonary recruitment maneuvers with IS, EZPAP  · PT/OT and ambulation when ok per surgery  · aspiration precautions  · Follow chest imaging  · Bipap prn  · Bronchodilators q4 WA and prn  · Dvt/gi prophylaxis per general surgery   · Remove raegan if there is no further need for pressors     Gely Szymanski M.D.    Pulmonary/Critical Care Medicine

## 2020-09-18 ENCOUNTER — APPOINTMENT (OUTPATIENT)
Dept: GENERAL RADIOLOGY | Age: 75
DRG: 270 | End: 2020-09-18
Attending: SURGERY
Payer: COMMERCIAL

## 2020-09-18 LAB
ALBUMIN SERPL-MCNC: 3.6 G/DL (ref 3.5–5.2)
ALP BLD-CCNC: 36 U/L (ref 35–104)
ALT SERPL-CCNC: 7 U/L (ref 0–32)
ANION GAP SERPL CALCULATED.3IONS-SCNC: 11 MMOL/L (ref 7–16)
AST SERPL-CCNC: 28 U/L (ref 0–31)
BILIRUB SERPL-MCNC: 0.7 MG/DL (ref 0–1.2)
BUN BLDV-MCNC: 11 MG/DL (ref 8–23)
CALCIUM IONIZED: 1.18 MMOL/L (ref 1.15–1.33)
CALCIUM SERPL-MCNC: 8 MG/DL (ref 8.6–10.2)
CHLORIDE BLD-SCNC: 115 MMOL/L (ref 98–107)
CO2: 21 MMOL/L (ref 22–29)
CREAT SERPL-MCNC: 0.7 MG/DL (ref 0.5–1)
FERRITIN: 221 NG/ML
FOLATE: 12.1 NG/ML (ref 4.8–24.2)
GFR AFRICAN AMERICAN: >60
GFR NON-AFRICAN AMERICAN: >60 ML/MIN/1.73
GLUCOSE BLD-MCNC: 101 MG/DL (ref 74–99)
HCT VFR BLD CALC: 27 % (ref 34–48)
HEMOGLOBIN: 7.9 G/DL (ref 11.5–15.5)
IRON SATURATION: 14 % (ref 15–50)
IRON: 25 MCG/DL (ref 37–145)
MAGNESIUM: 2.4 MG/DL (ref 1.6–2.6)
MCH RBC QN AUTO: 29.6 PG (ref 26–35)
MCHC RBC AUTO-ENTMCNC: 29.3 % (ref 32–34.5)
MCV RBC AUTO: 101.1 FL (ref 80–99.9)
PDW BLD-RTO: 18.1 FL (ref 11.5–15)
PHOSPHORUS: 3 MG/DL (ref 2.5–4.5)
PLATELET # BLD: 85 E9/L (ref 130–450)
PLATELET CONFIRMATION: NORMAL
PMV BLD AUTO: 10.7 FL (ref 7–12)
POTASSIUM SERPL-SCNC: 4.7 MMOL/L (ref 3.5–5)
PRO-BNP: 7895 PG/ML (ref 0–450)
RBC # BLD: 2.67 E12/L (ref 3.5–5.5)
SODIUM BLD-SCNC: 147 MMOL/L (ref 132–146)
TOTAL IRON BINDING CAPACITY: 181 MCG/DL (ref 250–450)
TOTAL PROTEIN: 5.6 G/DL (ref 6.4–8.3)
VITAMIN B-12: 319 PG/ML (ref 211–946)
WBC # BLD: 5.2 E9/L (ref 4.5–11.5)

## 2020-09-18 PROCEDURE — 82330 ASSAY OF CALCIUM: CPT

## 2020-09-18 PROCEDURE — 94660 CPAP INITIATION&MGMT: CPT

## 2020-09-18 PROCEDURE — 97530 THERAPEUTIC ACTIVITIES: CPT

## 2020-09-18 PROCEDURE — 82728 ASSAY OF FERRITIN: CPT

## 2020-09-18 PROCEDURE — 36415 COLL VENOUS BLD VENIPUNCTURE: CPT

## 2020-09-18 PROCEDURE — 6360000002 HC RX W HCPCS: Performed by: SURGERY

## 2020-09-18 PROCEDURE — 80053 COMPREHEN METABOLIC PANEL: CPT

## 2020-09-18 PROCEDURE — 83540 ASSAY OF IRON: CPT

## 2020-09-18 PROCEDURE — 85027 COMPLETE CBC AUTOMATED: CPT

## 2020-09-18 PROCEDURE — 83550 IRON BINDING TEST: CPT

## 2020-09-18 PROCEDURE — 2700000000 HC OXYGEN THERAPY PER DAY

## 2020-09-18 PROCEDURE — 6360000002 HC RX W HCPCS: Performed by: INTERNAL MEDICINE

## 2020-09-18 PROCEDURE — 2000000000 HC ICU R&B

## 2020-09-18 PROCEDURE — 6370000000 HC RX 637 (ALT 250 FOR IP): Performed by: SURGERY

## 2020-09-18 PROCEDURE — 83880 ASSAY OF NATRIURETIC PEPTIDE: CPT

## 2020-09-18 PROCEDURE — 2580000003 HC RX 258: Performed by: SURGERY

## 2020-09-18 PROCEDURE — 82607 VITAMIN B-12: CPT

## 2020-09-18 PROCEDURE — 97162 PT EVAL MOD COMPLEX 30 MIN: CPT

## 2020-09-18 PROCEDURE — 97535 SELF CARE MNGMENT TRAINING: CPT

## 2020-09-18 PROCEDURE — 99231 SBSQ HOSP IP/OBS SF/LOW 25: CPT | Performed by: INTERNAL MEDICINE

## 2020-09-18 PROCEDURE — 97166 OT EVAL MOD COMPLEX 45 MIN: CPT

## 2020-09-18 PROCEDURE — 94640 AIRWAY INHALATION TREATMENT: CPT

## 2020-09-18 PROCEDURE — 84100 ASSAY OF PHOSPHORUS: CPT

## 2020-09-18 PROCEDURE — 82746 ASSAY OF FOLIC ACID SERUM: CPT

## 2020-09-18 PROCEDURE — 71045 X-RAY EXAM CHEST 1 VIEW: CPT

## 2020-09-18 PROCEDURE — 83735 ASSAY OF MAGNESIUM: CPT

## 2020-09-18 RX ORDER — BISACODYL 10 MG
10 SUPPOSITORY, RECTAL RECTAL ONCE
Status: COMPLETED | OUTPATIENT
Start: 2020-09-18 | End: 2020-09-18

## 2020-09-18 RX ORDER — BUMETANIDE 0.25 MG/ML
1 INJECTION, SOLUTION INTRAMUSCULAR; INTRAVENOUS DAILY
Status: DISCONTINUED | OUTPATIENT
Start: 2020-09-19 | End: 2020-09-23

## 2020-09-18 RX ORDER — DIGOXIN 0.25 MG/ML
500 INJECTION INTRAMUSCULAR; INTRAVENOUS ONCE
Status: COMPLETED | OUTPATIENT
Start: 2020-09-18 | End: 2020-09-18

## 2020-09-18 RX ORDER — BUMETANIDE 0.25 MG/ML
2 INJECTION, SOLUTION INTRAMUSCULAR; INTRAVENOUS 2 TIMES DAILY
Status: DISCONTINUED | OUTPATIENT
Start: 2020-09-18 | End: 2020-09-18

## 2020-09-18 RX ORDER — FUROSEMIDE 10 MG/ML
40 INJECTION INTRAMUSCULAR; INTRAVENOUS ONCE
Status: COMPLETED | OUTPATIENT
Start: 2020-09-18 | End: 2020-09-18

## 2020-09-18 RX ADMIN — DORZOLAMIDE HYDROCHLORIDE 1 DROP: 20 SOLUTION/ DROPS OPHTHALMIC at 08:08

## 2020-09-18 RX ADMIN — DIGOXIN 500 MCG: 0.25 INJECTION INTRAMUSCULAR; INTRAVENOUS at 22:31

## 2020-09-18 RX ADMIN — LATANOPROST 1 DROP: 50 SOLUTION OPHTHALMIC at 20:23

## 2020-09-18 RX ADMIN — Medication 10 ML: at 20:24

## 2020-09-18 RX ADMIN — DORZOLAMIDE HYDROCHLORIDE 1 DROP: 20 SOLUTION/ DROPS OPHTHALMIC at 20:23

## 2020-09-18 RX ADMIN — FUROSEMIDE 40 MG: 10 INJECTION, SOLUTION INTRAVENOUS at 06:09

## 2020-09-18 RX ADMIN — AMLODIPINE BESYLATE 5 MG: 5 TABLET ORAL at 08:09

## 2020-09-18 RX ADMIN — ASPIRIN 300 MG: 300 SUPPOSITORY RECTAL at 08:08

## 2020-09-18 RX ADMIN — IPRATROPIUM BROMIDE AND ALBUTEROL SULFATE 1 AMPULE: 2.5; .5 SOLUTION RESPIRATORY (INHALATION) at 17:01

## 2020-09-18 RX ADMIN — IPRATROPIUM BROMIDE AND ALBUTEROL SULFATE 1 AMPULE: 2.5; .5 SOLUTION RESPIRATORY (INHALATION) at 13:28

## 2020-09-18 RX ADMIN — DORZOLAMIDE HYDROCHLORIDE 1 DROP: 20 SOLUTION/ DROPS OPHTHALMIC at 13:38

## 2020-09-18 RX ADMIN — BISACODYL 10 MG: 10 SUPPOSITORY RECTAL at 08:08

## 2020-09-18 RX ADMIN — Medication 4 MG: at 05:39

## 2020-09-18 RX ADMIN — MORPHINE SULFATE 2 MG: 2 INJECTION, SOLUTION INTRAMUSCULAR; INTRAVENOUS at 10:56

## 2020-09-18 RX ADMIN — Medication 10 ML: at 08:08

## 2020-09-18 RX ADMIN — METOPROLOL TARTRATE 50 MG: 50 TABLET, FILM COATED ORAL at 08:08

## 2020-09-18 RX ADMIN — IPRATROPIUM BROMIDE AND ALBUTEROL SULFATE 1 AMPULE: 2.5; .5 SOLUTION RESPIRATORY (INHALATION) at 09:50

## 2020-09-18 RX ADMIN — Medication 4 MG: at 02:40

## 2020-09-18 RX ADMIN — IPRATROPIUM BROMIDE AND ALBUTEROL SULFATE 1 AMPULE: 2.5; .5 SOLUTION RESPIRATORY (INHALATION) at 20:29

## 2020-09-18 RX ADMIN — METOPROLOL TARTRATE 50 MG: 50 TABLET, FILM COATED ORAL at 20:23

## 2020-09-18 RX ADMIN — MORPHINE SULFATE 2 MG: 2 INJECTION, SOLUTION INTRAMUSCULAR; INTRAVENOUS at 22:53

## 2020-09-18 ASSESSMENT — PAIN SCALES - GENERAL
PAINLEVEL_OUTOF10: 6
PAINLEVEL_OUTOF10: 0
PAINLEVEL_OUTOF10: 8
PAINLEVEL_OUTOF10: 0
PAINLEVEL_OUTOF10: 3
PAINLEVEL_OUTOF10: 5
PAINLEVEL_OUTOF10: 0
PAINLEVEL_OUTOF10: 3
PAINLEVEL_OUTOF10: 6
PAINLEVEL_OUTOF10: 5
PAINLEVEL_OUTOF10: 3

## 2020-09-18 ASSESSMENT — PAIN DESCRIPTION - ONSET
ONSET: ON-GOING

## 2020-09-18 ASSESSMENT — PAIN - FUNCTIONAL ASSESSMENT
PAIN_FUNCTIONAL_ASSESSMENT: PREVENTS OR INTERFERES SOME ACTIVE ACTIVITIES AND ADLS

## 2020-09-18 ASSESSMENT — PAIN DESCRIPTION - PAIN TYPE
TYPE: CHRONIC PAIN

## 2020-09-18 ASSESSMENT — PAIN DESCRIPTION - ORIENTATION
ORIENTATION: MID

## 2020-09-18 ASSESSMENT — PAIN DESCRIPTION - FREQUENCY
FREQUENCY: INTERMITTENT

## 2020-09-18 ASSESSMENT — PAIN DESCRIPTION - PROGRESSION
CLINICAL_PROGRESSION: NOT CHANGED
CLINICAL_PROGRESSION: RAPIDLY IMPROVING
CLINICAL_PROGRESSION: GRADUALLY IMPROVING
CLINICAL_PROGRESSION: NOT CHANGED
CLINICAL_PROGRESSION: GRADUALLY IMPROVING
CLINICAL_PROGRESSION: GRADUALLY IMPROVING
CLINICAL_PROGRESSION: RAPIDLY IMPROVING

## 2020-09-18 ASSESSMENT — PAIN DESCRIPTION - LOCATION
LOCATION: BACK

## 2020-09-18 ASSESSMENT — PAIN DESCRIPTION - DESCRIPTORS
DESCRIPTORS: ACHING;CONSTANT;DISCOMFORT
DESCRIPTORS: CONSTANT;ACHING;DISCOMFORT
DESCRIPTORS: ACHING;CONSTANT;DISCOMFORT

## 2020-09-18 NOTE — FLOWSHEET NOTE
Dr Di Small regarding clarifictaion potassium phosphates continuous order. Message read but no response. Order will infused as ordered.

## 2020-09-18 NOTE — PROGRESS NOTES
Vascular:    Sequence of events noted    Patient doing better, after Lasix given by the pulmonary consultants for fluid overload with elevated proBNP    Abdomen soft, incision healing well, bowel sounds present    Both the feet are warm to touch    Lab data, chest x-ray were reviewed    Discussed with the patient, encouraged deep breathing and coughing etc.    Later in the day, with the patient stable, will start her on oral feedings    Discussed with pulmonary critical care consultant, Dr. Liset Michelle

## 2020-09-18 NOTE — PLAN OF CARE
Problem: Falls - Risk of:  Goal: Will remain free from falls  Description: Will remain free from falls  9/18/2020 1612 by Digna Quiñonez RN  Outcome: Met This Shift  9/18/2020 0933 by Digna Quiñonez RN  Outcome: Met This Shift  Goal: Absence of physical injury  Description: Absence of physical injury  9/18/2020 1612 by Digna Quiñonez RN  Outcome: Met This Shift  9/18/2020 0933 by Digna Quiñonez RN  Outcome: Met This Shift

## 2020-09-18 NOTE — PROGRESS NOTES
Kevin Horton M.D.,[facility]  Carole Cruz D.O., F.A.C.O.I., Joie Parker M.D. Rodriguez Hopson M.D., Aj Marie M.D. Efra Bull D.O. Daily Pulmonary Progress Note    Patient:  Manoj Ceja 67 y.o. female MRN: [id number]     Date of Service: 9/18/2020        Subjective      Refused bipap most of the overnight   Desaturated with increased respiratory effort   Lasix given x 2 over night     Objective   Vitals: /67   Pulse 83   Temp 98.9 °F (37.2 °C) (Axillary)   Resp 21   Ht 5' (1.524 m)   Wt 181 lb (82.1 kg)   SpO2 96%   BMI 35.35 kg/m²     I/O:    Intake/Output Summary (Last 24 hours) at 9/18/2020 1127  Last data filed at 9/18/2020 1100  Gross per 24 hour   Intake 3077 ml   Output 3557 ml   Net -480 ml       CURRENT MEDS :  Scheduled Meds:   bumetanide  2 mg Intravenous BID    sodium chloride  20 mL Intravenous Once    metoprolol tartrate  50 mg Oral BID    amLODIPine  5 mg Oral Daily    dorzolamide  1 drop Left Eye TID    latanoprost  1 drop Both Eyes Nightly    sodium chloride flush  10 mL Intravenous 2 times per day    ipratropium-albuterol  1 ampule Inhalation Q4H WA    aspirin  300 mg Rectal Daily       Continuous Infusions:   IV infusion builder 25 mL/hr at 09/18/20 7197    clevidipine      phenylephrine (MANJU-SYNEPHRINE) 50mg/250mL infusion Stopped (09/15/20 1946)       PRN Meds:  sodium chloride flush, acetaminophen, oxyCODONE-acetaminophen **OR** [DISCONTINUED] oxyCODONE-acetaminophen, morphine **OR** [DISCONTINUED] morphine, labetalol, hydrALAZINE, clevidipine, magnesium sulfate, potassium chloride, albuterol      Physical Exam:    General:  Awake, alert, oriented X 3. Well developed, well nourished, well groomed. No apparent distress. HEENT:  Normocephalic, atraumatic. Pupils equal, round, reactive to light.   No scleral injection  Ng in place   Heart:  RRR, no murmurs, gallops, rubs  Lungs:  CTA bilaterally, bilat symmetrical expansion, no wheeze, rales, or rhonchi  Abdomen: Bowel sounds present, soft, nontender, no masses, no organomegaly, no peritoneal signs  Extremities:  No clubbing, cyanosis, or edema  Skin:  Warm and dry, no open lesions or rash  Neuro:  Cranial nerves 2-12 intact, no focal deficits    Pertinent/ New Labs and Imaging Studies         PERTINENT LAB RESULTS:     Bmp, cbc reviewed         DIAGNOSTICS:          Assessment:      1. S/p Aorto to common iliac bypass on right, external iliac bypass on the left (9/15/20)     2. Acute hypoxic and hypercapnic resp failure post-operative     3. Restrictive lung disease  *PFT (8/2020) mild to moderate ventilatory restriction with mild impairment in gas transfer     4. Hyperchloremic metabolic acidosis      5. Muscular deconditioning     6. kyphosis of cervicothoracic region     7. Hx of HTN, HLD, CAD, PAD, PVD      Plan:      Switch to bumex 1 mg daily reassess I/o's repeat cxr and bmp in am. Per vascular surgery I/o's are not counting insensible losses and blood loss so she is likely more closer to even.  Monitor I/o's    bipap as needed for respiratory support. · Supplemental O2 for SpO2 > 92%  · Pulmonary recruitment maneuvers with IS, EZPAP  · aspiration precautions  · Bronchodilators q4 WA and prn  · Dvt/gi prophylaxis per general surgery       Alana Scott M.D.    Pulmonary/Critical Care Medicine

## 2020-09-18 NOTE — PROGRESS NOTES
Subjective:  Feeling better   No CP or SOB  No fever or chills   No uncontrolled pain  No vomiting or diarrhea     Objective:    /67   Pulse 83   Temp 98.9 °F (37.2 °C) (Axillary)   Resp 21   Ht 5' (1.524 m)   Wt 181 lb (82.1 kg)   SpO2 96%   BMI 35.35 kg/m²     24HR INTAKE/OUTPUT:      Intake/Output Summary (Last 24 hours) at 9/18/2020 1156  Last data filed at 9/18/2020 1100  Gross per 24 hour   Intake 3077 ml   Output 3557 ml   Net -480 ml       General appearance: NAD, conversant  Neck: FROM, supple   Lungs: Clear bilaterally no wheezes, no rhonchi, no crackles  CV: RRR, no MRGs; normal carotid upstroke and amplitude without Bruits  Abdomen: Soft, non-tender; no masses or HSM  Extremities: No edema, no cyanosis, no clubbing  Skin: Intact no rash, no lesions, no ulcers    Psych: Alert and oriented normal affect  Neuro: Nonfocal  Most Recent Labs  Lab Results   Component Value Date    WBC 5.2 09/18/2020    HGB 7.9 (L) 09/18/2020    HCT 27.0 (L) 09/18/2020    PLT 85 (L) 09/18/2020     (H) 09/18/2020    K 4.7 09/18/2020     (H) 09/18/2020    CREATININE 0.7 09/18/2020    BUN 11 09/18/2020    CO2 21 (L) 09/18/2020    GLUCOSE 101 (H) 09/18/2020    ALT 7 09/18/2020    AST 28 09/18/2020    INR 1.1 09/04/2020    TSH 1.670 09/11/2019    LABA1C 5.1 09/11/2019     Recent Labs     09/18/20  0436   MG 2.4     Lab Results   Component Value Date    CALCIUM 8.0 (L) 09/18/2020    PHOS 3.0 09/18/2020        XR CHEST PORTABLE   Final Result   No interval change               VL JAMEE BILATERAL LIMITED 1-2 LEVELS   Final Result      XR CHEST PORTABLE   Final Result   from recent postop in the abdomen midline.  IMPRESSION:      Cardiomegaly with mild interstitial pulmonary edema which was not seen   on the prior radiograph      Small left-sided pleural effusion      Support lines and tubes are in satisfactory position         XR CHEST PORTABLE   Final Result      Nasogastric tube follows the expected contours of

## 2020-09-18 NOTE — PROGRESS NOTES
OCCUPATIONAL THERAPY INITIAL EVALUATION      Date:2020  Patient Name: Amie Salinas  MRN: [id number]  : 1945  Room: [address] [place] [address]    Referring Provider: Aren Baker MD    Evaluating OT: Talia Boston OTR/L [id number]    AM-PAC Daily Activity Raw Score:     Recommended Adaptive Equipment: TBA: ADL AE (arthritic adaptations for self care tasks)     Diagnosis: Atherosclerosis; LLE pain    Surgery/Procedures:  9/15 Aorta to right common iliac and aorta to left external iliac bypass     Pertinent Medical History: CAD, CVA, Glaucoma, HLD, HTN, PVD    Precautions:  Falls, BiPAP, ice chips only  (NPO)     Home Living: Pt lives alone  in a 2nd floor apt with 3+8 step(s) to enter and 1+2 rail(s)  Bathroom setup: tub/shower with seat; higher commode with rail  Equipment owned: home O2, [address], SPC, shower seat    Prior Level of Function: Pt reports Mod I with ADLs; Assist with bathing. Pt has [facility] aide 2 days. wk for 2 hours per chart. Pt requires assist with IADLs. SPC for ambulation. Driving: no    Pain Level: pt c/o 8/10 surgical site pain  this session; min c/o pain L shoulder with flexion      Cognition: A&O: 4/4    Follows 1-2 step commands appropriately with min redirection.    Memory: fair   Comprehension fair   Problem solving: fair-   Judgement/safety: fair-               Communication skills: WFL; difficult to understand due to Bipap           Vision: h/o glaucoma               Glasses:reading                                                   Hearing: min [place]     RASS: 0  CAM-ICU: (NT) Delirium    UE Assessment:  Hand Dominance: Right [x]  Left []     ROM Strength STM goal: PRN   R/L UE  Limited shoulder ROM due to arthritis: gross abd; gross elbow flexion; gross lateral pinch- poor prehension due to digits in ulnar deviation  2-/5 proximal  3-/5 grasp Educate pt on jt protection techniques to maintain prehension for light ADLs       Sensation: No c/o numbness or tingling in extremities   Tone: WNL   Edema: [facility]     Functional Assessment   Initial Eval Status  Date: 9/18 Treatment Status  Date: STG=LTG  5-14  days    Feeding Min A  Set up- using R hand to scoop ice chips from cup. Mod I with AE as needed  while seated up in chair to increase activity tolerance once cleared for diet . Grooming Max A  Decreased functional reach-Bipap and B UE deformities                         Mod A   while seated supported demonstrating G knowledge of adapted techniques as needed. UB dressing/bathing Dep                        Mod A       LB dressing/bathing Dep                        Max A   using AE as needed for safe reach/ energy conservation       Toileting Dep                        Mod A     Bed Mobility  Supine to sit: Max A with HOB elevated    Sit to supine: NT                        SBA  in prep of ADL tasks & transfers   Functional Transfers Sit to stand: Mod A    Stand to sit: Mod A                        SBA  sit<>stand/functional bathroom transfers using AD/DME as needed for balance and safety   Functional Mobility Mod A WW   SPT to chair (limited due to bipap)  2nd person assist with [employer] management                       SBA   functional/bathroom mobility using AD as needed & demonstrating G safety     Balance Sitting:     Static:  S    Dynamic:Min A  Standing: Mod A  S dynamic sitting balance; SBA dynamic standing balance  during ADL tasks & transfers   Endurance/Activity Tolerance   F tolerance with light activity.    G   tolerance with moderate activity/self care routine   Visual/  Perceptual   Grossly WFL;   H/o glaucoma                        Vitals:   Heart Rate at rest 88 bpm Heart Rate post session 83 bpm   SpO2 at rest 97% SpO2 post session 99%   Blood Pressure at rest 132/51 mmHg Blood Pressure post session 121/72 mmHg       Assessment of current deficits   Functional mobility [x]   ADLs [x] Strength [x]   Cognition []  Functional transfers  [x]  IADLs [x] Safety Awareness [x] Endurance [x]  Fine Motor Coordination [x]  Balance [x] Vision/perception [x]  Sensation [x]   Gross Motor Coordination [x]  ROM [x] Delirium []                   Communication []    Plan of Care: 1-5 tx/wk PRN   ADL retraining/AE recommendations to increase independence within precautions[x]     Energy conservation techniques to improve tolerance for safe ADL routine [x]  Functional transfer/mobility training for fall prevention & DME recommendations[x]             Patient/family education to increase safety and functional independence[x]   Environmental modifications for safe mobility and completion of ADLs[x]                         Cognitive retraining ex's to improve problem solving skills & safe participation in ADLs/IADLs[]        Sensory re-education techniques to improve extremity awareness, maintain skin integrity and improve hand function []                       Visual/Perceptual retraining ex's to improve body awareness and safety during transfers and ADLs[x]    Splinting/postioning needs to maintain joint/skin integrity and prevent contractures[x]    Therapeutic activity to improve functional skills[]                                       Therapeutic exercise to maintain B UE hand function[x]     Balance retraining ex's for postural control with dynamic challenges[x]     Neuromuscular re-education exercises[]                       Delirium prevention/treatment  [x]  Other:  []      Treatment: OT intervention provided to achieve goals:     Bed mobility: Instruction on precautions prior to bed mobility to facilitate safe transfers and ADLS. Pt required min cues with HOB elevated; can benefit from bed rails. Balance retraining: Performed sitting balance ex's EOB to improve righting reactions with postural changes during ADLS. Pt required Min A for trunk with dynamic challenges.      ADL retraining: Instruction on adapted techniques/possible use of AE to increase independence and safe reach during dressing/bathing activities due to arthritic contractures. Pt demonstrated poor tolerance for ADLs this am.    Functional transfer training:  Instruction on postural cues, hand placement/sequencing, & walker safety  to assist with balance and fall prevention during self care routine/bathroom transfers. Pt required 2nd person assist with medical lines and assist with walker mobility. Energy conservation: Education on breathing techniques, pacing, work simplification strategies & recommended bathroom DME for safety and energy conservation during self care tasks and activities of daily living. ROM/exercise: Review of B UE ROM/scapular mobilization ex's and coordination ex's to maintain functional use of B UE's. Pt demo Fair- follow through. Continue to educate. Comments: OK from RN to see patient. Upon arrival, patient supine in bed; agreeable to session with  Min encouragment. Pt demo fair tolerance with fiar understanding of education/techniques. At end of session, patient . Call light within reach, all lines and tubes intact. Pt instructed on use of call light for assistance and fall prevention. Line management and environmental modifications made prior to and end of session to ensure patient safety and to increase efficiency of session. Skilled monitoring of HR, O2 saturation, blood pressure and patient's response to activity performed throughout session. Overall, pt presents with decreased activity tolerance, functional ROM, dynamic balance, functional mobility limiting completion of ADLs and safety. Pt can benefit from continued skilled OT to increase safety and functional independence. Evaluation Complexity: Moderate    · History: Expanded chart review of consults, imaging, and psychosocial history related to current functional performance.    · Exam: 5+ performance deficits identified limiting functional independence and safe return home   · Assistance/Modification: Min/mod assistance or modifications required to perform tasks. May have comorbidities that affect occupational performance. Rehab Potential: Good for established goals    Patient / Family Goal: to return to PLOF    Patient and/or family were instructed/educated on diagnosis, prognosis/goals and plan of care. Pt demonstrated F understanding. [] Malnutrition indicators have been identified and nursing has been notified to ensure a dietitian consult is ordered. Time In:1015             Time Out: 1045         Total Treatment time: 25   Min Units   OT Eval Low 95648     OT Eval Medium 18095     OT Eval High 29424     OT Re-Eval [id number]     Therapeutic Ex 57352     Therapeutic Activities 32359 17 1   ADL/Self Care 30003 8 1   Orthotic Management 17633     Neuro Re-Ed 46716     Non-Billable Time     TOTAL TIMED TREATMENT 25 2      Evaluation time includes thorough review of current medical information, gathering information on past medical history/social history and prior level of function, completion of standardized testing/informal observation of tasks, assessment of data and development of POC/Goals.      Karla Vicente, OTR/L [id number]

## 2020-09-18 NOTE — PROGRESS NOTES
stand transfers 3/7   Ambulation 1/7   Total  10/35       Therapeutic Exercises:  NA    Patient education  Pt educated on safety    Patient response to education:   Pt verbalized understanding Pt demonstrated skill Pt requires further education in this area   x x x     ASSESSMENT:    Comments:  RN reported pt was stable for session. Pt was in bed upon arrival, agreeable to initial evaluation. Increased assistance required for bed mobility due to pain, body habitus and deconditioning. Pt reported dizziness at EOB but symptoms improved with time and vitals were WNL. Pt stood with flexed posture that continued into shuffled steps to chair. Assistance for [employer] when turning. Pt was left in chair with all needs met and call light in reach. All lines remained intact. Treatment:  Patient practiced and was instructed in the following treatment:     Bed mobility training - pt given verbal and tactile cues to facilitate proper sequencing and safety during supine>sit as well as provided with physical assistance to complete task    Sitting EOB for >8 minutes for upright tolerance, postural awareness and BLE ROM   Transfer training - pt was given verbal and tactile cues to facilitate proper hand placement, technique and safety during sit to stand and stand to sit as well as provided with physical assistance to complete task.  Gait training- pt was given verbal and tactile cues to facilitate safety, balance, posture and use of AD during ambulation as well as provided with physical assistance to complete task. Pt's/ family goals   1. Return home    Patient and or family understand(s) diagnosis, prognosis, and plan of care.   Yes    PLAN OF CARE:    Current Treatment Recommendations     [x] Strengthening     [] ROM   [x] Balance Training   [x] Endurance Training   [x] Transfer Training   [x] Gait Training   [x] Stair Training   [] Positioning   [x] Safety and Education Training   [x] Patient/Caregiver Education   [] HEP  [] Other      Frequency of treatments: 2-5x/week x 1-2 weeks. Time in  1005  Time out  1035    Total Treatment Time  25 minutes     Evaluation Time includes thorough review of current medical information, gathering information on past medical history/social history and prior level of function, completion of standardized testing/informal observation of tasks, assessment of data and education on plan of care and goals.     CPT codes:  [] Low Complexity PT evaluation 60210  [x] Moderate Complexity PT evaluation 04890  [] High Complexity PT evaluation 82931  [] PT Re-evaluation 81453  [] Gait training 32475 - minutes  [] Manual therapy 75373 - minutes  [x] Therapeutic activities 78996 25 minutes  [] Therapeutic exercises 10102 - minutes  [] Neuromuscular reeducation 36650 - minutes     Alyssa Wolf, PT, DPT  [id number]

## 2020-09-18 NOTE — FLOWSHEET NOTE
Partient sating 84% on 15L HFNC. Convinced patient to use bipap. Currenlty sating 92% and resting more comfortably.

## 2020-09-18 NOTE — PROGRESS NOTES
Subjective:  Feeling better   No CP or SOB  No fever or chills   No uncontrolled pain  No vomiting or diarrhea     Objective:    /89   Pulse 102   Temp 98.3 °F (36.8 °C) (Oral)   Resp 22   Ht 5' (1.524 m)   Wt 181 lb 2 oz (82.2 kg)   SpO2 90%   BMI 35.37 kg/m²     24HR INTAKE/OUTPUT:      Intake/Output Summary (Last 24 hours) at 9/17/2020 2104  Last data filed at 9/17/2020 2034  Gross per 24 hour   Intake 5582 ml   Output 2530 ml   Net 3052 ml       General appearance: NAD, conversant  Neck: FROM, supple   Lungs: Clear bilaterally no wheezes, no rhonchi, no crackles  CV: RRR, no MRGs; normal carotid upstroke and amplitude without Bruits  Abdomen: Soft, non-tender; no masses or HSM  Extremities: No edema, no cyanosis, no clubbing  Skin: Intact no rash, no lesions, no ulcers    Psych: Alert and oriented normal affect  Neuro: Nonfocal  Most Recent Labs  Lab Results   Component Value Date    WBC 4.8 09/17/2020    HGB 8.0 (L) 09/17/2020    HCT 26.1 (L) 09/17/2020    PLT 73 (L) 09/17/2020     09/17/2020    K 4.3 09/17/2020     (H) 09/17/2020    CREATININE 0.7 09/17/2020    BUN 10 09/17/2020    CO2 21 (L) 09/17/2020    GLUCOSE 110 (H) 09/17/2020    ALT 6 09/17/2020    AST 29 09/17/2020    INR 1.1 09/04/2020    TSH 1.670 09/11/2019    LABA1C 5.1 09/11/2019     Recent Labs     09/17/20  0445   MG 2.6     Lab Results   Component Value Date    CALCIUM 7.0 (L) 09/17/2020    PHOS 2.0 (L) 09/17/2020        VL JAMEE BILATERAL LIMITED 1-2 LEVELS   Final Result      XR CHEST PORTABLE   Final Result   from recent postop in the abdomen midline.  IMPRESSION:      Cardiomegaly with mild interstitial pulmonary edema which was not seen   on the prior radiograph      Small left-sided pleural effusion      Support lines and tubes are in satisfactory position         XR CHEST PORTABLE   Final Result      Nasogastric tube follows the expected contours of the esophagus into   stomach with distal tip overlying the left upper quadrant of the   abdomen. Left subclavian central venous catheter terminates over the superior   vena cava. Small left pleural effusion appears present. XR CHEST PORTABLE   Final Result   Mild opacity at the lung bases which is likely atelectasis. XR CHEST PORTABLE   Final Result      Newly placed left subclavian central venous catheter terminates over   the superior vena cava. No evidence of pneumothorax. Nasogastric tube follows the expected contours of the esophagus into   stomach with distal tip overlying the left upper quadrant of the   abdomen and distal sideholes overlying the esophagogastric junction. Worsening interstitial infiltrates. XR CHEST PORTABLE    (Results Pending)       Assessment    Principal Problem: Atherosclerosis of native arteries of extremity with rest pain ([address]  Active Problems:    Hypotension    CAD (coronary artery disease)    Aorto-iliac atherosclerosis (HCC)    Atherosclerosis of native arteries of extremities with rest pain, left leg (HCC)    Restrictive lung disease    Hypomagnesemia  Resolved Problems:    * No resolved hospital problems. *      Plan:   67 y.o. female patient of GRACE Isaac - CNP history of PAD, CAD, carotid stenosis, CVA, GERD who presents with PAD status post aorto to right common iliac and aorto to left external iliac bypass 9/15. Supplemental O2 wean as tolerated  Nebulizers-  Replace electrolytes  IV fluid resuscitation  Pain control  Bowel regimen  Pressors wean as tolerated  Pulmonology consulted  Cardiology consulted  Medications for other co morbidities cont as appropriate w dosage adjustments as necessary     Thank you for allowing me to participate in the care of this patient.        Electronically signed by Latasha Nelson MD on 9/17/2020 at 9:04 PM

## 2020-09-18 NOTE — FLOWSHEET NOTE
Dr Merlin Salter regarding patient saturation. She is currently 88-90% on 15L HFNC.amd tachypneic. She is refusing bipap. Crackles in the lung bases upon ausculation. Worked with patient on couching and deep breathing as well as SMI and barely pulled 500ml. Awaiting return call with orders. Message currenlty unread.

## 2020-09-19 LAB
ALBUMIN SERPL-MCNC: 3.3 G/DL (ref 3.5–5.2)
ALP BLD-CCNC: 55 U/L (ref 35–104)
ALT SERPL-CCNC: 10 U/L (ref 0–32)
ANION GAP SERPL CALCULATED.3IONS-SCNC: 12 MMOL/L (ref 7–16)
ANION GAP SERPL CALCULATED.3IONS-SCNC: 16 MMOL/L (ref 7–16)
AST SERPL-CCNC: 25 U/L (ref 0–31)
BILIRUB SERPL-MCNC: 0.9 MG/DL (ref 0–1.2)
BLOOD BANK DISPENSE STATUS: NORMAL
BLOOD BANK PRODUCT CODE: NORMAL
BPU ID: NORMAL
BUN BLDV-MCNC: 10 MG/DL (ref 8–23)
BUN BLDV-MCNC: 8 MG/DL (ref 8–23)
CALCIUM IONIZED: 1.32 MMOL/L (ref 1.15–1.33)
CALCIUM SERPL-MCNC: 8.8 MG/DL (ref 8.6–10.2)
CALCIUM SERPL-MCNC: 8.9 MG/DL (ref 8.6–10.2)
CHLORIDE BLD-SCNC: 103 MMOL/L (ref 98–107)
CHLORIDE BLD-SCNC: 110 MMOL/L (ref 98–107)
CO2: 23 MMOL/L (ref 22–29)
CO2: 25 MMOL/L (ref 22–29)
CREAT SERPL-MCNC: 0.6 MG/DL (ref 0.5–1)
CREAT SERPL-MCNC: 0.6 MG/DL (ref 0.5–1)
DESCRIPTION BLOOD BANK: NORMAL
GFR AFRICAN AMERICAN: >60
GFR AFRICAN AMERICAN: >60
GFR NON-AFRICAN AMERICAN: >60 ML/MIN/1.73
GFR NON-AFRICAN AMERICAN: >60 ML/MIN/1.73
GLUCOSE BLD-MCNC: 128 MG/DL (ref 74–99)
GLUCOSE BLD-MCNC: 131 MG/DL (ref 74–99)
HCT VFR BLD CALC: 25.6 % (ref 34–48)
HEMOGLOBIN: 7.9 G/DL (ref 11.5–15.5)
MAGNESIUM: 1.8 MG/DL (ref 1.6–2.6)
MAGNESIUM: 2 MG/DL (ref 1.6–2.6)
MCH RBC QN AUTO: 30.3 PG (ref 26–35)
MCHC RBC AUTO-ENTMCNC: 30.9 % (ref 32–34.5)
MCV RBC AUTO: 98.1 FL (ref 80–99.9)
PDW BLD-RTO: 17.4 FL (ref 11.5–15)
PHOSPHORUS: 2.2 MG/DL (ref 2.5–4.5)
PHOSPHORUS: 3.1 MG/DL (ref 2.5–4.5)
PLATELET # BLD: 98 E9/L (ref 130–450)
PLATELET CONFIRMATION: NORMAL
PMV BLD AUTO: 10.8 FL (ref 7–12)
POTASSIUM SERPL-SCNC: 3.5 MMOL/L (ref 3.5–5)
POTASSIUM SERPL-SCNC: 3.9 MMOL/L (ref 3.5–5)
PRO-BNP: 6286 PG/ML (ref 0–450)
RBC # BLD: 2.61 E12/L (ref 3.5–5.5)
SODIUM BLD-SCNC: 144 MMOL/L (ref 132–146)
SODIUM BLD-SCNC: 145 MMOL/L (ref 132–146)
TOTAL PROTEIN: 5.4 G/DL (ref 6.4–8.3)
WBC # BLD: 4.6 E9/L (ref 4.5–11.5)

## 2020-09-19 PROCEDURE — 6370000000 HC RX 637 (ALT 250 FOR IP): Performed by: SURGERY

## 2020-09-19 PROCEDURE — 2580000003 HC RX 258: Performed by: SURGERY

## 2020-09-19 PROCEDURE — 36415 COLL VENOUS BLD VENIPUNCTURE: CPT

## 2020-09-19 PROCEDURE — 2500000003 HC RX 250 WO HCPCS: Performed by: INTERNAL MEDICINE

## 2020-09-19 PROCEDURE — 6360000002 HC RX W HCPCS: Performed by: SURGERY

## 2020-09-19 PROCEDURE — 80053 COMPREHEN METABOLIC PANEL: CPT

## 2020-09-19 PROCEDURE — 84100 ASSAY OF PHOSPHORUS: CPT

## 2020-09-19 PROCEDURE — 83880 ASSAY OF NATRIURETIC PEPTIDE: CPT

## 2020-09-19 PROCEDURE — 94660 CPAP INITIATION&MGMT: CPT

## 2020-09-19 PROCEDURE — 83735 ASSAY OF MAGNESIUM: CPT

## 2020-09-19 PROCEDURE — 94640 AIRWAY INHALATION TREATMENT: CPT

## 2020-09-19 PROCEDURE — 2000000000 HC ICU R&B

## 2020-09-19 PROCEDURE — 80048 BASIC METABOLIC PNL TOTAL CA: CPT

## 2020-09-19 PROCEDURE — 2700000000 HC OXYGEN THERAPY PER DAY

## 2020-09-19 PROCEDURE — 85027 COMPLETE CBC AUTOMATED: CPT

## 2020-09-19 PROCEDURE — 82330 ASSAY OF CALCIUM: CPT

## 2020-09-19 RX ORDER — ASPIRIN 81 MG/1
81 TABLET, CHEWABLE ORAL DAILY
Status: DISCONTINUED | OUTPATIENT
Start: 2020-09-19 | End: 2020-09-24 | Stop reason: HOSPADM

## 2020-09-19 RX ADMIN — METOPROLOL TARTRATE 50 MG: 50 TABLET, FILM COATED ORAL at 08:37

## 2020-09-19 RX ADMIN — LATANOPROST 1 DROP: 50 SOLUTION OPHTHALMIC at 21:05

## 2020-09-19 RX ADMIN — MORPHINE SULFATE 2 MG: 2 INJECTION, SOLUTION INTRAMUSCULAR; INTRAVENOUS at 18:17

## 2020-09-19 RX ADMIN — AMLODIPINE BESYLATE 5 MG: 5 TABLET ORAL at 08:37

## 2020-09-19 RX ADMIN — MORPHINE SULFATE 2 MG: 2 INJECTION, SOLUTION INTRAMUSCULAR; INTRAVENOUS at 08:37

## 2020-09-19 RX ADMIN — Medication 10 ML: at 21:04

## 2020-09-19 RX ADMIN — DORZOLAMIDE HYDROCHLORIDE 1 DROP: 20 SOLUTION/ DROPS OPHTHALMIC at 12:28

## 2020-09-19 RX ADMIN — IPRATROPIUM BROMIDE AND ALBUTEROL SULFATE 1 AMPULE: 2.5; .5 SOLUTION RESPIRATORY (INHALATION) at 16:28

## 2020-09-19 RX ADMIN — POTASSIUM CHLORIDE 20 MEQ: 400 INJECTION, SOLUTION INTRAVENOUS at 22:12

## 2020-09-19 RX ADMIN — IPRATROPIUM BROMIDE AND ALBUTEROL SULFATE 1 AMPULE: 2.5; .5 SOLUTION RESPIRATORY (INHALATION) at 12:15

## 2020-09-19 RX ADMIN — IPRATROPIUM BROMIDE AND ALBUTEROL SULFATE 1 AMPULE: 2.5; .5 SOLUTION RESPIRATORY (INHALATION) at 20:19

## 2020-09-19 RX ADMIN — METOPROLOL TARTRATE 50 MG: 50 TABLET, FILM COATED ORAL at 21:04

## 2020-09-19 RX ADMIN — ASPIRIN 81 MG CHEWABLE TABLET 81 MG: 81 TABLET CHEWABLE at 18:19

## 2020-09-19 RX ADMIN — DORZOLAMIDE HYDROCHLORIDE 1 DROP: 20 SOLUTION/ DROPS OPHTHALMIC at 21:04

## 2020-09-19 RX ADMIN — POTASSIUM CHLORIDE 20 MEQ: 400 INJECTION, SOLUTION INTRAVENOUS at 21:27

## 2020-09-19 RX ADMIN — Medication 10 ML: at 10:07

## 2020-09-19 RX ADMIN — BUMETANIDE 1 MG: 0.25 INJECTION INTRAMUSCULAR; INTRAVENOUS at 08:37

## 2020-09-19 RX ADMIN — IPRATROPIUM BROMIDE AND ALBUTEROL SULFATE 1 AMPULE: 2.5; .5 SOLUTION RESPIRATORY (INHALATION) at 08:48

## 2020-09-19 ASSESSMENT — PAIN DESCRIPTION - PROGRESSION
CLINICAL_PROGRESSION: GRADUALLY IMPROVING

## 2020-09-19 ASSESSMENT — PAIN SCALES - GENERAL
PAINLEVEL_OUTOF10: 0
PAINLEVEL_OUTOF10: 0
PAINLEVEL_OUTOF10: 10
PAINLEVEL_OUTOF10: 6

## 2020-09-19 NOTE — PROGRESS NOTES
Patient is seen in follow-up for cardiac evaluation       Subjective:     Ms. Kristy Henriquez  feels okay today, no chest pain no shortness of breath  Laying in bed no apparent distress    ROS:  CONSTITUTIONAL:  negative for  fevers, chills  HEENT:  negative for earaches, nasal congestion and epistaxis  RESPIRATORY:  negative for  dry cough, cough with sputum,wheezing and hemoptysis  GASTROINTESTINAL:  negative for nausea, vomiting  MUSCULOSKELETAL:  negative for  myalgias, arthralgias  NEUROLOGICAL:  negative for visual disturbance, dysphagia    Medication side effects:none    Scheduled Meds:   [START ON 9/19/2020] bumetanide  1 mg Intravenous Daily    sodium chloride  20 mL Intravenous Once    metoprolol tartrate  50 mg Oral BID    amLODIPine  5 mg Oral Daily    dorzolamide  1 drop Left Eye TID    latanoprost  1 drop Both Eyes Nightly    sodium chloride flush  10 mL Intravenous 2 times per day    ipratropium-albuterol  1 ampule Inhalation Q4H WA    aspirin  300 mg Rectal Daily     Continuous Infusions:   IV infusion builder 25 mL/hr at 09/18/20 0818    clevidipine      phenylephrine (MANJU-SYNEPHRINE) 50mg/250mL infusion Stopped (09/15/20 1946)     PRN Meds:sodium chloride flush, acetaminophen, oxyCODONE-acetaminophen **OR** [DISCONTINUED] oxyCODONE-acetaminophen, morphine **OR** [DISCONTINUED] morphine, labetalol, hydrALAZINE, clevidipine, magnesium sulfate, potassium chloride, albuterol      Objective:      Physical Exam:   BP (!) 134/56   Pulse 93   Temp 97.6 °F (36.4 °C) (Temporal)   Resp 21   Ht 5' (1.524 m)   Wt 181 lb (82.1 kg)   SpO2 96%   BMI 35.35 kg/m²   CONSTITUTIONAL:  awake, alert, cooperative, no apparent distress, and appears stated age  HEAD:  normocepalic, without obvious abnormality, atraumatic  NECK:  Supple, symmetrical, trachea midline, no adenopathy, thyroid symmetric, not enlarged and no tenderness, skin normal  LUNGS:  No increased work of breathing, No accessory muscle use or intercostal retractions, good air exchange, clear to auscultation bilaterally, no crackles or wheezing  CARDIOVASCULAR:  Normal apical impulse, regular rate and rhythm, normal S1 and S2, no S3 or S4, 2/6 systolic murmur at the apex , no edema, no JVD, no carotid bruit. ABDOMEN:  Soft, nontender, no masses, no hepatomegaly, no splenomegaly, BS+  MUSCULOSKELETAL:  No clubbing no cyanosis, full range of motion noted  NEUROLOGIC:  Alert, awake,oriented x3  SKIN:  no bruising or bleeding, normal skin color, texture, turgor and no redness, warmth, or swelling      Cardiographics  I personally reviewed the telemetry monitor strips with the following interpretation: Sinus rhythm    Echocardiogram: not done    Imaging  XR CHEST PORTABLE   Final Result   No interval change               VL JAMEE BILATERAL LIMITED 1-2 LEVELS   Final Result      XR CHEST PORTABLE   Final Result   from recent postop in the abdomen midline. IMPRESSION:      Cardiomegaly with mild interstitial pulmonary edema which was not seen   on the prior radiograph      Small left-sided pleural effusion      Support lines and tubes are in satisfactory position         XR CHEST PORTABLE   Final Result      Nasogastric tube follows the expected contours of the esophagus into   stomach with distal tip overlying the left upper quadrant of the   abdomen. Left subclavian central venous catheter terminates over the superior   vena cava. Small left pleural effusion appears present. XR CHEST PORTABLE   Final Result   Mild opacity at the lung bases which is likely atelectasis. XR CHEST PORTABLE   Final Result      Newly placed left subclavian central venous catheter terminates over   the superior vena cava. No evidence of pneumothorax.       Nasogastric tube follows the expected contours of the esophagus into   stomach with distal tip overlying the left upper quadrant of the   abdomen and distal sideholes overlying the esophagogastric junction. Worsening interstitial infiltrates. Lab Review   Lab Results   Component Value Date     09/18/2020    K 4.7 09/18/2020    K 4.1 09/04/2020     09/18/2020    CO2 21 09/18/2020    BUN 11 09/18/2020    CREATININE 0.7 09/18/2020    GLUCOSE 101 09/18/2020    GLUCOSE 92 09/07/2011    CALCIUM 8.0 09/18/2020     Lab Results   Component Value Date    WBC 5.2 09/18/2020    HGB 7.9 09/18/2020    HCT 27.0 09/18/2020    .1 09/18/2020    PLT 85 09/18/2020     I have personally reviewed the laboratory, cardiac diagnostic and radiographic testing as outlined above:    Assessment:     1.  CAD: Status post PCI to RCA in 2011, doing fine, will continue current treatment  2. Peripheral vascular disease status post femoropopliteal bypass, bilateral CEAs  3. Hyperlipidemia: On statin  4. Anemia: Postop      Recommendations:     1.  will continue current treatment  2. Will transfuse to maintain hemoglobin more than 8 g/dL    No active cardiac problems, will see as needed    Discussed with patient     electronically signed by Lydia Valencia MD on 9/18/2020 at 11:14 PM  NOTE: This report was transcribed using voice recognition software.  Every effort was made to ensure accuracy; however, inadvertent computerized transcription errors may be present

## 2020-09-19 NOTE — PROGRESS NOTES
Vascular Surgery Progress Note    Pt is being seen in f/u today regarding rest pain, bypas  Subjective  Pt s/e. Svt overnight. Resolved with digoxin. Remains on high flow nc but overall she is feeling a bit better today. Not much appetite but tolerating po.  + flatulence. Had bm. Denies emesis. Pain is controlled.   Current Medications:    IV infusion builder 25 mL/hr at 09/18/20 5866    clevidipine      phenylephrine (MANJU-SYNEPHRINE) 50mg/250mL infusion Stopped (09/15/20 1946)      sodium chloride flush, acetaminophen, oxyCODONE-acetaminophen **OR** [DISCONTINUED] oxyCODONE-acetaminophen, morphine **OR** [DISCONTINUED] morphine, labetalol, hydrALAZINE, clevidipine, magnesium sulfate, potassium chloride, albuterol    bumetanide  1 mg Intravenous Daily    sodium chloride  20 mL Intravenous Once    metoprolol tartrate  50 mg Oral BID    amLODIPine  5 mg Oral Daily    dorzolamide  1 drop Left Eye TID    latanoprost  1 drop Both Eyes Nightly    sodium chloride flush  10 mL Intravenous 2 times per day    ipratropium-albuterol  1 ampule Inhalation Q4H WA    aspirin  300 mg Rectal Daily      PHYSICAL EXAM:    /69   Pulse 81   Temp 98.1 °F (36.7 °C) (Temporal)   Resp 20   Ht 5' (1.524 m)   Wt 181 lb (82.1 kg)   SpO2 96%   BMI 35.35 kg/m²     Intake/Output Summary (Last 24 hours) at 9/19/2020 1013  Last data filed at 9/19/2020 0900  Gross per 24 hour   Intake 890 ml   Output 1305 ml   Net -415 ml        Gen awake and alert  CVS S1S2  Resp + excursion   High flow NC 15 L   Abd soft, incision c/d/i, incisional ttp, slight distension  R LE DP and PT biphasic  L LE DP and PT weakly biphasic  LABS:    Lab Results   Component Value Date    WBC 4.6 09/19/2020    HGB 7.9 (L) 09/19/2020    HCT 25.6 (L) 09/19/2020    PLT 98 (L) 09/19/2020    PROTIME 12.0 09/04/2020    INR 1.1 09/04/2020    APTT 29.5 09/04/2020    K 3.9 09/19/2020    BUN 10 09/19/2020    CREATININE 0.6 09/19/2020     A/P s/p Aorotbiiliac bypass for rest pain 9/15/20  Vasc Exam stable  Neuro pain well controlled  CVS svt episode overnight - no in sinus after receiving digoxin  Resp encourage smi   Volume overload - received dose of bumex again   Duonebs,   FEN/Nut tolerating po but no appetite   adequate uo, cr stable  Heme hgb stable, platelets improving  Ok to make asa suppository po  DVT Risk increase activity   PUD Risk diet  Pt ot     Binta Villareal

## 2020-09-20 LAB
ALBUMIN SERPL-MCNC: 2.9 G/DL (ref 3.5–5.2)
ALP BLD-CCNC: 56 U/L (ref 35–104)
ALT SERPL-CCNC: 10 U/L (ref 0–32)
ANION GAP SERPL CALCULATED.3IONS-SCNC: 11 MMOL/L (ref 7–16)
AST SERPL-CCNC: 20 U/L (ref 0–31)
BILIRUB SERPL-MCNC: 0.9 MG/DL (ref 0–1.2)
BUN BLDV-MCNC: 7 MG/DL (ref 8–23)
CALCIUM IONIZED: 1.27 MMOL/L (ref 1.15–1.33)
CALCIUM SERPL-MCNC: 8.6 MG/DL (ref 8.6–10.2)
CHLORIDE BLD-SCNC: 109 MMOL/L (ref 98–107)
CO2: 26 MMOL/L (ref 22–29)
CREAT SERPL-MCNC: 0.6 MG/DL (ref 0.5–1)
GFR AFRICAN AMERICAN: >60
GFR NON-AFRICAN AMERICAN: >60 ML/MIN/1.73
GLUCOSE BLD-MCNC: 102 MG/DL (ref 74–99)
HCT VFR BLD CALC: 24.3 % (ref 34–48)
HEMOGLOBIN: 7.6 G/DL (ref 11.5–15.5)
MAGNESIUM: 1.8 MG/DL (ref 1.6–2.6)
MCH RBC QN AUTO: 30.3 PG (ref 26–35)
MCHC RBC AUTO-ENTMCNC: 31.3 % (ref 32–34.5)
MCV RBC AUTO: 96.8 FL (ref 80–99.9)
PDW BLD-RTO: 17.2 FL (ref 11.5–15)
PHOSPHORUS: 3.6 MG/DL (ref 2.5–4.5)
PLATELET # BLD: 100 E9/L (ref 130–450)
PMV BLD AUTO: 10.9 FL (ref 7–12)
POTASSIUM SERPL-SCNC: 3.7 MMOL/L (ref 3.5–5)
RBC # BLD: 2.51 E12/L (ref 3.5–5.5)
SODIUM BLD-SCNC: 146 MMOL/L (ref 132–146)
TOTAL PROTEIN: 5 G/DL (ref 6.4–8.3)
WBC # BLD: 3.7 E9/L (ref 4.5–11.5)

## 2020-09-20 PROCEDURE — P9047 ALBUMIN (HUMAN), 25%, 50ML: HCPCS | Performed by: INTERNAL MEDICINE

## 2020-09-20 PROCEDURE — 85027 COMPLETE CBC AUTOMATED: CPT

## 2020-09-20 PROCEDURE — 6370000000 HC RX 637 (ALT 250 FOR IP): Performed by: SURGERY

## 2020-09-20 PROCEDURE — 84100 ASSAY OF PHOSPHORUS: CPT

## 2020-09-20 PROCEDURE — 83735 ASSAY OF MAGNESIUM: CPT

## 2020-09-20 PROCEDURE — 80053 COMPREHEN METABOLIC PANEL: CPT

## 2020-09-20 PROCEDURE — 2500000003 HC RX 250 WO HCPCS: Performed by: INTERNAL MEDICINE

## 2020-09-20 PROCEDURE — 2700000000 HC OXYGEN THERAPY PER DAY

## 2020-09-20 PROCEDURE — 94660 CPAP INITIATION&MGMT: CPT

## 2020-09-20 PROCEDURE — 82330 ASSAY OF CALCIUM: CPT

## 2020-09-20 PROCEDURE — 2500000003 HC RX 250 WO HCPCS: Performed by: SURGERY

## 2020-09-20 PROCEDURE — 2140000000 HC CCU INTERMEDIATE R&B

## 2020-09-20 PROCEDURE — 2580000003 HC RX 258: Performed by: SURGERY

## 2020-09-20 PROCEDURE — 94640 AIRWAY INHALATION TREATMENT: CPT

## 2020-09-20 PROCEDURE — 6360000002 HC RX W HCPCS: Performed by: INTERNAL MEDICINE

## 2020-09-20 RX ORDER — ALBUMIN (HUMAN) 12.5 G/50ML
25 SOLUTION INTRAVENOUS ONCE
Status: COMPLETED | OUTPATIENT
Start: 2020-09-20 | End: 2020-09-20

## 2020-09-20 RX ORDER — BUMETANIDE 0.25 MG/ML
0.5 INJECTION, SOLUTION INTRAMUSCULAR; INTRAVENOUS ONCE
Status: COMPLETED | OUTPATIENT
Start: 2020-09-20 | End: 2020-09-20

## 2020-09-20 RX ADMIN — SODIUM CHLORIDE, PRESERVATIVE FREE 10 ML: 5 INJECTION INTRAVENOUS at 17:39

## 2020-09-20 RX ADMIN — OXYCODONE AND ACETAMINOPHEN 1 TABLET: 5; 325 TABLET ORAL at 16:21

## 2020-09-20 RX ADMIN — LATANOPROST 1 DROP: 50 SOLUTION OPHTHALMIC at 23:54

## 2020-09-20 RX ADMIN — METOPROLOL TARTRATE 50 MG: 50 TABLET, FILM COATED ORAL at 21:19

## 2020-09-20 RX ADMIN — AMLODIPINE BESYLATE 5 MG: 5 TABLET ORAL at 16:20

## 2020-09-20 RX ADMIN — BUMETANIDE 0.5 MG: 0.25 INJECTION INTRAMUSCULAR; INTRAVENOUS at 16:14

## 2020-09-20 RX ADMIN — OXYCODONE AND ACETAMINOPHEN 1 TABLET: 5; 325 TABLET ORAL at 10:09

## 2020-09-20 RX ADMIN — Medication 10 ML: at 09:29

## 2020-09-20 RX ADMIN — DORZOLAMIDE HYDROCHLORIDE 1 DROP: 20 SOLUTION/ DROPS OPHTHALMIC at 21:20

## 2020-09-20 RX ADMIN — POTASSIUM PHOSPHATE, MONOBASIC AND POTASSIUM PHOSPHATE, DIBASIC: 224; 236 INJECTION, SOLUTION, CONCENTRATE INTRAVENOUS at 14:16

## 2020-09-20 RX ADMIN — Medication 10 ML: at 21:19

## 2020-09-20 RX ADMIN — DORZOLAMIDE HYDROCHLORIDE 1 DROP: 20 SOLUTION/ DROPS OPHTHALMIC at 16:14

## 2020-09-20 RX ADMIN — METOPROLOL TARTRATE 50 MG: 50 TABLET, FILM COATED ORAL at 09:28

## 2020-09-20 RX ADMIN — ASPIRIN 81 MG CHEWABLE TABLET 81 MG: 81 TABLET CHEWABLE at 09:30

## 2020-09-20 RX ADMIN — BUMETANIDE 1 MG: 0.25 INJECTION INTRAMUSCULAR; INTRAVENOUS at 09:28

## 2020-09-20 RX ADMIN — OXYCODONE AND ACETAMINOPHEN 1 TABLET: 5; 325 TABLET ORAL at 21:23

## 2020-09-20 RX ADMIN — ALBUMIN (HUMAN) 25 G: 0.25 INJECTION, SOLUTION INTRAVENOUS at 15:28

## 2020-09-20 RX ADMIN — DORZOLAMIDE HYDROCHLORIDE 1 DROP: 20 SOLUTION/ DROPS OPHTHALMIC at 09:28

## 2020-09-20 RX ADMIN — IPRATROPIUM BROMIDE AND ALBUTEROL SULFATE 1 AMPULE: 2.5; .5 SOLUTION RESPIRATORY (INHALATION) at 13:40

## 2020-09-20 ASSESSMENT — PAIN DESCRIPTION - DESCRIPTORS: DESCRIPTORS: ACHING;DISCOMFORT;SORE

## 2020-09-20 ASSESSMENT — PAIN DESCRIPTION - ORIENTATION: ORIENTATION: MID;LOWER

## 2020-09-20 ASSESSMENT — PAIN DESCRIPTION - PAIN TYPE: TYPE: CHRONIC PAIN

## 2020-09-20 ASSESSMENT — PAIN SCALES - GENERAL
PAINLEVEL_OUTOF10: 0
PAINLEVEL_OUTOF10: 0
PAINLEVEL_OUTOF10: 8
PAINLEVEL_OUTOF10: 0
PAINLEVEL_OUTOF10: 8
PAINLEVEL_OUTOF10: 0
PAINLEVEL_OUTOF10: 6
PAINLEVEL_OUTOF10: 0

## 2020-09-20 ASSESSMENT — PAIN - FUNCTIONAL ASSESSMENT: PAIN_FUNCTIONAL_ASSESSMENT: PREVENTS OR INTERFERES SOME ACTIVE ACTIVITIES AND ADLS

## 2020-09-20 ASSESSMENT — PAIN DESCRIPTION - LOCATION: LOCATION: BACK

## 2020-09-20 ASSESSMENT — PAIN DESCRIPTION - PROGRESSION: CLINICAL_PROGRESSION: NOT CHANGED

## 2020-09-20 ASSESSMENT — PAIN DESCRIPTION - FREQUENCY: FREQUENCY: INTERMITTENT

## 2020-09-20 ASSESSMENT — PAIN DESCRIPTION - ONSET: ONSET: ON-GOING

## 2020-09-20 NOTE — PROGRESS NOTES
Vascular Surgery Progress Note    Pt is being seen in f/u today regarding rest pain, bypas  Subjective  Pt s/e. Breathing better today, remain on high flow nc but at 8 lNot much appetite but tolerating po.  + flatulence. No bm. .  Denies emesis. Pain is controlled.   Current Medications:    IV infusion builder 25 mL/hr at 09/19/20 2000    phenylephrine (MANJU-SYNEPHRINE) 50mg/250mL infusion Stopped (09/15/20 1946)      sodium chloride flush, acetaminophen, oxyCODONE-acetaminophen **OR** [DISCONTINUED] oxyCODONE-acetaminophen, morphine **OR** [DISCONTINUED] morphine, labetalol, hydrALAZINE, magnesium sulfate, potassium chloride, albuterol    aspirin  81 mg Oral Daily    bumetanide  1 mg Intravenous Daily    sodium chloride  20 mL Intravenous Once    metoprolol tartrate  50 mg Oral BID    amLODIPine  5 mg Oral Daily    dorzolamide  1 drop Left Eye TID    latanoprost  1 drop Both Eyes Nightly    sodium chloride flush  10 mL Intravenous 2 times per day    ipratropium-albuterol  1 ampule Inhalation Q4H WA      PHYSICAL EXAM:    /71   Pulse 80   Temp 97.8 °F (36.6 °C) (Temporal)   Resp 22   Ht 5' (1.524 m)   Wt 175 lb 0.7 oz (79.4 kg)   SpO2 95%   BMI 34.19 kg/m²     Intake/Output Summary (Last 24 hours) at 9/20/2020 0924  Last data filed at 9/20/2020 0700  Gross per 24 hour   Intake 620 ml   Output 1990 ml   Net -1370 ml        Gen awake and alert  CVS S1S2  Resp + excursion   High flow NC 8 L   Abd soft, incision c/d/i, incisional ttp, slight distension  R LE DP and PT biphasic  L LE DP and PT weakly biphasic  LABS:    Lab Results   Component Value Date    WBC 3.7 (L) 09/20/2020    HGB 7.6 (L) 09/20/2020    HCT 24.3 (L) 09/20/2020     (L) 09/20/2020    PROTIME 12.0 09/04/2020    INR 1.1 09/04/2020    APTT 29.5 09/04/2020    K 3.7 09/20/2020    BUN 7 (L) 09/20/2020    CREATININE 0.6 09/20/2020     A/P s/p Aorotbiiliac bypass for rest pain 9/15/20  Vasc Exam stable  Neuro pain well controlled  CVS no further episodes of svt  Resp encourage smi   Volume overload - received dose of bumex again   Duonebs  FEN/Nut tolerating po but no appetite   adequate uo, cr stable  Heme hgb stable, platelets stable  DVT Risk start subq lovenox likely tommorow, increase activity  PUD Risk diet  Pt ot   Will tx to floor    Saroj Mejia

## 2020-09-20 NOTE — PROGRESS NOTES
Christel Claude, M.D.,[facility]  Renae Aguilar D.O., F.A.C.O.I., Judy Benavidez M.D. Maryellen Love M.D., Zofia New M.D. Ernesto Avila D.O. Daily Pulmonary Progress Note    Patient:  Ramonita Harper 67 y.o. female MRN: [id number]     Date of Service: 9/20/2020        Subjective      Patient awake and alert sitting up in bed. She is doing much better, oxygenation has improved and she is saturating 94-96% on 6/L min. UOP last 24 hours :2290    Objective   Vitals: BP (!) 134/54   Pulse 63   Temp 97.7 °F (36.5 °C) (Axillary)   Resp 19   Ht 5' (1.524 m)   Wt 175 lb 0.7 oz (79.4 kg)   SpO2 98%   BMI 34.19 kg/m²     I/O:    Intake/Output Summary (Last 24 hours) at 9/20/2020 1127  Last data filed at 9/20/2020 1100  Gross per 24 hour   Intake 620 ml   Output 1890 ml   Net -1270 ml       CURRENT MEDS :  Scheduled Meds:   aspirin  81 mg Oral Daily    bumetanide  1 mg Intravenous Daily    sodium chloride  20 mL Intravenous Once    metoprolol tartrate  50 mg Oral BID    amLODIPine  5 mg Oral Daily    dorzolamide  1 drop Left Eye TID    latanoprost  1 drop Both Eyes Nightly    sodium chloride flush  10 mL Intravenous 2 times per day    ipratropium-albuterol  1 ampule Inhalation Q4H WA       Continuous Infusions:   IV infusion builder 25 mL/hr at 09/19/20 2000    phenylephrine (MANJU-SYNEPHRINE) 50mg/250mL infusion Stopped (09/15/20 1946)       PRN Meds:  sodium chloride flush, acetaminophen, oxyCODONE-acetaminophen **OR** [DISCONTINUED] oxyCODONE-acetaminophen, morphine **OR** [DISCONTINUED] morphine, labetalol, hydrALAZINE, magnesium sulfate, potassium chloride, albuterol      Physical Exam:    General:  Awake, alert, oriented X 3. Well developed, well nourished, well groomed. No apparent distress. HEENT:  Normocephalic, atraumatic. Pupils equal, round, reactive to light.   No scleral injection  Ng in place   Heart:  RRR, no murmurs, gallops, rubs  Lungs:  CTA bilaterally, bilat symmetrical expansion, no wheeze, rales, or rhonchi  Abdomen: Bowel sounds present, soft, nontender, no masses, no organomegaly, no peritoneal signs  Extremities:  No clubbing, cyanosis, or edema, patienty has significant rheumatoid changes in her hands  Skin:  Warm and dry, no open lesions or rash  Neuro:  Cranial nerves 2-12 intact, no focal deficits    Pertinent/ New Labs and Imaging Studies         PERTINENT LAB RESULTS:     Results for Renuka Truong (MRN [id number]) as of 9/19/2020 14:29   Ref.  Range 9/18/2020 04:36 9/18/2020 07:08 9/19/2020 03:42   Sodium Latest Ref Range: 132 - 146 mmol/L 147 (H)  145   Potassium Latest Ref Range: 3.5 - 5.0 mmol/L 4.7  3.9   Chloride Latest Ref Range: 98 - 107 mmol/L 115 (H)  110 (H)   CO2 Latest Ref Range: 22 - 29 mmol/L 21 (L)  23   BUN Latest Ref Range: 8 - 23 mg/dL 11  10   Creatinine Latest Ref Range: 0.5 - 1.0 mg/dL 0.7  0.6   Anion Gap Latest Ref Range: 7 - 16 mmol/L 11  12   Calcium, Ion Latest Ref Range: 1.15 - 1.33 mmol/L 1.18  1.32   GFR Non- Latest Ref Range: >=60 mL/min/1.73 >60  >60   GFR  Unknown >60  >60   Magnesium Latest Ref Range: 1.6 - 2.6 mg/dL 2.4  2.0   Glucose Latest Ref Range: 74 - 99 mg/dL 101 (H)  128 (H)   Calcium Latest Ref Range: 8.6 - 10.2 mg/dL 8.0 (L)  8.8   Phosphorus Latest Ref Range: 2.5 - 4.5 mg/dL 3.0  2.2 (L)   Total Protein Latest Ref Range: 6.4 - 8.3 g/dL 5.6 (L)  5.4 (L)   Pro-BNP Latest Ref Range: 0 - 450 pg/mL 7,895 (H)  6,286 (H)   Albumin Latest Ref Range: 3.5 - 5.2 g/dL 3.6  3.3 (L)   Alk Phos Latest Ref Range: 35 - 104 U/L 36  55   ALT Latest Ref Range: 0 - 32 U/L 7  10   AST Latest Ref Range: 0 - 31 U/L 28  25   Bilirubin Latest Ref Range: 0.0 - 1.2 mg/dL 0.7  0.9   WBC Latest Ref Range: 4.5 - 11.5 E9/L 5.2  4.6   RBC Latest Ref Range: 3.50 - 5.50 E12/L 2.67 (L)  2.61 (L)   Hemoglobin Quant Latest Ref Range: 11.5 - 15.5 g/dL 7.9 (L)  7.9 (L)   Hematocrit Latest Ref Range: 34.0 - 48.0 % 27.0 (L)  25.6 (L)   MCV Latest Ref Range: 80.0 - 99.9 fL 101.1 (H)  98.1   MCH Latest Ref Range: 26.0 - 35.0 pg 29.6  30.3   MCHC Latest Ref Range: 32.0 - 34.5 % 29.3 (L)  30.9 (L)   MPV Latest Ref Range: 7.0 - 12.0 fL 10.7  10.8   RDW Latest Ref Range: 11.5 - 15.0 fL 18.1 (H)  17.4 (H)   Platelet Count Latest Ref Range: 130 - 450 E9/L 85 (L)  98 (L)   Platelet Confirmation Unknown CONFIRMED  CONFIRMED         DIAGNOSTICS:          Assessment:      1. S/p Aorto to common iliac bypass on right, external iliac bypass on the left (9/15/20)     2. Acute hypoxic and hypercapnic resp failure post-operative     3. Restrictive lung disease  *PFT (8/2020) mild to moderate ventilatory restriction with mild impairment in gas transfer     4. Hyperchloremic metabolic acidosis      5. Muscular deconditioning     6. kyphosis of cervicothoracic region     7. Hx of HTN, HLD, CAD, PAD, PVD      Plan:      Wean FiO2 for saturations above 92%, patient weaned down to 4 liters   BiPAP prn and qhs , although mckennaet has declined to wear it the past 2 nights   Monitor I/O's closely patient still 7.7 liters positive, patient on Bumex 1 mg daily,    Will give her addition albumin 25 gr followed by 0.5 bumex later this afternoon  · Pulmonary recruitment maneuvers with IS, EZPAP  · aspiration precautions  · Bronchodilators q4 WA and prn  · Dvt/gi prophylaxis per general surgery   · PT/OT  · CXR in a.m. Patient is stable to be transferred out of the ICU.     Sharmila Bragg MD

## 2020-09-21 PROBLEM — J96.02 ACUTE RESPIRATORY FAILURE WITH HYPOXIA AND HYPERCAPNIA (HCC): Status: ACTIVE | Noted: 2020-09-21

## 2020-09-21 PROBLEM — J96.01 ACUTE RESPIRATORY FAILURE WITH HYPOXIA AND HYPERCAPNIA (HCC): Status: ACTIVE | Noted: 2020-09-21

## 2020-09-21 LAB
ABO/RH: NORMAL
ANION GAP SERPL CALCULATED.3IONS-SCNC: 12 MMOL/L (ref 7–16)
ANTIBODY SCREEN: NORMAL
BLOOD BANK DISPENSE STATUS: NORMAL
BLOOD BANK PRODUCT CODE: NORMAL
BPU ID: NORMAL
BUN BLDV-MCNC: 8 MG/DL (ref 8–23)
CALCIUM SERPL-MCNC: 8.5 MG/DL (ref 8.6–10.2)
CHLORIDE BLD-SCNC: 103 MMOL/L (ref 98–107)
CO2: 28 MMOL/L (ref 22–29)
CREAT SERPL-MCNC: 0.7 MG/DL (ref 0.5–1)
DESCRIPTION BLOOD BANK: NORMAL
GFR AFRICAN AMERICAN: >60
GFR NON-AFRICAN AMERICAN: >60 ML/MIN/1.73
GLUCOSE BLD-MCNC: 101 MG/DL (ref 74–99)
HAPTOGLOBIN: 312 MG/DL (ref 30–200)
HCT VFR BLD CALC: 24.3 % (ref 34–48)
HEMOGLOBIN: 7.6 G/DL (ref 11.5–15.5)
LACTATE DEHYDROGENASE: 238 U/L (ref 135–214)
MAGNESIUM: 1.6 MG/DL (ref 1.6–2.6)
MCH RBC QN AUTO: 30.3 PG (ref 26–35)
MCHC RBC AUTO-ENTMCNC: 31.3 % (ref 32–34.5)
MCV RBC AUTO: 96.8 FL (ref 80–99.9)
PDW BLD-RTO: 16.7 FL (ref 11.5–15)
PHOSPHORUS: 4 MG/DL (ref 2.5–4.5)
PLATELET # BLD: 105 E9/L (ref 130–450)
PMV BLD AUTO: 10.5 FL (ref 7–12)
POTASSIUM SERPL-SCNC: 3.2 MMOL/L (ref 3.5–5)
RBC # BLD: 2.51 E12/L (ref 3.5–5.5)
SODIUM BLD-SCNC: 143 MMOL/L (ref 132–146)
WBC # BLD: 2.9 E9/L (ref 4.5–11.5)

## 2020-09-21 PROCEDURE — 86900 BLOOD TYPING SEROLOGIC ABO: CPT

## 2020-09-21 PROCEDURE — 97530 THERAPEUTIC ACTIVITIES: CPT

## 2020-09-21 PROCEDURE — 6370000000 HC RX 637 (ALT 250 FOR IP): Performed by: SURGERY

## 2020-09-21 PROCEDURE — 6370000000 HC RX 637 (ALT 250 FOR IP): Performed by: INTERNAL MEDICINE

## 2020-09-21 PROCEDURE — 99232 SBSQ HOSP IP/OBS MODERATE 35: CPT | Performed by: NURSE PRACTITIONER

## 2020-09-21 PROCEDURE — 2580000003 HC RX 258: Performed by: SURGERY

## 2020-09-21 PROCEDURE — 2140000000 HC CCU INTERMEDIATE R&B

## 2020-09-21 PROCEDURE — 2500000003 HC RX 250 WO HCPCS: Performed by: INTERNAL MEDICINE

## 2020-09-21 PROCEDURE — 83010 ASSAY OF HAPTOGLOBIN QUANT: CPT

## 2020-09-21 PROCEDURE — 86901 BLOOD TYPING SEROLOGIC RH(D): CPT

## 2020-09-21 PROCEDURE — 36430 TRANSFUSION BLD/BLD COMPNT: CPT

## 2020-09-21 PROCEDURE — 80048 BASIC METABOLIC PNL TOTAL CA: CPT

## 2020-09-21 PROCEDURE — 86850 RBC ANTIBODY SCREEN: CPT

## 2020-09-21 PROCEDURE — P9016 RBC LEUKOCYTES REDUCED: HCPCS

## 2020-09-21 PROCEDURE — 94640 AIRWAY INHALATION TREATMENT: CPT

## 2020-09-21 PROCEDURE — 94660 CPAP INITIATION&MGMT: CPT

## 2020-09-21 PROCEDURE — 83735 ASSAY OF MAGNESIUM: CPT

## 2020-09-21 PROCEDURE — 36592 COLLECT BLOOD FROM PICC: CPT

## 2020-09-21 PROCEDURE — 6360000002 HC RX W HCPCS: Performed by: NURSE PRACTITIONER

## 2020-09-21 PROCEDURE — 2700000000 HC OXYGEN THERAPY PER DAY

## 2020-09-21 PROCEDURE — 85027 COMPLETE CBC AUTOMATED: CPT

## 2020-09-21 PROCEDURE — 6360000002 HC RX W HCPCS: Performed by: INTERNAL MEDICINE

## 2020-09-21 PROCEDURE — 2580000003 HC RX 258: Performed by: INTERNAL MEDICINE

## 2020-09-21 PROCEDURE — 97535 SELF CARE MNGMENT TRAINING: CPT

## 2020-09-21 PROCEDURE — 84100 ASSAY OF PHOSPHORUS: CPT

## 2020-09-21 PROCEDURE — 86923 COMPATIBILITY TEST ELECTRIC: CPT

## 2020-09-21 PROCEDURE — 83615 LACTATE (LD) (LDH) ENZYME: CPT

## 2020-09-21 PROCEDURE — 36415 COLL VENOUS BLD VENIPUNCTURE: CPT

## 2020-09-21 RX ORDER — MAGNESIUM SULFATE IN WATER 40 MG/ML
4 INJECTION, SOLUTION INTRAVENOUS ONCE
Status: DISCONTINUED | OUTPATIENT
Start: 2020-09-21 | End: 2020-09-21

## 2020-09-21 RX ORDER — LANOLIN ALCOHOL/MO/W.PET/CERES
6 CREAM (GRAM) TOPICAL NIGHTLY PRN
Status: DISCONTINUED | OUTPATIENT
Start: 2020-09-21 | End: 2020-09-24 | Stop reason: HOSPADM

## 2020-09-21 RX ORDER — 0.9 % SODIUM CHLORIDE 0.9 %
20 INTRAVENOUS SOLUTION INTRAVENOUS ONCE
Status: COMPLETED | OUTPATIENT
Start: 2020-09-21 | End: 2020-09-21

## 2020-09-21 RX ORDER — PROCHLORPERAZINE EDISYLATE 5 MG/ML
5 INJECTION INTRAMUSCULAR; INTRAVENOUS EVERY 6 HOURS PRN
Status: DISCONTINUED | OUTPATIENT
Start: 2020-09-21 | End: 2020-09-24 | Stop reason: HOSPADM

## 2020-09-21 RX ORDER — MAGNESIUM SULFATE IN WATER 40 MG/ML
2 INJECTION, SOLUTION INTRAVENOUS ONCE
Status: COMPLETED | OUTPATIENT
Start: 2020-09-21 | End: 2020-09-21

## 2020-09-21 RX ORDER — POTASSIUM CHLORIDE 20 MEQ/1
20 TABLET, EXTENDED RELEASE ORAL
Status: DISCONTINUED | OUTPATIENT
Start: 2020-09-22 | End: 2020-09-24 | Stop reason: HOSPADM

## 2020-09-21 RX ORDER — POTASSIUM CHLORIDE 20 MEQ/1
40 TABLET, EXTENDED RELEASE ORAL ONCE
Status: COMPLETED | OUTPATIENT
Start: 2020-09-21 | End: 2020-09-21

## 2020-09-21 RX ADMIN — MAGNESIUM SULFATE 2 G: 2 INJECTION INTRAVENOUS at 11:44

## 2020-09-21 RX ADMIN — Medication 10 ML: at 21:16

## 2020-09-21 RX ADMIN — AMLODIPINE BESYLATE 5 MG: 5 TABLET ORAL at 09:12

## 2020-09-21 RX ADMIN — SODIUM CHLORIDE 20 ML: 9 INJECTION, SOLUTION INTRAVENOUS at 19:00

## 2020-09-21 RX ADMIN — ENOXAPARIN SODIUM 30 MG: 30 INJECTION SUBCUTANEOUS at 14:48

## 2020-09-21 RX ADMIN — METOPROLOL TARTRATE 50 MG: 50 TABLET, FILM COATED ORAL at 09:12

## 2020-09-21 RX ADMIN — OXYCODONE AND ACETAMINOPHEN 1 TABLET: 5; 325 TABLET ORAL at 02:14

## 2020-09-21 RX ADMIN — ASPIRIN 81 MG CHEWABLE TABLET 81 MG: 81 TABLET CHEWABLE at 09:12

## 2020-09-21 RX ADMIN — LATANOPROST 1 DROP: 50 SOLUTION OPHTHALMIC at 21:18

## 2020-09-21 RX ADMIN — METOPROLOL TARTRATE 50 MG: 50 TABLET, FILM COATED ORAL at 21:16

## 2020-09-21 RX ADMIN — PROCHLORPERAZINE EDISYLATE 5 MG: 5 INJECTION INTRAMUSCULAR; INTRAVENOUS at 11:32

## 2020-09-21 RX ADMIN — DORZOLAMIDE HYDROCHLORIDE 1 DROP: 20 SOLUTION/ DROPS OPHTHALMIC at 21:16

## 2020-09-21 RX ADMIN — Medication 10 ML: at 09:12

## 2020-09-21 RX ADMIN — BUMETANIDE 1 MG: 0.25 INJECTION INTRAMUSCULAR; INTRAVENOUS at 09:12

## 2020-09-21 RX ADMIN — MELATONIN 3 MG ORAL TABLET 6 MG: 3 TABLET ORAL at 21:16

## 2020-09-21 RX ADMIN — DORZOLAMIDE HYDROCHLORIDE 1 DROP: 20 SOLUTION/ DROPS OPHTHALMIC at 09:12

## 2020-09-21 RX ADMIN — OXYCODONE AND ACETAMINOPHEN 1 TABLET: 5; 325 TABLET ORAL at 21:16

## 2020-09-21 RX ADMIN — POTASSIUM CHLORIDE 40 MEQ: 1500 TABLET, EXTENDED RELEASE ORAL at 11:32

## 2020-09-21 RX ADMIN — DORZOLAMIDE HYDROCHLORIDE 1 DROP: 20 SOLUTION/ DROPS OPHTHALMIC at 14:48

## 2020-09-21 RX ADMIN — IPRATROPIUM BROMIDE AND ALBUTEROL SULFATE 1 AMPULE: 2.5; .5 SOLUTION RESPIRATORY (INHALATION) at 16:11

## 2020-09-21 ASSESSMENT — PAIN DESCRIPTION - DESCRIPTORS: DESCRIPTORS: ACHING;DISCOMFORT;SORE

## 2020-09-21 ASSESSMENT — PAIN SCALES - GENERAL
PAINLEVEL_OUTOF10: 6
PAINLEVEL_OUTOF10: 8
PAINLEVEL_OUTOF10: 0

## 2020-09-21 ASSESSMENT — PAIN DESCRIPTION - ORIENTATION: ORIENTATION: MID;LOWER

## 2020-09-21 ASSESSMENT — PAIN DESCRIPTION - LOCATION: LOCATION: BACK

## 2020-09-21 ASSESSMENT — PAIN DESCRIPTION - PAIN TYPE: TYPE: CHRONIC PAIN

## 2020-09-21 ASSESSMENT — PAIN - FUNCTIONAL ASSESSMENT: PAIN_FUNCTIONAL_ASSESSMENT: PREVENTS OR INTERFERES SOME ACTIVE ACTIVITIES AND ADLS

## 2020-09-21 ASSESSMENT — PAIN DESCRIPTION - PROGRESSION: CLINICAL_PROGRESSION: GRADUALLY IMPROVING

## 2020-09-21 ASSESSMENT — PAIN DESCRIPTION - ONSET: ONSET: ON-GOING

## 2020-09-21 ASSESSMENT — PAIN DESCRIPTION - FREQUENCY: FREQUENCY: INTERMITTENT

## 2020-09-21 NOTE — PROGRESS NOTES
Inpatient [facility] Cardiology Reconsult Note    Date of Service: 9/21/2020    Reason for Follow-up: Post-op cardiac evaluation (9/15/2020). Reconsulted for AF with RVR (9/21/2020)    Ramonita Harper is a 67year old  female who was most recently seen in consultation with Dr. Irena Mercado on 07/01/2020. Her medical history includes ex heavy smoker quit in 2000, PVD with claudication, bilateral CEA's,  HTN, HLD, RLS, CAD s/p PCI in 2011, R carpal tunnel release, chronic back pain s/p injections, cervical discectomy 2002, PUD, and anemia. SUBJECTIVE: Denies SOB and CP. Woke up feeling nauseous without emesis -->worsened after eating oatmeal this am.     OBJECTIVE: Sitting up in a chair at the bedside. On 2 L NC (no prior home O2). 24 hour interim history:  Net output +6 L. 9.4ml/kg over 12 hour shift. SCr 0.7. K+ 3.2; Mg++ 1.6  9/21/2020 0909 Brief episode of AF with RVR (lasting ~ 3.8 seconds) spontaneously converted to SR.       HOME MEDICATIONS:  Prior to Admission medications    Medication Sig Start Date End Date Taking? Authorizing Provider   metoprolol succinate (TOPROL XL) 100 MG extended release tablet Take 1 tablet by mouth daily 7/3/20  Yes Miguel A Mix MD   amLODIPine [facility]) 5 MG tablet Take 1 tablet by mouth daily 7/3/20  Yes Miguel A Mix MD   gabapentin (NEURONTIN) 300 MG capsule Take 300 mg by mouth 3 times daily.    Yes Historical Provider, MD   Tofacitinib Citrate ER (XELJANZ XR) 11 MG TB24 Take 11 mg by mouth Daily   Yes Historical Provider, MD   cilostazol (PLETAL) 100 MG tablet Take 1 tablet by mouth 2 times daily 6/4/20 9/3/20 Yes Natalie Goodson MD   latanoprost (XALATAN) 0.005 % ophthalmic solution INSTILL 1 DROP INTO BOTH EYES EVERY DAY 2/14/20  Yes Dylan Díaz, DO   rosuvastatin (CRESTOR) 10 MG tablet TAKE 1 TABLET BY MOUTH EVERY DAY 12/17/19  Yes Tyrel Díaz, DO   aspirin 81 MG EC tablet TAKE 1 TABLET BY MOUTH DAILY 7/19/19  Yes Arcdaio GARG Jeni Kahn MD   brinzolamide (AZOPT) 1 % ophthalmic suspension Place 1 drop into the left eye 3 times daily 9/17/18  Yes Alek Díaz DO   hydroxychloroquine (PLAQUENIL) 200 MG tablet Take 200 mg by mouth 2 times daily TAKES ONLY DAILY 8/9/17  Yes Historical Provider, MD   aspirin 325 MG EC tablet Take 1 tablet by mouth daily 8/9/20   Historical Provider, MD   triamcinolone (KENALOG) 0.1 % cream Apply 1 applicator topically 2 times daily 8/4/20   Historical Provider, MD   Misc.  Devices (ROLLATOR ULTRA-LIGHT) MISC 1 Device by Does not apply route continuous 2/11/20   Tyrel Díaz,    loratadine (CLARITIN) 10 MG tablet TAKE ONE TABLET BY MOUTH EVERY DAY. 12/2/19   Guadalupe Díaz,    Multiple Vitamins-Minerals (VITAMIN D3 COMPLETE PO) Take 2,000 Units by mouth    Historical Provider, MD   Omega-3 Fatty Acids (FISH OIL) 1000 MG CPDR Take 1,000 mg by mouth daily STOP PREOP MED    Historical Provider, MD   famotidine (PEPCID) 40 MG tablet Take 1 tablet by mouth daily  Patient taking differently: Take 40 mg by mouth daily TAKES PRN 11/4/19   Robin Go MD   Cholecalciferol (VITAMIN D) 2000 units TABS tablet Take 2,000 Units by mouth daily  7/13/17   Historical Provider, MD       CURRENT MEDICATIONS:      Current Facility-Administered Medications:     potassium chloride (KLOR-CON M) extended release tablet 40 mEq, 40 mEq, Oral, Once, Monika Spivey MD    prochlorperazine (COMPAZINE) injection 5 mg, 5 mg, Intravenous, Q6H PRN, Monika Spivey MD    melatonin tablet 6 mg, 6 mg, Oral, Nightly PRN, Monika Spivey MD    aspirin chewable tablet 81 mg, 81 mg, Oral, Daily, Yumiko Maza MD, 81 mg at 09/21/20 0912    bumetanide (BUMEX) injection 1 mg, 1 mg, Intravenous, Daily, Nicole Smith MD, 1 mg at 09/21/20 0912    metoprolol tartrate (LOPRESSOR) tablet 50 mg, 50 mg, Oral, BID, Artemio Rosenthal MD, 50 mg at 09/21/20 0912    amLODIPine (NORVASC) tablet 5 mg, 5 mg, Oral, Daily, Alexandrea Boswell MD, 5 mg at 09/21/20 0912    dorzolamide (TRUSOPT) 2 % ophthalmic solution 1 drop, 1 drop, Left Eye, TID, Alexandrea Boswell MD, 1 drop at 09/21/20 0912    latanoprost (XALATAN) 0.005 % ophthalmic solution 1 drop, 1 drop, Both Eyes, Nightly, Alexandrea Boswell MD, 1 drop at 09/20/20 2354    sodium chloride flush 0.9 % injection 10 mL, 10 mL, Intravenous, 2 times per day, Alexandrea Boswell MD, 10 mL at 09/21/20 0912    sodium chloride flush 0.9 % injection 10 mL, 10 mL, Intravenous, PRN, Alexandrea Boswell MD, 10 mL at 09/20/20 1739    acetaminophen (TYLENOL) tablet 650 mg, 650 mg, Oral, Q4H PRN, Alexandrea Boswell MD    oxyCODONE-acetaminophen (PERCOCET) 5-325 MG per tablet 1 tablet, 1 tablet, Oral, Q4H PRN, 1 tablet at 09/21/20 0214 **OR** [DISCONTINUED] oxyCODONE-acetaminophen (PERCOCET) 5-325 MG per tablet 2 tablet, 2 tablet, Oral, Q4H PRN, Alexandrea Boswell MD    labetalol (NORMODYNE;TRANDATE) injection 10 mg, 10 mg, Intravenous, Q1H PRN, Shannen Rivera MD    ipratropium-albuterol (DUONEB) nebulizer solution 1 ampule, 1 ampule, Inhalation, Q4H WA, Alexandrea Boswell MD, 1 ampule at 09/20/20 1340    albuterol (PROVENTIL) nebulizer solution 2.5 mg, 2.5 mg, Nebulization, Q6H PRN, Damon Morales DO, 2.5 mg at 09/17/20 2343      ALLERGIES:  Iodine; Macrodantin [nitrofurantoin]; Prednisone; Sulfa antibiotics; and 5-alpha reductase inhibitors      REVIEW OF SYSTEMS:     · Constitutional: + Fatigue. Denies chills, night sweats. Denies significant weight loss or weight gain. · HEENT: Denies headaches, nose bleeds, rhinorrhea, sore throat. Denies blurred vision. Denies dysphagia, odynophagia. · Musculoskeletal: Denies falls, pain to BLE with ambulation. Denies muscle weakness. · Neurological: Denies dizziness and lightheadedness, numbness and tingling. Denies focal neurological deficits.   · Cardiovascular: Denies chest pain, palpitations, diaphoresis. Denies dizziness, syncope. Denies PND, orthopnea. +BLE edema \"improving. \"  · Respiratory: Denies shortness of breath at rest or with exertion. Denies cough, hemoptysis. · Gastrointestinal: Denies heartburn, nausea/vomiting, diarrhea and constipation, black/bloody, and tarry stools. PHYSICAL EXAM:   BP (!) 146/67   Pulse 88   Temp 98.3 °F (36.8 °C) (Temporal)   Resp 16   Ht 5' (1.524 m)   Wt 175 lb 9.6 oz (79.7 kg)   SpO2 92%   BMI 34.29 kg/m²   CONST: Well developed, obese elderly female who appears stated age. Awake, alert and cooperative. No apparent distress  HEENT:   Head- Normocephalic, atraumatic   Eyes- Conjunctivae pink, anicteric  Throat- Oral mucosa pink and moist  Neck-  No stridor, trachea midline, NRB on, JVD not assessed. No carotid bruit  CHEST: Chest symmetrical and non-tender to palpation. No accessory muscle use or intercostal retractions  RESPIRATORY:  Lung sounds - clear throughout anterior and lateral fields. On supplemental O2.   CARDIOVASCULAR:     Heart Inspection- shows no noted pulsations  Heart Palpation- no heaves or thrills; PMI is non-displaced   Heart Ausculation- Regular rate and rhythm, no murmur. No s3, s4 or rub   PV: trace pedal lower extremity edema. No varicosities. Pedal pulses palpable, no clubbing or cyanosis   ABDOMEN: Soft, obese. Bowel sounds present. No palpable masses no organomegaly; no abdominal bruit  MS: Good muscle strength and tone. No atrophy or abnormal movements. : Perez with clear yellow urine. SKIN: Warm and dry no statis dermatitis or ulcers   NEURO / PSYCH: Oriented to person, place and time. Speech clear and appropriate. Follows all commands.  Pleasant affect          DATA:    Telemetry: SR.     Diagnostic:      Intake/Output Summary (Last 24 hours) at 9/21/2020 1120  Last data filed at 9/21/2020 0645  Gross per 24 hour   Intake 0 ml   Output 1350 ml   Net -1350 ml       Labs:   CBC:   Recent Labs     09/20/20  4925 SR.   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 · Acute on chronic HFpEF: decompensated. ProBNP. Small left sided pleural effusion. Net + 6L. · CAD with history of distal RCA stenting 2011 and negative stress in 07/2020 with normal EF  · Acute hypoxic and hypercapnic respiratory failure   · Restrictive Lung disease (PFT 8/2020--> mild to moderate ventilatory restriction with mild impairment in gas transfer)   · Muscular deconditioning   · Kyphosis   · Bilateral carotid disease s/p bilateral CEA's. Hx CVA  · Ex smoker quit in 2000  · HTN  · HLD   · Hx cervical diskectomy   · Chronic lower back with history of injections  · Arthritis on Plaquenil  · RLS   · Thrombocytopenia: 98 -->100 -->105  · Hypokalemia: K+ 3.2 (today)  · Hypomagnesemia: Mg++ 1.6    · Anemia of chronic disease: Hgb 7.9--7.6 (today)  · Hypoalbuminemia                       RECOMMENDATIONS:  -Due to brief episode of AF with RVR (lasting ~3.8 seconds)-->with spontaneous conversion to SR -- would recommend [address] if no active bleeding or thrombocytopenia due to high CVA risk. -Recommend 30 day event monitor upon discharge.   -Continue Lopressor 50 mg BID  -Supplement K+ and Mg++  -Continue IV diuresis: Net + 6 L since admission.   -Start Daily Potassium supplementation while IV diuresis   -Consider transfusion for Hgb < 8  -Further recommendations pending the above clinical course. [facility] Cardiology    Electronically signed by GRACE Zendeajs - CNP on 9/21/2020 at 11:20 AM     Chart reviewed, patient seen and examined. Case discussed with GRACE Baca. Plan as outlined above. Patient paroxysmal atrial fibrillation is probably triggered by hypokalemia and hypomagnesemia.

## 2020-09-21 NOTE — PROGRESS NOTES
Subjective:  Feeling better   No CP or SOB  No fever or chills   No uncontrolled pain  No vomiting or diarrhea     Objective:    BP (!) 146/67   Pulse 88   Temp 98.3 °F (36.8 °C) (Temporal)   Resp 16   Ht 5' (1.524 m)   Wt 175 lb 9.6 oz (79.7 kg)   SpO2 92%   BMI 34.29 kg/m²     24HR INTAKE/OUTPUT:      Intake/Output Summary (Last 24 hours) at 9/21/2020 1102  Last data filed at 9/21/2020 0645  Gross per 24 hour   Intake 0 ml   Output 1350 ml   Net -1350 ml       General appearance: NAD, conversant  Neck: FROM, supple   Lungs: Clear bilaterally no wheezes, no rhonchi, no crackles  CV: RRR, no MRGs; normal carotid upstroke and amplitude without Bruits  Abdomen: Soft, non-tender; no masses or HSM  Extremities: No edema, no cyanosis, no clubbing  Skin: Intact no rash, no lesions, no ulcers    Psych: Alert and oriented normal affect  Neuro: Nonfocal  Most Recent Labs  Lab Results   Component Value Date    WBC 2.9 (L) 09/21/2020    HGB 7.6 (L) 09/21/2020    HCT 24.3 (L) 09/21/2020     (L) 09/21/2020     09/21/2020    K 3.2 (L) 09/21/2020     09/21/2020    CREATININE 0.7 09/21/2020    BUN 8 09/21/2020    CO2 28 09/21/2020    GLUCOSE 101 (H) 09/21/2020    ALT 10 09/20/2020    AST 20 09/20/2020    INR 1.1 09/04/2020    TSH 1.670 09/11/2019    LABA1C 5.1 09/11/2019     Recent Labs     09/21/20  0500   MG 1.6     Lab Results   Component Value Date    CALCIUM 8.5 (L) 09/21/2020    PHOS 4.0 09/21/2020        XR CHEST PORTABLE   Final Result   No interval change               VL JAMEE BILATERAL LIMITED 1-2 LEVELS   Final Result      XR CHEST PORTABLE   Final Result   from recent postop in the abdomen midline.  IMPRESSION:      Cardiomegaly with mild interstitial pulmonary edema which was not seen   on the prior radiograph      Small left-sided pleural effusion      Support lines and tubes are in satisfactory position         XR CHEST PORTABLE   Final Result      Nasogastric tube follows the expected contours of the esophagus into   stomach with distal tip overlying the left upper quadrant of the   abdomen. Left subclavian central venous catheter terminates over the superior   vena cava. Small left pleural effusion appears present. XR CHEST PORTABLE   Final Result   Mild opacity at the lung bases which is likely atelectasis. XR CHEST PORTABLE   Final Result      Newly placed left subclavian central venous catheter terminates over   the superior vena cava. No evidence of pneumothorax. Nasogastric tube follows the expected contours of the esophagus into   stomach with distal tip overlying the left upper quadrant of the   abdomen and distal sideholes overlying the esophagogastric junction. Worsening interstitial infiltrates. Assessment    Principal Problem: Atherosclerosis of native arteries of extremity with rest pain ([address]  Active Problems:    Hypotension    Anemia    CAD (coronary artery disease)    Aorto-iliac atherosclerosis (HCC)    Atherosclerosis of native arteries of extremities with rest pain, left leg (HCC)    Restrictive lung disease    Hypomagnesemia    Acute respiratory failure with hypoxia and hypercapnia (HCC)  Resolved Problems:    * No resolved hospital problems. *      Plan:   67 y.o. female patient of Raymundo Sandhoff, APRN - CNP history of PAD, CAD, carotid stenosis, CVA, GERD who presents with PAD status post aorto to right common iliac and aorto to left external iliac bypass 9/15.        Supplemental O2 wean as tolerated  BiPAP PRN and at bedtime  Nebulizers-  Replace electrolytes  IV fluid resuscitation completed  Diuresis-IV Bumex  Pain control  Bowel regimen  Pressors weaned off  Anemia-iron studies most consistent with anemia of chronic disease  Transfuse 1 Unit PRBC  B12 and folate adequate  Check LDH , retic, haptoglobin  Pulmonology Consult appreciated   Cardiology Consult appreciated   Medications for other co morbidities cont as appropriate w dosage adjustments as necessary     Thank you for allowing me to participate in the care of this patient.        Electronically signed by Desean Chavez MD on 9/21/2020 at 11:02 AM

## 2020-09-21 NOTE — PROGRESS NOTES
Physical Therapy  Treatment Note    Name: Izzy Ingram  :   MRN: [id number]    Referring Provider:  Sarita Dunlap MD    Date of Service: 2020    Evaluating PT:  Satish Steven, PT, DPT [id number]    Room #:  -S  Diagnosis:  Atherosclerosis; LLE pain  Precautions: Falls, BiPAP  Procedure/Surgery:  9/15 Aorta to right common iliac and aorta to left external iliac bypass  PMHx/PSHx:  CAD, CVA, Glaucoma, HLD, HTN, PVD  Equipment Needs:  TBD    SUBJECTIVE:    Pt lives alone in a 2nd floor apartment with 3 stairs to enter and 1 rail. Once inside, 8 steps and 1 rail. Pt ambulated with SPC PTA. Marcello Santacruz aides 2 days/week that assist with ADLs. Daughters also able to assist PRN. OBJECTIVE:   Initial Evaluation  Date: 20 Treatment   20 Short Term/ Long Term   Goals   AM-PAC 6 Clicks  88/82    Was pt agreeable to Eval/treatment? Yes Yes    Does pt have pain? 8/10 surgical site No c/o pain    Bed Mobility  Rolling: NT  Supine to sit: MaxA  Sit to supine: NT  Scooting: MaxA Rolling: Min A   Supine to sit: Mod A  Sit to supine: Max A   Scooting: Min A to EOB  Mod Independent   Transfers Sit to stand: ModA   Stand to sit: ModA  Stand pivot: ModA with [employer] Sit to stand: Min A  Stand to sit: Min A  Stand pivot: Min A using [employer] Independent with [employer]   Ambulation   3 feet with ModA with [employer] 15 feet x2 reps using [employer] with [employer] A >150 feet with Mod Independent with [employer]   Stair negotiation: ascended and descended NT NT >8 steps with 1 rail Mod Independent   ROM BUE:  Defer to OT note  BLE:  WNL BLE: WNL    Strength BUE:  Defer to OT note  BLE:  4-/5 BLE: 4-/5 Increase by 1/3 MMT grade   Balance Sitting EOB:  Romel  Dynamic Standing:  ModA with [employer] Sitting EOB:  SBA  Dynamic Standing:  Min A with [employer] Sitting EOB:  Independent  Dynamic Standing:   Mod Independent with [employer]     Pt is A & O x 4  Sensation:  Pt denies numbness and tingling to extremities  Edema:  Mild edema noted in BLE    Therapeutic Exercises:     Seated Marches, LAQs and APs 2x10 reps BLE   STS 3 reps    Patient education  Pt educated on benefits of OOB activity, safety with mobility and effective breathing. Patient response to education:   Pt verbalized understanding Pt demonstrated skill Pt requires further education in this area   Yes  Partial  yes     ASSESSMENT:    Comments:  Pt cleared by RN prior to treatment. Pt was received supine and agreeable to treatment with encouragement. Pt c/o fatigued throughout session due to being OOB and sitting in bedside chair throughout day. Pt sat EOB >8-10 minutes due to reporting dizziness and completed seated TE. Encouragement needed for transfer and amb. Pt declined amb outside of room and amb with very slow gait speed and shuffling gait. FFP and downward gaze noted. Pt c/o dizziness returning during amb. Pt was assisted back to supine with assist for her trunk and BLE. Pt was repositioned and left with call button within reach and all needs met. Bed alarm reset. Pt would benefit from continued therapy services at discharge to improve strength, balance, functional tolerance and reduce risk for falls to increase functional independence and promote return to PLOF. Treatment:  Patient practiced and was instructed in the following treatment:     Bed mobility training - pt given verbal and tactile cues to facilitate proper sequencing and safety during rolling and supine<>sit as well as provided with physical assistance to complete task    Sitting Balance EOB for improved postural awareness, upright tolerance, effective breathing and decreased in light headedness.  Seated TE completed as noted above.  Transfer training with VCs for hand placement, sequencing and technique. Physical assist to complete task and correct unsteadiness upon standing.  Standing balance retraining with VCs for upright posture and forward gaze. Physical assist to correct unsteadiness.     · Gait training- pt was given verbal and tactile cues to facilitate upright posture, increased step height and body positioning inside walker during ambulation as well as provided with physical assistance to complete task.  Patient education provided with focus on upright tolerance, safety, correcting LOB and benefits of participating with therapy. PLAN:    Patient is making fair progress towards established goals. Will continue with current POC.       Time in  1500  Time out  1530    Total Treatment Time  30 minutes     CPT codes:  [] Gait training 46655 - minutes  [] Manual therapy 10029 - minutes  [x] Therapeutic activities 55105 30 minutes  [] Therapeutic exercises 77949 - minutes  [] Neuromuscular reeducation 69603 - minutes    Glen Arshad, PT, DPT  License [id number]

## 2020-09-21 NOTE — PROGRESS NOTES
Vascular Surgery Progress Note    Pt is being seen in f/u today regarding rest pain, bypass    Subjective  Pt s/e. Good response to diuretic. Breathing better, on 4L NC. Ambulated some in the room today. Brief episode of Afib with RVR this AM. Cardiology reconsulted. Not much appetite but tolerating po.  + flatulence. No BM. Denies emesis. Pain is controlled.     Current Medications:      prochlorperazine, melatonin, sodium chloride flush, acetaminophen, oxyCODONE-acetaminophen **OR** [DISCONTINUED] oxyCODONE-acetaminophen, labetalol, albuterol    magnesium sulfate  2 g Intravenous Once    [START ON 9/22/2020] potassium chloride  20 mEq Oral Daily with breakfast    aspirin  81 mg Oral Daily    bumetanide  1 mg Intravenous Daily    metoprolol tartrate  50 mg Oral BID    amLODIPine  5 mg Oral Daily    dorzolamide  1 drop Left Eye TID    latanoprost  1 drop Both Eyes Nightly    sodium chloride flush  10 mL Intravenous 2 times per day    ipratropium-albuterol  1 ampule Inhalation Q4H WA      PHYSICAL EXAM:    BP (!) 143/59   Pulse 72   Temp 97.8 °F (36.6 °C) (Oral)   Resp 18   Ht 5' (1.524 m)   Wt 175 lb 9.6 oz (79.7 kg)   SpO2 94%   BMI 34.29 kg/m²     Intake/Output Summary (Last 24 hours) at 9/21/2020 1247  Last data filed at 9/21/2020 1241  Gross per 24 hour   Intake 311 ml   Output 2350 ml   Net -2039 ml        Gen awake and alert  CVS S1S2  Resp + excursion   4L O2  Abd soft, incision c/d/i, incisional ttp, slight distension  R LE DP and PT biphasic  L LE DP and PT weakly biphasic  LABS:    Lab Results   Component Value Date    WBC 2.9 (L) 09/21/2020    HGB 7.6 (L) 09/21/2020    HCT 24.3 (L) 09/21/2020     (L) 09/21/2020    PROTIME 12.0 09/04/2020    INR 1.1 09/04/2020    APTT 29.5 09/04/2020    K 3.2 (L) 09/21/2020    BUN 8 09/21/2020    CREATININE 0.7 09/21/2020     A/P s/p Aorotbiiliac bypass for rest pain 9/15/20  Vasc Exam stable  Neuro pain well controlled  CVS- brief episode of Afib with RVR this AM, cardiology reconsulted  Resp encourage smi   Volume overload - continue diuresis   Duonebs  FEN/Nut tolerating po but no appetite   adequate uo, cr stable  Heme hgb stable, platelets stable  DVT Risk start subq lovenox today   PUD Risk diet  Pt ot     Plan reviewed with Dr. Christian Shearer.      Brenden Mendez

## 2020-09-21 NOTE — CARE COORDINATION
SOCIAL WORK/CASEMANAGEMENT TRANSITION OF CARE [id number] Corrine Tamayo, [address], [place], [phone]): I met with pt in the room after PT saw pt. Pt said she is going home on discharge and that [facility] will increase her hours and set up hhc of PT and OT. Pt lives alone. She said that her daughter helps a times. Pt has a fww as well and uses it. She has visiting physicians and home care aides thru [facility]. She is on the [employer] program and has services through [facility]. Pt has an aide that comes in twice a week, Tues&Thursday's and is there for about two hours to assist with chores as well as running errands and assisting with showers. Pt has a wheeled walker, cane, shower chair, CR,ERS unit and has a grab bar on the tub. Belen joseph( [phone])  from [facility] was called and she will talk with pt via phone about increasing services. She is concerned about pt returning home. Has emergency response system. Pt refused meal delivery service from her cm at [facility]. Per ar we are to use [facility] for any home needs if available which was in nov. 2019.  OT [facility] is 11/24 waiting for PT Logan Hall  9/21/2020

## 2020-09-21 NOTE — PROGRESS NOTES
Tone: WNL   Edema: [facility]     Functional Assessment    Initial Eval Status  Date: 9/18 Treatment Status  Date: 9/21/20 STG=LTG  5-14  days    Feeding Min A  Set up- using R hand to scoop ice chips from cup.  DNT  Julian per previous level         Mod I with AE as needed  while seated up in chair to increase activity tolerance once cleared for diet .      Grooming Max A  Decreased functional reach-Bipap and B UE deformities   maxA  Pt washed face, requiring assistance to pao deodorant due to limited  and ROM of BUE's                       Mod A   while seated supported demonstrating G knowledge of adapted techniques as needed.       UB dressing/bathing Dep modA  Pt completed sponge bathing task supine in bed, with pt able to wash of UB, cueing throughout, assistance to wash underarms and pao of hospital gown managing of lines                       Mod A         LB dressing/bathing Dep  Dependent  Pt completed sponge bathing task supine in bed, with pt requiring assistance to wash of LB, violette area and wash of buttocks once OOB and standing. Dependent to pao socks due to limited  of B hands                       Max A   using AE as needed for safe reach/ energy conservation        Toileting Dep  Dependent  Pt having of ann catheter                       Mod A      Bed Mobility  Supine to sit: Max A with HOB elevated     Sit to supine: NT  modA  Supine<>EOB                       SBA  in prep of ADL tasks & transfers   Functional Transfers Sit to stand:  Mod A     Stand to sit: Mod A Julian  Sit to Stand  Stand to [place] [employer] Transfer EOB<>Chair                       SBA  sit<>stand/functional bathroom transfers using AD/DME as needed for balance and safety   Functional Mobility Mod A WW   SPT to chair (limited due to bipap)  2nd person assist with [employer] management Julian  Pt ambulated short steps during SPT with w.w.                       SBA   functional/bathroom mobility using AD as needed & demonstrating G

## 2020-09-21 NOTE — PROGRESS NOTES
Kevin Horton M.D.,[facility]  Carole Cruz D.O., F.A.C.O.I., Joie Parker M.D. Rodriguez Hopson M.D., Aj Marie M.D. Efra Bull D.O. Daily Pulmonary Progress Note    Patient:  Manoj Ceja 67 y.o. female MRN: [id number]     Date of Service: 9/21/2020        Subjective      Patient awake and alert sitting up in bed. She is doing much better, oxygenation has improved down to 3 /L min. Up to chair eating lunch. Objective   Vitals: BP (!) 143/59   Pulse 72   Temp 97.8 °F (36.6 °C) (Oral)   Resp 18   Ht 5' (1.524 m)   Wt 175 lb 9.6 oz (79.7 kg)   SpO2 94%   BMI 34.29 kg/m²     I/O:    Intake/Output Summary (Last 24 hours) at 9/21/2020 1443  Last data filed at 9/21/2020 1241  Gross per 24 hour   Intake 431 ml   Output 2350 ml   Net -1919 ml       CURRENT MEDS :  Scheduled Meds:   [START ON 9/22/2020] potassium chloride  20 mEq Oral Daily with breakfast    enoxaparin  30 mg Subcutaneous Daily    sodium chloride  20 mL Intravenous Once    aspirin  81 mg Oral Daily    bumetanide  1 mg Intravenous Daily    metoprolol tartrate  50 mg Oral BID    amLODIPine  5 mg Oral Daily    dorzolamide  1 drop Left Eye TID    latanoprost  1 drop Both Eyes Nightly    sodium chloride flush  10 mL Intravenous 2 times per day    ipratropium-albuterol  1 ampule Inhalation Q4H WA       Continuous Infusions:      PRN Meds:  prochlorperazine, melatonin, sodium chloride flush, acetaminophen, oxyCODONE-acetaminophen **OR** [DISCONTINUED] oxyCODONE-acetaminophen, labetalol, albuterol      Physical Exam:    General:  Awake, alert, oriented X 3. Well developed, well nourished, well groomed. No apparent distress. HEENT:  Normocephalic, atraumatic. Pupils equal, round, reactive to light. 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  Range 9/18/2020 04:36 9/18/2020 07:08 9/19/2020 03:42   Sodium Latest Ref Range: 132 - 146 mmol/L 147 (H)  145   Potassium Latest Ref Range: 3.5 - 5.0 mmol/L 4.7  3.9   Chloride Latest Ref Range: 98 - 107 mmol/L 115 (H)  110 (H)   CO2 Latest Ref Range: 22 - 29 mmol/L 21 (L)  23   BUN Latest Ref Range: 8 - 23 mg/dL 11  10   Creatinine Latest Ref Range: 0.5 - 1.0 mg/dL 0.7  0.6   Anion Gap Latest Ref Range: 7 - 16 mmol/L 11  12   Calcium, Ion Latest Ref Range: 1.15 - 1.33 mmol/L 1.18  1.32   GFR Non- Latest Ref Range: >=60 mL/min/1.73 >60  >60   GFR  Unknown >60  >60   Magnesium Latest Ref Range: 1.6 - 2.6 mg/dL 2.4  2.0   Glucose Latest Ref Range: 74 - 99 mg/dL 101 (H)  128 (H)   Calcium Latest Ref Range: 8.6 - 10.2 mg/dL 8.0 (L)  8.8   Phosphorus Latest Ref Range: 2.5 - 4.5 mg/dL 3.0  2.2 (L)   Total Protein Latest Ref Range: 6.4 - 8.3 g/dL 5.6 (L)  5.4 (L)   Pro-BNP Latest Ref Range: 0 - 450 pg/mL 7,895 (H)  6,286 (H)   Albumin Latest Ref Range: 3.5 - 5.2 g/dL 3.6  3.3 (L)   Alk Phos Latest Ref Range: 35 - 104 U/L 36  55   ALT Latest Ref Range: 0 - 32 U/L 7  10   AST Latest Ref Range: 0 - 31 U/L 28  25   Bilirubin Latest Ref Range: 0.0 - 1.2 mg/dL 0.7  0.9   WBC Latest Ref Range: 4.5 - 11.5 E9/L 5.2  4.6   RBC Latest Ref Range: 3.50 - 5.50 E12/L 2.67 (L)  2.61 (L)   Hemoglobin Quant Latest Ref Range: 11.5 - 15.5 g/dL 7.9 (L)  7.9 (L)   Hematocrit Latest Ref Range: 34.0 - 48.0 % 27.0 (L)  25.6 (L)   MCV Latest Ref Range: 80.0 - 99.9 fL 101.1 (H)  98.1   MCH Latest Ref Range: 26.0 - 35.0 pg 29.6  30.3   MCHC Latest Ref Range: 32.0 - 34.5 % 29.3 (L)  30.9 (L)   MPV Latest Ref Range: 7.0 - 12.0 fL 10.7  10.8   RDW Latest Ref Range: 11.5 - 15.0 fL 18.1 (H)  17.4 (H)   Platelet Count Latest Ref Range: 130 - 450 E9/L 85 (L)  98 (L)   Platelet Confirmation Unknown CONFIRMED  CONFIRMED         DIAGNOSTICS:     CXR 9/18  FINDINGS:      Heart is borderline prominent. There is left lower lobe infiltrate. There is some interstitial prominence and some cephalization of    pulmonary vascularity. No acute right-sided infiltrates are noted. Left-sided central line is appropriate. There has been removal of the    NG tube.           Impression    No interval change                Assessment:      1. S/p Aorto to common iliac bypass on right, external iliac bypass on the left (9/15/20)     2. Acute hypoxic and hypercapnic resp failure post-operative     3. Restrictive lung disease  *PFT (8/2020) mild to moderate ventilatory restriction with mild impairment in gas transfer     4. Hyperchloremic metabolic acidosis      5. Muscular deconditioning     6. kyphosis of cervicothoracic region     7. Hx of HTN, HLD, CAD, PAD, PVD      Plan:      Wean FiO2 for saturations above 92%, patient weaned down to 2 liters   BiPAP prn and qhs ,not using, refuses   Monitor I/O's closely patient still  Positive fluid balance, patient on Bumex 1 mg daily,    Received 9/20/2020 dose of  albumin 25 G followed by 0.5 mg  bumex   · Pulmonary recruitment maneuvers with IS, EZPAP  · aspiration precautions  · Bronchodilators q4 WA and prn  · Dvt/gi prophylaxis per general surgery   · PT/OT  · CXR in a.m. This plan of care was reviewed in collaboration with Dr. Laverne Vigil, APRN - CNP  2:48 PM  9/21/2020     I personally saw, examined, and cared for the patient. Labs, medications, radiographs reviewed. I agree with history exam and plans detailed in NP note.   Louise Zuniga MD

## 2020-09-21 NOTE — PROGRESS NOTES
Blood administered. Present at bedside while the first fifteen minutes of blood administering. VSS and no adverse reactions at this time. Will continue to monitor.

## 2020-09-22 ENCOUNTER — APPOINTMENT (OUTPATIENT)
Dept: GENERAL RADIOLOGY | Age: 75
DRG: 270 | End: 2020-09-22
Attending: SURGERY
Payer: COMMERCIAL

## 2020-09-22 LAB
ANION GAP SERPL CALCULATED.3IONS-SCNC: 14 MMOL/L (ref 7–16)
BUN BLDV-MCNC: 9 MG/DL (ref 8–23)
CALCIUM SERPL-MCNC: 9 MG/DL (ref 8.6–10.2)
CHLORIDE BLD-SCNC: 103 MMOL/L (ref 98–107)
CO2: 28 MMOL/L (ref 22–29)
CREAT SERPL-MCNC: 0.7 MG/DL (ref 0.5–1)
GFR AFRICAN AMERICAN: >60
GFR NON-AFRICAN AMERICAN: >60 ML/MIN/1.73
GLUCOSE BLD-MCNC: 100 MG/DL (ref 74–99)
HCT VFR BLD CALC: 29.2 % (ref 34–48)
HCT VFR BLD CALC: 29.3 % (ref 34–48)
HCT VFR BLD CALC: 29.3 % (ref 34–48)
HEMOGLOBIN: 9.3 G/DL (ref 11.5–15.5)
HEMOGLOBIN: 9.5 G/DL (ref 11.5–15.5)
IMMATURE RETIC FRACT: 23.2 % (ref 3–15.9)
MAGNESIUM: 1.9 MG/DL (ref 1.6–2.6)
MCH RBC QN AUTO: 29.7 PG (ref 26–35)
MCH RBC QN AUTO: 30 PG (ref 26–35)
MCHC RBC AUTO-ENTMCNC: 31.7 % (ref 32–34.5)
MCHC RBC AUTO-ENTMCNC: 32.4 % (ref 32–34.5)
MCV RBC AUTO: 92.4 FL (ref 80–99.9)
MCV RBC AUTO: 93.6 FL (ref 80–99.9)
PDW BLD-RTO: 17.1 FL (ref 11.5–15)
PDW BLD-RTO: 17.5 FL (ref 11.5–15)
PHOSPHORUS: 3.4 MG/DL (ref 2.5–4.5)
PLATELET # BLD: 137 E9/L (ref 130–450)
PLATELET # BLD: 147 E9/L (ref 130–450)
PMV BLD AUTO: 10.1 FL (ref 7–12)
PMV BLD AUTO: 10.2 FL (ref 7–12)
POTASSIUM SERPL-SCNC: 3.4 MMOL/L (ref 3.5–5)
RBC # BLD: 3.13 E12/L (ref 3.5–5.5)
RBC # BLD: 3.17 E12/L (ref 3.5–5.5)
RETIC HGB EQUIVALENT: 27.6 PG (ref 28.2–36.6)
RETICULOCYTE ABSOLUTE COUNT: 0.07 E12/L
RETICULOCYTE COUNT PCT: 2.2 % (ref 0.4–1.9)
SODIUM BLD-SCNC: 145 MMOL/L (ref 132–146)
WBC # BLD: 3 E9/L (ref 4.5–11.5)
WBC # BLD: 3.7 E9/L (ref 4.5–11.5)

## 2020-09-22 PROCEDURE — 36592 COLLECT BLOOD FROM PICC: CPT

## 2020-09-22 PROCEDURE — 85027 COMPLETE CBC AUTOMATED: CPT

## 2020-09-22 PROCEDURE — 2500000003 HC RX 250 WO HCPCS: Performed by: INTERNAL MEDICINE

## 2020-09-22 PROCEDURE — 6370000000 HC RX 637 (ALT 250 FOR IP): Performed by: SURGERY

## 2020-09-22 PROCEDURE — 94660 CPAP INITIATION&MGMT: CPT

## 2020-09-22 PROCEDURE — 2580000003 HC RX 258: Performed by: SURGERY

## 2020-09-22 PROCEDURE — 6370000000 HC RX 637 (ALT 250 FOR IP): Performed by: NURSE PRACTITIONER

## 2020-09-22 PROCEDURE — 2700000000 HC OXYGEN THERAPY PER DAY

## 2020-09-22 PROCEDURE — 99232 SBSQ HOSP IP/OBS MODERATE 35: CPT | Performed by: INTERNAL MEDICINE

## 2020-09-22 PROCEDURE — 84100 ASSAY OF PHOSPHORUS: CPT

## 2020-09-22 PROCEDURE — 97110 THERAPEUTIC EXERCISES: CPT

## 2020-09-22 PROCEDURE — 6370000000 HC RX 637 (ALT 250 FOR IP): Performed by: INTERNAL MEDICINE

## 2020-09-22 PROCEDURE — 6360000002 HC RX W HCPCS: Performed by: NURSE PRACTITIONER

## 2020-09-22 PROCEDURE — 85045 AUTOMATED RETICULOCYTE COUNT: CPT

## 2020-09-22 PROCEDURE — 71045 X-RAY EXAM CHEST 1 VIEW: CPT

## 2020-09-22 PROCEDURE — 80048 BASIC METABOLIC PNL TOTAL CA: CPT

## 2020-09-22 PROCEDURE — 2140000000 HC CCU INTERMEDIATE R&B

## 2020-09-22 PROCEDURE — 97530 THERAPEUTIC ACTIVITIES: CPT

## 2020-09-22 PROCEDURE — 83735 ASSAY OF MAGNESIUM: CPT

## 2020-09-22 RX ORDER — POTASSIUM CHLORIDE 20 MEQ/1
40 TABLET, EXTENDED RELEASE ORAL ONCE
Status: COMPLETED | OUTPATIENT
Start: 2020-09-22 | End: 2020-09-22

## 2020-09-22 RX ADMIN — DORZOLAMIDE HYDROCHLORIDE 1 DROP: 20 SOLUTION/ DROPS OPHTHALMIC at 09:03

## 2020-09-22 RX ADMIN — ACETAMINOPHEN 650 MG: 325 TABLET, FILM COATED ORAL at 11:47

## 2020-09-22 RX ADMIN — ASPIRIN 81 MG CHEWABLE TABLET 81 MG: 81 TABLET CHEWABLE at 09:03

## 2020-09-22 RX ADMIN — METOPROLOL TARTRATE 50 MG: 50 TABLET, FILM COATED ORAL at 21:01

## 2020-09-22 RX ADMIN — LATANOPROST 1 DROP: 50 SOLUTION OPHTHALMIC at 21:02

## 2020-09-22 RX ADMIN — DORZOLAMIDE HYDROCHLORIDE 1 DROP: 20 SOLUTION/ DROPS OPHTHALMIC at 14:24

## 2020-09-22 RX ADMIN — POTASSIUM CHLORIDE 20 MEQ: 1500 TABLET, EXTENDED RELEASE ORAL at 09:04

## 2020-09-22 RX ADMIN — DORZOLAMIDE HYDROCHLORIDE 1 DROP: 20 SOLUTION/ DROPS OPHTHALMIC at 21:02

## 2020-09-22 RX ADMIN — OXYCODONE AND ACETAMINOPHEN 1 TABLET: 5; 325 TABLET ORAL at 21:01

## 2020-09-22 RX ADMIN — Medication 10 ML: at 21:02

## 2020-09-22 RX ADMIN — AMLODIPINE BESYLATE 5 MG: 5 TABLET ORAL at 09:03

## 2020-09-22 RX ADMIN — ENOXAPARIN SODIUM 30 MG: 30 INJECTION SUBCUTANEOUS at 09:04

## 2020-09-22 RX ADMIN — OXYCODONE AND ACETAMINOPHEN 1 TABLET: 5; 325 TABLET ORAL at 01:50

## 2020-09-22 RX ADMIN — METOPROLOL TARTRATE 50 MG: 50 TABLET, FILM COATED ORAL at 09:03

## 2020-09-22 RX ADMIN — POTASSIUM CHLORIDE 40 MEQ: 1500 TABLET, EXTENDED RELEASE ORAL at 21:01

## 2020-09-22 RX ADMIN — BUMETANIDE 1 MG: 0.25 INJECTION INTRAMUSCULAR; INTRAVENOUS at 09:03

## 2020-09-22 RX ADMIN — Medication 10 ML: at 09:04

## 2020-09-22 ASSESSMENT — PAIN DESCRIPTION - ONSET: ONSET: ON-GOING

## 2020-09-22 ASSESSMENT — PAIN DESCRIPTION - PROGRESSION: CLINICAL_PROGRESSION: NOT CHANGED

## 2020-09-22 ASSESSMENT — PAIN SCALES - GENERAL
PAINLEVEL_OUTOF10: 0
PAINLEVEL_OUTOF10: 0
PAINLEVEL_OUTOF10: 7
PAINLEVEL_OUTOF10: 0
PAINLEVEL_OUTOF10: 7
PAINLEVEL_OUTOF10: 8
PAINLEVEL_OUTOF10: 0
PAINLEVEL_OUTOF10: 0
PAINLEVEL_OUTOF10: 6

## 2020-09-22 ASSESSMENT — PAIN DESCRIPTION - PAIN TYPE: TYPE: CHRONIC PAIN

## 2020-09-22 ASSESSMENT — PAIN - FUNCTIONAL ASSESSMENT: PAIN_FUNCTIONAL_ASSESSMENT: PREVENTS OR INTERFERES SOME ACTIVE ACTIVITIES AND ADLS

## 2020-09-22 ASSESSMENT — PAIN DESCRIPTION - LOCATION: LOCATION: BACK

## 2020-09-22 ASSESSMENT — PAIN DESCRIPTION - FREQUENCY: FREQUENCY: CONTINUOUS

## 2020-09-22 ASSESSMENT — PAIN DESCRIPTION - ORIENTATION: ORIENTATION: LOWER

## 2020-09-22 NOTE — PLAN OF CARE
Problem: Skin Integrity:  Goal: Absence of new skin breakdown  Description: Absence of new skin breakdown  Outcome: Met This Shift     Problem: Musculor/Skeletal Functional Status  Goal: Highest potential functional level  Outcome: Met This Shift     Problem: Musculor/Skeletal Functional Status  Goal: Absence of falls  Outcome: Met This Shift

## 2020-09-22 NOTE — PROGRESS NOTES
OT BEDSIDE TREATMENT NOTE      Date:2020  Patient Name: Manoj Ceja  MRN: [id number]  : 1945  Room: [address] [place] [address]     Per OT Eval:    Evaluating OT: Mauro Winkler OTR/SUDHEER [id number]     AM-PAC Daily Activity Raw Score:      Recommended Adaptive Equipment: TBA: ADL AE (arthritic adaptations for self care tasks)      Diagnosis: Atherosclerosis; LLE pain     Surgery/Procedures:  9/15 Aorta to right common iliac and aorta to left external iliac bypass      Pertinent Medical History: CAD, CVA, Glaucoma, HLD, HTN, PVD     Precautions:  Falls, BiPAP, ice chips only  (NPO)     Home Living: Pt lives alone  in a 2nd floor apt with 3+8 step(s) to enter and 1+2 rail(s)  Bathroom setup: tub/shower with seat; higher commode with rail  Equipment owned: home O2, [employer], [employer], shower seat     Prior Level of Function: Pt reports Mod I with ADLs; Assist with bathing. Pt has Marcello Santacrzu aide 2 days. wk for 2 hours per chart. Pt requires assist with IADLs. SPC for ambulation. Driving: no     Pain Level: Pt did not complain of pain this session        Cognition: A&O: 4/4    Follows 1-2 step commands appropriately with min redirection. Memory: fair              Comprehension fair              Problem solving: fair-              Judgement/safety: fair-                 Communication skills: WFL; difficult to understand due to Bipap              Vision: h/o glaucoma                     Glasses:reading                                                        Hearing: min [place]                RASS: 0  CAM-ICU: (NT) Delirium     UE Assessment:  Hand Dominance: Right [x]?   Left []?       ROM Strength STM goal: PRN   R/L UE  Limited shoulder ROM due to arthritis: gross abd; gross elbow flexion; gross lateral pinch- poor prehension due to digits in ulnar deviation  2-/5 proximal  3-/5 grasp Educate pt on jt protection techniques to maintain prehension for light ADLs         Sensation: No c/o numbness or tingling in extremities Tone: WNL   Edema: [facility]     Functional Assessment    Initial Eval Status  Date: 9/18 Treatment Status  Date: 9/22/20 STG=LTG  5-14  days    Feeding Min A  Set up- using R hand to scoop ice chips from cup.  DNT  Julian per previous level         Mod I with AE as needed  while seated up in chair to increase activity tolerance once cleared for diet .      Grooming Max A  Decreased functional reach-Bipap and B UE deformities  Per previous tx:   maxA  Pt washed face, requiring assistance to pao deodorant due to limited  and ROM of BUE's                       Mod A   while seated supported demonstrating G knowledge of adapted techniques as needed.       UB dressing/bathing Dep Per previous tx:  modA  Pt completed sponge bathing task supine in bed, with pt able to wash of UB, cueing throughout, assistance to wash underarms and pao of hospital gown managing of lines                       Mod A         LB dressing/bathing Dep Per previous tx:   Dependent  Pt completed sponge bathing task supine in bed, with pt requiring assistance to wash of LB, violette area and wash of buttocks once OOB and standing. Dependent to pao socks due to limited  of B hands                       Max A   using AE as needed for safe reach/ energy conservation        Toileting Dep  Dependent  Pt having of ann catheter                       Mod A      Bed Mobility  Supine to sit: Max A with HOB elevated     Sit to supine: NT maxA  EOB<>Supine                       SBA  in prep of ADL tasks & transfers   Functional Transfers Sit to stand:  Mod A     Stand to sit: Mod A Julian  Sit to Stand  Stand to [employer] Pivot Transfer Chair<>EOB                       SBA  sit<>stand/functional bathroom transfers using AD/DME as needed for balance and safety   Functional Mobility Mod A WW   SPT to chair (limited due to bipap)  2nd person assist with [employer] management Julian  Pt ambulated short steps during SPT with wNaawNaa                       SBSILVIANO functional/bathroom mobility using AD as needed & demonstrating G safety      Balance Sitting:     Static:  S    Dynamic:Min A  Standing: Mod A  Sitting:  SBA    Standing:  Julian S dynamic sitting balance; SBA dynamic standing balance  during ADL tasks & transfers   Endurance/Activity Tolerance    F tolerance with light activity.   Fair- G   tolerance with moderate activity/self care routine   Visual/  Perceptual    Grossly WFL;   H/o glaucoma                                         Treatment: Upon arrival pt seated upright in chair, agreeable to therapy. Attempted to have pt complete of ADL tasks, with pt denying stating of doing all that earlier. Pt then participated in BUE exercises for 10reps x2 in all planes, with rest breaks, focusing on increasing of ROM and prevention of contractures. Pt completed of multiple STS transfers with Julian, requesting to transfer to bed at end of session. Pt educated on safety and hand placement with transfers, safety with bed mobility, and ROM exercises to complete daily to increase of ROM and prevent contractures. At end of session, pt supine in bed, all tubes and lines intact, call light within reach. · Pt has made fair progress towards set goals.    · Continue with current plan of care focusing on increasing of independency with ADL tasks and transfers      Treatment Time In: 1:45pm          Treatment Time Out: 2:10pm             Treatment Charges: Mins Units   Ther Ex  07179 15 1   Manual Therapy 71110     Thera Activities 60874 10 1   ADL/Home Mgt [id number]     Neuro Re-ed [id number]     Group Therapy      Orthotic manage/training  72423     Non-Billable Time     Total Timed Treatment 25 2        Arely Ortega CARLIN/L [id number]

## 2020-09-22 NOTE — PROGRESS NOTES
Subjective:  Feeling better   No CP or SOB  No fever or chills   No uncontrolled pain  No vomiting or diarrhea     Objective:    BP (!) 141/66   Pulse 71   Temp 97.8 °F (36.6 °C) (Temporal)   Resp 20   Ht 5' (1.524 m)   Wt 175 lb 1.6 oz (79.4 kg)   SpO2 92%   BMI 34.20 kg/m²     24HR INTAKE/OUTPUT:      Intake/Output Summary (Last 24 hours) at 9/22/2020 1001  Last data filed at 9/22/2020 0645  Gross per 24 hour   Intake 1624.33 ml   Output 2575 ml   Net -950.67 ml       General appearance: NAD, conversant  Neck: FROM, supple   Lungs: Clear bilaterally no wheezes, no rhonchi, no crackles  CV: RRR, no MRGs; normal carotid upstroke and amplitude without Bruits  Abdomen: Soft, non-tender; no masses or HSM  Extremities: No edema, no cyanosis, no clubbing  Skin: Intact no rash, no lesions, no ulcers    Psych: Alert and oriented normal affect  Neuro: Nonfocal  Most Recent Labs  Lab Results   Component Value Date    WBC 3.0 (L) 09/22/2020    HGB 9.3 (L) 09/22/2020    HCT 29.2 (L) 09/22/2020    HCT 29.3 (L) 09/22/2020     09/22/2020     09/22/2020    K 3.4 (L) 09/22/2020     09/22/2020    CREATININE 0.7 09/22/2020    BUN 9 09/22/2020    CO2 28 09/22/2020    GLUCOSE 100 (H) 09/22/2020    ALT 10 09/20/2020    AST 20 09/20/2020    INR 1.1 09/04/2020    TSH 1.670 09/11/2019    LABA1C 5.1 09/11/2019     Recent Labs     09/22/20  0630   MG 1.9     Lab Results   Component Value Date    CALCIUM 9.0 09/22/2020    PHOS 3.4 09/22/2020        XR CHEST PORTABLE   Final Result   No interval change               VL JAMEE BILATERAL LIMITED 1-2 LEVELS   Final Result      XR CHEST PORTABLE   Final Result   from recent postop in the abdomen midline.  IMPRESSION:      Cardiomegaly with mild interstitial pulmonary edema which was not seen   on the prior radiograph      Small left-sided pleural effusion      Support lines and tubes are in satisfactory position         XR CHEST PORTABLE   Final Result      Nasogastric tube follows the expected contours of the esophagus into   stomach with distal tip overlying the left upper quadrant of the   abdomen. Left subclavian central venous catheter terminates over the superior   vena cava. Small left pleural effusion appears present. XR CHEST PORTABLE   Final Result   Mild opacity at the lung bases which is likely atelectasis. XR CHEST PORTABLE   Final Result      Newly placed left subclavian central venous catheter terminates over   the superior vena cava. No evidence of pneumothorax. Nasogastric tube follows the expected contours of the esophagus into   stomach with distal tip overlying the left upper quadrant of the   abdomen and distal sideholes overlying the esophagogastric junction. Worsening interstitial infiltrates. XR CHEST PORTABLE    (Results Pending)       Assessment    Principal Problem: Atherosclerosis of native arteries of extremity with rest pain ([address]  Active Problems:    Hypotension    Anemia    CAD (coronary artery disease)    Aorto-iliac atherosclerosis (HCC)    Atherosclerosis of native arteries of extremities with rest pain, left leg (HCC)    Restrictive lung disease    Hypomagnesemia    Acute respiratory failure with hypoxia and hypercapnia (HCC)  Resolved Problems:    * No resolved hospital problems. *      Plan:   67 y.o. female patient of GRACE Grullon - CNP history of PAD, CAD, carotid stenosis, CVA, GERD who presents with PAD status post aorto to right common iliac and aorto to left external iliac bypass 9/15.        Supplemental O2 wean as tolerated-- 92% on 2 L nasal cannula  BiPAP PRN and at bedtime  Nebulizers-  Replace electrolytes  IV fluid resuscitation completed  Diuresis-IV Bumex  Pain control  Bowel regimen  Pressors weaned off  Anemia-iron studies most consistent with anemia of chronic disease  Transfuse 1 Unit PRBC-hemoglobin 9.3 this morning  B12 and folate adequate  Check LDH , retic, haptoglobin-reviewed  Pulmonology Consult appreciated   Cardiology Consult appreciated   Medications for other co morbidities cont as appropriate w dosage adjustments as necessary   PT/OT-- moblize  DVT PPx  DC planning  Likely benefit from MANNY based on therapy scores    Thank you for allowing me to participate in the care of this patient.        Electronically signed by Wayne Baum MD on 9/22/2020 at 10:01 AM

## 2020-09-22 NOTE — PROGRESS NOTES
Physical Therapy  [facility]  Daily [facility] Note  NAME: Sasha Trivedi  : 1945  MRN: [id number]    Date of Service: 2020  PT treatment session attempted  PM. Pt supine in bed upon room entry, declining to participate reporting she just returned to bed prior to therapist entry. Pt continued to decline participation despite therapist encouragement. Will attempt again at a later time.      Bar Landis, PT, DPT  [phone]

## 2020-09-22 NOTE — PROGRESS NOTES
Comprehensive Nutrition Assessment    Type and Reason for Visit:  Initial(LOS)    Nutrition Recommendations/Plan: Recommend and start Ensure High Protein supplement BID to help meet increased nutritional needs from surgical wound healing. Nutrition Assessment:  Patients po intake has been sporadic, averaging ~50% of meals served since admission ; pt at further nutritional risk d/t increased needs from surgical wound healing (s/p Aorto-iliac Bypass Graft) ; will start ONS    Malnutrition Assessment:  Malnutrition Status: At risk for malnutrition (Comment)    Context:  Acute Illness     Findings of the 6 clinical characteristics of malnutrition:  Energy Intake:  1 - 75% or less of estimated energy requirements for 7 or more days  Weight Loss:  No significant weight loss     Body Fat Loss:  Unable to assess(data not available to assess at this time)     Muscle Mass Loss:  Unable to assess(data not available to assess at this time)    Fluid Accumulation:  No significant fluid accumulation     Strength:  Not Performed    Estimated Daily Nutrient Needs:  Energy (kcal):  8476-7639 (REE 1207 x 1.2 SF); Weight Used for Energy Requirements:  Current     Protein (g):  70-85 (1.5-1.8g/kg IBW); Weight Used for Protein Requirements:  Ideal        Fluid (ml/day):  4909-7057; Weight Used for Fluid Requirements:  [place]      Nutrition Related Findings:  +I&Os (+5.5 L), 2+ edema, A&O x 4, missing U/L dentures, hypoactive BS, rounded/tender abd, red/boggy heels, redness to buttocks      Wounds:  Surgical Wound(Incision x 1 noted to abd)       Current Nutrition Therapies:    DIET GENERAL;     Anthropometric Measures:  · Height: 5' (152.4 cm)  · [facility] 175 [facility] (79.4 kg[facility]   · Admission Body Weight: 169 lb (76.7 kg)(9/16/20, [facility])    · Usual Body Weight: 165 lb (74.8 kg)([facility]     · Ideal Body Weight: 100 lbs; % Ideal Body Weight 175 %   · BMI: 34.2   · BMI Categories: Obese Class

## 2020-09-22 NOTE — PROGRESS NOTES
Patient is chest discomfort, dyspnea or palpitations. She is lying flat without orthopnea. Blood pressure pressure is under control. Telemetry reveals sinus rhythm at 90 bpm with occasional PVCs. Potassium 3.4 and magnesium 1.9. Hematocrit 29.2 and hemoglobin 9.3. RECOMMENDATIONS:  -[facility] if no active bleeding due to high CVA risk. -Recommend 30 day event monitor upon discharge.   -Continue Lopressor 50 mg BID. -Supplement K+. -Consider transfusion for Hgb < 8.  -We will sign off. May call if needed.

## 2020-09-22 NOTE — PROGRESS NOTES
Isidro Saxena M.D.,[facility]  Rivka Nicole D.O., F.A.C.O.I., Brenda Douglas M.D. Min Arcos M.D., Josh Em M.D. Meghan Kennedy D.O. Daily Pulmonary Progress Note    Patient:  Lázaro Pearson 67 y.o. female MRN: [id number]     Date of Service: 9/22/2020        Subjective      Patient awake and alert sitting up in bed. She is doing much better, oxygenation has improved down to 3 /L min. Up to chair eating lunch. Diuresis tolerated 2.5 L urine out in past 24 hrs. Objective   Vitals: /66   Pulse 81   Temp 98 °F (36.7 °C) (Temporal)   Resp 17   Ht 5' (1.524 m)   Wt 175 lb 1.6 oz (79.4 kg)   SpO2 96%   BMI 34.20 kg/m²     I/O:    Intake/Output Summary (Last 24 hours) at 9/22/2020 1632  Last data filed at 9/22/2020 1312  Gross per 24 hour   Intake 1673.33 ml   Output 1575 ml   Net 98.33 ml       CURRENT MEDS :  Scheduled Meds:   potassium chloride  20 mEq Oral Daily with breakfast    enoxaparin  30 mg Subcutaneous Daily    aspirin  81 mg Oral Daily    bumetanide  1 mg Intravenous Daily    metoprolol tartrate  50 mg Oral BID    amLODIPine  5 mg Oral Daily    dorzolamide  1 drop Left Eye TID    latanoprost  1 drop Both Eyes Nightly    sodium chloride flush  10 mL Intravenous 2 times per day    ipratropium-albuterol  1 ampule Inhalation Q4H WA       Continuous Infusions:  None    PRN Meds:  prochlorperazine, melatonin, sodium chloride flush, acetaminophen, oxyCODONE-acetaminophen **OR** [DISCONTINUED] oxyCODONE-acetaminophen, labetalol, albuterol      Physical Exam:    General:  Awake, alert, oriented X 3. Well developed, well nourished, well groomed. No apparent distress. HEENT:  Normocephalic, atraumatic. Pupils equal, round, reactive to light. No scleral injection  Ng in place   Heart:  RRR, no murmurs, gallops, rubs  Lungs:  CTA bilaterally, bilat symmetrical expansion, no wheeze, rales, or rhonchi  Abdomen:   Bowel sounds present, soft, 98.5 % 92.0     DIAGNOSTICS:     CXR 9/22       Comparison: 18 September 2020         FINDINGS:    Stable central venous catheter on the left. Heart enlarged. Pulmonary    vascularity is moderately congested. There are lower lobe opacities    which likely represent atelectasis. These appear slightly improved.              Impression         1. Opacities at the lung bases appears slightly improved and these are    thought most likely related to atelectasis. 2. Mild CHF.                    Assessment:      1. S/p Aorto to common iliac bypass on right, external iliac bypass on the left (9/15/20)     2. Acute hypoxic and hypercapnic resp failure post-operative     3. Restrictive lung disease  *PFT (8/2020) mild to moderate ventilatory restriction with mild impairment in gas transfer     4. Hyperchloremic metabolic acidosis      5. Muscular deconditioning     6. kyphosis of cervicothoracic region     7. Hx of HTN, HLD, CAD, PAD, PVD      Plan:      Wean FiO2 -2 L nasal cannula for saturations above 92%    BiPAP prn and qhs , previously not using, refused   Monitor I/O's closely patient still  Positive fluid balance, patient on Bumex 1 mg daily, improving fluid status, 2.5 L urine out in past 24 hrs   Completed 9/20/2020 dose of  albumin 25 G followed by 0.5 mg  bumex   · Pulmonary recruitment maneuvers with IS, EZPAP  · aspiration precautions  · Bronchodilators q4 WA and prn  · Dvt/gi prophylaxis per general surgery   · PT/OT  · CXR today  · Recommend MANNY upon discharge    This plan of care was reviewed in collaboration with Dr. Ruslan Louis, APRN - CNP  4:32 PM  9/22/2020      Addendum:  CXR reviewed. Overall improved. Will switch bumex to oral in a.m. Encouraged Incentive spirometry and lung recruitment maneuvers. FiO2 has been weaned down to 2 l/min. I personally saw, examined, and cared for the patient. Labs, medications, radiographs reviewed.  I agree with history exam and plans detailed in NP note.   MD Sharmila Hernandez MD

## 2020-09-22 NOTE — PROGRESS NOTES
Vascular Surgery Progress Note    Pt is being seen in f/u today regarding rest pain, bypass    Subjective  Pt s/e. Weaning O2, on 2L NC. Xray improved this AM.  Not much appetite but tolerating po.  + flatulence. No BM. Denies nausea or emesis. Pain is controlled. Current Medications:      prochlorperazine, melatonin, sodium chloride flush, acetaminophen, oxyCODONE-acetaminophen **OR** [DISCONTINUED] oxyCODONE-acetaminophen, labetalol, albuterol    potassium chloride  20 mEq Oral Daily with breakfast    enoxaparin  30 mg Subcutaneous Daily    aspirin  81 mg Oral Daily    bumetanide  1 mg Intravenous Daily    metoprolol tartrate  50 mg Oral BID    amLODIPine  5 mg Oral Daily    dorzolamide  1 drop Left Eye TID    latanoprost  1 drop Both Eyes Nightly    sodium chloride flush  10 mL Intravenous 2 times per day    ipratropium-albuterol  1 ampule Inhalation Q4H WA      PHYSICAL EXAM:    /60   Pulse 71   Temp 97.6 °F (36.4 °C) (Oral)   Resp 20   Ht 5' (1.524 m)   Wt 175 lb 1.6 oz (79.4 kg)   SpO2 96%   BMI 34.20 kg/m²     Intake/Output Summary (Last 24 hours) at 9/22/2020 1144  Last data filed at 9/22/2020 0645  Gross per 24 hour   Intake 1624.33 ml   Output 2575 ml   Net -950.67 ml        Gen awake and alert  CVS S1S2  Resp + excursion   2L O2  Abd soft, incision c/d/i, incisional ttp, slight distension  R LE DP and PT biphasic  L LE DP and PT weakly biphasic  LABS:    Lab Results   Component Value Date    WBC 3.0 (L) 09/22/2020    HGB 9.3 (L) 09/22/2020    HCT 29.2 (L) 09/22/2020    HCT 29.3 (L) 09/22/2020     09/22/2020    PROTIME 12.0 09/04/2020    INR 1.1 09/04/2020    APTT 29.5 09/04/2020    K 3.4 (L) 09/22/2020    BUN 9 09/22/2020    CREATININE 0.7 09/22/2020     A/P s/p Aorotbiiliac bypass for rest pain 9/15/20  Vasc Exam stable  Neuro pain well controlled  CVS- No further Afib since 9/21. Cardiology following.    Resp encourage smi   Volume overload - continue diuresis   Duonebs  Xray improved   FEN/Nut tolerating po but no appetite   adequate uo, cr stable  Heme hgb stable, platelets stable  DVT Risk, continue lovenox sq  PUD Risk diet  Pt ot- encouraged ambulation    Plan reviewed with Dr. Leilani Oro.      Sarahy Okeefe    Pt seen and examined  Overall doing better    Remove ann 9/23/am  Remove TLC likely 9/23/am if they can get an iv    Hopeful for dsicharge later tjis week  Discussed extensively with patient importance of rehab she is now agreeable    Sydnee Wakefield

## 2020-09-22 NOTE — ADT AUTH CERT
Patient Demographics     Name  Patient ID  SSN  Gender Identity  Birth Date    Antonetta Siemens  [id number]    Female  [birth year] (67 yrs)    Address  Phone  Email  Employer     [address] [facility] [facility] [phone] (D)   [phone] (M)  --  [address]  Race  Occupation  Emp Status     [address] [employer]  --  Disabled     Reg Status  PCP  Date Last Verified  Next Review Date     Verified  Davian Dalal - [address] [facility]  [phone]  20     Admission Date  Discharge Date  Admitting Provider      09/15/20  --  Moshe Plummer MD      Marital Status  [facility]       Emergency Contact 1  Emergency Contact 2  Emergency Contact [address] [facility] ([facility])   [address] [place] [address] [facility]   [phone] ([address] [place] [address])  Kwasi Nelson ([facility])   [place]   [phone] ([address] [place] [address])  Amanda Lewis (W)   [phone] (H)    [facility] Account [de-identified]   CVG  Subscriber Name/Sex/Relation  Subscriber   Subscriber Address/Phone  Subscriber Emp/Emp Phone    4. [address] [facility]   [phone]  ASAYAJAIRA - Female   (Self)  1945  [address] [place] [address] [address] [facility]  [address]   [phone](V)  DISABLED    Utilization Reviews         Femoral Popliteal Bypass - Care Day 6 (2020) by Margarita Schmidt RN         Review Status  Review Entered    Completed  2020 16:39        Criteria Review       Care Day: 6 Care Date: 2020 Level of Care: Intermediate Care    Guideline Day 3    Clinical Status    (X) * No evidence of postoperative or surgical site infection    (X) * No evidence of distal arterial insufficiency    ( ) * Adequate ambulation [G]    (X) * Pain absent or managed    ( ) * Discharge plans and education understood    Activity    ( ) * Ambulatory or acceptable for next level of care    Routes    (X) * Oral hydration, medications, and diet    * Milestone    Additional Notes        Day 6       Vitals: BP     Review Status  Review Entered    Completed  9/21/2020 16:34        Criteria Review       Care Day: 5 Care Date: 9/19/2020 Level of Care: ICU    Guideline Day 2    Clinical Status    (X) * Procedure completed    (X) * Hemodynamic stability    (X) * Tolerates ambulation    (X) * No evidence of distal arterial insufficiency    Activity    (X) * Ambulatory    Routes    (X) IV fluids, medications    (X) * Liquid diet, advance as tolerated    Interventions    (X) Vascular checks    Medications    (X) * PCA absent [F]    * Milestone    Additional Notes    9/19    Day 5       VS-    /69   Pulse 81   Temp 98.1 °F (36.7 °C) (Temporal)   Resp 20   Ht 5' (1.524 m)   Wt 181 lb (82.1 kg)   SpO2 96%  6L       MEDS-    bumetanide 1 mg Intravenous Daily    · sodium chloride 20 mL Intravenous Once    · metoprolol tartrate 50 mg Oral BID    · amLODIPine 5 mg Oral Daily    · dorzolamide 1 drop Left Eye TID    · latanoprost 1 drop Both Eyes Nightly    · sodium chloride flush 10 mL Intravenous 2 times per day    · ipratropium-albuterol 1 ampule Inhalation Q4H WA    · aspirin 300 mg Rectal Daily    Albumin 25gm vpb x1    Bumex 0.5mg ivp x1    Percocet 1 tab x3 doses       Vascular note      Svt overnight.  Resolved with digoxin.  Remains on high flow nc but overall she is feeling a bit better today.  Not much appetite but tolerating po.  + flatulence.  Had bm.  Denies emesis.  Pain is controlled.      CREATININE 0.6 09/19/2020       A/P s/p Aorotbiiliac bypass for rest pain 9/15/20    · Vasc Exam stable    · Neuro pain well controlled    · CVS svt episode overnight - no in sinus after receiving digoxin    · Resp encourage smi    ·            Volume overload - received dose of bumex again    ·            Duonebs,    · FEN/Nut tolerating po but no appetite    ·  adequate uo, cr stable    · Heme hgb stable, platelets improving    · Ok to make asa suppository po          Pulmonary note    She is saturating 93-97% on 12 Liters. She did not wear her BiPAP last night. Went to SVT overnight, 500 digoxin was given per cardiology and it resolved.     Assessment:    1. S/p Aorto to common iliac bypass on right, external iliac bypass on the left (9/15/20)     2. Acute hypoxic and hypercapnic resp failure post-operative    3. Restrictive lung disease    *PFT (8/2020) mild to moderate ventilatory restriction with mild impairment in gas transfer     4. Hyperchloremic metabolic acidosis     5. Muscular deconditioning     6. kyphosis of cervicothoracic region    7.  Hx of HTN, HLD, CAD, PAD, PVD              Plan:    · Wean FiO2 for saturations above 92%    · BiPAP prn and qhs , although elie has declined to wear it the past 2 nights    · Continue Bumex 1 mg daily,    · Monitor I/o's    · Pulmonary recruitment maneuvers with IS, EZPAP    · aspiration precautions    · Bronchodilators q4 WA and prn    · Dvt/gi prophylaxis per general surgery

## 2020-09-23 LAB
ANION GAP SERPL CALCULATED.3IONS-SCNC: 11 MMOL/L (ref 7–16)
BUN BLDV-MCNC: 10 MG/DL (ref 8–23)
CALCIUM SERPL-MCNC: 8.7 MG/DL (ref 8.6–10.2)
CHLORIDE BLD-SCNC: 103 MMOL/L (ref 98–107)
CO2: 29 MMOL/L (ref 22–29)
CREAT SERPL-MCNC: 0.7 MG/DL (ref 0.5–1)
GFR AFRICAN AMERICAN: >60
GFR NON-AFRICAN AMERICAN: >60 ML/MIN/1.73
GLUCOSE BLD-MCNC: 92 MG/DL (ref 74–99)
HCT VFR BLD CALC: 29.4 % (ref 34–48)
HEMOGLOBIN: 9.2 G/DL (ref 11.5–15.5)
MAGNESIUM: 1.8 MG/DL (ref 1.6–2.6)
MCH RBC QN AUTO: 29.7 PG (ref 26–35)
MCHC RBC AUTO-ENTMCNC: 31.3 % (ref 32–34.5)
MCV RBC AUTO: 94.8 FL (ref 80–99.9)
PDW BLD-RTO: 17 FL (ref 11.5–15)
PHOSPHORUS: 3 MG/DL (ref 2.5–4.5)
PLATELET # BLD: 164 E9/L (ref 130–450)
PMV BLD AUTO: 10.6 FL (ref 7–12)
POTASSIUM SERPL-SCNC: 3.9 MMOL/L (ref 3.5–5)
RBC # BLD: 3.1 E12/L (ref 3.5–5.5)
SODIUM BLD-SCNC: 143 MMOL/L (ref 132–146)
WBC # BLD: 3.6 E9/L (ref 4.5–11.5)

## 2020-09-23 PROCEDURE — 6360000002 HC RX W HCPCS: Performed by: NURSE PRACTITIONER

## 2020-09-23 PROCEDURE — 97530 THERAPEUTIC ACTIVITIES: CPT

## 2020-09-23 PROCEDURE — 2500000003 HC RX 250 WO HCPCS: Performed by: INTERNAL MEDICINE

## 2020-09-23 PROCEDURE — 94660 CPAP INITIATION&MGMT: CPT

## 2020-09-23 PROCEDURE — 97116 GAIT TRAINING THERAPY: CPT

## 2020-09-23 PROCEDURE — 85027 COMPLETE CBC AUTOMATED: CPT

## 2020-09-23 PROCEDURE — 6370000000 HC RX 637 (ALT 250 FOR IP): Performed by: SURGERY

## 2020-09-23 PROCEDURE — 2140000000 HC CCU INTERMEDIATE R&B

## 2020-09-23 PROCEDURE — 80048 BASIC METABOLIC PNL TOTAL CA: CPT

## 2020-09-23 PROCEDURE — 2580000003 HC RX 258: Performed by: SURGERY

## 2020-09-23 PROCEDURE — 83735 ASSAY OF MAGNESIUM: CPT

## 2020-09-23 PROCEDURE — 97535 SELF CARE MNGMENT TRAINING: CPT

## 2020-09-23 PROCEDURE — 36415 COLL VENOUS BLD VENIPUNCTURE: CPT

## 2020-09-23 PROCEDURE — 6370000000 HC RX 637 (ALT 250 FOR IP): Performed by: NURSE PRACTITIONER

## 2020-09-23 PROCEDURE — 84100 ASSAY OF PHOSPHORUS: CPT

## 2020-09-23 PROCEDURE — 2700000000 HC OXYGEN THERAPY PER DAY

## 2020-09-23 RX ORDER — BUMETANIDE 1 MG/1
1 TABLET ORAL DAILY
Status: DISCONTINUED | OUTPATIENT
Start: 2020-09-24 | End: 2020-09-24 | Stop reason: HOSPADM

## 2020-09-23 RX ADMIN — ENOXAPARIN SODIUM 30 MG: 30 INJECTION SUBCUTANEOUS at 08:17

## 2020-09-23 RX ADMIN — AMLODIPINE BESYLATE 5 MG: 5 TABLET ORAL at 08:18

## 2020-09-23 RX ADMIN — ASPIRIN 81 MG CHEWABLE TABLET 81 MG: 81 TABLET CHEWABLE at 08:17

## 2020-09-23 RX ADMIN — BUMETANIDE 1 MG: 0.25 INJECTION INTRAMUSCULAR; INTRAVENOUS at 08:17

## 2020-09-23 RX ADMIN — OXYCODONE AND ACETAMINOPHEN 1 TABLET: 5; 325 TABLET ORAL at 01:34

## 2020-09-23 RX ADMIN — DORZOLAMIDE HYDROCHLORIDE 1 DROP: 20 SOLUTION/ DROPS OPHTHALMIC at 21:24

## 2020-09-23 RX ADMIN — Medication 10 ML: at 21:24

## 2020-09-23 RX ADMIN — METOPROLOL TARTRATE 50 MG: 50 TABLET, FILM COATED ORAL at 08:17

## 2020-09-23 RX ADMIN — OXYCODONE AND ACETAMINOPHEN 1 TABLET: 5; 325 TABLET ORAL at 21:24

## 2020-09-23 RX ADMIN — POTASSIUM CHLORIDE 20 MEQ: 1500 TABLET, EXTENDED RELEASE ORAL at 08:17

## 2020-09-23 RX ADMIN — METOPROLOL TARTRATE 50 MG: 50 TABLET, FILM COATED ORAL at 21:24

## 2020-09-23 RX ADMIN — DORZOLAMIDE HYDROCHLORIDE 1 DROP: 20 SOLUTION/ DROPS OPHTHALMIC at 08:17

## 2020-09-23 RX ADMIN — LATANOPROST 1 DROP: 50 SOLUTION OPHTHALMIC at 21:24

## 2020-09-23 RX ADMIN — Medication 10 ML: at 08:18

## 2020-09-23 ASSESSMENT — PAIN SCALES - GENERAL
PAINLEVEL_OUTOF10: 7
PAINLEVEL_OUTOF10: 9
PAINLEVEL_OUTOF10: 0
PAINLEVEL_OUTOF10: 7
PAINLEVEL_OUTOF10: 7

## 2020-09-23 NOTE — PROGRESS NOTES
OT BEDSIDE TREATMENT NOTE      Date:2020  Patient Name: Anabell Mejia  MRN: [id number]  : 1945  Room: [facility]     Per OT Eval:    Evaluating OT: [place] OTR/L [id number]     AM-PAC Daily Activity Raw Score:      Recommended Adaptive Equipment: TBA: ADL AE (arthritic adaptations for self care tasks)      Diagnosis: Atherosclerosis; LLE pain     Surgery/Procedures:  9/15 Aorta to right common iliac and aorta to left external iliac bypass      Pertinent Medical History: CAD, CVA, Glaucoma, HLD, HTN, PVD     Precautions:  Falls, BiPAP, ice chips only  (NPO)     Home Living: Pt lives alone  in a 2nd floor apt with 3+8 step(s) to enter and 1+2 rail(s)  Bathroom setup: tub/shower with seat; higher commode with rail  Equipment owned: home O2, [employer], [employer], shower seat     Prior Level of Function: Pt reports Mod I with ADLs; Assist with bathing. Pt has [facility] aide 2 days. wk for 2 hours per chart. Pt requires assist with IADLs. SPC for ambulation. Driving: no     Pain Level: Pt did not complain of pain this session        Cognition: A&O: 4/4    Follows 1-2 step commands appropriately with min redirection. Memory: fair              Comprehension fair              Problem solving: fair-              Judgement/safety: fair-                 Communication skills: WFL; difficult to understand due to Bipap              Vision: h/o glaucoma                     Glasses:reading                                                        Hearing: min [place]                RASS: 0  CAM-ICU: (NT) Delirium     UE Assessment:  Hand Dominance: Right [x]?   Left []?       ROM Strength STM goal: PRN   R/L UE  Limited shoulder ROM due to arthritis: gross abd; gross elbow flexion; gross lateral pinch- poor prehension due to digits in ulnar deviation  2-/5 proximal  3-/5 grasp Educate pt on jt protection techniques to maintain prehension for light ADLs         Sensation: No c/o numbness or tingling in extremities Tone: WNL   Edema: [facility]     Functional Assessment    Initial Eval Status  Date: 9/18 Treatment Status  Date: 9/23/20 STG=LTG  5-14  days    Feeding Min A  Set up- using R hand to scoop ice chips from cup.  DNT  Julian per previous level         Mod I with AE as needed  while seated up in chair to increase activity tolerance once cleared for diet .      Grooming Max A  Decreased functional reach-Bipap and B UE deformities  Julian  Pt washed face, applied deodorant, combed hair seated upright in chair                       Mod A   while seated supported demonstrating G knowledge of adapted techniques as needed.       UB dressing/bathing Dep Per previous tx:  modA  Pt completed sponge bathing task seated upright in chair, with pt able to wash of UB, cueing throughout, assistance to wash underarms and pao of hospital gown managing of lines                       Mod A         LB dressing/bathing Dep maxA  Pt completed sponge bathing task seated upright in chair, with pt requiring assistance with pt able to wash of thighs, requiring assistance with LE's and to stand and wash buttocks/violette area     Dependent to pao socks due to limited  of B hands                       Max A   using AE as needed for safe reach/ energy conservation        Toileting Dep  Dependent  Pt having of ann catheter                       Mod A      Bed Mobility  Supine to sit: Max A with HOB elevated     Sit to supine: NT maxA  EOB<>Supine                       SBA  in prep of ADL tasks & transfers   Functional Transfers Sit to stand:  Mod A     Stand to sit: Mod A Julian  Sit to Stand  Stand to [employer] Pivot Transfer Chair<>EOB                       SBA  sit<>stand/functional bathroom transfers using AD/DME as needed for balance and safety   Functional Mobility Mod A WW   SPT to chair (limited due to bipap)  2nd person assist with [employer] management Julian  Pt ambulated short steps during SPT with w.w.                       SBA   functional/bathroom

## 2020-09-23 NOTE — PROGRESS NOTES
Physical Therapy  Treatment Note    Name: Mollie Brittle  :   MRN: [id number]    Referring Provider:  Alexandrea Boswell MD    Date of Service: 2020    Evaluating PT:  Yennyskinny Brito, PT, DPT [id number]    Room #:  7383/9360-M  Diagnosis:  Atherosclerosis; LLE pain  Precautions: Falls, BiPAP  Procedure/Surgery:  9/15 Aorta to right common iliac and aorta to left external iliac bypass  PMHx/PSHx:  CAD, CVA, Glaucoma, HLD, HTN, PVD  Equipment Needs:  TBD    SUBJECTIVE:    Pt lives alone in a 2nd floor apartment with 3 stairs to enter and 1 rail. Once inside, 8 steps and 1 rail. Pt ambulated with SPC PTA. [facility] aides 2 days/week that assist with ADLs. Daughters also able to assist PRN. OBJECTIVE:   Initial Evaluation  Date: 20 Treatment   20 Short Term/ Long Term   Goals   AM-PAC 6 Clicks     Was pt agreeable to Eval/treatment? Yes Yes    Does pt have pain? 8/10 surgical site No c/o pain    Bed Mobility  Rolling: NT  Supine to sit: MaxA  Sit to supine: NT  Scooting: MaxA Rolling: Min A   Supine to sit: Mod A  Sit to supine: NT  Scooting: Min A to EOB  Mod Independent   Transfers Sit to stand: ModA   Stand to sit: ModA  Stand pivot: ModA with [address] Sit to stand: Min A  Stand to sit: Min A  Stand pivot: Min A using [employer] Independent with [address]   Ambulation   3 feet with ModA with [address] 25 feet x2 reps using [address] with Romel >150 feet with Mod Independent with [address]   Stair negotiation: ascended and descended NT NT >8 steps with 1 rail Mod Independent   ROM BUE:  Defer to OT note  BLE:  WNL BLE: WNL    Strength BUE:  Defer to OT note  BLE:  4-/5 BLE: 4-/5 Increase by 1/3 MMT grade   Balance Sitting EOB:  Romel  Dynamic Standing:  ModA with [address] Sitting EOB:  SBA  Dynamic Standing:  Min A with [address] Sitting EOB:  Independent  Dynamic Standing:   Mod Independent with [address]     Pt is A & O x 3  Sensation:  Pt denies numbness and tingling to extremities  Edema:  Mild extremity edema noted    Therapeutic Exercises:  Seated LAQ/AP at EOB x 2 mintues    Patient education  Pt educated on strategies to come to EOB using bed railes    Patient response to education:   Pt verbalized understanding Pt demonstrated skill Pt requires further education in this area   yes partial yes     ASSESSMENT:    Comments:  Pt found supine in bed upon room entry, agreeable to session. Resting O2 saturation 96% on 2L O2. O2 saturation remained at or above 92% on 2L with all activity per grid. Pt c/o generalized stiffness from hx RA, localized to R hip. Multiple sit<>Stand transfers completed at EOB prior to initial ambulation repetition. Once standing pt ambulates with slow gait speed and FW flexed posture. Ambulation distance limited by general activity tolerance deficits and low motivation as pt requires encouragement to increase distance. Seated rest break required between ambulation repetitions. Following second ambulation repetition pt left seated in bedside chair with all needs met following session. Treatment:  Patient practiced and was instructed in the following treatment:     Pt was instructed on UE use and use of bed rails to assists with transfer to EOB. Pt instructed on proper hand placement to scoot to EOB. Once EOB pt exhibits mild retropulsion and c/o mild dizziness which resolved following short time seated EOB. Upon standing pt requires verbal cueing for hand placement and postural correction while ambulating. Verbal cueing required for safe AD approximation while turning device and with approach to chair. PLAN:    Patient is making good  progress towards established goals. Will continue with current POC.       Time in  0845  Time out  0915    Total Treatment Time  25 minutes     CPT codes:  [x] Gait training 12889 10 minutes  [] Manual therapy 22617 0 minutes  [x] Therapeutic activities 89086 15 minutes  [] Therapeutic exercises 45182 0 minutes  [] Neuromuscular reeducation 63185 0 minutes    Heide Negron, PT, KARLEET  [phone]

## 2020-09-23 NOTE — PROGRESS NOTES
Subjective:  Feeling better no complaints  No CP or SOB  No fever or chills   No uncontrolled pain  No vomiting or diarrhea     Objective:    BP (!) 148/69   Pulse 77   Temp 97.6 °F (36.4 °C) (Temporal)   Resp 20   Ht 5' (1.524 m)   Wt 166 lb 6.4 oz (75.5 kg)   SpO2 97%   BMI 32.50 kg/m²     24HR INTAKE/OUTPUT:      Intake/Output Summary (Last 24 hours) at 9/23/2020 1003  Last data filed at 9/23/2020 0646  Gross per 24 hour   Intake 840 ml   Output 1000 ml   Net -160 ml       General appearance: NAD, conversant  Neck: FROM, supple   Lungs: Clear bilaterally no wheezes, no rhonchi, no crackles  CV: RRR, no MRGs; normal carotid upstroke and amplitude without Bruits  Abdomen: Soft, non-tender; no masses or HSM  Extremities: No edema, no cyanosis, no clubbing  Skin: Incision CDI intact no rash, no lesions, no ulcers    Psych: Alert and oriented normal affect  Neuro: Nonfocal  Most Recent Labs  Lab Results   Component Value Date    WBC 3.6 (L) 09/23/2020    HGB 9.2 (L) 09/23/2020    HCT 29.4 (L) 09/23/2020     09/23/2020     09/23/2020    K 3.9 09/23/2020     09/23/2020    CREATININE 0.7 09/23/2020    BUN 10 09/23/2020    CO2 29 09/23/2020    GLUCOSE 92 09/23/2020    ALT 10 09/20/2020    AST 20 09/20/2020    INR 1.1 09/04/2020    TSH 1.670 09/11/2019    LABA1C 5.1 09/11/2019     Recent Labs     09/23/20  0555   MG 1.8     Lab Results   Component Value Date    CALCIUM 8.7 09/23/2020    PHOS 3.0 09/23/2020        XR CHEST PORTABLE   Final Result      1. Opacities at the lung bases appears slightly improved and these are   thought most likely related to atelectasis. 2. Mild CHF. XR CHEST PORTABLE   Final Result   No interval change               VL JAMEE BILATERAL LIMITED 1-2 LEVELS   Final Result      XR CHEST PORTABLE   Final Result   from recent postop in the abdomen midline.  IMPRESSION:      Cardiomegaly with mild interstitial pulmonary edema which was not seen   on the prior radiograph      Small left-sided pleural effusion      Support lines and tubes are in satisfactory position         XR CHEST PORTABLE   Final Result      Nasogastric tube follows the expected contours of the esophagus into   stomach with distal tip overlying the left upper quadrant of the   abdomen. Left subclavian central venous catheter terminates over the superior   vena cava. Small left pleural effusion appears present. XR CHEST PORTABLE   Final Result   Mild opacity at the lung bases which is likely atelectasis. XR CHEST PORTABLE   Final Result      Newly placed left subclavian central venous catheter terminates over   the superior vena cava. No evidence of pneumothorax. Nasogastric tube follows the expected contours of the esophagus into   stomach with distal tip overlying the left upper quadrant of the   abdomen and distal sideholes overlying the esophagogastric junction. Worsening interstitial infiltrates. Assessment    Principal Problem: Atherosclerosis of native arteries of extremity with rest pain ([address]  Active Problems:    Hypotension    Anemia    CAD (coronary artery disease)    Aorto-iliac atherosclerosis (HCC)    Atherosclerosis of native arteries of extremities with rest pain, left leg (HCC)    Restrictive lung disease    Hypomagnesemia    Acute respiratory failure with hypoxia and hypercapnia (HCC)  Resolved Problems:    * No resolved hospital problems. *      Plan:   67 y.o. female patient of GRACE Shelley - CNP history of PAD, CAD, carotid stenosis, CVA, GERD who presents with PAD status post aorto to right common iliac and aorto to left external iliac bypass 9/15.        Supplemental O2 wean as tolerated-- 97% on 2 L nasal cannula  BiPAP PRN and at bedtime patient refusing  Nebulizers-  Replace electrolytes  IV fluid resuscitation completed  Diuresis-IV Bumex  Pain control  Bowel regimen  Pressors weaned off  Anemia-iron studies

## 2020-09-23 NOTE — PROGRESS NOTES
Vascular SROC rounded and Bedside RN informed to attempt to remove TLC once peripheral site is placed. Also, increase activity with patient and Bedside RN to pull ann once patient is ambulating more.

## 2020-09-23 NOTE — PLAN OF CARE
Problem: Falls - Risk of:  Goal: Will remain free from falls  Description: Will remain free from falls  9/23/2020 1905 by Oziel Arriaga RN  Outcome: Met This Shift  9/23/2020 1033 by Oziel Arriaga RN  Outcome: Met This Shift     Problem: Pain:  Goal: Pain level will decrease  Description: Pain level will decrease  Outcome: Met This Shift     Problem: Pain:  Goal: Control of acute pain  Description: Control of acute pain  Outcome: Met This Shift

## 2020-09-23 NOTE — PLAN OF CARE
Problem: Falls - Risk of:  Goal: Will remain free from falls  Description: Will remain free from falls  9/23/2020 1033 by Ward Conrad RN  Outcome: Met This Shift     Problem: Falls - Risk of:  Goal: Absence of physical injury  Description: Absence of physical injury  9/23/2020 1033 by Ward Conrad RN  Outcome: Met This Shift     Problem: Pain:  Goal: Control of chronic pain  Description: Control of chronic pain  Outcome: Met This Shift

## 2020-09-23 NOTE — CARE COORDINATION
SOCIAL WORK/CASEMANAGEMENT TRANSITION OF CARE [id number] [facility], [address], [place], [phone]): pt accepted to [facility]. precert to be started for discharge thurs. Per vascular surgery once pt is seen by PT. OT tara is in epic. All discharge paperwork with hens in place.  Juan J Rivas  9/23/2020

## 2020-09-23 NOTE — PROGRESS NOTES
Vascular Surgery Progress Note    Pt is being seen in f/u today regarding rest pain, bypass    Subjective  Pt s/e. Weaning O2, on 2L NC. Not much appetite but tolerating po.  + flatulence. No BM. Denies nausea or emesis. Pain is controlled. Current Medications:      prochlorperazine, melatonin, sodium chloride flush, acetaminophen, oxyCODONE-acetaminophen **OR** [DISCONTINUED] oxyCODONE-acetaminophen, labetalol, albuterol    potassium chloride  20 mEq Oral Daily with breakfast    enoxaparin  30 mg Subcutaneous Daily    aspirin  81 mg Oral Daily    bumetanide  1 mg Intravenous Daily    metoprolol tartrate  50 mg Oral BID    amLODIPine  5 mg Oral Daily    dorzolamide  1 drop Left Eye TID    latanoprost  1 drop Both Eyes Nightly    sodium chloride flush  10 mL Intravenous 2 times per day    ipratropium-albuterol  1 ampule Inhalation Q4H WA      PHYSICAL EXAM:    /60   Pulse 73   Temp 97.4 °F (36.3 °C) (Temporal)   Resp 18   Ht 5' (1.524 m)   Wt 166 lb 6.4 oz (75.5 kg)   SpO2 91%   BMI 32.50 kg/m²     Intake/Output Summary (Last 24 hours) at 9/23/2020 1447  Last data filed at 9/23/2020 0933  Gross per 24 hour   Intake 600 ml   Output 1000 ml   Net -400 ml        Gen awake and alert  CVS S1S2  Resp + excursion   2L O2  Abd soft, incision c/d/i, incisional ttp, slight distension  R LE DP and PT biphasic  L LE DP and PT weakly biphasic  LABS:    Lab Results   Component Value Date    WBC 3.6 (L) 09/23/2020    HGB 9.2 (L) 09/23/2020    HCT 29.4 (L) 09/23/2020     09/23/2020    PROTIME 12.0 09/04/2020    INR 1.1 09/04/2020    APTT 29.5 09/04/2020    K 3.9 09/23/2020    BUN 10 09/23/2020    CREATININE 0.7 09/23/2020     A/P s/p Aorotbiiliac bypass for rest pain 9/15/20  Vasc Exam stable  Neuro pain well controlled  CVS- No further Afib since 9/21. Cardiology following.    Resp encourage smi   Volume overload - continue diuresis   Duonebs  FEN/Nut tolerating po but no appetite   adequate uo, cr stable  Heme hgb stable, platelets stable  DVT Risk, continue lovenox sq  PUD Risk diet  Pt ot- encouraged ambulation  Discontinue ann  Discharge planning    Plan reviewed with Dr. Mann of [place].      Theron Mejia CNP    Ptsee and examined  Continues to be doing better  Hopeful for discharge soon  Remove ann  Leave tlc in place as pt has difficulty with iv access - will remove on discharge    Alexa Jordan

## 2020-09-23 NOTE — PROGRESS NOTES
Kunal Duncan M.D.,[facility]  Cynthia Kinney D.O., F.A.C.O.I., Cristino Alejo M.D. Nona Barron M.D., Germain Flannery M.D. Cristi Trotter D.O. Daily Pulmonary Progress Note    Patient:  Haim Cueto 67 y.o. female MRN: [id number]     Date of Service: 9/23/2020        Subjective      Patient awake and alert sitting up in bed. She is doing much better, oxygenation has improved down to 2 /L min. Diuresis tolerated 1 L urine out in past 24 hrs. Awaiting physical therapy evaluation. Objective   Vitals: /60   Pulse 73   Temp 97.4 °F (36.3 °C) (Temporal)   Resp 18   Ht 5' (1.524 m)   Wt 166 lb 6.4 oz (75.5 kg)   SpO2 91%   BMI 32.50 kg/m²     I/O:    Intake/Output Summary (Last 24 hours) at 9/23/2020 1240  Last data filed at 9/23/2020 0933  Gross per 24 hour   Intake 780 ml   Output 1000 ml   Net -220 ml       CURRENT MEDS :  Scheduled Meds:   potassium chloride  20 mEq Oral Daily with breakfast    enoxaparin  30 mg Subcutaneous Daily    aspirin  81 mg Oral Daily    bumetanide  1 mg Intravenous Daily    metoprolol tartrate  50 mg Oral BID    amLODIPine  5 mg Oral Daily    dorzolamide  1 drop Left Eye TID    latanoprost  1 drop Both Eyes Nightly    sodium chloride flush  10 mL Intravenous 2 times per day    ipratropium-albuterol  1 ampule Inhalation Q4H WA       Continuous Infusions:  None    PRN Meds:  prochlorperazine, melatonin, sodium chloride flush, acetaminophen, oxyCODONE-acetaminophen **OR** [DISCONTINUED] oxyCODONE-acetaminophen, labetalol, albuterol      Physical Exam:    General:  Awake, alert, oriented X 3. Well developed, well nourished, well groomed. No apparent distress. HEENT:  Normocephalic, atraumatic. Pupils equal, round, reactive to light. No scleral injection  Ng in place   Heart:  RRR, no murmurs, gallops, rubs  Lungs:  CTA bilaterally, bilat symmetrical expansion, no wheeze, rales, or rhonchi  Abdomen:   Bowel sounds present, soft, 98.5 % 92.0     DIAGNOSTICS:     CXR 9/22       Comparison: 18 September 2020         FINDINGS:    Stable central venous catheter on the left. Heart enlarged. Pulmonary    vascularity is moderately congested. There are lower lobe opacities    which likely represent atelectasis. These appear slightly improved.              Impression         1. Opacities at the lung bases appears slightly improved and these are    thought most likely related to atelectasis. 2. Mild CHF.                Overall improved. Assessment:      1. S/p Aorto to common iliac bypass on right, external iliac bypass on the left (9/15/20)     2. Acute hypoxic and hypercapnic resp failure post-operative     3. Restrictive lung disease  *PFT (8/2020) mild to moderate ventilatory restriction with mild impairment in gas transfer     4. Hyperchloremic metabolic acidosis      5. Muscular deconditioning     6. kyphosis of cervicothoracic region     7. Hx of HTN, HLD, CAD, PAD, PVD      Plan:      Wean FiO2 -2 L nasal cannula for saturations above 92%    BiPAP prn and qhs , previously not using, refused   Monitor I/O's closely patient still  Positive fluid balance, patient on Bumex 1 mg daily, improving fluid status, 1 L urine out in past 24 hrs   Completed 9/20/2020 dose of  albumin 25 G followed by 0.5 mg  bumex   · Pulmonary recruitment maneuvers with IS, EZPAP  · aspiration precautions  · Bronchodilators q4 WA and prn  · Dvt/gi prophylaxis per general surgery   · CXR improving  · Recommend MANNY upon discharge  · Await PT, OT eval    This plan of care was reviewed in collaboration with Dr. Cleopatra Gonzalez, APRN - CNP  12:40 PM  9/23/2020      Addendum; Patient is doing much better. Sitting up in chair. Oxygen requirement down to 2 L. We will switch her Bumex to 1 mg p.o. Courage incentive spirometry. I personally saw, examined, and cared for the patient. Labs, medications, radiographs reviewed.  I agree with history exam

## 2020-09-24 VITALS
DIASTOLIC BLOOD PRESSURE: 60 MMHG | TEMPERATURE: 97.5 F | SYSTOLIC BLOOD PRESSURE: 130 MMHG | BODY MASS INDEX: 32.53 KG/M2 | HEIGHT: 60 IN | WEIGHT: 165.7 LBS | HEART RATE: 65 BPM | OXYGEN SATURATION: 95 % | RESPIRATION RATE: 16 BRPM

## 2020-09-24 LAB
ANION GAP SERPL CALCULATED.3IONS-SCNC: 9 MMOL/L (ref 7–16)
BUN BLDV-MCNC: 11 MG/DL (ref 8–23)
CALCIUM SERPL-MCNC: 8.9 MG/DL (ref 8.6–10.2)
CHLORIDE BLD-SCNC: 103 MMOL/L (ref 98–107)
CO2: 29 MMOL/L (ref 22–29)
CREAT SERPL-MCNC: 0.7 MG/DL (ref 0.5–1)
GFR AFRICAN AMERICAN: >60
GFR NON-AFRICAN AMERICAN: >60 ML/MIN/1.73
GLUCOSE BLD-MCNC: 99 MG/DL (ref 74–99)
HCT VFR BLD CALC: 30.1 % (ref 34–48)
HEMOGLOBIN: 9.3 G/DL (ref 11.5–15.5)
MAGNESIUM: 1.8 MG/DL (ref 1.6–2.6)
MCH RBC QN AUTO: 29.5 PG (ref 26–35)
MCHC RBC AUTO-ENTMCNC: 30.9 % (ref 32–34.5)
MCV RBC AUTO: 95.6 FL (ref 80–99.9)
PDW BLD-RTO: 16.5 FL (ref 11.5–15)
PHOSPHORUS: 3.8 MG/DL (ref 2.5–4.5)
PLATELET # BLD: 191 E9/L (ref 130–450)
PMV BLD AUTO: 10.3 FL (ref 7–12)
POTASSIUM SERPL-SCNC: 3.9 MMOL/L (ref 3.5–5)
RBC # BLD: 3.15 E12/L (ref 3.5–5.5)
SARS-COV-2, NAAT: NOT DETECTED
SODIUM BLD-SCNC: 141 MMOL/L (ref 132–146)
WBC # BLD: 4.1 E9/L (ref 4.5–11.5)

## 2020-09-24 PROCEDURE — 6370000000 HC RX 637 (ALT 250 FOR IP): Performed by: SURGERY

## 2020-09-24 PROCEDURE — 6370000000 HC RX 637 (ALT 250 FOR IP): Performed by: INTERNAL MEDICINE

## 2020-09-24 PROCEDURE — 80048 BASIC METABOLIC PNL TOTAL CA: CPT

## 2020-09-24 PROCEDURE — 85027 COMPLETE CBC AUTOMATED: CPT

## 2020-09-24 PROCEDURE — 83735 ASSAY OF MAGNESIUM: CPT

## 2020-09-24 PROCEDURE — 2580000003 HC RX 258: Performed by: SURGERY

## 2020-09-24 PROCEDURE — 94660 CPAP INITIATION&MGMT: CPT

## 2020-09-24 PROCEDURE — 6370000000 HC RX 637 (ALT 250 FOR IP): Performed by: NURSE PRACTITIONER

## 2020-09-24 PROCEDURE — 6360000002 HC RX W HCPCS: Performed by: NURSE PRACTITIONER

## 2020-09-24 PROCEDURE — U0002 COVID-19 LAB TEST NON-CDC: HCPCS

## 2020-09-24 PROCEDURE — 84100 ASSAY OF PHOSPHORUS: CPT

## 2020-09-24 PROCEDURE — 2700000000 HC OXYGEN THERAPY PER DAY

## 2020-09-24 RX ORDER — ALBUTEROL SULFATE 2.5 MG/3ML
2.5 SOLUTION RESPIRATORY (INHALATION) EVERY 6 HOURS PRN
Qty: 120 EACH | Refills: 3
Start: 2020-09-24 | End: 2020-11-25

## 2020-09-24 RX ORDER — SENNA AND DOCUSATE SODIUM 50; 8.6 MG/1; MG/1
2 TABLET, FILM COATED ORAL DAILY PRN
Status: DISCONTINUED | OUTPATIENT
Start: 2020-09-24 | End: 2020-09-24 | Stop reason: HOSPADM

## 2020-09-24 RX ORDER — OXYCODONE HYDROCHLORIDE AND ACETAMINOPHEN 5; 325 MG/1; MG/1
1 TABLET ORAL EVERY 6 HOURS PRN
Qty: 28 TABLET | Refills: 0 | Status: SHIPPED | OUTPATIENT
Start: 2020-09-24 | End: 2020-10-01

## 2020-09-24 RX ORDER — IPRATROPIUM BROMIDE AND ALBUTEROL SULFATE 2.5; .5 MG/3ML; MG/3ML
3 SOLUTION RESPIRATORY (INHALATION)
Qty: 360 ML | Refills: 2
Start: 2020-09-24 | End: 2020-11-25 | Stop reason: ALTCHOICE

## 2020-09-24 RX ORDER — LANOLIN ALCOHOL/MO/W.PET/CERES
6 CREAM (GRAM) TOPICAL NIGHTLY PRN
Qty: 20 TABLET | Refills: 0
Start: 2020-09-24

## 2020-09-24 RX ORDER — SENNA AND DOCUSATE SODIUM 50; 8.6 MG/1; MG/1
2 TABLET, FILM COATED ORAL DAILY PRN
Qty: 14 TABLET | Refills: 0
Start: 2020-09-24 | End: 2020-10-01

## 2020-09-24 RX ADMIN — AMLODIPINE BESYLATE 5 MG: 5 TABLET ORAL at 08:51

## 2020-09-24 RX ADMIN — ASPIRIN 81 MG CHEWABLE TABLET 81 MG: 81 TABLET CHEWABLE at 08:51

## 2020-09-24 RX ADMIN — METOPROLOL TARTRATE 50 MG: 50 TABLET, FILM COATED ORAL at 08:51

## 2020-09-24 RX ADMIN — POTASSIUM CHLORIDE 20 MEQ: 1500 TABLET, EXTENDED RELEASE ORAL at 08:51

## 2020-09-24 RX ADMIN — Medication 10 ML: at 08:52

## 2020-09-24 RX ADMIN — OXYCODONE AND ACETAMINOPHEN 1 TABLET: 5; 325 TABLET ORAL at 16:25

## 2020-09-24 RX ADMIN — OXYCODONE AND ACETAMINOPHEN 1 TABLET: 5; 325 TABLET ORAL at 08:51

## 2020-09-24 RX ADMIN — BUMETANIDE 1 MG: 1 TABLET ORAL at 08:51

## 2020-09-24 RX ADMIN — DOCUSATE SODIUM 50 MG AND SENNOSIDES 8.6 MG 2 TABLET: 8.6; 5 TABLET, FILM COATED ORAL at 14:18

## 2020-09-24 RX ADMIN — ENOXAPARIN SODIUM 30 MG: 30 INJECTION SUBCUTANEOUS at 08:51

## 2020-09-24 ASSESSMENT — PAIN - FUNCTIONAL ASSESSMENT
PAIN_FUNCTIONAL_ASSESSMENT: PREVENTS OR INTERFERES SOME ACTIVE ACTIVITIES AND ADLS
PAIN_FUNCTIONAL_ASSESSMENT: PREVENTS OR INTERFERES SOME ACTIVE ACTIVITIES AND ADLS

## 2020-09-24 ASSESSMENT — PAIN SCALES - GENERAL
PAINLEVEL_OUTOF10: 0
PAINLEVEL_OUTOF10: 3
PAINLEVEL_OUTOF10: 6
PAINLEVEL_OUTOF10: 0
PAINLEVEL_OUTOF10: 6
PAINLEVEL_OUTOF10: 8

## 2020-09-24 ASSESSMENT — PAIN DESCRIPTION - LOCATION
LOCATION: BACK
LOCATION: BACK
LOCATION: BACK;ABDOMEN

## 2020-09-24 ASSESSMENT — PAIN DESCRIPTION - ONSET
ONSET: ON-GOING
ONSET: ON-GOING

## 2020-09-24 ASSESSMENT — PAIN DESCRIPTION - PAIN TYPE
TYPE: ACUTE PAIN
TYPE: ACUTE PAIN;CHRONIC PAIN
TYPE: CHRONIC PAIN
TYPE: ACUTE PAIN

## 2020-09-24 ASSESSMENT — PAIN DESCRIPTION - ORIENTATION
ORIENTATION: LOWER

## 2020-09-24 ASSESSMENT — PAIN DESCRIPTION - FREQUENCY
FREQUENCY: INTERMITTENT
FREQUENCY: CONTINUOUS
FREQUENCY: INTERMITTENT

## 2020-09-24 ASSESSMENT — PAIN DESCRIPTION - DESCRIPTORS
DESCRIPTORS: ACHING;DISCOMFORT
DESCRIPTORS: DISCOMFORT
DESCRIPTORS: ACHING;CONSTANT;DISCOMFORT

## 2020-09-24 ASSESSMENT — PAIN DESCRIPTION - PROGRESSION
CLINICAL_PROGRESSION: NOT CHANGED
CLINICAL_PROGRESSION: NOT CHANGED

## 2020-09-24 NOTE — PROGRESS NOTES
Vascular Surgery Progress Note    Pt is being seen in f/u today regarding rest pain, bypass    Subjective  Pt s/e. Weaning O2, on 1L NC. Not much appetite but tolerating po.  + flatulence. No BM. Denies nausea or emesis. Pain is controlled. Current Medications:      sennosides-docusate sodium, prochlorperazine, melatonin, sodium chloride flush, acetaminophen, oxyCODONE-acetaminophen **OR** [DISCONTINUED] oxyCODONE-acetaminophen, labetalol, albuterol    bumetanide  1 mg Oral Daily    potassium chloride  20 mEq Oral Daily with breakfast    enoxaparin  30 mg Subcutaneous Daily    aspirin  81 mg Oral Daily    metoprolol tartrate  50 mg Oral BID    amLODIPine  5 mg Oral Daily    dorzolamide  1 drop Left Eye TID    latanoprost  1 drop Both Eyes Nightly    sodium chloride flush  10 mL Intravenous 2 times per day    ipratropium-albuterol  1 ampule Inhalation Q4H WA      PHYSICAL EXAM:    BP (!) 132/58   Pulse 63   Temp 97.9 °F (36.6 °C) (Temporal)   Resp 16   Ht 5' (1.524 m)   Wt 165 lb 11.2 oz (75.2 kg)   SpO2 95%   BMI 32.36 kg/m²     Intake/Output Summary (Last 24 hours) at 9/24/2020 1447  Last data filed at 9/24/2020 1320  Gross per 24 hour   Intake 540 ml   Output 950 ml   Net -410 ml        Gen awake and alert  CVS S1S2  Resp + excursion   1L O2  Abd soft, incision c/d/i, incisional ttp, slight distension    R LE DP and PT biphasic  L LE DP and PT weakly biphasic  LABS:    Lab Results   Component Value Date    WBC 4.1 (L) 09/24/2020    HGB 9.3 (L) 09/24/2020    HCT 30.1 (L) 09/24/2020     09/24/2020    PROTIME 12.0 09/04/2020    INR 1.1 09/04/2020    APTT 29.5 09/04/2020    K 3.9 09/24/2020    BUN 11 09/24/2020    CREATININE 0.7 09/24/2020     A/P s/p Aorotbiiliac bypass for rest pain 9/15/20  Vasc Exam stable  Neuro pain well controlled  Resp encourage smi   Volume overload improved with diuresis.  Discontinue on discharge as discussed with pulmonary   Duonebs  FEN/Nut tolerating po but no appetite   adequate uo, cr stable  Heme hgb stable, platelets stable  DVT Risk, continue lovenox sq  PUD Risk diet  Pt/OT  Discharge to rehab today     Plan reviewed with Dr. Shade Plummer.      Theron Mejia, CNP

## 2020-09-24 NOTE — PROGRESS NOTES
Alma Jacques M.D.,[facility]  Tamara Trivedi D.O., F.A.C.O.I., Isaiah Bradley M.D. Jj Franco M.D., Monica Jorgensen M.D. Job Walsh D.O. Daily Pulmonary Progress Note    Patient:  Sravanthi Truong 67 y.o. female MRN: [id number]     Date of Service: 9/24/2020        Subjective      Patient awake and alert lying in bed. She is doing much better, oxygenation has improved down to 1 /L min. She continues to diurese well  1575cc output past 24 hours. Objective   Vitals: BP (!) 132/58   Pulse 63   Temp 97.9 °F (36.6 °C) (Temporal)   Resp 16   Ht 5' (1.524 m)   Wt 165 lb 11.2 oz (75.2 kg)   SpO2 95%   BMI 32.36 kg/m²     I/O:    Intake/Output Summary (Last 24 hours) at 9/24/2020 1159  Last data filed at 9/24/2020 0856  Gross per 24 hour   Intake 600 ml   Output 1675 ml   Net -1075 ml       CURRENT MEDS :  Scheduled Meds:   bumetanide  1 mg Oral Daily    potassium chloride  20 mEq Oral Daily with breakfast    enoxaparin  30 mg Subcutaneous Daily    aspirin  81 mg Oral Daily    metoprolol tartrate  50 mg Oral BID    amLODIPine  5 mg Oral Daily    dorzolamide  1 drop Left Eye TID    latanoprost  1 drop Both Eyes Nightly    sodium chloride flush  10 mL Intravenous 2 times per day    ipratropium-albuterol  1 ampule Inhalation Q4H WA       Continuous Infusions:  None    PRN Meds:  sennosides-docusate sodium, prochlorperazine, melatonin, sodium chloride flush, acetaminophen, oxyCODONE-acetaminophen **OR** [DISCONTINUED] oxyCODONE-acetaminophen, labetalol, albuterol      Physical Exam:    General:  Awake, alert, oriented X 3. Well developed, well nourished, well groomed. No apparent distress. HEENT:  Normocephalic, atraumatic. Pupils equal, round, reactive to light. No scleral injection  Ng in place   Heart:  RRR, no murmurs, gallops, rubs  Lungs:  CTA bilaterally, bilat symmetrical expansion, no wheeze, rales, or rhonchi  Abdomen:   Bowel sounds present, soft, nontender, no masses, no organomegaly, no peritoneal signs  Extremities:  No clubbing, cyanosis, or edema, patienty has significant rheumatoid changes in her hands  Skin:  Warm and dry, no open lesions or rash  Neuro:  Cranial nerves 2-12 intact, no focal deficits    Pertinent/ New Labs and Imaging Studies       PERTINENT LAB RESULTS:   Results for Shruti Leahy (MRN [id number]) as of 9/22/2020 16:33   Ref. Range 9/22/2020 06:30   WBC Latest Ref Range: 4.5 - 11.5 E9/L 3.0 (L)   RBC Latest Ref Range: 3.50 - 5.50 E12/L 3.13 (L)   Hemoglobin Quant Latest Ref Range: 11.5 - 15.5 g/dL 9.3 (L)   Hematocrit Latest Ref Range: 34.0 - 48.0 % 29.3 (L)   MCV Latest Ref Range: 80.0 - 99.9 fL 93.6   MCH Latest Ref Range: 26.0 - 35.0 pg 29.7   MCHC Latest Ref Range: 32.0 - 34.5 % 31.7 (L)   MPV Latest Ref Range: 7.0 - 12.0 fL 10.1   RDW Latest Ref Range: 11.5 - 15.0 fL 17.5 (H)   Platelet Count Latest Ref Range: 130 - 450 E9/L 137    Results for Shruti Leahy (MRN [id number]) as of 9/22/2020 16:33   Ref. Range 9/22/2020 06:30   Sodium Latest Ref Range: 132 - 146 mmol/L 145   Potassium Latest Ref Range: 3.5 - 5.0 mmol/L 3.4 (L)   Chloride Latest Ref Range: 98 - 107 mmol/L 103   CO2 Latest Ref Range: 22 - 29 mmol/L 28   BUN Latest Ref Range: 8 - 23 mg/dL 9   Creatinine Latest Ref Range: 0.5 - 1.0 mg/dL 0.7   Anion Gap Latest Ref Range: 7 - 16 mmol/L 14   GFR Non- Latest Ref Range: >=60 mL/min/1.73 >60   GFR African American Unknown >60   Magnesium Latest Ref Range: 1.6 - 2.6 mg/dL 1.9     Results for Shruti Leahy (MRN [id number]) as of 9/22/2020 16:33   Ref.  Range 9/16/2020 04:28   Source: Unknown Blood Arterial   pH, Blood Gas Latest Ref Range: 7.350 - 7.450  7.400   PCO2 Latest Ref Range: 35.0 - 45.0 mmHg 32.1 (L)   pO2 Latest Ref Range: 75.0 - 100.0 mmHg 60.2 (L)   HCO3 Latest Ref Range: 22.0 - 26.0 mmol/L 19.4 (L)   Base Excess Latest Ref Range: -3.0 - 3.0 mmol/L -4.7 (L)   O2 Sat Latest Ref Range: 92.0 - detailed in NP note.   Maria L Vásquez MD

## 2020-09-24 NOTE — PLAN OF CARE
Problem: Falls - Risk of:  Goal: Will remain free from falls  Description: Will remain free from falls  9/24/2020 0306 by Tiffany Chaney RN  Outcome: Met This Shift     Problem: Falls - Risk of:  Goal: Absence of physical injury  Description: Absence of physical injury  Outcome: Met This Shift     Problem: Pain:  Goal: Pain level will decrease  Description: Pain level will decrease  9/24/2020 0306 by Tiffany Chaney RN  Outcome: Met This Shift     Problem: Pain:  Goal: Control of acute pain  Description: Control of acute pain  9/24/2020 0306 by Tiffany Chaney RN  Outcome: Met This Shift     Problem: Skin Integrity:  Goal: Will show no infection signs and symptoms  Description: Will show no infection signs and symptoms  Outcome: Met This Shift     Problem: Skin Integrity:  Goal: Absence of new skin breakdown  Description: Absence of new skin breakdown  Outcome: Met This Shift     Problem: Musculor/Skeletal Functional Status  Goal: Highest potential functional level  Outcome: Ongoing

## 2020-09-24 NOTE — PLAN OF CARE
Problem: Falls - Risk of:  Goal: Will remain free from falls  Description: Will remain free from falls  9/24/2020 0933 by Maryanne Aguilar RN  Outcome: Met This Shift     Problem: Pain:  Goal: Pain level will decrease  Description: Pain level will decrease  9/24/2020 0933 by Maryanne Aguilar RN  Outcome: Met This Shift

## 2020-09-24 NOTE — DISCHARGE INSTR - COC
Continuity of Care Form    Patient Name: Haim Cueto   :  [id number]  MRN:  [id number]    Admit date:  9/15/2020  Discharge date:  2020    Code Status Order: Full Code   Advance Directives:   [facility] Documentation     Date/Time Healthcare Directive Type of Healthcare Directive Copy in [facility] [address] Agent's Name Healthcare Agent's Phone Number    09/15/20 1735  No, patient does not have an advance directive for healthcare treatment -- -- -- -- --    20 1212  No, patient does not have an advance directive for healthcare treatment -- -- -- -- --          Admitting Physician:  Dav Bowie MD  PCP: GRACE Song CNP    Discharging Nurse: [facility] Unit/Room#: 5855/3317-S  Discharging Unit Phone Number: [phone]    Emergency Contact:   Extended Emergency Contact Information  Primary Emergency Contact: Jessy Lopez  Address: [address], [address] [place] [address] Phone: [phone]  Relation: Child  Secondary Emergency Contact: Eddie [address] Phone: [phone]  Relation: Child    Past Surgical History:  Past Surgical History:   Procedure Laterality Date    ANTERIOR FIXATION OF THORACIC SPINE      AORTO-ILIAC BYPASS GRAFT N/A 9/15/2020    AORTO BIFEMORAL  BYPASS performed by Dav Bowie MD at [address]      cervical c5-c6   [facility]    right    CAROTID ENDARTERECTOMY  2012    left carotid endarterectomy done by Dr. Lc Rogers      left wrist    CATARACT REMOVAL      CERVICAL [address]  2002    C5 and C6    CORONARY ANGIOPLASTY WITH STENT PLACEMENT  2011    Dr. Yovany Lambert      in past for kidney stones    DENTAL SURGERY      DIAGNOSTIC CARDIAC CATH LAB PROCEDURE  11    Emergent cath/PTCA with deployment of 2 overlapping DE stents to the distal, mid and proximal RCA.    HYSTERECTOMY      JOINT REPLACEMENT      right knee twice, left knee    JOINT REPLACEMENT Right     hip    KNEE ARTHROPLASTY Left 4-11-13    KNEE SURGERY      right knee x 2    LITHOTRIPSY Right 11/7/2019    RIGHT ESWL EXTRACORPOREAL SHOCK WAVE LITHOTRIPSY CYSTOSCOPY STENT INSERTION [facility]         Immunization History:   Immunization History   Administered Date(s) Administered    Pneumococcal Conjugate 13-valent ([id number]) 11/20/2015    Pneumococcal Polysaccharide ([id number]) 06/17/2012       Active Problems:  Patient Active Problem List   Diagnosis Code    Hypertension I10    Glaucoma H40.9    Lipid disorder E78.9    Neuropathy G62.9    DJD (degenerative joint disease) M19.90    Bilateral carotid artery disease (HCC) I73.9    CAD (coronary artery disease) I25.10    Hyperlipemia E78.5    Cerebrovascular accident (stroke) ([address] I63.9    Old myocardial infarction, greater than 8 weeks I25.2    Presence of drug coated stent in right coronary artery Z95.5    Accelerated hypertension I10    S/P carotid endarterectomy Z98.890    Carotid bruit R09.89    Hypotension I95.9    Anemia D64.9    H/O total knee replacement Z96.659    Osteoarthritis of right hip M16.11    Fibromyalgia M79.7    Incontinence R32    Rheumatoid arthritis involving both hands ([address] M06.9    Diverticulosis of large intestine without diverticulitis K57.30    Second degree hemorrhoids K64.1    Decreased radial pulse R09.89    Acute unilateral obstructive uropathy N13.9    Hydronephrosis concurrent with and due to calculi of kidney and ureter N13.2    PVD (peripheral vascular disease) with claudication (HCC) I73.9    Aorto-iliac atherosclerosis (HCC) I70.0, I70.8    Atherosclerosis of native arteries of extremity with rest pain ([address] I70.229    Atherosclerosis of native arteries of extremities with rest pain, left leg ([facility]) I70.222    Restrictive lung disease J98.4    Hypomagnesemia E83.42    Acute respiratory failure with hypoxia and hypercapnia ([facility]) J96.01, J96.02       Isolation/Infection:   Isolation          No Isolation        Patient Infection Status     Infection Onset Added Last Indicated Last Indicated By Review Planned Expiration Resolved Resolved By    None active    Resolved    COVID-19 Rule Out 09/10/20 09/10/20 09/10/20 Covid-19 Ambulatory (Ordered)   09/12/20 Rule-Out Test Resulted    ESBL (Extended Spectrum Beta Lactamase)  09/13/19 09/13/19 Barrett Arriola RN   06/30/20 Marek Quevedo RN    Klebsiella oxytoca urine 9/11/19    MDRO (multi-drug resistant organism) 09/11/19 09/13/19 09/11/19 Urine culture   06/30/20 Marek Quevedo RN    Follow urine culture with cystoscopy-no growth. Klebsiella oxytoca urine 9/11/19 due to obstructing uretal stone. Nurse Assessment:  Last Vital Signs: BP (!) 132/58   Pulse 63   Temp 97.9 °F (36.6 °C) (Temporal)   Resp 16   Ht 5' (1.524 m)   Wt 165 lb 11.2 oz (75.2 kg)   SpO2 95%   BMI 32.36 kg/m²     Last documented pain score (0-10 scale): Pain Level: 0  Last Weight:   Wt Readings from Last 1 Encounters:   09/24/20 165 lb 11.2 oz (75.2 kg)     Mental Status:  oriented and alert    IV Access:  - None    Nursing Mobility/ADLs:  Walking   Assisted  Transfer  Assisted  Bathing  Dependent  Dressing  Assisted  Toileting  Assisted  Feeding  Independent  Med Admin  Dependent  Med Delivery   whole    Wound Care Documentation and Therapy:        Elimination:  Continence:   · Bowel: Yes  · Bladder: Yes  Urinary Catheter: None   Colostomy/Ileostomy/Ileal Conduit: No       Date of Last BM: 9/18/2020    Intake/Output Summary (Last 24 hours) at 9/24/2020 1233  Last data filed at 9/24/2020 1207  Gross per 24 hour   Intake 420 ml   Output 875 ml   Net -455 ml     I/O last 3 completed shifts:   In: 600 [P.O.:600]  Out: 9155 {[facility]:[id number]}

## 2020-10-05 NOTE — DISCHARGE SUMMARY
Vascular Surgery Discharge Summary    [facility] SUMMARY:                The patient is a 67 y.o. female who was admitted to the hospital on 9/15/2020  6:12 AM for treatment of rest pain, severe iliac and femoral-popliteal arterial occlusive disease. On the day of admission, an aortiobiiliac bypass was performed. The patient's hospital course consisted of physical therapy, incision observation, and a return to normal oral intake. She did have episodes of SVT and was evaluated by cardiology. She was recommended outpatient event monitor. The patient was discharged on 9/24/2020  4:54 PM tolerating a diet, moving bowels, and urinating without difficulty. The incisions were clean and intact. The patient was discharged to rehab facility in satisfactory condition with instructions to call the office for a follow up appointment. Hospital Problem List:  Principal Problem: Atherosclerosis of native arteries of extremity with rest pain ([address]  Active Problems:    Hypotension    Anemia    CAD (coronary artery disease)    Aorto-iliac atherosclerosis (HCC)    Atherosclerosis of native arteries of extremities with rest pain, left leg (HCC)    Restrictive lung disease    Hypomagnesemia    Acute respiratory failure with hypoxia and hypercapnia (HCC)  Resolved Problems:    * No resolved hospital problems.  *     Procedure(s) (LRB):  AORTO BIFEMORAL  BYPASS (N/A)    Discharge Medications:    Luba Gonzáles   Home Medication Instructions AQD:[id number]    Printed on:10/05/20 0432   Medication Information                      albuterol (PROVENTIL) (2.5 MG/3ML) 0.083% nebulizer solution  Take 3 mLs by nebulization every 6 hours as needed for Wheezing or Shortness of Breath             amLODIPine (NORVASC) 5 MG tablet  Take 1 tablet by mouth daily             aspirin 81 MG EC tablet  TAKE 1 TABLET BY MOUTH DAILY             brinzolamide (AZOPT) 1 % ophthalmic suspension  Place 1 drop into the left eye 3 times daily             Cholecalciferol (VITAMIN D) 2000 units TABS tablet  Take 2,000 Units by mouth daily              cilostazol (PLETAL) 100 MG tablet  Take 1 tablet by mouth 2 times daily             famotidine (PEPCID) 40 MG tablet  Take 1 tablet by mouth daily             gabapentin (NEURONTIN) 300 MG capsule  Take 300 mg by mouth 3 times daily. hydroxychloroquine (PLAQUENIL) 200 MG tablet  Take 200 mg by mouth 2 times daily TAKES ONLY DAILY             ipratropium-albuterol (DUONEB) 0.5-2.5 (3) MG/3ML SOLN nebulizer solution  Inhale 3 mLs into the lungs every 4 hours (while awake)             latanoprost (XALATAN) 0.005 % ophthalmic solution  INSTILL 1 DROP INTO BOTH EYES EVERY DAY             loratadine (CLARITIN) 10 MG tablet  TAKE ONE TABLET BY MOUTH EVERY DAY. melatonin 3 MG TABS tablet  Take 2 tablets by mouth nightly as needed (insomnia)             metoprolol succinate (TOPROL XL) 100 MG extended release tablet  Take 1 tablet by mouth daily             Misc.  Devices (ROLLATOR ULTRA-LIGHT) MISC  1 Device by Does not apply route continuous             Multiple Vitamins-Minerals (VITAMIN D3 COMPLETE PO)  Take 2,000 Units by mouth             Omega-3 Fatty Acids (FISH OIL) 1000 MG CPDR  Take 1,000 mg by mouth daily STOP PREOP MED             rosuvastatin (CRESTOR) 10 MG tablet  TAKE 1 TABLET BY MOUTH EVERY DAY             Tofacitinib Citrate ER (XELJANZ XR) 11 MG TB24  Take 11 mg by mouth Daily             triamcinolone (KENALOG) 0.1 % cream  Apply 1 applicator topically 2 times daily                 Damaris Austin  10/5/2020

## 2020-10-09 ENCOUNTER — APPOINTMENT (OUTPATIENT)
Dept: CT IMAGING | Age: 75
DRG: 371 | End: 2020-10-09
Payer: COMMERCIAL

## 2020-10-09 ENCOUNTER — APPOINTMENT (OUTPATIENT)
Dept: GENERAL RADIOLOGY | Age: 75
DRG: 371 | End: 2020-10-09
Payer: COMMERCIAL

## 2020-10-09 ENCOUNTER — HOSPITAL ENCOUNTER (INPATIENT)
Age: 75
LOS: 5 days | Discharge: NURSING, REHAB OR LONG-TERM CARE | DRG: 371 | End: 2020-10-15
Attending: EMERGENCY MEDICINE | Admitting: INTERNAL MEDICINE
Payer: COMMERCIAL

## 2020-10-09 LAB
ALBUMIN SERPL-MCNC: 3.6 G/DL (ref 3.5–5.2)
ALP BLD-CCNC: 84 U/L (ref 35–104)
ALT SERPL-CCNC: 11 U/L (ref 0–32)
ANION GAP SERPL CALCULATED.3IONS-SCNC: 13 MMOL/L (ref 7–16)
ANISOCYTOSIS: ABNORMAL
AST SERPL-CCNC: 17 U/L (ref 0–31)
BASOPHILS ABSOLUTE: 0.03 E9/L (ref 0–0.2)
BASOPHILS RELATIVE PERCENT: 0.5 % (ref 0–2)
BILIRUB SERPL-MCNC: 0.5 MG/DL (ref 0–1.2)
BUN BLDV-MCNC: 10 MG/DL (ref 8–23)
CALCIUM SERPL-MCNC: 9.1 MG/DL (ref 8.6–10.2)
CHLORIDE BLD-SCNC: 104 MMOL/L (ref 98–107)
CO2: 23 MMOL/L (ref 22–29)
CREAT SERPL-MCNC: 0.9 MG/DL (ref 0.5–1)
EOSINOPHILS ABSOLUTE: 0.08 E9/L (ref 0.05–0.5)
EOSINOPHILS RELATIVE PERCENT: 1.3 % (ref 0–6)
GFR AFRICAN AMERICAN: >60
GFR NON-AFRICAN AMERICAN: >60 ML/MIN/1.73
GLUCOSE BLD-MCNC: 115 MG/DL (ref 74–99)
HCT VFR BLD CALC: 26.9 % (ref 34–48)
HEMOGLOBIN: 8 G/DL (ref 11.5–15.5)
HYPOCHROMIA: ABNORMAL
IMMATURE GRANULOCYTES #: 0.03 E9/L
IMMATURE GRANULOCYTES %: 0.5 % (ref 0–5)
LACTIC ACID, SEPSIS: 1 MMOL/L (ref 0.5–1.9)
LYMPHOCYTES ABSOLUTE: 0.34 E9/L (ref 1.5–4)
LYMPHOCYTES RELATIVE PERCENT: 5.5 % (ref 20–42)
MCH RBC QN AUTO: 29.3 PG (ref 26–35)
MCHC RBC AUTO-ENTMCNC: 29.7 % (ref 32–34.5)
MCV RBC AUTO: 98.5 FL (ref 80–99.9)
MONOCYTES ABSOLUTE: 0.47 E9/L (ref 0.1–0.95)
MONOCYTES RELATIVE PERCENT: 7.6 % (ref 2–12)
NEUTROPHILS ABSOLUTE: 5.27 E9/L (ref 1.8–7.3)
NEUTROPHILS RELATIVE PERCENT: 84.6 % (ref 43–80)
OVALOCYTES: ABNORMAL
PDW BLD-RTO: 16.2 FL (ref 11.5–15)
PLATELET # BLD: 252 E9/L (ref 130–450)
PMV BLD AUTO: 9.4 FL (ref 7–12)
POIKILOCYTES: ABNORMAL
POLYCHROMASIA: ABNORMAL
POTASSIUM REFLEX MAGNESIUM: 3.8 MMOL/L (ref 3.5–5)
PRO-BNP: 1250 PG/ML (ref 0–450)
RBC # BLD: 2.73 E12/L (ref 3.5–5.5)
SARS-COV-2, NAAT: NOT DETECTED
SODIUM BLD-SCNC: 140 MMOL/L (ref 132–146)
TOTAL PROTEIN: 6.2 G/DL (ref 6.4–8.3)
TROPONIN: 0.04 NG/ML (ref 0–0.03)
WBC # BLD: 6.2 E9/L (ref 4.5–11.5)

## 2020-10-09 PROCEDURE — 87040 BLOOD CULTURE FOR BACTERIA: CPT

## 2020-10-09 PROCEDURE — 83880 ASSAY OF NATRIURETIC PEPTIDE: CPT

## 2020-10-09 PROCEDURE — 93005 ELECTROCARDIOGRAM TRACING: CPT | Performed by: STUDENT IN AN ORGANIZED HEALTH CARE EDUCATION/TRAINING PROGRAM

## 2020-10-09 PROCEDURE — 2580000003 HC RX 258: Performed by: STUDENT IN AN ORGANIZED HEALTH CARE EDUCATION/TRAINING PROGRAM

## 2020-10-09 PROCEDURE — U0002 COVID-19 LAB TEST NON-CDC: HCPCS

## 2020-10-09 PROCEDURE — 85025 COMPLETE CBC W/AUTO DIFF WBC: CPT

## 2020-10-09 PROCEDURE — 83605 ASSAY OF LACTIC ACID: CPT

## 2020-10-09 PROCEDURE — 6360000002 HC RX W HCPCS: Performed by: STUDENT IN AN ORGANIZED HEALTH CARE EDUCATION/TRAINING PROGRAM

## 2020-10-09 PROCEDURE — 71045 X-RAY EXAM CHEST 1 VIEW: CPT

## 2020-10-09 PROCEDURE — 99285 EMERGENCY DEPT VISIT HI MDM: CPT

## 2020-10-09 PROCEDURE — 2500000003 HC RX 250 WO HCPCS: Performed by: STUDENT IN AN ORGANIZED HEALTH CARE EDUCATION/TRAINING PROGRAM

## 2020-10-09 PROCEDURE — 80053 COMPREHEN METABOLIC PANEL: CPT

## 2020-10-09 PROCEDURE — 99284 EMERGENCY DEPT VISIT MOD MDM: CPT

## 2020-10-09 PROCEDURE — 84484 ASSAY OF TROPONIN QUANT: CPT

## 2020-10-09 PROCEDURE — 36415 COLL VENOUS BLD VENIPUNCTURE: CPT

## 2020-10-09 PROCEDURE — 96375 TX/PRO/DX INJ NEW DRUG ADDON: CPT

## 2020-10-09 RX ORDER — DIPHENHYDRAMINE HYDROCHLORIDE 50 MG/ML
50 INJECTION INTRAMUSCULAR; INTRAVENOUS ONCE
Status: COMPLETED | OUTPATIENT
Start: 2020-10-09 | End: 2020-10-09

## 2020-10-09 RX ORDER — SODIUM CHLORIDE 0.9 % (FLUSH) 0.9 %
10 SYRINGE (ML) INJECTION
Status: ACTIVE | OUTPATIENT
Start: 2020-10-09 | End: 2020-10-09

## 2020-10-09 RX ORDER — 0.9 % SODIUM CHLORIDE 0.9 %
1000 INTRAVENOUS SOLUTION INTRAVENOUS ONCE
Status: COMPLETED | OUTPATIENT
Start: 2020-10-09 | End: 2020-10-09

## 2020-10-09 RX ORDER — 0.9 % SODIUM CHLORIDE 0.9 %
1000 INTRAVENOUS SOLUTION INTRAVENOUS ONCE
Status: COMPLETED | OUTPATIENT
Start: 2020-10-09 | End: 2020-10-10

## 2020-10-09 RX ADMIN — DIPHENHYDRAMINE HYDROCHLORIDE 50 MG: 50 INJECTION, SOLUTION INTRAMUSCULAR; INTRAVENOUS at 22:59

## 2020-10-09 RX ADMIN — SODIUM CHLORIDE 1000 ML: 9 INJECTION, SOLUTION INTRAVENOUS at 22:25

## 2020-10-09 RX ADMIN — FAMOTIDINE 20 MG: 10 INJECTION, SOLUTION INTRAVENOUS at 23:01

## 2020-10-09 ASSESSMENT — ENCOUNTER SYMPTOMS
COUGH: 0
VOMITING: 0
NAUSEA: 1
SHORTNESS OF BREATH: 0
DIARRHEA: 0
WHEEZING: 1
ABDOMINAL PAIN: 1
RHINORRHEA: 0

## 2020-10-10 ENCOUNTER — APPOINTMENT (OUTPATIENT)
Dept: GENERAL RADIOLOGY | Age: 75
DRG: 371 | End: 2020-10-10
Payer: COMMERCIAL

## 2020-10-10 ENCOUNTER — APPOINTMENT (OUTPATIENT)
Dept: CT IMAGING | Age: 75
DRG: 371 | End: 2020-10-10
Payer: COMMERCIAL

## 2020-10-10 PROBLEM — L02.211 ABSCESS OF ABDOMINAL WALL: Status: ACTIVE | Noted: 2020-10-10

## 2020-10-10 PROBLEM — A41.9 SEPSIS (HCC): Status: ACTIVE | Noted: 2020-10-10

## 2020-10-10 LAB
BACTERIA: ABNORMAL /HPF
BILIRUBIN URINE: NEGATIVE
BLOOD, URINE: ABNORMAL
CLARITY: ABNORMAL
COLOR: YELLOW
EKG ATRIAL RATE: 102 BPM
EKG P AXIS: 84 DEGREES
EKG P-R INTERVAL: 140 MS
EKG Q-T INTERVAL: 376 MS
EKG QRS DURATION: 118 MS
EKG QTC CALCULATION (BAZETT): 490 MS
EKG R AXIS: 90 DEGREES
EKG T AXIS: 19 DEGREES
EKG VENTRICULAR RATE: 102 BPM
GLUCOSE URINE: NEGATIVE MG/DL
KETONES, URINE: NEGATIVE MG/DL
LEUKOCYTE ESTERASE, URINE: ABNORMAL
NITRITE, URINE: POSITIVE
PH UA: 6.5 (ref 5–9)
PROTEIN UA: ABNORMAL MG/DL
RBC UA: ABNORMAL /HPF (ref 0–2)
SPECIFIC GRAVITY UA: >=1.03 (ref 1–1.03)
UROBILINOGEN, URINE: 0.2 E.U./DL
WBC UA: ABNORMAL /HPF (ref 0–5)

## 2020-10-10 PROCEDURE — 2580000003 HC RX 258: Performed by: STUDENT IN AN ORGANIZED HEALTH CARE EDUCATION/TRAINING PROGRAM

## 2020-10-10 PROCEDURE — 94640 AIRWAY INHALATION TREATMENT: CPT

## 2020-10-10 PROCEDURE — 6370000000 HC RX 637 (ALT 250 FOR IP): Performed by: PHYSICIAN ASSISTANT

## 2020-10-10 PROCEDURE — 74176 CT ABD & PELVIS W/O CONTRAST: CPT

## 2020-10-10 PROCEDURE — 99222 1ST HOSP IP/OBS MODERATE 55: CPT | Performed by: SURGERY

## 2020-10-10 PROCEDURE — 2060000000 HC ICU INTERMEDIATE R&B

## 2020-10-10 PROCEDURE — 87088 URINE BACTERIA CULTURE: CPT

## 2020-10-10 PROCEDURE — 6360000002 HC RX W HCPCS: Performed by: PHYSICIAN ASSISTANT

## 2020-10-10 PROCEDURE — 2500000003 HC RX 250 WO HCPCS: Performed by: STUDENT IN AN ORGANIZED HEALTH CARE EDUCATION/TRAINING PROGRAM

## 2020-10-10 PROCEDURE — 2580000003 HC RX 258: Performed by: PHYSICIAN ASSISTANT

## 2020-10-10 PROCEDURE — 2700000000 HC OXYGEN THERAPY PER DAY

## 2020-10-10 PROCEDURE — 93010 ELECTROCARDIOGRAM REPORT: CPT | Performed by: INTERNAL MEDICINE

## 2020-10-10 PROCEDURE — 81001 URINALYSIS AUTO W/SCOPE: CPT

## 2020-10-10 PROCEDURE — 6360000002 HC RX W HCPCS: Performed by: INTERNAL MEDICINE

## 2020-10-10 PROCEDURE — 6360000002 HC RX W HCPCS: Performed by: STUDENT IN AN ORGANIZED HEALTH CARE EDUCATION/TRAINING PROGRAM

## 2020-10-10 PROCEDURE — 71046 X-RAY EXAM CHEST 2 VIEWS: CPT

## 2020-10-10 PROCEDURE — 71250 CT THORAX DX C-: CPT

## 2020-10-10 PROCEDURE — 2580000003 HC RX 258: Performed by: INTERNAL MEDICINE

## 2020-10-10 RX ORDER — LANOLIN ALCOHOL/MO/W.PET/CERES
6 CREAM (GRAM) TOPICAL NIGHTLY PRN
Status: DISCONTINUED | OUTPATIENT
Start: 2020-10-10 | End: 2020-10-15 | Stop reason: HOSPADM

## 2020-10-10 RX ORDER — CETIRIZINE HYDROCHLORIDE 10 MG/1
10 TABLET ORAL DAILY
Status: DISCONTINUED | OUTPATIENT
Start: 2020-10-10 | End: 2020-10-15 | Stop reason: HOSPADM

## 2020-10-10 RX ORDER — PROMETHAZINE HYDROCHLORIDE 25 MG/1
12.5 TABLET ORAL EVERY 6 HOURS PRN
Status: DISCONTINUED | OUTPATIENT
Start: 2020-10-10 | End: 2020-10-15 | Stop reason: HOSPADM

## 2020-10-10 RX ORDER — METOPROLOL SUCCINATE 100 MG/1
100 TABLET, EXTENDED RELEASE ORAL DAILY
Status: DISCONTINUED | OUTPATIENT
Start: 2020-10-10 | End: 2020-10-15 | Stop reason: HOSPADM

## 2020-10-10 RX ORDER — SODIUM CHLORIDE 0.9 % (FLUSH) 0.9 %
10 SYRINGE (ML) INJECTION EVERY 12 HOURS SCHEDULED
Status: DISCONTINUED | OUTPATIENT
Start: 2020-10-10 | End: 2020-10-15 | Stop reason: HOSPADM

## 2020-10-10 RX ORDER — CILOSTAZOL 100 MG/1
100 TABLET ORAL 2 TIMES DAILY
Status: DISCONTINUED | OUTPATIENT
Start: 2020-10-10 | End: 2020-10-12

## 2020-10-10 RX ORDER — LATANOPROST 50 UG/ML
1 SOLUTION/ DROPS OPHTHALMIC DAILY
Status: DISCONTINUED | OUTPATIENT
Start: 2020-10-10 | End: 2020-10-15 | Stop reason: HOSPADM

## 2020-10-10 RX ORDER — ACETAMINOPHEN 650 MG/1
650 SUPPOSITORY RECTAL EVERY 6 HOURS PRN
Status: DISCONTINUED | OUTPATIENT
Start: 2020-10-10 | End: 2020-10-15 | Stop reason: HOSPADM

## 2020-10-10 RX ORDER — ACETAMINOPHEN 325 MG/1
650 TABLET ORAL EVERY 6 HOURS PRN
Status: DISCONTINUED | OUTPATIENT
Start: 2020-10-10 | End: 2020-10-15 | Stop reason: HOSPADM

## 2020-10-10 RX ORDER — DORZOLAMIDE HCL 20 MG/ML
1 SOLUTION/ DROPS OPHTHALMIC 3 TIMES DAILY
Status: DISCONTINUED | OUTPATIENT
Start: 2020-10-10 | End: 2020-10-15 | Stop reason: HOSPADM

## 2020-10-10 RX ORDER — ASPIRIN 81 MG/1
81 TABLET ORAL DAILY
Status: DISCONTINUED | OUTPATIENT
Start: 2020-10-10 | End: 2020-10-15 | Stop reason: HOSPADM

## 2020-10-10 RX ORDER — POLYETHYLENE GLYCOL 3350 17 G/17G
17 POWDER, FOR SOLUTION ORAL DAILY PRN
Status: DISCONTINUED | OUTPATIENT
Start: 2020-10-10 | End: 2020-10-15 | Stop reason: HOSPADM

## 2020-10-10 RX ORDER — FAMOTIDINE 20 MG/1
20 TABLET, FILM COATED ORAL DAILY
Status: DISCONTINUED | OUTPATIENT
Start: 2020-10-10 | End: 2020-10-15 | Stop reason: HOSPADM

## 2020-10-10 RX ORDER — FAMOTIDINE 20 MG/1
20 TABLET, FILM COATED ORAL 2 TIMES DAILY
Status: DISCONTINUED | OUTPATIENT
Start: 2020-10-10 | End: 2020-10-10

## 2020-10-10 RX ORDER — SODIUM CHLORIDE 0.9 % (FLUSH) 0.9 %
10 SYRINGE (ML) INJECTION PRN
Status: DISCONTINUED | OUTPATIENT
Start: 2020-10-10 | End: 2020-10-15 | Stop reason: HOSPADM

## 2020-10-10 RX ORDER — ROSUVASTATIN CALCIUM 10 MG/1
10 TABLET, COATED ORAL DAILY
Status: DISCONTINUED | OUTPATIENT
Start: 2020-10-10 | End: 2020-10-15 | Stop reason: HOSPADM

## 2020-10-10 RX ORDER — ONDANSETRON 2 MG/ML
4 INJECTION INTRAMUSCULAR; INTRAVENOUS EVERY 6 HOURS PRN
Status: DISCONTINUED | OUTPATIENT
Start: 2020-10-10 | End: 2020-10-15 | Stop reason: HOSPADM

## 2020-10-10 RX ORDER — AMLODIPINE BESYLATE 5 MG/1
5 TABLET ORAL DAILY
Status: DISCONTINUED | OUTPATIENT
Start: 2020-10-10 | End: 2020-10-15 | Stop reason: HOSPADM

## 2020-10-10 RX ORDER — IPRATROPIUM BROMIDE AND ALBUTEROL SULFATE 2.5; .5 MG/3ML; MG/3ML
3 SOLUTION RESPIRATORY (INHALATION)
Status: DISCONTINUED | OUTPATIENT
Start: 2020-10-10 | End: 2020-10-15 | Stop reason: HOSPADM

## 2020-10-10 RX ORDER — GABAPENTIN 300 MG/1
300 CAPSULE ORAL 3 TIMES DAILY
Status: DISCONTINUED | OUTPATIENT
Start: 2020-10-10 | End: 2020-10-15 | Stop reason: HOSPADM

## 2020-10-10 RX ORDER — SODIUM CHLORIDE 9 MG/ML
INJECTION, SOLUTION INTRAVENOUS CONTINUOUS
Status: DISCONTINUED | OUTPATIENT
Start: 2020-10-10 | End: 2020-10-15 | Stop reason: HOSPADM

## 2020-10-10 RX ADMIN — SODIUM CHLORIDE: 9 INJECTION, SOLUTION INTRAVENOUS at 19:38

## 2020-10-10 RX ADMIN — METRONIDAZOLE 500 MG: 500 INJECTION, SOLUTION INTRAVENOUS at 05:04

## 2020-10-10 RX ADMIN — GABAPENTIN 300 MG: 300 CAPSULE ORAL at 21:39

## 2020-10-10 RX ADMIN — FAMOTIDINE 20 MG: 20 TABLET ORAL at 13:00

## 2020-10-10 RX ADMIN — AMLODIPINE BESYLATE 5 MG: 5 TABLET ORAL at 12:58

## 2020-10-10 RX ADMIN — GABAPENTIN 300 MG: 300 CAPSULE ORAL at 12:57

## 2020-10-10 RX ADMIN — IPRATROPIUM BROMIDE AND ALBUTEROL SULFATE 3 ML: .5; 3 SOLUTION RESPIRATORY (INHALATION) at 16:08

## 2020-10-10 RX ADMIN — IPRATROPIUM BROMIDE AND ALBUTEROL SULFATE 3 ML: .5; 3 SOLUTION RESPIRATORY (INHALATION) at 19:52

## 2020-10-10 RX ADMIN — SODIUM CHLORIDE 1000 ML: 9 INJECTION, SOLUTION INTRAVENOUS at 12:55

## 2020-10-10 RX ADMIN — CILOSTAZOL 100 MG: 100 TABLET ORAL at 21:40

## 2020-10-10 RX ADMIN — DORZOLAMIDE HYDROCHLORIDE 1 DROP: 20 SOLUTION/ DROPS OPHTHALMIC at 12:58

## 2020-10-10 RX ADMIN — ACETAMINOPHEN 650 MG: 325 TABLET, FILM COATED ORAL at 17:30

## 2020-10-10 RX ADMIN — Medication 10 ML: at 21:40

## 2020-10-10 RX ADMIN — GABAPENTIN 300 MG: 300 CAPSULE ORAL at 17:41

## 2020-10-10 RX ADMIN — PIPERACILLIN AND TAZOBACTAM 3.38 G: 3; .375 INJECTION, POWDER, FOR SOLUTION INTRAVENOUS at 15:39

## 2020-10-10 RX ADMIN — METOPROLOL SUCCINATE 100 MG: 100 TABLET, EXTENDED RELEASE ORAL at 13:05

## 2020-10-10 RX ADMIN — ENOXAPARIN SODIUM 40 MG: 40 INJECTION SUBCUTANEOUS at 12:59

## 2020-10-10 RX ADMIN — SODIUM CHLORIDE: 9 INJECTION, SOLUTION INTRAVENOUS at 12:59

## 2020-10-10 RX ADMIN — CETIRIZINE HYDROCHLORIDE 10 MG: 10 TABLET, FILM COATED ORAL at 12:57

## 2020-10-10 RX ADMIN — CEFTRIAXONE SODIUM 1 G: 1 INJECTION, POWDER, FOR SOLUTION INTRAMUSCULAR; INTRAVENOUS at 05:00

## 2020-10-10 RX ADMIN — ASPIRIN 81 MG: 81 TABLET, COATED ORAL at 12:56

## 2020-10-10 RX ADMIN — CILOSTAZOL 100 MG: 100 TABLET ORAL at 12:56

## 2020-10-10 RX ADMIN — ROSUVASTATIN 10 MG: 10 TABLET, FILM COATED ORAL at 13:05

## 2020-10-10 ASSESSMENT — PAIN DESCRIPTION - DESCRIPTORS: DESCRIPTORS: ACHING;DISCOMFORT;DULL

## 2020-10-10 ASSESSMENT — PAIN SCALES - GENERAL
PAINLEVEL_OUTOF10: 0
PAINLEVEL_OUTOF10: 0
PAINLEVEL_OUTOF10: 3

## 2020-10-10 NOTE — ED NOTES
purewick catheter in place at this time. Pt declined catheterization for urine sample.       Rosa Mariee RN  10/09/20 1817

## 2020-10-10 NOTE — ED NOTES
Pt resting with eyes closed awakens easily alert oriented skin warm dry respeasy denies pain     Horacio Calvo RN  10/10/20 9243

## 2020-10-10 NOTE — ED NOTES
Pt resting with eyes closed awakens easily alert oriented skin warm dry resp easy abd soft non tender staples intact     Mesha Kirkes, RN  10/10/20 1776       Mesha Meredith, RN  10/10/20 2687

## 2020-10-10 NOTE — H&P
WITH STENT PLACEMENT  09/06/2011    Dr. Silvino Honeycutt      in past for kidney stones    DENTAL SURGERY      DIAGNOSTIC CARDIAC CATH LAB PROCEDURE  9/6/11    Emergent cath/PTCA with deployment of 2 overlapping DE stents to the distal, mid and proximal RCA.    HYSTERECTOMY      JOINT REPLACEMENT      right knee twice, left knee    JOINT REPLACEMENT Right     hip    KNEE ARTHROPLASTY Left 4-11-13    KNEE SURGERY      right knee x 2    LITHOTRIPSY Right 11/7/2019    RIGHT ESWL EXTRACORPOREAL SHOCK WAVE LITHOTRIPSY CYSTOSCOPY STENT INSERTION performed by Suri Bagley MD at [address] [place] [address]           Medications Prior to Admission:    Medications Prior to Admission: melatonin 3 MG TABS tablet, Take 2 tablets by mouth nightly as needed (insomnia)  ipratropium-albuterol (DUONEB) 0.5-2.5 (3) MG/3ML SOLN nebulizer solution, Inhale 3 mLs into the lungs every 4 hours (while awake)  albuterol (PROVENTIL) (2.5 MG/3ML) 0.083% nebulizer solution, Take 3 mLs by nebulization every 6 hours as needed for Wheezing or Shortness of Breath  triamcinolone (KENALOG) 0.1 % cream, Apply 1 applicator topically 2 times daily  metoprolol succinate (TOPROL XL) 100 MG extended release tablet, Take 1 tablet by mouth daily  amLODIPine (NORVASC) 5 MG tablet, Take 1 tablet by mouth daily  gabapentin (NEURONTIN) 300 MG capsule, Take 300 mg by mouth 3 times daily. Tofacitinib Citrate ER (XELJANZ XR) 11 MG TB24, Take 11 mg by mouth Daily  cilostazol (PLETAL) 100 MG tablet, Take 1 tablet by mouth 2 times daily  latanoprost (XALATAN) 0.005 % ophthalmic solution, INSTILL 1 DROP INTO BOTH EYES EVERY DAY  Misc. Devices (ROLLATOR ULTRA-LIGHT) MISC, 1 Device by Does not apply route continuous  rosuvastatin (CRESTOR) 10 MG tablet, TAKE 1 TABLET BY MOUTH EVERY DAY  loratadine (CLARITIN) 10 MG tablet, TAKE ONE TABLET BY MOUTH EVERY DAY.   Multiple Vitamins-Minerals (VITAMIN D3 COMPLETE PO), Take 2,000 Units by mouth  Omega-3 Fatty Acids (FISH OIL) 1000 MG CPDR, Take 1,000 mg by mouth daily STOP PREOP MED  famotidine (PEPCID) 40 MG tablet, Take 1 tablet by mouth daily (Patient taking differently: Take 40 mg by mouth daily TAKES PRN)  aspirin 81 MG EC tablet, TAKE 1 TABLET BY MOUTH DAILY  brinzolamide (AZOPT) 1 % ophthalmic suspension, Place 1 drop into the left eye 3 times daily  hydroxychloroquine (PLAQUENIL) 200 MG tablet, Take 200 mg by mouth 2 times daily TAKES ONLY DAILY  Cholecalciferol (VITAMIN D) 2000 units TABS tablet, Take 2,000 Units by mouth daily     Allergies:  Iodine; Macrodantin [nitrofurantoin];  Prednisone; Sulfa antibiotics; and 5-alpha reductase inhibitors    Social History:   Social History     Socioeconomic History    Marital status:      Spouse name: Not on file    Number of children: Not on file    Years of education: Not on file    Highest education level: Not on file   Occupational History    Occupation: [employer]- [employer]   Social Needs    Financial resource strain: Not on file    Food insecurity     Worry: Not on file     Inability: Not on file    Transportation needs     Medical: Not on file     Non-medical: Not on file   Tobacco Use    Smoking status: Former Smoker     Packs/day: 2.50     Years: 40.00     Pack years: 100.00     Types: Cigarettes     Last attempt to quit: 9/3/2000     Years since quittin.1    Smokeless tobacco: Never Used   Substance and Sexual Activity    Alcohol use: No     Comment: drinks 3 cups of tea daily    Drug use: No    Sexual activity: Not on file   Lifestyle    Physical activity     Days per week: Not on file     Minutes per session: Not on file    Stress: Not on file   Relationships    Social connections     Talks on phone: Not on file     Gets together: Not on file     Attends Nondenominational service: Not on file     Active member of club or organization: Not on file     Attends meetings of clubs or organizations: Not on file     Relationship status: Not on file    Intimate partner violence     Fear of current or ex partner: Not on file     Emotionally abused: Not on file     Physically abused: Not on file     Forced sexual activity: Not on file   Other Topics Concern    Not on file   Social History Narrative    Not on file         Family History:   History reviewed. No pertinent family history. REVIEW OF SYSTEMS:    General ROS: Positive for reported fever  Hematological and Lymphatic ROS: negative  Endocrine ROS: negative  Respiratory ROS: no cough,  wheezing  or shortness of breath,   Cardiovascular ROS: no chest pain or dyspnea on exertion  Gastrointestinal ROS: no abdominal pain, change in bowel habits, or black or bloody stools  Genito-Urinary ROS: no dysuria, trouble voiding, or hematuria  Neurological ROS: no TIA or stroke symptoms  negative    Vitals:  BP (!) 111/58   Pulse 87   Temp 98.6 °F (37 °C)   Resp 18   Ht 5' (1.524 m)   Wt 165 lb 12.6 oz (75.2 kg)   SpO2 97%   BMI 32.38 kg/m²     PHYSICAL EXAM:  General:  Awake, alert, oriented X 3. Well developed, well nourished, well groomed. No apparent distress. HEENT:  Normocephalic, atraumatic. Pupils equal, round, reactive to light. No scleral icterus. No conjunctival injection. Neck:  Supple, no carotid bruits  Heart:  RRR,   Lungs:  CTA bilaterally, bilat symmetrical expansion, no wheeze, rales, or rhonchi  Abdomen:   Bowel sounds present, soft, nontender, no masses, no organomegaly, no peritoneal signs, surgical incision does not look infected  Extremities:  No clubbing, cyanosis, or edema  Skin:  Warm and dry, no open lesions or rash  Neuro:  Cranial nerves 2-12 intact, no focal deficits          DATA:     Recent Results (from the past 24 hour(s))   EKG 12 lead    Collection Time: 10/09/20  9:55 PM   Result Value Ref Range    Ventricular Rate 102 BPM    Atrial Rate 102 BPM    P-R Interval 140 ms    QRS Duration 118 ms    Q-T Interval 376 ms    QTc Calculation (Robby) 490 ms    P Axis 84 degrees    R Axis 90 degrees    T Axis 19 degrees   Lactate, Sepsis    Collection Time: 10/09/20  9:57 PM   Result Value Ref Range    Lactic Acid, Sepsis 1.0 0.5 - 1.9 mmol/L   CBC auto differential    Collection Time: 10/09/20  9:57 PM   Result Value Ref Range    WBC 6.2 4.5 - 11.5 E9/L    RBC 2.73 (L) 3.50 - 5.50 E12/L    Hemoglobin 8.0 (L) 11.5 - 15.5 g/dL    Hematocrit 26.9 (L) 34.0 - 48.0 %    MCV 98.5 80.0 - 99.9 fL    MCH 29.3 26.0 - 35.0 pg    MCHC 29.7 (L) 32.0 - 34.5 %    RDW 16.2 (H) 11.5 - 15.0 fL    Platelets 320 811 - 418 E9/L    MPV 9.4 7.0 - 12.0 fL    Neutrophils % 84.6 (H) 43.0 - 80.0 %    Immature Granulocytes % 0.5 0.0 - 5.0 %    Lymphocytes % 5.5 (L) 20.0 - 42.0 %    Monocytes % 7.6 2.0 - 12.0 %    Eosinophils % 1.3 0.0 - 6.0 %    Basophils % 0.5 0.0 - 2.0 %    Neutrophils Absolute 5.27 1.80 - 7.30 E9/L    Immature Granulocytes # 0.03 E9/L    Lymphocytes Absolute 0.34 (L) 1.50 - 4.00 E9/L    Monocytes Absolute 0.47 0.10 - 0.95 E9/L    Eosinophils Absolute 0.08 0.05 - 0.50 E9/L    Basophils Absolute 0.03 0.00 - 0.20 E9/L    Anisocytosis 1+     Polychromasia 1+     Hypochromia 1+     Poikilocytes 1+     Ovalocytes 1+    Comprehensive Metabolic Panel w/ Reflex to MG    Collection Time: 10/09/20  9:57 PM   Result Value Ref Range    Sodium 140 132 - 146 mmol/L    Potassium reflex Magnesium 3.8 3.5 - 5.0 mmol/L    Chloride 104 98 - 107 mmol/L    CO2 23 22 - 29 mmol/L    Anion Gap 13 7 - 16 mmol/L    Glucose 115 (H) 74 - 99 mg/dL    BUN 10 8 - 23 mg/dL    CREATININE 0.9 0.5 - 1.0 mg/dL    GFR Non-African American >60 >=60 mL/min/1.73    GFR African American >60     Calcium 9.1 8.6 - 10.2 mg/dL    Total Protein 6.2 (L) 6.4 - 8.3 g/dL    Alb 3.6 3.5 - 5.2 g/dL    Total Bilirubin 0.5 0.0 - 1.2 mg/dL    Alkaline Phosphatase 84 35 - 104 U/L    ALT 11 0 - 32 U/L    AST 17 0 - 31 U/L   Troponin    Collection Time: 10/09/20  9:57 PM   Result Value Ref Range    Troponin 0.04 (H) 0.00 - 0.03 ng/mL   Brain Natriuretic Peptide    Collection Time: 10/09/20  9:57 PM   Result Value Ref Range    Pro-BNP 1,250 (H) 0 - 450 pg/mL   COVID-19    Collection Time: 10/09/20  9:57 PM   Result Value Ref Range    SARS-CoV-2, NAAT Not Detected Not Detected       XR CHEST (2 VW)   Final Result   Increase in airspace opacity of bilateral lungs may represent atelectasis. Superimposed infectious etiology may also be considered in the proper   clinical setting. CT CHEST WO CONTRAST   Final Result   Small layering bilateral pleural effusions with bibasilar compressive   atelectatic changes. No consolidations to suggest pneumonia. CT ABDOMEN PELVIS WO CONTRAST Additional Contrast? None   Final Result   1. 6.5 x 4.8 x 6.3 cm fluid collection in the left lower quadrant could be   abscess. 2. Distended gallbladder with cholelithiasis. 3. Bilateral small pleural effusions with bibasilar atelectasis. 4. Several nonobstructing intrarenal calculi bilaterally. 5. Low-density renal lesions are probably cysts although not definitively   characterized on noncontrast imaging. Findings were sent to Radiology Results Communication Center at 2:03 a.m. on   10/10/2020 to be communicated to a licensed caregiver. XR CHEST PORTABLE   Final Result   1. Retrocardiac left lower lobe opacities are present which could suggest   pneumonia or atelectasis. These findings appear similar since prior. 2.  Interstitial prominence bilaterally could indicate pulmonary vascular   congestion. These findings are also similar compared to prior. ASSESSMENT :      Active Problems:    Sepsis ([address]    Abscess of abdominal wall  Resolved Problems:    * No resolved hospital problems.  *  Abdominal abscess  Peripheral vascular arterial disease status post surgery  Hyperlipidemia by history  Hypertension  Coronary artery disease without angina      Plan :    Empiric antibiotics  Infectious

## 2020-10-10 NOTE — PROGRESS NOTES
Your patient is on a medication that requires a renal dose adjustment. Renal Function Assessment:    Date Body Weight SCr CrCl   10/10/2020 75.2 kg 0.9 48 ml/min       Pharmacy has renally dose-adjusted the following medication(s):    Date Medication Original Dosing Regimen New Dosing Regimen   10/10/2020 Famotidine  20 mg twice daily 20 mg once daily           These changes were made per protocol according to the [facility] Renal Function-Based Dose Adjustments Policy    *Please note this dose may need readjusted if your patient's renal function significantly improves. Please contact pharmacy with any questions regarding these changes. Altagracia Hager, PharmD 10/10/2020 10:19 AM

## 2020-10-10 NOTE — CONSULTS
[facility] CONSULT NOTE    Reason for Consult: Concern for intraabdominal abscess   Requested by: Dr. Elliott Marques     Chief complaint: Fever    History Obtained From: EMR and patient    HISTORY OFPRESENT ILLNESS              The patient is a 67 y.o. female nursing home resident with history of CAD, stroke, hypertension, severe peripheral vascular disease status post aortoiliac bypass graft surgery on 09/15, presented on 10/09 with fever of 101.1 °F.  On admission, she was afebrile and hemodynamically stable with no leukocytosis. SARS-CoV-2 PCR was not detected. CT chest showed small layering bilateral pleural effusion with bibasilar compressive atelectasis. CT abdomen and pelvis showed distended gallbladder with dependent cholelithiasis, several nonobstructing intrarenal calculi bilaterally, 6.5 x 4.8 x 6.3 cm fluid collection in the left lower quadrant anterior to the iliac vessels. She was given a dose of ceftriaxone and metronidazole on admission. Piperacillin-tazobactam was started on admission. ID service was subsequently consulted for further recommendations. Past Medical History  Past Medical History:   Diagnosis Date    Aorto-iliac atherosclerosis ([address] 7/1/2020    Arthritis     Atherosclerosis of native arteries of extremity with rest pain ([address] 7/16/2020    Bladder prolapse     CAD (coronary artery disease)     MI  2011    Carotid artery stenosis     Right carotid stenosis.     Carotid bruit 1/10/2013    Carotid stenosis, left 12/13/2012    Cerebrovascular accident (stroke) ([address]     pt states 13 yrs ago    Decreased radial pulse 7/6/2017    Gastric reflux     Glaucoma     History of blood transfusion     Hyperlipemia     Hypertension     Insomnia     PVD (peripheral vascular disease) with claudication (HCC) 6/4/2020    Restless leg syndrome     Sinusitis        Current Facility-Administered Medications   Medication Dose Route Frequency Provider Last Rate Last Dose    amLODIPine (NORVASC) tablet 5 mg  5 mg Oral Daily NEGRA Wallis   5 mg at 10/10/20 1258    aspirin EC tablet 81 mg  81 mg Oral Daily NEGRA Wallis   81 mg at 10/10/20 1256    dorzolamide (TRUSOPT) 2 % ophthalmic solution 1 drop  1 drop Left Eye TID NEGRA Wallis   1 drop at 10/10/20 1258    cilostazol (PLETAL) tablet 100 mg  100 mg Oral BID NEGRA Wallis   100 mg at 10/10/20 1256    gabapentin (NEURONTIN) capsule 300 mg  300 mg Oral TID NEGRA Wallis   300 mg at 10/10/20 1741    ipratropium-albuterol (DUONEB) nebulizer solution 3 mL  3 mL Inhalation Q4H WA NEGRA Wallis   3 mL at 10/10/20 1608    latanoprost (XALATAN) 0.005 % ophthalmic solution 1 drop  1 drop Both Eyes Daily NEGRA Wallis        cetirizine (ZYRTEC) tablet 10 mg  10 mg Oral Daily NEGRA Wallis   10 mg at 10/10/20 1257    melatonin tablet 6 mg  6 mg Oral Nightly PRN NEGRA Wallis        metoprolol succinate (TOPROL XL) extended release tablet 100 mg  100 mg Oral Daily NEGRA Wallis   100 mg at 10/10/20 1305    rosuvastatin (CRESTOR) tablet 10 mg  10 mg Oral Daily NEGRA Wallis   10 mg at 10/10/20 1305    Tofacitinib Citrate ER TB24 11 mg  11 mg Oral Daily Sangeetha Ornelas MD        sodium chloride flush 0.9 % injection 10 mL  10 mL Intravenous 2 times per day NEGRA Wallis        sodium chloride flush 0.9 % injection 10 mL  10 mL Intravenous PRN NEGRA Wallis        acetaminophen (TYLENOL) tablet 650 mg  650 mg Oral Q6H PRN NEGRA Wallis   650 mg at 10/10/20 1730    Or    acetaminophen (TYLENOL) suppository 650 mg  650 mg Rectal Q6H PRN NEGRA Wallis        polyethylene glycol (GLYCOLAX) packet 17 g  17 g Oral Daily PRN NEGRA Wallis        promethazine (PHENERGAN) tablet 12.5 mg  12.5 mg Oral Q6H PRN NEGRA Wallis        Or    ondansetron (ZOFRAN) injection 4 mg  4 mg Intravenous Q6H PRN Priti April, PA        enoxaparin (LOVENOX) injection 40 mg  40 mg Subcutaneous Daily NEGRA Sears   40 mg at 10/10/20 1259    0.9 % sodium chloride infusion   Intravenous Continuous NEGRA Sears 75 mL/hr at 10/10/20 1259      famotidine (PEPCID) tablet 20 mg  20 mg Oral Daily NEGRA Sears   20 mg at 10/10/20 1300    piperacillin-tazobactam (ZOSYN) 3.375 g in dextrose 5 % 100 mL IVPB extended infusion (mini-bag)  3.375 g Intravenous Q8H Denise Plascencia MD 25 mL/hr at 10/10/20 1539 3.375 g at 10/10/20 1539    And    0.9 % sodium chloride infusion admixture   Intravenous Q8H Denise Plascencia MD        iopamidol (ISOVUE-370) 76 % injection 110 mL  110 mL Intravenous ONCE PRN Cheryl Stokes MD           Allergies   Allergen Reactions    Iodine Shortness Of Breath    Macrodantin [Nitrofurantoin] Shortness Of Breath    Prednisone Shortness Of Breath and Palpitations    Sulfa Antibiotics Shortness Of Breath    5-Alpha Reductase Inhibitors        Surgical History  Past Surgical History:   Procedure Laterality Date    ANTERIOR FIXATION OF THORACIC SPINE      AORTO-ILIAC BYPASS GRAFT N/A 9/15/2020    AORTO BIFEMORAL  BYPASS performed by Saritha Em MD at [address] [facility]    right    CAROTID ENDARTERECTOMY  12/18/2012    left carotid endarterectomy done by Dr. Didi Smith      left wrist    CATARACT REMOVAL      CERVICAL DISC SURGERY  2002    C5 and C6    CORONARY ANGIOPLASTY WITH STENT PLACEMENT  09/06/2011    Dr. Lisa Blanchard      in past for kidney stones    DENTAL SURGERY      DIAGNOSTIC CARDIAC CATH LAB PROCEDURE  9/6/11    Emergent cath/PTCA with deployment of 2 overlapping DE stents to the distal, mid and proximal RCA.    HYSTERECTOMY      JOINT REPLACEMENT      right knee twice, left knee    JOINT REPLACEMENT Right     hip    KNEE ARTHROPLASTY Left 4-11-13    KNEE SURGERY      right knee x 2    LITHOTRIPSY Right 11/7/2019 RIGHT ESWL EXTRACORPOREAL SHOCK WAVE LITHOTRIPSY CYSTOSCOPY STENT INSERTION performed by Srui Bagley MD at [address] LITHOTRIPSY      SKIN BIOPSY      TONSILLECTOMY          Social History  Social History     Socioeconomic History    Marital status:    Occupational History    Occupation: disabled- floral shop   Tobacco Use    Smoking status: Former Smoker     Packs/day: 2.50     Years: 40.00     Pack years: 100.00     Types: Cigarettes     Last attempt to quit: 9/3/2000     Years since quittin.1    Smokeless tobacco: Never Used   Substance and Sexual Activity    Alcohol use: No     Comment: drinks 3 cups of tea daily    Drug use: No       Family Medical History  History reviewed. No pertinent family history. Review of Systems:  Constitutional: No fever, no chills  Eyes: No vision changes, no retroorbital pain  ENT: No hearing changes, no ear pain  Respiratory: Has cough, has dyspnea  Cardiovascular: No chest pain, no palpitations  Gastrointestinal: No abdominal pain, no diarrhea  Genitourinary: No dysuria, no hematuria  Integumentary: No rash, no itching  Musculoskeletal: Has muscle pain, has joint pain  Neurologic: No headache, no numbness in extremities    Physical Examination:  Vitals:    10/10/20 0612 10/10/20 0800 10/10/20 0915 10/10/20 1608   BP: (!) 111/58 (!) 142/65     Pulse: 87 92     Resp: 18 22     Temp: 98.6 °F (37 °C) 96.6 °F (35.9 °C)     TempSrc:  Oral     SpO2: 97%   94%   Weight:   165 lb 12.6 oz (75.2 kg)    Height:         Constitutional: Alert, not in distress  Eyes: Sclerae anicteric, no conjunctival erythema  ENT: No buccal lesion, no pharyngeal exudates  Neck: No nuchal rigidity, no cervical adenopathy  Lungs: Clear breath sounds, no crackles, no wheezes  Heart: Regular rate and rhythm, no murmurs  Abdomen: Bowel sounds present, soft, nontender.  Midline incision clean and dry with staples in place  Skin: Warm and dry, no active dermatoses  Musculoskeletal: No joint erythema, no joint swelling    Labs, imaging, and medical records/notes were personally reviewed. Assessment:  Fever, etiology unclear  Left lower quadrant abdominal fluid collection  History of recent aortoiliac bypass graft surgery on 09/15    Plan:  Continue piperacillin-tazobactam 3.375g q8h for now. Follow up blood and urine cultures. Check procalcitonin. Follow up Vascular Surgery recommendations. Thank you for involving me in the care of [facility]. I will continue to follow. Please do not hesitate to call for any questions or concerns.     Electronically signed by Flora Ahn MD on 10/10/2020 at 6:14 PM

## 2020-10-10 NOTE — CONSULTS
Vascular Surgery Consultation Note    Reason for Consult:  Abdominal fluid collection    HPI : This is a 67 y.o. female admitted on 10/9/2020  9:42 PM with fever of 101.1. She had been having wheezing also prior to admissin. She has noted increased urinary frequency/incontinence which she experiences when she is developing a urinary tract infection. She also states she recently started having some burning with urination, but denies blood. She was also hypoxic. She is on oxygen. She had aortobiiliac bypass on 9/15/20 for treatment of rest pain by Dr. Vick Ramirez. Postop she did have some respiratory  insufficiency and issues with volume overload and required significant diuresis. She was discharged on 9/24/20 to rehab facility. Vascular surgery is consulted in regards to concern of possible intra-abdominal abscess. He denies any significant abdominal pain. He denies rest pain of her lower extremities. He denies lifestyle limiting claudication, or tissue loss. States she was doing well in rehab up till 10/8/2020. She then started to feel poorly, having issues with breathing increased urinary issues.     ROS : All others Negative if blank [], Positive if [x]  General Urinary   [x] Fevers [] Hematuria   [] Chills [x] Dysuria   [] Weight Loss Vascular   Skin [] Claudication   [] Tissue Loss [] Rest Pain   Eyes Neurologic   [x] Wears Glasses/Contacts [x] Stroke/TIA   [] Vision Changes [] Focal weakness   Respiratory [] Slurred Speech    [x] Shortness of breath ENT   Cardiovascular [] Difficulty swallowing   [] Chest Pain Endocrine    [x] Shortness of breath with exertion [] Increased Thirst   Gastrointestinal    [] Abdominal Pain    [] Melena   [] Hematochezia         Past Medical History:   Diagnosis Date    Aorto-iliac atherosclerosis ([facility] 7/1/2020    Arthritis     Atherosclerosis of native arteries of extremity with rest pain ([facility] 7/16/2020    Bladder prolapse     CAD (coronary artery disease)     MI  2011    Carotid artery stenosis     Right carotid stenosis.     Carotid bruit 1/10/2013    Carotid stenosis, left 12/13/2012    Cerebrovascular accident (stroke) ([address]     pt states 13 yrs ago    Decreased radial pulse 7/6/2017    Gastric reflux     Glaucoma     History of blood transfusion     Hyperlipemia     Hypertension     Insomnia     PVD (peripheral vascular disease) with claudication ([address] 6/4/2020    Restless leg syndrome     Sinusitis         Past Surgical History:   Procedure Laterality Date    ANTERIOR FIXATION OF THORACIC SPINE      AORTO-ILIAC BYPASS GRAFT N/A 9/15/2020    AORTO BIFEMORAL  BYPASS performed by Kaela Hermosillo MD at [address] [facility]    right    CAROTID ENDARTERECTOMY  12/18/2012    left carotid endarterectomy done by Dr. Kayla Sifuentes      left wrist    CATARACT REMOVAL      CERVICAL [address] SURGERY  2002    C5 and C6    CORONARY ANGIOPLASTY WITH STENT PLACEMENT  09/06/2011    Dr. Reji Almonte      in past for kidney stones   [address] CATH LAB PROCEDURE  9/6/11    Emergent cath/PTCA with deployment of 2 overlapping DE stents to the distal, mid and proximal RCA.    HYSTERECTOMY      JOINT REPLACEMENT      right knee twice, left knee    JOINT REPLACEMENT Right     hip    KNEE ARTHROPLASTY Left 4-11-13    KNEE SURGERY      right knee x 2    LITHOTRIPSY Right 11/7/2019    RIGHT ESWL EXTRACORPOREAL SHOCK WAVE LITHOTRIPSY CYSTOSCOPY STENT INSERTION performed by Alma Hoover MD at [address] [facility]       Current Medications:    sodium chloride 75 mL/hr at 10/10/20 1259      melatonin, sodium chloride flush, acetaminophen **OR** acetaminophen, polyethylene glycol, promethazine **OR** ondansetron, iopamidol    amLODIPine  5 mg Oral Daily    aspirin  81 mg Oral Daily    dorzolamide  1 drop Left Eye TID    cilostazol  100 mg Oral BID    gabapentin  300 mg Oral TID    ipratropium-albuterol  3 mL Inhalation Q4H WA    latanoprost  1 drop Both Eyes Daily    cetirizine  10 mg Oral Daily    metoprolol succinate  100 mg Oral Daily    rosuvastatin  10 mg Oral Daily    Tofacitinib Citrate ER  11 mg Oral Daily    sodium chloride flush  10 mL Intravenous 2 times per day    enoxaparin  40 mg Subcutaneous Daily    famotidine  20 mg Oral Daily    piperacillin-tazobactam  3.375 g Intravenous Q8H    And    sodium chloride   Intravenous Q8H    sodium chloride  1,000 mL Intravenous Once      Allergies:  Iodine; Macrodantin [nitrofurantoin];  Prednisone; Sulfa antibiotics; and 5-alpha reductase inhibitors  Social History     Socioeconomic History    Marital status:      Spouse name: Not on file    Number of children: Not on file    Years of education: Not on file    Highest education level: Not on file   Occupational History    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   Social Needs    Financial resource strain: Not on file    Food insecurity     Worry: Not on file     Inability: Not on file    Transportation needs     Medical: Not on file     Non-medical: Not on file   Tobacco Use    Smoking status: Former Smoker     Packs/day: 2.50     Years: 40.00     Pack years: 100.00     Types: Cigarettes     Last attempt to quit: 9/3/2000     Years since quittin.1    Smokeless tobacco: Never Used   Substance and Sexual Activity    Alcohol use: No     Comment: drinks 3 cups of tea daily    Drug use: No    Sexual activity: Not on file   Lifestyle    Physical activity     Days per week: Not on file     Minutes per session: Not on file    Stress: Not on file   Relationships    Social connections     Talks on phone: Not on file     Gets together: Not on file     Attends Latter day service: Not on file     Active member of club or organization: Not on file     Attends meetings of clubs or organizations: Not on file     Relationship status: Not on file    Intimate partner violence     Fear of current or ex partner: Not on file     Emotionally abused: Not on file     Physically abused: Not on file     Forced sexual activity: Not on file   Other Topics Concern    Not on file   Social History Narrative    Not on file     History reviewed. No pertinent family history.   PHYSICAL EXAM:    BP (!) 111/58   Pulse 87   Temp 98.6 °F (37 °C)   Resp 18   Ht 5' (1.524 m)   Wt 165 lb 12.6 oz (75.2 kg)   SpO2 97%   BMI 32.38 kg/m²   CONSTITUTIONAL:   Awake, alert, cooperative  PSYCHIATRIC :  Oriented to time, place and person      Appropriate insight to disease process  EYES: Lids and lashes normal  ENT:  External ears and nose without lesions   Hearing deficits notnoted  NECK: Supple, symmetrical, trachea midline  LUNGS:  Increased work of breathing                 Good respiratory excursion  CARDIOVASCULAR:  regular rate and rhythm   ABDOMEN:  soft, non-distended, non-tender   Abdominal incision appears to be healing well, staples still in place, appears to be having some irritation from the staples   ` Incision is clean and dry and intact  SKIN:   Normal skin color   Texture and turgor normal, no induration  EXTREMITIES:   R UE 5/5 strength   No cyanosis noted in nail beds  L UE 5/5 strength   No cyanosis noted in nail beds  R LE Edema present   Open wounds absent   L LE Edema present   Open wound absent  R posterior tibial  biphasic L posterior tibial  biphasic   R dorsalis pedis  biphasic L dorsalis pedis  biphasic     LABS:    Lab Results   Component Value Date    WBC 6.2 10/09/2020    HGB 8.0 (L) 10/09/2020    HCT 26.9 (L) 10/09/2020     10/09/2020    PROTIME 12.0 09/04/2020    INR 1.1 09/04/2020    K 3.8 10/09/2020    BUN 10 10/09/2020    CREATININE 0.9 10/09/2020     Lab Results   Component Value Date    LABA1C 5.1 09/11/2019     No results found for: EAG  Lab Results   Component Value Date    LABALBU 3.6

## 2020-10-10 NOTE — ED NOTES
Pt resting with eyes closed awakens easily alert oriented skin warm dry resp easy voices no complaints     Tamara Zarco, LENA  10/10/20 1972

## 2020-10-10 NOTE — ED NOTES
Radiology Procedure Waiver   Name: Mike Foster  :   MRN: [id number]    Date:  10/9/20    Time: 11:22 PM EDT    Benefits of immediately proceeding with Radiology exam(s) without pre-testing outweigh the risks or are not indicated as specified below and therefore the following is/are being waived:    [] Pregnancy test   [] Patients LMP on-time and regular.   [] Patient had Tubal Ligation or has other Contraception Device. [] Patient  is Menopausal or Premenarcheal.    [] Patient had Full or Partial Hysterectomy. [] Protocol for Iodine allergy    [] MRI Questionnaire     [] BUN/Creatinine   [] Patient age w/no hx of renal dysfunction. [] Patient on Dialysis. [] Recent Normal Labs.   Electronically signed by Sara Potts DO on 10/9/20 at [facility] EDT          Patient premedicated     Sara Potts DO  Resident  10/09/20 0934       Gildardo Zimmerman MD  10/10/20 [id number]

## 2020-10-10 NOTE — ED PROVIDER NOTES
Dell Castillo is a 67 y.o. female presenting to the ED for fever. Patient prior to arrival coming from [facility] had a fever 101.1. She took Tylenol at 8 PM and fever is now 99.8. Patient states she has been wheezing for 2 days. She also was nauseous prior to arrival was given something by EMS and that has improved her nausea. Patient had a femoral bypass 2 weeks ago. She states she has some frequent urination for the past 4 days. She has some suprapubic abdominal pain around her incision area. Patient was reportedly hypoxic and not on oxygen at baseline but requiring 4 to 5 L. The complaint has been persistent, mild in severity, and worsened by nothing. Her fever has been made better by Tylenol. Patient has a medical history as listed below:   Past Medical History:   Diagnosis Date    Aorto-iliac atherosclerosis ([address] 7/1/2020    Arthritis     Atherosclerosis of native arteries of extremity with rest pain ([address] 7/16/2020    Bladder prolapse     CAD (coronary artery disease)     MI  2011    Carotid artery stenosis     Right carotid stenosis.     Carotid bruit 1/10/2013    Carotid stenosis, left 12/13/2012    Cerebrovascular accident (stroke) ([address]     pt states 13 yrs ago    Decreased radial pulse 7/6/2017    Gastric reflux     Glaucoma     History of blood transfusion     Hyperlipemia     Hypertension     Insomnia     PVD (peripheral vascular disease) with claudication ([address] 6/4/2020    Restless leg syndrome     Sinusitis      Patient Active Problem List    Diagnosis Date Noted    Hypotension 10/10/2013     Priority: High    Anemia 10/10/2013     Priority: High    Sepsis ([address] 10/10/2020    Abscess of abdominal wall 10/10/2020    Acute respiratory failure with hypoxia and hypercapnia ([address] 09/21/2020    Restrictive lung disease 09/16/2020    Hypomagnesemia 09/16/2020    Atherosclerosis of native arteries of extremities with rest pain, left leg ([address] 09/15/2020    Atherosclerosis of native arteries of extremity with rest pain ([facility] 07/16/2020    Aorto-iliac atherosclerosis ([facility] 07/01/2020    PVD (peripheral vascular disease) with claudication ([address] 06/04/2020    Acute unilateral obstructive uropathy 11/01/2019    Hydronephrosis concurrent with and due to calculi of kidney and ureter 11/01/2019    Decreased radial pulse 07/06/2017    Diverticulosis of large intestine without diverticulitis     Second degree hemorrhoids     Rheumatoid arthritis involving both hands ([facility] 10/12/2015    Incontinence 05/18/2015    Osteoarthritis of right hip 12/12/2014    Fibromyalgia 12/12/2014    H/O total knee replacement 06/09/2014    S/P carotid endarterectomy 01/10/2013    Carotid bruit 01/10/2013    Accelerated hypertension 12/06/2012    Old myocardial infarction, greater than 8 weeks 10/08/2012    Presence of drug coated stent in right coronary artery 10/08/2012    CAD (coronary artery disease)     Hyperlipemia     Cerebrovascular accident (stroke) ([facility]     Bilateral carotid artery disease ([facility] 06/19/2012    Hypertension 06/17/2012    Glaucoma 06/17/2012    Lipid disorder 06/17/2012    Neuropathy 06/17/2012    DJD (degenerative joint disease) 06/17/2012             Review of Systems   Constitutional: Positive for fever. Negative for chills. HENT: Negative for congestion and rhinorrhea. Respiratory: Positive for wheezing. Negative for cough and shortness of breath. Cardiovascular: Negative for chest pain, palpitations and leg swelling. Gastrointestinal: Positive for abdominal pain and nausea. Negative for diarrhea and vomiting. Genitourinary: Positive for frequency. Negative for difficulty urinating, dysuria, flank pain and hematuria. Skin: Negative. Neurological: Negative for weakness and numbness. Hematological: Negative. Psychiatric/Behavioral: Negative. Physical Exam  Vitals signs and nursing note reviewed.    Constitutional: Appearance: She is well-developed. HENT:      Head: Normocephalic and atraumatic. Nose: Nose normal.   Eyes:      Conjunctiva/sclera: Conjunctivae normal.      Pupils: Pupils are equal, round, and reactive to light. Neck:      Musculoskeletal: Normal range of motion. Vascular: No JVD. Cardiovascular:      Rate and Rhythm: Normal rate and regular rhythm. Pulses: Normal pulses. Heart sounds: Normal heart sounds. Pulmonary:      Effort: Pulmonary effort is normal. No respiratory distress. Breath sounds: Rales (Bilateral lower lung fields) present. No wheezing or rhonchi. Abdominal:      General: Abdomen is flat. There is no distension. Palpations: Abdomen is soft. There is no mass. Tenderness: There is abdominal tenderness. There is no right CVA tenderness, left CVA tenderness, guarding or rebound. Comments: Large midline abdominal incision approximated well with staples. Tenderness palpation to inferior portion/suprapubic area of incision with hard circular mass inferior to incision. Photo below for more detail no seepage or drainage. Some erythema surrounding the incision site inferiorly. Musculoskeletal:         General: No swelling or deformity. Right lower leg: No edema. Left lower leg: No edema. Skin:     General: Skin is warm and dry. Neurological:      Mental Status: She is alert. Procedures     MDM     ED Course as of Oct 10 0504   Fri Oct 09, 2020   2222 IMPRESSION:  1. Retrocardiac left lower lobe opacities are present which could suggest  pneumonia or atelectasis. These findings appear similar since prior.     2. Interstitial prominence bilaterally could indicate pulmonary vascular  congestion. These findings are also similar compared to prior. XR CHEST PORTABLE [WL]   Sat Oct 10, 2020   0050 Patient was taken back to CT scan but refused to have a contrast given. We will get a dry scan of her chest and abdomen. [WL]   0209 1. 6.5 x 4.8 x 6.3 cm fluid collection in the left lower quadrant could be  abscess. 2. Distended gallbladder with cholelithiasis. 3. Bilateral small pleural effusions with bibasilar atelectasis. 4. Several nonobstructing intrarenal calculi bilaterally. 5. Low-density renal lesions are probably cysts although not definitively  characterized on noncontrast imaging. Findings were sent to Radiology Results [id number] at 2:03 a.m. on  10/10/2020 to be communicated to a licensed caregiver.      CT ABDOMEN PELVIS WO CONTRAST Additional Contrast? None [WL]   0220 Small layering bilateral pleural effusions with bibasilar compressive  atelectatic changes. No consolidations to suggest pneumonia. CT CHEST WO CONTRAST [WL]   1622 Spoke with PK, he agrees to consult on patient. He would like us to admit to medicine.    [WL]   812 [facility] Dr. Mindy Nicholson will admit patient.    [WL]      ED Course User Index  [WL] Cass Caller,         Patient presents to the ED for evaluation. This patient was seen and evaluated with the attending. Work-up was performed with concerns for but not limited to viral illness, pneumonia, urinary tract infection, intra-abdominal infection, intra-abdominal abscess. Patient was found to have concerning findings on CT scan from intra-abdominal abscess. He wanted to get a CT of her chest with contrast for concern of PE but patient refused to receive contrast even with being premedicated for allergic reactions. She she was treated with broad-spectrum antibiotics. Patient requires continued workup and management of their symptoms and will be admitted to the hospital for further evaluation and treatment. EKG: sinus tachycardia. Rightward axis, incomplete right bundle branch block. No ossific ST-T wave abnormalities. When compared to prior EKG sinus tachycardia has replaced sinus bradycardia.   QTC has improved to 490 ms from 545 ms.          --------------------------------------------- PAST HISTORY ---------------------------------------------  Past Medical History:  has a past medical history of Aorto-iliac atherosclerosis ([facility] Arthritis, Atherosclerosis of native arteries of extremity with rest pain [facility]), Bladder prolapse, CAD (coronary artery disease), Carotid artery stenosis, Carotid bruit, Carotid stenosis, left, Cerebrovascular accident (stroke) ([facility] Decreased radial pulse, Gastric reflux, Glaucoma, History of blood transfusion, Hyperlipemia, Hypertension, Insomnia, PVD (peripheral vascular disease) with claudication ([facility] Restless leg syndrome, and Sinusitis. Past Surgical History:  has a past surgical history that includes Cataract removal; Hysterectomy; knee surgery; skin biopsy; Carpal tunnel release; Dental surgery; Tonsillectomy; Coronary angioplasty with stent (09/06/2011); Carotid endarterectomy (1998); Diagnostic Cardiac Cath Lab Procedure (9/6/11); Anterior Fixation of Thoracic Spine; Cervical disc surgery (2002); Carotid endarterectomy (12/18/2012); Knee Arthroplasty (Left, 4-11-13); back surgery; Cystoscopy; Lithotripsy (Right, 11/7/2019); Lithotripsy; joint replacement; joint replacement (Right); Cardiac surgery; and Aorto-iliac Bypass Graft (N/A, 9/15/2020). Social History:  reports that she quit smoking about 20 years ago. Her smoking use included cigarettes. She has a 100.00 pack-year smoking history. She has never used smokeless tobacco. She reports that she does not drink alcohol or use drugs. Family History: family history is not on file. The patients home medications have been reviewed. Allergies: Iodine; Macrodantin [nitrofurantoin];  Prednisone; Sulfa antibiotics; and 5-alpha reductase inhibitors    -------------------------------------------------- RESULTS -------------------------------------------------    LABS:  Results for orders placed or performed during the hospital encounter of 10/09/20   Lactate, Sepsis   Result Value Ref Range    Lactic Acid, Sepsis 1.0 0.5 - 1.9 mmol/L   CBC auto differential   Result Value Ref Range    WBC 6.2 4.5 - 11.5 E9/L    RBC 2.73 (L) 3.50 - 5.50 E12/L    Hemoglobin 8.0 (L) 11.5 - 15.5 g/dL    Hematocrit 26.9 (L) 34.0 - 48.0 %    MCV 98.5 80.0 - 99.9 fL    MCH 29.3 26.0 - 35.0 pg    MCHC 29.7 (L) 32.0 - 34.5 %    RDW 16.2 (H) 11.5 - 15.0 fL    Platelets 698 724 - 777 E9/L    MPV 9.4 7.0 - 12.0 fL    Neutrophils % 84.6 (H) 43.0 - 80.0 %    Immature Granulocytes % 0.5 0.0 - 5.0 %    Lymphocytes % 5.5 (L) 20.0 - 42.0 %    Monocytes % 7.6 2.0 - 12.0 %    Eosinophils % 1.3 0.0 - 6.0 %    Basophils % 0.5 0.0 - 2.0 %    Neutrophils Absolute 5.27 1.80 - 7.30 E9/L    Immature Granulocytes # 0.03 E9/L    Lymphocytes Absolute 0.34 (L) 1.50 - 4.00 E9/L    Monocytes Absolute 0.47 0.10 - 0.95 E9/L    Eosinophils Absolute 0.08 0.05 - 0.50 E9/L    Basophils Absolute 0.03 0.00 - 0.20 E9/L    Anisocytosis 1+     Polychromasia 1+     Hypochromia 1+     Poikilocytes 1+     Ovalocytes 1+    Comprehensive Metabolic Panel w/ Reflex to MG   Result Value Ref Range    Sodium 140 132 - 146 mmol/L    Potassium reflex Magnesium 3.8 3.5 - 5.0 mmol/L    Chloride 104 98 - 107 mmol/L    CO2 23 22 - 29 mmol/L    Anion Gap 13 7 - 16 mmol/L    Glucose 115 (H) 74 - 99 mg/dL    BUN 10 8 - 23 mg/dL    CREATININE 0.9 0.5 - 1.0 mg/dL    GFR Non-African American >60 >=60 mL/min/1.73    GFR African American >60     Calcium 9.1 8.6 - 10.2 mg/dL    Total Protein 6.2 (L) 6.4 - 8.3 g/dL    Alb 3.6 3.5 - 5.2 g/dL    Total Bilirubin 0.5 0.0 - 1.2 mg/dL    Alkaline Phosphatase 84 35 - 104 U/L    ALT 11 0 - 32 U/L    AST 17 0 - 31 U/L   Troponin   Result Value Ref Range    Troponin 0.04 (H) 0.00 - 0.03 ng/mL   Brain Natriuretic Peptide   Result Value Ref Range    Pro-BNP 1,250 (H) 0 - 450 pg/mL   COVID-19   Result Value Ref Range    SARS-CoV-2, NAAT Not Detected Not Detected   EKG 12 lead   Result Value Ref with the patient/family members and discussed todays results, in addition to providing specific details for the plan of care and counseling regarding the diagnosis and prognosis. Their questions are answered at this time and they are agreeable with the plan of admission. This patient has remained hemodynamically stable during their ED course. Diagnosis:  1. Abdominal wall abscess    2. Septicemia ([address]    3. Pleural effusion    4. Acute respiratory failure with hypoxia (HCC)    5. Biliary calculus of other site without obstruction        Disposition:  Patient's disposition: Admit to monitored bed  Patient's condition is stable. NOTE:  This report was transcribed using voice recognition software. Efforts were made to ensure accuracy; however, inadvertent computerized transcription errors may be present. Giuliana Grewal DO  Resident  10/10/20 [id number]  ATTENDING PROVIDER ATTESTATION:     I have personally performed and/or participated in the history, exam, medical decision making, and procedures and agree with all pertinent clinical information. I have also reviewed and agree with the past medical, family and social history unless otherwise noted. I have discussed this patient in detail with the resident, and provided the instruction and education regarding fever. My findings/Plan: Please note I was the primary provider for patient. Patient presenting here because a history of fever. Patient had temperature of over 101 nursing facility. Patient reporting no chest pain. Patient was noted to have hypoxia pulse ox is in the lower 80s. Patient reporting no chest pain she reports very minimal abdominal pain. She has mild tenderness to her lower abdomen there is some noted swelling to incision site but there is no obvious drainage and/or cellulitis. Patient moving all extremities. Patient is awake alert here she was mildly attentive here. Patient was given IV fluids.   Patient blood pressure did improve. Patient lungs are clear. Patient neurologically intact. Labs and EKG as well as x-rays noted and reviewed. CT is also noted and reviewed COVID testing was negative. Patient CT shows possible abscess. Patient ordered IV antibiotics here blood cultures were also obtained. We did speak to on-call vascular surgery they wanted patient admitted we did speak to on-call internal medicine. Patient will be admitted to monitored bed. Patient made aware of findings and plan.        Oscar Sanon MD  10/10/20 081 [facility] MD Miya  10/10/20 0074

## 2020-10-11 LAB
ALBUMIN SERPL-MCNC: 3.3 G/DL (ref 3.5–5.2)
ALP BLD-CCNC: 76 U/L (ref 35–104)
ALT SERPL-CCNC: 9 U/L (ref 0–32)
ANION GAP SERPL CALCULATED.3IONS-SCNC: 11 MMOL/L (ref 7–16)
APTT: 23.9 SEC (ref 24.5–35.1)
AST SERPL-CCNC: 18 U/L (ref 0–31)
BILIRUB SERPL-MCNC: 0.5 MG/DL (ref 0–1.2)
BUN BLDV-MCNC: 8 MG/DL (ref 8–23)
CALCIUM SERPL-MCNC: 8.5 MG/DL (ref 8.6–10.2)
CHLORIDE BLD-SCNC: 106 MMOL/L (ref 98–107)
CO2: 23 MMOL/L (ref 22–29)
CREAT SERPL-MCNC: 0.8 MG/DL (ref 0.5–1)
GFR AFRICAN AMERICAN: >60
GFR NON-AFRICAN AMERICAN: >60 ML/MIN/1.73
GLUCOSE BLD-MCNC: 99 MG/DL (ref 74–99)
HCT VFR BLD CALC: 25.4 % (ref 34–48)
HEMOGLOBIN: 7.5 G/DL (ref 11.5–15.5)
INR BLD: 1.1
LACTIC ACID: 0.9 MMOL/L (ref 0.5–2.2)
POTASSIUM REFLEX MAGNESIUM: 3.8 MMOL/L (ref 3.5–5)
PROCALCITONIN: 0.47 NG/ML (ref 0–0.08)
PROTHROMBIN TIME: 12.7 SEC (ref 9.3–12.4)
SODIUM BLD-SCNC: 140 MMOL/L (ref 132–146)
TOTAL PROTEIN: 5.8 G/DL (ref 6.4–8.3)
TROPONIN: 0.04 NG/ML (ref 0–0.03)

## 2020-10-11 PROCEDURE — 2700000000 HC OXYGEN THERAPY PER DAY

## 2020-10-11 PROCEDURE — 80053 COMPREHEN METABOLIC PANEL: CPT

## 2020-10-11 PROCEDURE — 85018 HEMOGLOBIN: CPT

## 2020-10-11 PROCEDURE — 84145 PROCALCITONIN (PCT): CPT

## 2020-10-11 PROCEDURE — 6370000000 HC RX 637 (ALT 250 FOR IP): Performed by: PHYSICIAN ASSISTANT

## 2020-10-11 PROCEDURE — 85610 PROTHROMBIN TIME: CPT

## 2020-10-11 PROCEDURE — 36415 COLL VENOUS BLD VENIPUNCTURE: CPT

## 2020-10-11 PROCEDURE — 6360000002 HC RX W HCPCS: Performed by: PHYSICIAN ASSISTANT

## 2020-10-11 PROCEDURE — 84484 ASSAY OF TROPONIN QUANT: CPT

## 2020-10-11 PROCEDURE — 85730 THROMBOPLASTIN TIME PARTIAL: CPT

## 2020-10-11 PROCEDURE — 6360000002 HC RX W HCPCS: Performed by: INTERNAL MEDICINE

## 2020-10-11 PROCEDURE — 83605 ASSAY OF LACTIC ACID: CPT

## 2020-10-11 PROCEDURE — 85014 HEMATOCRIT: CPT

## 2020-10-11 PROCEDURE — 2580000003 HC RX 258: Performed by: PHYSICIAN ASSISTANT

## 2020-10-11 PROCEDURE — 94640 AIRWAY INHALATION TREATMENT: CPT

## 2020-10-11 PROCEDURE — 99231 SBSQ HOSP IP/OBS SF/LOW 25: CPT | Performed by: SURGERY

## 2020-10-11 PROCEDURE — 2060000000 HC ICU INTERMEDIATE R&B

## 2020-10-11 PROCEDURE — 2580000003 HC RX 258: Performed by: INTERNAL MEDICINE

## 2020-10-11 RX ADMIN — PIPERACILLIN AND TAZOBACTAM 3.38 G: 3; .375 INJECTION, POWDER, FOR SOLUTION INTRAVENOUS at 21:00

## 2020-10-11 RX ADMIN — LATANOPROST 1 DROP: 50 SOLUTION OPHTHALMIC at 09:29

## 2020-10-11 RX ADMIN — GABAPENTIN 300 MG: 300 CAPSULE ORAL at 14:15

## 2020-10-11 RX ADMIN — AMLODIPINE BESYLATE 5 MG: 5 TABLET ORAL at 09:29

## 2020-10-11 RX ADMIN — ENOXAPARIN SODIUM 40 MG: 40 INJECTION SUBCUTANEOUS at 09:29

## 2020-10-11 RX ADMIN — CILOSTAZOL 100 MG: 100 TABLET ORAL at 09:29

## 2020-10-11 RX ADMIN — METOPROLOL SUCCINATE 100 MG: 100 TABLET, EXTENDED RELEASE ORAL at 09:29

## 2020-10-11 RX ADMIN — DORZOLAMIDE HYDROCHLORIDE 1 DROP: 20 SOLUTION/ DROPS OPHTHALMIC at 19:37

## 2020-10-11 RX ADMIN — GABAPENTIN 300 MG: 300 CAPSULE ORAL at 19:36

## 2020-10-11 RX ADMIN — IPRATROPIUM BROMIDE AND ALBUTEROL SULFATE 3 ML: .5; 3 SOLUTION RESPIRATORY (INHALATION) at 20:01

## 2020-10-11 RX ADMIN — Medication 10 ML: at 09:29

## 2020-10-11 RX ADMIN — PIPERACILLIN AND TAZOBACTAM 3.38 G: 3; .375 INJECTION, POWDER, FOR SOLUTION INTRAVENOUS at 00:07

## 2020-10-11 RX ADMIN — FAMOTIDINE 20 MG: 20 TABLET ORAL at 09:29

## 2020-10-11 RX ADMIN — CETIRIZINE HYDROCHLORIDE 10 MG: 10 TABLET, FILM COATED ORAL at 09:29

## 2020-10-11 RX ADMIN — IPRATROPIUM BROMIDE AND ALBUTEROL SULFATE 3 ML: .5; 3 SOLUTION RESPIRATORY (INHALATION) at 15:30

## 2020-10-11 RX ADMIN — GABAPENTIN 300 MG: 300 CAPSULE ORAL at 09:29

## 2020-10-11 RX ADMIN — ROSUVASTATIN 10 MG: 10 TABLET, FILM COATED ORAL at 09:29

## 2020-10-11 RX ADMIN — PIPERACILLIN AND TAZOBACTAM 3.38 G: 3; .375 INJECTION, POWDER, FOR SOLUTION INTRAVENOUS at 13:51

## 2020-10-11 RX ADMIN — ACETAMINOPHEN 650 MG: 325 TABLET, FILM COATED ORAL at 06:07

## 2020-10-11 RX ADMIN — IPRATROPIUM BROMIDE AND ALBUTEROL SULFATE 3 ML: .5; 3 SOLUTION RESPIRATORY (INHALATION) at 08:59

## 2020-10-11 RX ADMIN — ACETAMINOPHEN 650 MG: 325 TABLET, FILM COATED ORAL at 19:36

## 2020-10-11 RX ADMIN — DORZOLAMIDE HYDROCHLORIDE 1 DROP: 20 SOLUTION/ DROPS OPHTHALMIC at 09:29

## 2020-10-11 RX ADMIN — CILOSTAZOL 100 MG: 100 TABLET ORAL at 19:36

## 2020-10-11 RX ADMIN — ASPIRIN 81 MG: 81 TABLET, COATED ORAL at 09:29

## 2020-10-11 ASSESSMENT — PAIN SCALES - GENERAL
PAINLEVEL_OUTOF10: 0
PAINLEVEL_OUTOF10: 7
PAINLEVEL_OUTOF10: 4
PAINLEVEL_OUTOF10: 7

## 2020-10-11 ASSESSMENT — PAIN DESCRIPTION - LOCATION: LOCATION: HEAD

## 2020-10-11 ASSESSMENT — PAIN DESCRIPTION - PAIN TYPE: TYPE: ACUTE PAIN

## 2020-10-11 NOTE — PROGRESS NOTES
(mini-bag), 3.375 g, Intravenous, Q8H, Stopped at 10/11/20 0407 **AND** 0.9 % sodium chloride infusion admixture, , Intravenous, Q8H, Jose Pace MD, Stopped at 10/10/20 2140    iopamidol (ISOVUE-370) 76 % injection 110 mL, 110 mL, Intravenous, ONCE PRN, Elier Wiley MD    DIET CARDIAC;    XR CHEST (2 VW)   Final Result   Increase in airspace opacity of bilateral lungs may represent atelectasis. Superimposed infectious etiology may also be considered in the proper   clinical setting. CT CHEST WO CONTRAST   Final Result   Small layering bilateral pleural effusions with bibasilar compressive   atelectatic changes. No consolidations to suggest pneumonia. CT ABDOMEN PELVIS WO CONTRAST Additional Contrast? None   Final Result   1. 6.5 x 4.8 x 6.3 cm fluid collection in the left lower quadrant could be   abscess. 2. Distended gallbladder with cholelithiasis. 3. Bilateral small pleural effusions with bibasilar atelectasis. 4. Several nonobstructing intrarenal calculi bilaterally. 5. Low-density renal lesions are probably cysts although not definitively   characterized on noncontrast imaging. Findings were sent to Radiology Results Communication Center at 2:03 a.m. on   10/10/2020 to be communicated to a licensed caregiver. XR CHEST PORTABLE   Final Result   1. Retrocardiac left lower lobe opacities are present which could suggest   pneumonia or atelectasis. These findings appear similar since prior. 2.  Interstitial prominence bilaterally could indicate pulmonary vascular   congestion. These findings are also similar compared to prior. Assessment:    Active Problems: Aorto-iliac atherosclerosis (HCC)    Sepsis ([address]    Abscess of abdominal wall  Resolved Problems:    * No resolved hospital problems.  *      Plan:    Procalcitonin is elevated  T-max was 99.8  Discussed with infectious disease  Possible CT-guided aspiration  We will also discuss with katey Villatoro  12:02 PM  10/11/2020    NOTE: This report was transcribed using voice recognition software.  Every effort was made to ensure accuracy; however, inadvertent transcription errors may be present

## 2020-10-12 ENCOUNTER — APPOINTMENT (OUTPATIENT)
Dept: GENERAL RADIOLOGY | Age: 75
DRG: 371 | End: 2020-10-12
Payer: COMMERCIAL

## 2020-10-12 ENCOUNTER — APPOINTMENT (OUTPATIENT)
Dept: ULTRASOUND IMAGING | Age: 75
DRG: 371 | End: 2020-10-12
Payer: COMMERCIAL

## 2020-10-12 PROBLEM — R50.9 FEVER: Status: ACTIVE | Noted: 2020-10-12

## 2020-10-12 PROBLEM — R18.8 INTRA-ABDOMINAL FLUID COLLECTION: Status: ACTIVE | Noted: 2020-10-12

## 2020-10-12 LAB
ABO/RH: NORMAL
ADENOVIRUS BY PCR: NOT DETECTED
ANTIBODY SCREEN: NORMAL
BASOPHILS ABSOLUTE: 0.02 E9/L (ref 0–0.2)
BASOPHILS RELATIVE PERCENT: 0.4 % (ref 0–2)
BORDETELLA PARAPERTUSSIS BY PCR: NOT DETECTED
BORDETELLA PERTUSSIS BY PCR: NOT DETECTED
CHLAMYDOPHILIA PNEUMONIAE BY PCR: NOT DETECTED
CORONAVIRUS 229E BY PCR: NOT DETECTED
CORONAVIRUS HKU1 BY PCR: NOT DETECTED
CORONAVIRUS NL63 BY PCR: NOT DETECTED
CORONAVIRUS OC43 BY PCR: NOT DETECTED
EOSINOPHILS ABSOLUTE: 0.11 E9/L (ref 0.05–0.5)
EOSINOPHILS RELATIVE PERCENT: 2 % (ref 0–6)
HCT VFR BLD CALC: 26.2 % (ref 34–48)
HEMOGLOBIN: 8 G/DL (ref 11.5–15.5)
HUMAN METAPNEUMOVIRUS BY PCR: NOT DETECTED
HUMAN RHINOVIRUS/ENTEROVIRUS BY PCR: NOT DETECTED
IMMATURE GRANULOCYTES #: 0.02 E9/L
IMMATURE GRANULOCYTES %: 0.4 % (ref 0–5)
INFLUENZA A BY PCR: NOT DETECTED
INFLUENZA B BY PCR: NOT DETECTED
LYMPHOCYTES ABSOLUTE: 0.63 E9/L (ref 1.5–4)
LYMPHOCYTES RELATIVE PERCENT: 11.7 % (ref 20–42)
MCH RBC QN AUTO: 29.4 PG (ref 26–35)
MCHC RBC AUTO-ENTMCNC: 30.5 % (ref 32–34.5)
MCV RBC AUTO: 96.3 FL (ref 80–99.9)
MONOCYTES ABSOLUTE: 0.51 E9/L (ref 0.1–0.95)
MONOCYTES RELATIVE PERCENT: 9.5 % (ref 2–12)
MYCOPLASMA PNEUMONIAE BY PCR: NOT DETECTED
NEUTROPHILS ABSOLUTE: 4.08 E9/L (ref 1.8–7.3)
NEUTROPHILS RELATIVE PERCENT: 76 % (ref 43–80)
PARAINFLUENZA VIRUS 1 BY PCR: NOT DETECTED
PARAINFLUENZA VIRUS 2 BY PCR: NOT DETECTED
PARAINFLUENZA VIRUS 3 BY PCR: NOT DETECTED
PARAINFLUENZA VIRUS 4 BY PCR: NOT DETECTED
PDW BLD-RTO: 16.1 FL (ref 11.5–15)
PLATELET # BLD: 247 E9/L (ref 130–450)
PMV BLD AUTO: 9.7 FL (ref 7–12)
RBC # BLD: 2.72 E12/L (ref 3.5–5.5)
RESPIRATORY SYNCYTIAL VIRUS BY PCR: NOT DETECTED
SARS-COV-2, PCR: NOT DETECTED
URINE CULTURE, ROUTINE: NORMAL
WBC # BLD: 5.4 E9/L (ref 4.5–11.5)

## 2020-10-12 PROCEDURE — 93970 EXTREMITY STUDY: CPT | Performed by: RADIOLOGY

## 2020-10-12 PROCEDURE — 6360000002 HC RX W HCPCS: Performed by: PHYSICIAN ASSISTANT

## 2020-10-12 PROCEDURE — 87450 HC DIRECT STREP B ANTIGEN: CPT

## 2020-10-12 PROCEDURE — 86900 BLOOD TYPING SEROLOGIC ABO: CPT

## 2020-10-12 PROCEDURE — 0202U NFCT DS 22 TRGT SARS-COV-2: CPT

## 2020-10-12 PROCEDURE — 85025 COMPLETE CBC W/AUTO DIFF WBC: CPT

## 2020-10-12 PROCEDURE — 94640 AIRWAY INHALATION TREATMENT: CPT

## 2020-10-12 PROCEDURE — 86850 RBC ANTIBODY SCREEN: CPT

## 2020-10-12 PROCEDURE — 6370000000 HC RX 637 (ALT 250 FOR IP): Performed by: PHYSICIAN ASSISTANT

## 2020-10-12 PROCEDURE — 71045 X-RAY EXAM CHEST 1 VIEW: CPT

## 2020-10-12 PROCEDURE — 94669 MECHANICAL CHEST WALL OSCILL: CPT

## 2020-10-12 PROCEDURE — 6360000002 HC RX W HCPCS: Performed by: INTERNAL MEDICINE

## 2020-10-12 PROCEDURE — 87070 CULTURE OTHR SPECIMN AEROBIC: CPT

## 2020-10-12 PROCEDURE — 2060000000 HC ICU INTERMEDIATE R&B

## 2020-10-12 PROCEDURE — 6360000002 HC RX W HCPCS: Performed by: CLINICAL NURSE SPECIALIST

## 2020-10-12 PROCEDURE — 2580000003 HC RX 258: Performed by: INTERNAL MEDICINE

## 2020-10-12 PROCEDURE — 2700000000 HC OXYGEN THERAPY PER DAY

## 2020-10-12 PROCEDURE — 2580000003 HC RX 258: Performed by: PHYSICIAN ASSISTANT

## 2020-10-12 PROCEDURE — 93970 EXTREMITY STUDY: CPT

## 2020-10-12 PROCEDURE — 87206 SMEAR FLUORESCENT/ACID STAI: CPT

## 2020-10-12 PROCEDURE — 86901 BLOOD TYPING SEROLOGIC RH(D): CPT

## 2020-10-12 PROCEDURE — 87449 NOS EACH ORGANISM AG IA: CPT

## 2020-10-12 PROCEDURE — 36415 COLL VENOUS BLD VENIPUNCTURE: CPT

## 2020-10-12 RX ORDER — ARFORMOTEROL TARTRATE 15 UG/2ML
15 SOLUTION RESPIRATORY (INHALATION) 2 TIMES DAILY
Status: DISCONTINUED | OUTPATIENT
Start: 2020-10-12 | End: 2020-10-15 | Stop reason: HOSPADM

## 2020-10-12 RX ORDER — FUROSEMIDE 10 MG/ML
40 INJECTION INTRAMUSCULAR; INTRAVENOUS ONCE
Status: COMPLETED | OUTPATIENT
Start: 2020-10-12 | End: 2020-10-12

## 2020-10-12 RX ADMIN — Medication 6 MG: at 20:32

## 2020-10-12 RX ADMIN — GABAPENTIN 300 MG: 300 CAPSULE ORAL at 13:49

## 2020-10-12 RX ADMIN — CETIRIZINE HYDROCHLORIDE 10 MG: 10 TABLET, FILM COATED ORAL at 08:21

## 2020-10-12 RX ADMIN — SODIUM CHLORIDE: 9 INJECTION, SOLUTION INTRAVENOUS at 08:23

## 2020-10-12 RX ADMIN — SODIUM CHLORIDE: 9 INJECTION, SOLUTION INTRAVENOUS at 00:29

## 2020-10-12 RX ADMIN — IPRATROPIUM BROMIDE AND ALBUTEROL SULFATE 3 ML: .5; 3 SOLUTION RESPIRATORY (INHALATION) at 05:51

## 2020-10-12 RX ADMIN — IPRATROPIUM BROMIDE AND ALBUTEROL SULFATE 3 ML: .5; 3 SOLUTION RESPIRATORY (INHALATION) at 08:58

## 2020-10-12 RX ADMIN — DORZOLAMIDE HYDROCHLORIDE 1 DROP: 20 SOLUTION/ DROPS OPHTHALMIC at 08:24

## 2020-10-12 RX ADMIN — ENOXAPARIN SODIUM 40 MG: 40 INJECTION SUBCUTANEOUS at 08:19

## 2020-10-12 RX ADMIN — CILOSTAZOL 100 MG: 100 TABLET ORAL at 08:21

## 2020-10-12 RX ADMIN — ONDANSETRON 4 MG: 2 INJECTION INTRAMUSCULAR; INTRAVENOUS at 11:59

## 2020-10-12 RX ADMIN — ARFORMOTEROL TARTRATE 15 MCG: 15 SOLUTION RESPIRATORY (INHALATION) at 19:53

## 2020-10-12 RX ADMIN — GABAPENTIN 300 MG: 300 CAPSULE ORAL at 20:27

## 2020-10-12 RX ADMIN — DORZOLAMIDE HYDROCHLORIDE 1 DROP: 20 SOLUTION/ DROPS OPHTHALMIC at 20:28

## 2020-10-12 RX ADMIN — PIPERACILLIN AND TAZOBACTAM 3.38 G: 3; .375 INJECTION, POWDER, FOR SOLUTION INTRAVENOUS at 20:27

## 2020-10-12 RX ADMIN — ROSUVASTATIN 10 MG: 10 TABLET, FILM COATED ORAL at 08:27

## 2020-10-12 RX ADMIN — ACETAMINOPHEN 650 MG: 325 TABLET, FILM COATED ORAL at 17:38

## 2020-10-12 RX ADMIN — PIPERACILLIN AND TAZOBACTAM 3.38 G: 3; .375 INJECTION, POWDER, FOR SOLUTION INTRAVENOUS at 04:06

## 2020-10-12 RX ADMIN — SODIUM CHLORIDE: 9 INJECTION, SOLUTION INTRAVENOUS at 16:47

## 2020-10-12 RX ADMIN — ASPIRIN 81 MG: 81 TABLET, COATED ORAL at 08:21

## 2020-10-12 RX ADMIN — SODIUM CHLORIDE, PRESERVATIVE FREE 10 ML: 5 INJECTION INTRAVENOUS at 18:33

## 2020-10-12 RX ADMIN — AMLODIPINE BESYLATE 5 MG: 5 TABLET ORAL at 08:20

## 2020-10-12 RX ADMIN — LATANOPROST 1 DROP: 50 SOLUTION OPHTHALMIC at 08:22

## 2020-10-12 RX ADMIN — ACETAMINOPHEN 650 MG: 325 TABLET, FILM COATED ORAL at 08:20

## 2020-10-12 RX ADMIN — PIPERACILLIN AND TAZOBACTAM 3.38 G: 3; .375 INJECTION, POWDER, FOR SOLUTION INTRAVENOUS at 13:49

## 2020-10-12 RX ADMIN — Medication 6 MG: at 00:32

## 2020-10-12 RX ADMIN — FAMOTIDINE 20 MG: 20 TABLET ORAL at 08:20

## 2020-10-12 RX ADMIN — METOPROLOL SUCCINATE 100 MG: 100 TABLET, EXTENDED RELEASE ORAL at 08:27

## 2020-10-12 RX ADMIN — GABAPENTIN 300 MG: 300 CAPSULE ORAL at 08:20

## 2020-10-12 RX ADMIN — IPRATROPIUM BROMIDE AND ALBUTEROL SULFATE 3 ML: .5; 3 SOLUTION RESPIRATORY (INHALATION) at 19:53

## 2020-10-12 RX ADMIN — FUROSEMIDE 40 MG: 10 INJECTION, SOLUTION INTRAVENOUS at 18:32

## 2020-10-12 ASSESSMENT — PAIN DESCRIPTION - LOCATION
LOCATION: HEAD;ABDOMEN
LOCATION: HEAD
LOCATION: HEAD

## 2020-10-12 ASSESSMENT — PAIN - FUNCTIONAL ASSESSMENT: PAIN_FUNCTIONAL_ASSESSMENT: PREVENTS OR INTERFERES SOME ACTIVE ACTIVITIES AND ADLS

## 2020-10-12 ASSESSMENT — PAIN DESCRIPTION - PAIN TYPE
TYPE: ACUTE PAIN

## 2020-10-12 ASSESSMENT — PAIN SCALES - GENERAL
PAINLEVEL_OUTOF10: 8
PAINLEVEL_OUTOF10: 4
PAINLEVEL_OUTOF10: 8
PAINLEVEL_OUTOF10: 5
PAINLEVEL_OUTOF10: 0

## 2020-10-12 ASSESSMENT — PAIN DESCRIPTION - DESCRIPTORS
DESCRIPTORS: HEADACHE
DESCRIPTORS: HEADACHE;DISCOMFORT
DESCRIPTORS: HEADACHE

## 2020-10-12 ASSESSMENT — PAIN DESCRIPTION - ONSET: ONSET: ON-GOING

## 2020-10-12 ASSESSMENT — PAIN DESCRIPTION - FREQUENCY: FREQUENCY: INTERMITTENT

## 2020-10-12 ASSESSMENT — PAIN DESCRIPTION - PROGRESSION: CLINICAL_PROGRESSION: NOT CHANGED

## 2020-10-12 NOTE — PROGRESS NOTES
Subjective:  Feeling better   No CP or SOB  No fever or chills   No uncontrolled pain  No vomiting or diarrhea     Objective:    BP (!) 178/75   Pulse 102   Temp 98.5 °F (36.9 °C) (Temporal)   Resp 20   Ht 5' (1.524 m)   Wt 181 lb (82.1 kg)   SpO2 93%   BMI 35.35 kg/m²     24HR INTAKE/OUTPUT:      Intake/Output Summary (Last 24 hours) at 10/12/2020 1019  Last data filed at 10/12/2020 0618  Gross per 24 hour   Intake 1095 ml   Output 800 ml   Net 295 ml       General appearance: NAD, conversant  Neck: FROM, supple   Lungs: Clear bilaterally no wheezes, no rhonchi, no crackles  CV: RRR, no MRGs; normal carotid upstroke and amplitude without Bruits  Abdomen: Soft, non-tender; no masses or HSM  Extremities: No edema, no cyanosis, no clubbing  Skin: Intact no rash, no lesions, no ulcers    Psych: Alert and oriented normal affect  Neuro: Nonfocal  Most Recent Labs  Lab Results   Component Value Date    WBC 5.4 10/12/2020    HGB 8.0 (L) 10/12/2020    HCT 26.2 (L) 10/12/2020     10/12/2020     10/11/2020    K 3.8 10/11/2020     10/11/2020    CREATININE 0.8 10/11/2020    BUN 8 10/11/2020    CO2 23 10/11/2020    GLUCOSE 99 10/11/2020    ALT 9 10/11/2020    AST 18 10/11/2020    INR 1.1 10/11/2020    TSH 1.670 09/11/2019    LABA1C 5.1 09/11/2019     No results for input(s): MG in the last 72 hours. Lab Results   Component Value Date    CALCIUM 8.5 (L) 10/11/2020    PHOS 3.8 09/24/2020        XR CHEST (2 VW)   Final Result   Increase in airspace opacity of bilateral lungs may represent atelectasis. Superimposed infectious etiology may also be considered in the proper   clinical setting. CT CHEST WO CONTRAST   Final Result   Small layering bilateral pleural effusions with bibasilar compressive   atelectatic changes. No consolidations to suggest pneumonia.          CT ABDOMEN PELVIS WO CONTRAST Additional Contrast? None   Final Result   1. 6.5 x 4.8 x 6.3 cm fluid collection in the left lower quadrant could be   abscess. 2. Distended gallbladder with cholelithiasis. 3. Bilateral small pleural effusions with bibasilar atelectasis. 4. Several nonobstructing intrarenal calculi bilaterally. 5. Low-density renal lesions are probably cysts although not definitively   characterized on noncontrast imaging. Findings were sent to Radiology Results Communication Center at 2:03 a.m. on   10/10/2020 to be communicated to a licensed caregiver. XR CHEST PORTABLE   Final Result   1. Retrocardiac left lower lobe opacities are present which could suggest   pneumonia or atelectasis. These findings appear similar since prior. 2.  Interstitial prominence bilaterally could indicate pulmonary vascular   congestion. These findings are also similar compared to prior. XR CHEST PORTABLE    (Results Pending)   US DUP LOWER EXTREMITIES BILATERAL VENOUS    (Results Pending)       Assessment    Principal Problem:    Intra-abdominal fluid collection  Active Problems:    CAD (coronary artery disease)    Hyperlipemia    Aorto-iliac atherosclerosis (HCC)  Resolved Problems:    * No resolved hospital problems.  *      Plan:  79-year-old female history of recent aortobiiliac bypass on 9/15/20 admitted with intra-abdominal fluid collection and fever    Zosyn  Supportive care  COVID-19 negative  Blood cultures NGTD  Urine culture negative  ID consult appreciated  Vascular consult appreciated  Medications for other co morbidities cont as appropriate w dosage adjustments as necessary   PT/OT  DVT PPx  DC planning        Electronically signed by Xiomara Reyez MD on 10/12/2020 at 10:19 AM

## 2020-10-12 NOTE — PROGRESS NOTES
ALESSAN[facility]A PROGRESS NOTE      Chief complaint: Follow-up of fever     The patient is a 67 y.o. female nursing home resident with history of CAD, stroke, hypertension, severe peripheral vascular disease status post aortoiliac bypass graft surgery on 09/15, presented on 10/09 with fever of 101.1 °F.  On admission, she was afebrile and hemodynamically stable with no leukocytosis. SARS-CoV-2 PCR was not detected. CT chest showed small layering bilateral pleural effusion with bibasilar compressive atelectasis. CT abdomen and pelvis showed distended gallbladder with dependent cholelithiasis, several nonobstructing intrarenal calculi bilaterally, 6.5 x 4.8 x 6.3 cm fluid collection in the left lower quadrant anterior to the iliac vessels, which is suspected to be hematoma or seroma or lymphocele per Vascular Surgery. She was given a dose of ceftriaxone and metronidazole on admission. Piperacillin-tazobactam was started on admission. Subjective: Patient was seen and examined. No chills, no abdominal pain, no diarrhea, no rash, no itching, no dysuria. Objective:  BP (!) 178/75   Pulse 102   Temp 98.5 °F (36.9 °C) (Temporal)   Resp 20   Ht 5' (1.524 m)   Wt 181 lb (82.1 kg)   SpO2 93%   BMI 35.35 kg/m²   Constitutional: Alert, not in distress  Respiratory: Clear breath sounds, no crackles, no wheezes  Cardiovascular: Regular rate and rhythm, no murmurs  Gastrointestinal: Bowel sounds present, soft, nontender. Midline incision clean and dry  Skin: Warm and dry, no active dermatoses  Musculoskeletal: No joint swelling, no joint erythema    Labs, imaging, and medical records/notes were personally reviewed.     Assessment:  Fever, etiology unclear  Left lower quadrant abdominal fluid collection (suspected to be hematoma or seroma or lymphocele per Vascular Surgery and as such, no intervention for now)   History of recent aortoiliac bypass graft surgery on 09/15    Recommendations:  Continue piperacillin-tazobactam 3.375g q8h for now. Plan to discontinue antibiotics if blood cultures showed no growth. Follow up blood and urine cultures. Thank you for involving me in the care of [facility]. I will continue to follow. Please do not hesitate to call for any questions or concerns.     Electronically signed by Venus Frankel, MD on 10/12/2020 at 9:39 AM

## 2020-10-12 NOTE — CONSULTS
Michael Johnston M.D.,[facility]  Donovan Nuno D.O., F.SILVIANO.AMINTAOELIOT., Sundar Fajardo M.D. Maylin Knox M.D., Kianna Werner M.D. Noemy Aviles D.O. Patient:  Benigno Londono 67 y.o. female MRN: [id number]     Date of Service: 10/12/2020      PULMONARY CONSULTATION    Reason for Consultation: COPD cough shortness of breath  Referring Physician: Dr. Ceferino Stanley    Communication with the referring physician will be sent via the electronic medical record. Chief Complaint: sob     CODE STATUS:full    SUBJECTIVE:  HPI:  Benigno Londono is a 67 y.o. male who presented to the ED 2 days ago with fever 101.1 she also began having some wheezing and required oxygen she was having desaturation, prior she was not on oxygen and has been ambulating with physical therapy. Patient with midline staples. CT of abdomen and pelvis shows fluid collection left lower quadrant, and bilateral pleural effusions small of CT  of the chest shows no consolidation, apparently patient refused CTA in the ED she did not want to have the dye. Awaiting ultrasounds of the lower extremities. Vascular has been consulted in addition ID is now following. We have been consulted for COPD and shortness of breath, patient is known to our service she was last seen during her most recent hospital admission on 9/24/2020 hypoxia status post aortic common iliac bypass, has restrictive lung disease. Patient seen in room she is lying in bed she appears in no acute distress she is currently on 5 L with a 93% . Patient reports that she had been doing very well at rehab walking and using the steps with physical therapy with no oxygen. Over the last 2 days she has felt more fatigue fever chills night sweats. On Tylenol and then sent in.   Patient is complaining that she is now having a productive cough small amount of yellow sputum will get respiratory culture      Past Medical History:   Diagnosis Date    Aorto-iliac atherosclerosis ([address] 7/1/2020    Arthritis     Atherosclerosis of native arteries of extremity with rest pain ([address] 7/16/2020    Bladder prolapse     CAD (coronary artery disease)     MI  2011    Carotid artery stenosis     Right carotid stenosis.  Carotid bruit 1/10/2013    Carotid stenosis, left 12/13/2012    Cerebrovascular accident (stroke) ([address]     pt states 13 yrs ago    Decreased radial pulse 7/6/2017    Gastric reflux     Glaucoma     History of blood transfusion     Hyperlipemia     Hypertension     Insomnia     PVD (peripheral vascular disease) with claudication ([address] 6/4/2020    Restless leg syndrome     Sinusitis        Past Surgical History:   Procedure Laterality Date    ANTERIOR FIXATION OF THORACIC SPINE      AORTO-ILIAC BYPASS GRAFT N/A 9/15/2020    AORTO BIFEMORAL  BYPASS performed by Selvin Fairbanks MD at [address] [facility]    right    CAROTID ENDARTERECTOMY  12/18/2012    left carotid endarterectomy done by Dr. Viji Hayes      left wrist    CATARACT REMOVAL      CERVICAL [address] SURGERY  2002    C5 and C6    CORONARY ANGIOPLASTY WITH STENT PLACEMENT  09/06/2011    Dr. Edward Cisse      in past for kidney stones    DENTAL SURGERY      DIAGNOSTIC CARDIAC CATH LAB PROCEDURE  9/6/11    Emergent cath/PTCA with deployment of 2 overlapping DE stents to the distal, mid and proximal RCA.    HYSTERECTOMY      JOINT REPLACEMENT      right knee twice, left knee    JOINT REPLACEMENT Right     hip    KNEE ARTHROPLASTY Left 4-11-13    KNEE SURGERY      right knee x 2    LITHOTRIPSY Right 11/7/2019    RIGHT ESWL EXTRACORPOREAL SHOCK WAVE LITHOTRIPSY CYSTOSCOPY STENT INSERTION performed by Chantal Bruno MD at [address] [facility]         History reviewed. No pertinent family history.     Social History:   Social History     Socioeconomic History    Marital status:  Administered    Pneumococcal Conjugate 13-valent ([id number]) 11/20/2015    Pneumococcal Polysaccharide ([id number]) 06/17/2012        Home Meds: Medications Prior to Admission: melatonin 3 MG TABS tablet, Take 2 tablets by mouth nightly as needed (insomnia)  ipratropium-albuterol (DUONEB) 0.5-2.5 (3) MG/3ML SOLN nebulizer solution, Inhale 3 mLs into the lungs every 4 hours (while awake)  albuterol (PROVENTIL) (2.5 MG/3ML) 0.083% nebulizer solution, Take 3 mLs by nebulization every 6 hours as needed for Wheezing or Shortness of Breath  triamcinolone (KENALOG) 0.1 % cream, Apply 1 applicator topically 2 times daily  metoprolol succinate (TOPROL XL) 100 MG extended release tablet, Take 1 tablet by mouth daily  amLODIPine (NORVASC) 5 MG tablet, Take 1 tablet by mouth daily  gabapentin (NEURONTIN) 300 MG capsule, Take 300 mg by mouth 3 times daily. Tofacitinib Citrate ER (XELJANZ XR) 11 MG TB24, Take 11 mg by mouth Daily  cilostazol (PLETAL) 100 MG tablet, Take 1 tablet by mouth 2 times daily  latanoprost (XALATAN) 0.005 % ophthalmic solution, INSTILL 1 DROP INTO BOTH EYES EVERY DAY  Misc. Devices (ROLLATOR ULTRA-LIGHT) MISC, 1 Device by Does not apply route continuous  rosuvastatin (CRESTOR) 10 MG tablet, TAKE 1 TABLET BY MOUTH EVERY DAY  loratadine (CLARITIN) 10 MG tablet, TAKE ONE TABLET BY MOUTH EVERY DAY.   Multiple Vitamins-Minerals (VITAMIN D3 COMPLETE PO), Take 2,000 Units by mouth  Omega-3 Fatty Acids (FISH OIL) 1000 MG CPDR, Take 1,000 mg by mouth daily STOP PREOP MED  famotidine (PEPCID) 40 MG tablet, Take 1 tablet by mouth daily (Patient taking differently: Take 40 mg by mouth daily TAKES PRN)  aspirin 81 MG EC tablet, TAKE 1 TABLET BY MOUTH DAILY  brinzolamide (AZOPT) 1 % ophthalmic suspension, Place 1 drop into the left eye 3 times daily  hydroxychloroquine (PLAQUENIL) 200 MG tablet, Take 200 mg by mouth 2 times daily TAKES ONLY DAILY  Cholecalciferol (VITAMIN D) 2000 units TABS tablet, Take 2,000 Units by mouth daily     CURRENT MEDS :  Scheduled Meds:   Arformoterol Tartrate  15 mcg Nebulization BID    amLODIPine  5 mg Oral Daily    aspirin  81 mg Oral Daily    dorzolamide  1 drop Left Eye TID    gabapentin  300 mg Oral TID    ipratropium-albuterol  3 mL Inhalation Q4H WA    latanoprost  1 drop Both Eyes Daily    cetirizine  10 mg Oral Daily    metoprolol succinate  100 mg Oral Daily    rosuvastatin  10 mg Oral Daily    Tofacitinib Citrate ER  11 mg Oral Daily    sodium chloride flush  10 mL Intravenous 2 times per day    enoxaparin  40 mg Subcutaneous Daily    famotidine  20 mg Oral Daily    piperacillin-tazobactam  3.375 g Intravenous Q8H    And    sodium chloride   Intravenous Q8H       Continuous Infusions:   sodium chloride 75 mL/hr at 10/11/20 2025       Allergies   Allergen Reactions    Iodine Shortness Of Breath    Macrodantin [Nitrofurantoin] Shortness Of Breath    Prednisone Shortness Of Breath and Palpitations    Sulfa Antibiotics Shortness Of Breath    5-Alpha Reductase Inhibitors        REVIEW OF SYSTEMS:  Constitutional:+ fever, weight loss, night sweats, and +fatigue  Skin: Denies pigmentation, dark lesions, and rashes   HEENT: Denies hearing loss, tinnitus, ear drainage, epistaxis, sore throat, and hoarseness. Cardiovascular: Denies palpitations, chest pain, and chest pressure. Respiratory: + cough, +dyspnea at rest,denies  hemoptysis, apnea, and choking.   Gastrointestinal: Denies nausea, vomiting, poor appetite, diarrhea, heartburn or reflux  Genitourinary: Denies dysuria, frequency, urgency or hematuria  Musculoskeletal: Denies myalgias, muscle weakness, and bone pain  Neurological: Denies dizziness, vertigo, headache, and focal weakness  Psychological: Denies anxiety and depression  Endocrine: Denies heat intolerance and cold intolerance  Hematopoietic/Lymphatic: Denies bleeding problems and blood transfusions    OBJECTIVE:   BP (!) 178/75   Pulse 102   Temp 98.5 °F improvement aeration left mid lung. Small to moderate left pleural effusion with left lower lobe atelectasis   and/or infiltrate. Small right pleural effusion. COMPARISON:    None.         HISTORY:    ORDERING SYSTEM PROVIDED HISTORY: sepsis    TECHNOLOGIST PROVIDED HISTORY:    Reason for exam:->sepsis    What reading provider will be dictating this exam?->CRC         FINDINGS:    The thyroid is within normal limits.  Aorta is mildly calcified without    aneurysm.  Moderate coronary artery calcifications.  Is no significant    mediastinal, hilar or axillary lymphadenopathy.  Small layering bilateral    pleural effusions.  Is bibasilar compressive atelectatic changes.  The    evaluation of the lung parenchyma demonstrates trachea and major airways to    be patent.  Bibasilar compressive atelectatic changes.  No pneumothorax.  No    consolidations to suggest pneumonia.              Impression    Small layering bilateral pleural effusions with bibasilar compressive    atelectatic changes.  No consolidations to suggest pneumonia.                   Echo:  9/17/2020   Summary   Left ventricle is normal in size . No regional wall motion abnormalities seen. Normal left ventricular ejection fraction. Mild mitral annular calcification. Mild mitral regurgitation is present. Mild tricuspid regurgitation. Pulmonary hypertension is mild . Moderate left pleural effusion.       Signature      ----------------------------------------------------------------      Labs:  Lab Results   Component Value Date    WBC 5.4 10/12/2020    HGB 8.0 10/12/2020    HCT 26.2 10/12/2020    MCV 96.3 10/12/2020    MCH 29.4 10/12/2020    MCHC 30.5 10/12/2020    RDW 16.1 10/12/2020     10/12/2020    MPV 9.7 10/12/2020     Lab Results   Component Value Date     10/11/2020    K 3.8 10/11/2020     10/11/2020    CO2 23 10/11/2020    BUN 8 10/11/2020    CREATININE 0.8 10/11/2020    LABALBU 3.3 10/11/2020 CALCIUM 8.5 10/11/2020    GFRAA >60 10/11/2020    LABGLOM >60 10/11/2020     Lab Results   Component Value Date    PROTIME 12.7 10/11/2020    INR 1.1 10/11/2020     Recent Labs     10/09/20  2157   PROBNP 1,250*     Recent Labs     10/09/20  2157 10/11/20  0446   TROPONINI 0.04* 0.04*     Recent Labs     10/11/20  0446   PROCAL 0.47*     This SmartLink has not been configured with any valid records. Micro:  No results for input(s): CULTRESP in the last 72 hours. No results for input(s): LABGRAM in the last 72 hours. No results for input(s): LEGUR in the last 72 hours. No results for input(s): STREPNEUMAGU in the last 72 hours. No results for input(s): LP1UAG in the last 72 hours. Assessment:  1. Acute on chronic respiratory failure with hypoxia   2. Fever unknown etiology  3. Stop intra-abdominal fluid collection  4. Bilateral layering pleural effusions , atelectasis   5. Hx of  Aorto to common iliac bypass on right, external iliac bypass on the left (9/15/20)   6. Restrictive lung disease  7. *PFT (8/2020) mild to moderate ventilatory restriction with mild impairment in gas transfer  8. kyphosis of cervicothoracic region  9. Hx of HTN, HLD, CAD, PAD, PVD    Plan:  1. Will add EZ pap Q 4 , pep, IS , add borvan , cuonebs Q 4   2. Follow imaging   3. Keep pulse ox >92, only on 5L 93% , as tolerated  4. ID consulted  5. Patient has noticed bilateral lower extremity swelling left greater than right will check ultrasound  6. Vascular following  7. Check strep pneumoniae antigen  , legionella urine  , respiratory culture , respiratory panel , pro calcitonin . 47  8.  has refused BiPAP on prior admissions      Thank you for allowing me to participate in the care of Usama Melvin. Please feel free to call with questions.      This plan of care was reviewed in collaboration with Dr. Tyrone Jacob     Electronically signed by GRACE Santos on 10/12/2020 at 10:09 AM      Note: This report was completed utilizing computer voice recognition software. Every effort has been made to ensure accuracy, however; inadvertent computerized transcription errors may be present        I personally saw, examined, and cared for the patient. Labs, medications, radiographs reviewed. I agree with history exam and plans detailed in NP note. Bibasilar atelectasis/effusions.   US with no DVT    -pulmonary recruitment maneuvers  -BIPAP  -dose of diuretic    Electronically signed by Sharif Dowell DO on 10/12/2020 at 5:27 PM

## 2020-10-12 NOTE — PROGRESS NOTES
Physical Therapy    PT order received, chart reviewed and PT evaluation attempted this date. Pt refused in AM due to nausea and in PM pt was off unit;  will re-attempt evaluation at a later time. Thank you for the opportunity to assist in the care of this patient.   Meggan Nj, PT, DPT  License PT [id number]

## 2020-10-12 NOTE — CARE COORDINATION
Spoke with patient, she is from [facility] and plans to return there when released. She was there for short term rehab and will need precert to return. COVID (-) 10/9. Ambulance on soft chart. Message left with daughter, Omi Shin as well to confirm plan. Daughter called back and confirmed plan is to return when released. For questions I can be reached at [phone]. Tonya Roper, [place]    The Plan for Transition of Care is related to the following treatment goals: discharge planning when stable     The Patient and/or patient representative Marella Sandifer was provided with a choice of provider and agrees   with the discharge plan. [x] Yes [] No    Freedom of choice list was provided with basic dialogue that supports the patient's individualized plan of care/goals, treatment preferences and shares the quality data associated with the providers.  [x] Yes [] No'

## 2020-10-12 NOTE — PLAN OF CARE
Problem: Falls - Risk of:  Goal: Will remain free from falls  Description: Will remain free from falls  Outcome: Met This Shift     Problem: Falls - Risk of:  Goal: Absence of physical injury  Description: Absence of physical injury  Outcome: Met This Shift     Problem: Skin Integrity:  Goal: Absence of new skin breakdown  Description: Absence of new skin breakdown  Outcome: Met This Shift     Problem: Pain:  Goal: Control of acute pain  Description: Control of acute pain  Outcome: Met This Shift

## 2020-10-12 NOTE — PROGRESS NOTES
Occupational Therapy  Attempted OT eval this AM and PM.  Pt adamantly declined both attempts reporting severe nausea. RN aware. Will attempt OT eval next date.   Thank you for this referral.  Salinas Irwin, MOT, OTR/L  # [phone]

## 2020-10-13 LAB
HCT VFR BLD CALC: 22.9 % (ref 34–48)
HEMOGLOBIN: 7 G/DL (ref 11.5–15.5)
L. PNEUMOPHILA SEROGP 1 UR AG: NORMAL
MCH RBC QN AUTO: 29.7 PG (ref 26–35)
MCHC RBC AUTO-ENTMCNC: 30.6 % (ref 32–34.5)
MCV RBC AUTO: 97 FL (ref 80–99.9)
PDW BLD-RTO: 16.3 FL (ref 11.5–15)
PLATELET # BLD: 216 E9/L (ref 130–450)
PMV BLD AUTO: 9.8 FL (ref 7–12)
RBC # BLD: 2.36 E12/L (ref 3.5–5.5)
STREP PNEUMONIAE ANTIGEN, URINE: NORMAL
WBC # BLD: 3.4 E9/L (ref 4.5–11.5)

## 2020-10-13 PROCEDURE — 2580000003 HC RX 258: Performed by: PHYSICIAN ASSISTANT

## 2020-10-13 PROCEDURE — 6360000002 HC RX W HCPCS: Performed by: INTERNAL MEDICINE

## 2020-10-13 PROCEDURE — 85027 COMPLETE CBC AUTOMATED: CPT

## 2020-10-13 PROCEDURE — 2700000000 HC OXYGEN THERAPY PER DAY

## 2020-10-13 PROCEDURE — 6360000002 HC RX W HCPCS: Performed by: PHYSICIAN ASSISTANT

## 2020-10-13 PROCEDURE — 6370000000 HC RX 637 (ALT 250 FOR IP): Performed by: PHYSICIAN ASSISTANT

## 2020-10-13 PROCEDURE — 2580000003 HC RX 258: Performed by: INTERNAL MEDICINE

## 2020-10-13 PROCEDURE — 6370000000 HC RX 637 (ALT 250 FOR IP): Performed by: INTERNAL MEDICINE

## 2020-10-13 PROCEDURE — 36415 COLL VENOUS BLD VENIPUNCTURE: CPT

## 2020-10-13 PROCEDURE — 97530 THERAPEUTIC ACTIVITIES: CPT

## 2020-10-13 PROCEDURE — 2060000000 HC ICU INTERMEDIATE R&B

## 2020-10-13 PROCEDURE — 94640 AIRWAY INHALATION TREATMENT: CPT

## 2020-10-13 PROCEDURE — 97166 OT EVAL MOD COMPLEX 45 MIN: CPT

## 2020-10-13 PROCEDURE — 97161 PT EVAL LOW COMPLEX 20 MIN: CPT

## 2020-10-13 PROCEDURE — 97535 SELF CARE MNGMENT TRAINING: CPT

## 2020-10-13 RX ORDER — HYDROXYZINE PAMOATE 25 MG/1
50 CAPSULE ORAL ONCE
Status: COMPLETED | OUTPATIENT
Start: 2020-10-13 | End: 2020-10-13

## 2020-10-13 RX ADMIN — AMLODIPINE BESYLATE 5 MG: 5 TABLET ORAL at 08:53

## 2020-10-13 RX ADMIN — METOPROLOL SUCCINATE 100 MG: 100 TABLET, EXTENDED RELEASE ORAL at 08:53

## 2020-10-13 RX ADMIN — Medication 6 MG: at 20:40

## 2020-10-13 RX ADMIN — SODIUM CHLORIDE: 9 INJECTION, SOLUTION INTRAVENOUS at 08:54

## 2020-10-13 RX ADMIN — FAMOTIDINE 20 MG: 20 TABLET ORAL at 08:53

## 2020-10-13 RX ADMIN — CETIRIZINE HYDROCHLORIDE 10 MG: 10 TABLET, FILM COATED ORAL at 08:53

## 2020-10-13 RX ADMIN — GABAPENTIN 300 MG: 300 CAPSULE ORAL at 20:27

## 2020-10-13 RX ADMIN — GABAPENTIN 300 MG: 300 CAPSULE ORAL at 14:12

## 2020-10-13 RX ADMIN — IPRATROPIUM BROMIDE AND ALBUTEROL SULFATE 3 ML: .5; 3 SOLUTION RESPIRATORY (INHALATION) at 21:32

## 2020-10-13 RX ADMIN — DORZOLAMIDE HYDROCHLORIDE 1 DROP: 20 SOLUTION/ DROPS OPHTHALMIC at 08:53

## 2020-10-13 RX ADMIN — ACETAMINOPHEN 650 MG: 325 TABLET, FILM COATED ORAL at 09:21

## 2020-10-13 RX ADMIN — HYDROXYZINE PAMOATE 50 MG: 25 CAPSULE ORAL at 14:12

## 2020-10-13 RX ADMIN — IPRATROPIUM BROMIDE AND ALBUTEROL SULFATE 3 ML: .5; 3 SOLUTION RESPIRATORY (INHALATION) at 09:24

## 2020-10-13 RX ADMIN — LATANOPROST 1 DROP: 50 SOLUTION OPHTHALMIC at 08:53

## 2020-10-13 RX ADMIN — PIPERACILLIN AND TAZOBACTAM 3.38 G: 3; .375 INJECTION, POWDER, FOR SOLUTION INTRAVENOUS at 20:28

## 2020-10-13 RX ADMIN — PROMETHAZINE HYDROCHLORIDE 12.5 MG: 25 TABLET ORAL at 13:10

## 2020-10-13 RX ADMIN — ENOXAPARIN SODIUM 40 MG: 40 INJECTION SUBCUTANEOUS at 08:53

## 2020-10-13 RX ADMIN — PIPERACILLIN AND TAZOBACTAM 3.38 G: 3; .375 INJECTION, POWDER, FOR SOLUTION INTRAVENOUS at 13:11

## 2020-10-13 RX ADMIN — ARFORMOTEROL TARTRATE 15 MCG: 15 SOLUTION RESPIRATORY (INHALATION) at 21:32

## 2020-10-13 RX ADMIN — Medication 10 ML: at 08:53

## 2020-10-13 RX ADMIN — ARFORMOTEROL TARTRATE 15 MCG: 15 SOLUTION RESPIRATORY (INHALATION) at 09:24

## 2020-10-13 RX ADMIN — ROSUVASTATIN 10 MG: 10 TABLET, FILM COATED ORAL at 08:53

## 2020-10-13 RX ADMIN — GABAPENTIN 300 MG: 300 CAPSULE ORAL at 08:53

## 2020-10-13 RX ADMIN — ASPIRIN 81 MG: 81 TABLET, COATED ORAL at 08:53

## 2020-10-13 RX ADMIN — SODIUM CHLORIDE: 9 INJECTION, SOLUTION INTRAVENOUS at 01:00

## 2020-10-13 RX ADMIN — PIPERACILLIN AND TAZOBACTAM 3.38 G: 3; .375 INJECTION, POWDER, FOR SOLUTION INTRAVENOUS at 05:12

## 2020-10-13 ASSESSMENT — PAIN SCALES - GENERAL
PAINLEVEL_OUTOF10: 4
PAINLEVEL_OUTOF10: 0
PAINLEVEL_OUTOF10: 0

## 2020-10-13 ASSESSMENT — PAIN DESCRIPTION - PROGRESSION
CLINICAL_PROGRESSION: NOT CHANGED
CLINICAL_PROGRESSION: NOT CHANGED

## 2020-10-13 NOTE — PROGRESS NOTES
Xiang Padilla M.D.,[facility]  Pam Ames D.O., F.A.C.O.I., Octavia Dumont M.D. Niko Tsang M.D., Roseanne Robert M.D. Roselyn Zarate D.O. Daily Pulmonary Progress Note    Patient:  Hawa Adams 67 y.o. female MRN: [id number]     Date of Service: 10/13/2020      Synopsis     We are following patient for COPD, cough, shortness of breath    \"CC\" shortness of breath    Code status: full      Subjective      Patient was seen and examined lying in bed in no apparent distress. She is on 5L oxygen today and denies cough. She continues Zosyn per ID, waiting on blood cultures. No intervention on abdominal fluid for now. Review of Systems:  Constitutional: Denies fever, weight loss, night sweats, and fatigue  Skin: Denies pigmentation, dark lesions, and rashes   HEENT: Denies hearing loss, tinnitus, ear drainage, epistaxis, sore throat, and hoarseness. Cardiovascular: Denies palpitations, chest pain, and chest pressure. Respiratory: Denies cough, dyspnea at rest, hemoptysis, apnea, and choking.   Gastrointestinal: Denies nausea, vomiting, poor appetite, diarrhea, heartburn or reflux  Genitourinary: Denies dysuria, frequency, urgency or hematuria  Musculoskeletal: Denies myalgias, muscle weakness, and bone pain  Neurological: Denies dizziness, vertigo, headache, and focal weakness  Psychological: Denies anxiety and depression  Endocrine: Denies heat intolerance and cold intolerance  Hematopoietic/Lymphatic: Denies bleeding problems and blood transfusions    24-hour events:  none    Objective   Vitals: BP (!) 148/65   Pulse 95   Temp 98.4 °F (36.9 °C) (Temporal)   Resp 16   Ht 5' (1.524 m)   Wt 181 lb (82.1 kg)   SpO2 95%   BMI 35.35 kg/m²     I/O:    Intake/Output Summary (Last 24 hours) at 10/13/2020 1159  Last data filed at 10/13/2020 0625  Gross per 24 hour   Intake 2010 ml   Output 800 ml   Net 1210 ml       Vent Information  SpO2: 95 %                CURRENT MEDS :  Scheduled Meds:   Arformoterol Tartrate  15 mcg Nebulization BID    amLODIPine  5 mg Oral Daily    aspirin  81 mg Oral Daily    dorzolamide  1 drop Left Eye TID    gabapentin  300 mg Oral TID    ipratropium-albuterol  3 mL Inhalation Q4H WA    latanoprost  1 drop Both Eyes Daily    cetirizine  10 mg Oral Daily    metoprolol succinate  100 mg Oral Daily    rosuvastatin  10 mg Oral Daily    Tofacitinib Citrate ER  11 mg Oral Daily    sodium chloride flush  10 mL Intravenous 2 times per day    enoxaparin  40 mg Subcutaneous Daily    famotidine  20 mg Oral Daily    piperacillin-tazobactam  3.375 g Intravenous Q8H    And    sodium chloride   Intravenous Q8H       Physical Exam:  General Appearance: appears comfortable in no acute distress. HEENT: Normocephalic atraumatic without obvious abnormality   Neck: Lips, mucosa, and tongue normal.  Supple, symmetrical, trachea midline, no adenopathy;thyroid:  no enlargement/tenderness/nodules or JVD. Lung: Breath sounds CTA. Respirations   unlabored. Symmetrical expansion. Heart: RRR, normal S1, S2. No MRG  Abdomen: Soft, NT, ND. BS present x 4 quadrants. No bruit or organomegaly. Extremities: Pedal pulses 2+ symmetric b/l. Extremities normal, no cyanosis, clubbing, or edema. Musculokeletal: No joint swelling, no muscle tenderness. ROM normal in all joints of extremities. Neurologic: Mental status: Alert and Oriented X3 . Pertinent/ New Labs and Imaging Studies     Imaging Personally Reviewed:  10/12  Interval improvement aeration left mid lung.         Small to moderate left pleural effusion with left lower lobe atelectasis    and/or infiltrate.         Small right pleural effusion. ECHO  9/17  Summary   Left ventricle is normal in size . No regional wall motion abnormalities seen. Normal left ventricular ejection fraction. Mild mitral annular calcification. Mild mitral regurgitation is present. Mild tricuspid regurgitation. collaboration with Dr. Manny Patel  Electronically signed by GRACE Ragsdale CNP on 10/13/2020 at 11:59 AM      I personally saw, examined, and cared for the patient. Labs, medications, radiographs reviewed. I agree with history exam and plans detailed in NP note. Resp failure primarily due to atelectasis and immobility. Continue PT/OT and recruitment maneuvers. Patient may d/c to facility from a pulmonary standpoint.     Electronically signed by Radha Elias DO on 10/13/2020 at 1:24 PM

## 2020-10-13 NOTE — PROGRESS NOTES
Occupational Therapy  OCCUPATIONAL THERAPY INITIAL EVALUATION        Date:10/13/2020  Patient Name: Chikis Carvalho  MRN: [id number]  : 1945  Room: [address] [place] [address]-A    Referring Physician:  NEGRA Christiansen    Evaluating OT:  [id number] Agudelo, OTR/L #[id number]    AM-PAC Daily Activity Raw Score:  1424  Recommended Adaptive Equipment:  TBD as pt progresses     Reason for Admission:  Pt was transferred from SNF w/ Fever of 101, wheezing, nausea. Recent Femoral Bypass    Diagnosis:     1. Abdominal wall abscess    2. Septicemia ([address]    3. Pleural effusion    4. Acute respiratory failure with hypoxia (HCC)    5. Biliary calculus of other site without obstruction      Procedures this admission:  None     Pertinent Medical History:  RA, Bladder Prolapse, CAD, MI, Carotid Stenosis, Glaucoma, PVD, Fixation of Thoracic Spine, Kiran TKR, R THR      Precautions:  Falls  Abdominal Precautions - recent OR  Cardiac Diet  NC O2 5 LPM    Home Living: Pt lives alone in a single-level apartment, 3+8 CRUZ. Bathroom setup:  Tub-Shower, High Commode, Grab bars throughout  Equipment owned:  [facility], [employer], Shower Chair    Available Family Assist:  Aide 2 hours/day, 2 Days/wk for assist w/ IADLs    Prior Level of Function:  Prior to Recent Abdominal Surgery, pt was IND with Dressing, Toileting, Assist w/ Bathing/IADLs, Mod I w/ Transfers and Mobility using SPC for ambulation.  Was transferred to a SNF post-op - has been receiving assist w/ All ADLs, transfers and limited mobility, participating in therapy  Driving: No  Occupation:  None reported    Pain Level:  4/10 HA;  Nsg Notified   Additional Complaints:  Reported Moderate Nausea, Recent onset of Head and Facial Pressure - RN Notified - Presented to room for assessment    Vitals/Lab Values:  /73, O2 sats 94%, HR 92    Cognition: A & O x 4   Able to Follow Multi-Step Commands INDly   Memory:  good (-)   Sequencing:  good (-)   Problem solving:  good (-)   Judgement/safety:  good (-)  Additional Comments:  Pt required Moderate encouragement and increased time to complete minimal ax       Functional Assessment:   Initial Eval Status  Date: 10-13-20 Treatment Status  Date: Short Term/Long Term Goals  Treatment frequency: PRN 2-4 x/week  7-10 days   Feeding Set up    Able to feed self w/ utensils, drink from modified cup after good set up of tray, opening of containers, cutting of food d/t severe arthritic deformities of digits  Mod I   Grooming Min A/set up    Required Mod A to comb hair, SUP to wash hands/face seated EOB - unable to ambulate to or stand at sink for ax d/t current symptoms   SUP  Standing At The Sink   UB Dressing Mod A/Set up    Required mod A to don garment seated EOB d/t limited ROM Kiran Shoulders  Min A   LB Dressing Max A    Max A to don socks, pull pants over hips, Min-Mod A for dynamic standing balance w/ use of WW, pt ed for safe/adaptive techs  Mod A   Bathing NT      Mod A   Toileting Max A    Unable to transfer to [facility] - Max A for use of Bed-pan, hygiene, clothing adjustment bed-level  Mod A   Bed Mobility  Rolling:  Min A  Repositioning: Mod A   Supine to Sit:  Mod A    Sit to Supine: Mod A     Required Assist to sit up from EOB despite use of Log-Rolling tech/side-rail, Assist to lift Kiran LEs into bed   Min A   Functional Transfers Sit to stand:   Mod A  Stand to sit:  Mod A      Required Mod A to stand from EOB  Mod VCs/Pt ed re: safety/hand placement    Min A   Functional Mobility Min A w/ [employer]    Side-stepping short distance along EOB - unsafe to step away from EOB    SUP   Balance Sitting:      Static:  SUP EOB    Dynamic:  Close SUP w/ functional ax EOB    Standing:      Static:  Min A w/ [employer]    Dynamic:  Min-Mod A w/ functional ax/mobility w/ [employer]       Activity Tolerance Fair  Limited by moderate Nausea, Pressure of head/face, general weakness, abdominal incision     Tolerated Sitting:  EOB > 20 mins w/ functional ax     Tolerated Standing:  ~ 2-3 mins w/ functional ax        Visual/  Perceptual WFL  Glasses:  None at b/s      Hearing WFL  Hearing Aids  None at b/s       Hand dominance: Right    UE ROM: RUE: Shoulder flex to ~ 40*, Elbow/Wrist WFL, Severe Ulnar deviation of Digits r/t RA    LUE: Shoulder flex to ~ 30*, Elbow/Wrist WFL, Severe Ulnar deviation of Digits r/t RA    Strength: RUE: grossly 3/5     LUE: grossly 3/5     Strength:  WFL Kiran UEs    Fine Motor Coordination:  Functional/Fair(-) Kiran UEs    Sensation:  Denies numbness or tingling Kiran UEs  Tone:  WFL Kiran UEs  Edema:  None Noted                            Comments: Upon arrival, patient was found in supine. She was agreeable to participate in therapeutic ax after several attempts and much encouragement d/t nausea. No Family present during session. Received permission from RN prior to engaging pt in OT services. At the end of the session, patient was properly positioned in Semi-Supine. Call light and phone within reach, all lines and tubes intact. Oriented pt to call bell. Made all appropriate Environmental Modifications to facilitate pt's level of IND and safety. All needs met. Bed Alarm activated. Overall patient demonstrated decreased independence and safety during completion of ADL/functional transfer/mobility tasks. Pt would benefit from continued skilled OT to increase safety and independence with completion of ADL/IADL tasks for functional independence and quality of life. Treatment:      Provided Skilled SUP/Assist w/ Pt safety, Proper Positioning, ADLs, Functional Transfers and Functional Mobility as noted above, as well as set up and clean up for session. Skilled monitoring of Vitals and pts response to treatment.   Consulted RN    Education:      Provided Pt/Family ed re: Purpose of OT services;  OT Plan of Care;       ADL-  Instruction/training on use of DME/AD/Adaptive equip/techs to improve safety/IND with Functional Ax - adaptive techs/equip for LB dressing/Feeding   Mobility-  Instruction/training on safety and improved independence with bed mobility, functional transfers and functional mobility - use of [employer]   Sitting EOB - to improve dynamic sitting balance and activity tolerance during ADLs as noted above   Activity tolerance - Instruction/training on energy conservation/work simplification for completion of ADLs     Neuromuscular Reeducation to facilitate balance/righting reactions for increased function with ADLs tasks    Cognitive retraining -  Oriented pt to current Date, Place and Situation; Cues for safety during Functional Ax for safety, sequencing, problem solving, improved safety awareness   Skilled positioning/alignment for Pain Mgmt, Skin Integrity, Edema Control    Skilled monitoring of Vitals during session and pt's response    Techs for improved Safety/Safety Awareness w/ Functional Activity/Mobility    Energy Conservation Techs/Pursed-Lip Breathing (PLB)    Recommendations for Continued Participation in OT services during Hospitalization and at D/C     Pt and/or Family verbalized/demonstrated a Good(-) understanding of education provided. Will Review PRN.        Assessment of current deficits   Functional mobility [x]  ADLs [x] Strength [x]  Cognition []  Functional transfers  [x] IADLs [x] Safety Awareness [x]  Endurance [x]  Fine Motor Coordination [x] Balance [x] Vision/perception [] Sensation []   Gross Motor Coordination [x] ROM [x] Delirium []                  Motor Control [x]      Plan of Care:  OT 2--4 x/week for 7-10 days PRN   [x] ADL retraining/AE, Equipment Needs/Recommendations   [x] Energy Conservation Techniques/Strategies      [x] Neuromuscular Re-Education      [x] Functional Transfer Training         [x] Functional Mobility Training          [] Cognitive Re-Training          [x] Splinting/Positioning Needs           [x] Therapeutic Activity   [x]Therapeutic Exercise   [] Visual/Perceptual   [] Delirium Prevention/Treatment   [x] Positioning to Improve Functional Placer, Safety, and Skin Integrity   [x] Patient and/or Family Education to Increase Safety and Functional Placer   [x] Environmental Modifications  [x] Compensatory techniques for ADLs   [x] Other:         Pt would benefit from continued skilled OT services to increase safety and independence with completion of ADL/IADL tasks for functional independence and quality of life. Pt/Family actively participated in the establishment of goals. Rehab Potential:  Fair(+) for established goals    Patient / Family Goal:  None stated     Patient and/or Family were instructed on Functional Diagnosis, Prognosis/Goals and OT Plan of Care. Demonstrated Good(-) understanding. Evaluation Time includes thorough review of current medical information, gathering information on past medical history/social history and prior level of function, completion of standardized testing/informal observation of tasks, assessment of data and education on plan of care and goals.      Eval Complexity: Mod  Profile and History - Med  Assessment of Occupational Performance and Identification of Deficits - Mod  Clinical Decision Making - Mod     Time In:  1315              Time Out:  1401  Total Treatment Time:  38 mins      Treatment Charges: Mins Units   OT Eval Low 41256     OT Eval Medium 97166 X 1   OT Eval High 66097     OT Re-Eval [id number]     Therapeutic Ex  63831     Therapeutic Activities 94776     ADL/Self Care 11356 38 3   Neuro Re-ed 67614     Orthotic manage/training  96834     Non-Billable Time     Total Timed Treatment 38 [address], OTR/L  # [id number]

## 2020-10-13 NOTE — PROGRESS NOTES
Pt started to complain of tingling in her face and a headache after receiving phenergan called Dr. Trisha Murphy atarax ordered. Will continue to monitor.

## 2020-10-13 NOTE — PROGRESS NOTES
Physical Therapy  Physical Therapy Initial Assessment     Name: Micah Lewis  : [birth year]  MRN: [id number]    Referring Provider:  NEGRA Bustamante     Date of Service: 10/13/2020    Evaluating PT:  Beata Linares, PT, DPT. [id number]    Room #:  7056/7786-S  Diagnosis:  Sepsis   Reason for admission:  Fever, SOB, wheezing  Precautions:  Falls, O2  Pertinent PMHx: HTN, HLD, CVA, CAD, PVD  Procedures: none, recent hx of common iliac - aorta and left external iliac - aorta bypass  Equipment Recommendations:  FWW    SUBJECTIVE:  Pt admitted from [facility]. Active with PT using [employer]. Prior to [place], pt lives alone in a 2nd floor apartment with 3 stair(s) to enter and 1 rail(s) and 8 steps with 1 rail to apartment entrance. Bed is on 1st floor and bath is on 1st floor. Pt ambulated with cane PTA to [facility]. OBJECTIVE:   Initial Evaluation  Date: 10/13 Treatment   Short Term/ Long Term   Goals   AM-PAC 6 Clicks      Was pt agreeable to Eval/treatment? yes     Does pt have pain? Back pain     Bed Mobility  Rolling: NT  Supine to sit: SBA  Sit to supine: SBA  Scooting: SBA  Independent    Transfers Sit to stand: SBA  Stand to sit: SBA  Stand pivot: NT  Independent    Ambulation    4 feet with [employer] SBA    >25 feet with [employer] Mod I   Stair negotiation: ascended and descended  NT  >8 steps with 1 rail Mod I   ROM BUE:  See OT eval   BLE:  WFL     Strength BUE:  See OT eval   BLE:  knee ext 5/5  Ankle DF 5/5  Increase by 1/3 MMT grade    Balance Sitting EOB:  SBA  Dynamic Standing:  SBA  Sitting EOB:  Independent   Dynamic Standing:   Mod I     -Pt is A & O x 3  -Sensation:  unremarkable   -Edema:  unremarkable     Therapeutic Exercises:  functional activity     Patient education  Pt educated on safety, sequencing of transfers, and role of PT    Patient response to education:   Pt verbalized understanding Pt demonstrated skill Pt requires further education in this area   yes yes reinforce ASSESSMENT:    Comments:  Pt received relined supine and agreeable to PT session  Patient required no hands on assist to complete bed mobility or to sit EOB. Patient very particular about gown and lines. Patient was able to ambulate short distance before needing a seated rest break d/t feeling \"woozy\". Pulse ox read 88% and recovered to 95% in 3-4 minutes with education on PLB -- gait pattern slow and shuffled. Patient sidestepped back to EOB with pulse ox reading 95-97% reporting feeling less \"woozy\". Patient OOB for combined >25 minutes between sitting EOB and standing time for ambulation/side steps. Overall, patient's activity tolerance is significantly decreased from baseline at this time. Patient returned to reclined supine to end session. Pt with all needs met and call light in reach. Pt would benefit from continued PT POC to address functional deficits described above. Treatment:  Patient practiced and was instructed in the following treatment:     Patient education provided continuously throughout session for sequencing, safety maintenance, and improving any deficits found during the evaluation.  Bed mobility training - pt given verbal and tactile cues to facilitate proper sequencing and safety during rolling and supine>sit   STS and pivot transfer training - pt educated on proper hand and foot placement, safety and sequencing, and use of proper technique to safely complete sit<>stand and pivot transfers   Gait training- pt was given verbal and tactile cues to facilitate normal gait pattern and chiara during ambulation   Side steps performed at EOB with no hands on assist        Pt's/ family goals   1. Return to [facility] and continue therapy    Patient and or family understand(s) diagnosis, prognosis, and plan of care.   yes    PLAN:    Current Treatment Recommendations   [] Strengthening     [] ROM   [x] Balance Training   [x] Endurance Training   [x] Transfer Training   [x] Gait Training [x] Stair Training   [] Positioning   [x] Safety and Education Training   [] Patient/Caregiver Education   [] HEP  [] Other     Frequency of treatments: 2-5x/week x 1-2 weeks. Time in  0930  Time out  1010    Total Treatment Time 30 minutes     Evaluation Time includes thorough review of current medical information, gathering information on past medical history/social history and prior level of function, completion of standardized testing/informal observation of tasks, assessment of data and education on plan of care and goals.     CPT codes:  [x] Low Complexity PT evaluation 03506  [] Moderate Complexity PT evaluation 57192  [] High Complexity PT evaluation 95707  [] PT Re-evaluation 74624  [] Gait training 16869 - minutes  [] Manual therapy 17892 - minutes  [x] Therapeutic activities 06110 30 minutes  [] Therapeutic exercises 20047 - minutes  [] Neuromuscular reeducation 72339 - minutes     Brenda Meredith, PT, DPT  [id number]  Agustin Garcia, SPT

## 2020-10-13 NOTE — PROGRESS NOTES
ALESSANDRA PROGRESS NOTE      Chief complaint: Follow-up of fever     The patient is a 67 y.o. female nursing home resident with history of CAD, stroke, hypertension, severe peripheral vascular disease status post aortoiliac bypass graft surgery on 09/15, presented on 10/09 with fever of 101.1 °F.  On admission, she was afebrile and hemodynamically stable with no leukocytosis. SARS-CoV-2 PCR was not detected. CT chest showed small layering bilateral pleural effusion with bibasilar compressive atelectasis. CT abdomen and pelvis showed distended gallbladder with dependent cholelithiasis, several nonobstructing intrarenal calculi bilaterally, 6.5 x 4.8 x 6.3 cm fluid collection in the left lower quadrant anterior to the iliac vessels, which is suspected to be hematoma or seroma or lymphocele per Vascular Surgery. She was given a dose of ceftriaxone and metronidazole on admission. Piperacillin-tazobactam was started on admission. Subjective: Patient was seen and examined. No chills, no abdominal pain, no diarrhea, no rash, no itching, no dysuria. She reports feeling unwell with nausea. Objective:  BP (!) 148/65   Pulse 95   Temp 98.4 °F (36.9 °C) (Temporal)   Resp 16   Ht 5' (1.524 m)   Wt 181 lb (82.1 kg)   SpO2 95%   BMI 35.35 kg/m²   Constitutional: Alert, not in distress  Respiratory: Clear breath sounds, no crackles, no wheezes  Cardiovascular: Regular rate and rhythm, no murmurs  Gastrointestinal: Bowel sounds present, soft, nontender. Midline incision clean and dry  Skin: Warm and dry, no active dermatoses  Musculoskeletal: No joint swelling, no joint erythema    Labs, imaging, and medical records/notes were personally reviewed.     Assessment:  Fever, etiology unclear  Left lower quadrant abdominal fluid collection (suspected to be hematoma or seroma or lymphocele per Vascular Surgery and as such, no intervention for now)   History of recent aortoiliac bypass graft surgery on 09/15  Rheumatoid

## 2020-10-13 NOTE — PROGRESS NOTES
Subjective:  Feeling better feels weak  No CP or SOB  No fever or chills   No uncontrolled pain  No vomiting or diarrhea     Objective:    BP (!) 148/65   Pulse 95   Temp 98.4 °F (36.9 °C) (Temporal)   Resp 16   Ht 5' (1.524 m)   Wt 181 lb (82.1 kg)   SpO2 95%   BMI 35.35 kg/m²     24HR INTAKE/OUTPUT:      Intake/Output Summary (Last 24 hours) at 10/13/2020 1028  Last data filed at 10/13/2020 9645  Gross per 24 hour   Intake 2010 ml   Output 800 ml   Net 1210 ml       General appearance: NAD, conversant  Neck: FROM, supple   Lungs: Clear bilaterally no wheezes, no rhonchi, no crackles  CV: RRR, no MRGs; normal carotid upstroke and amplitude without Bruits   Incision CDI  Abdomen: Soft, non-tender; no masses or HSM  Extremities: No edema, no cyanosis, no clubbing  Skin: Intact no rash, no lesions, no ulcers    Psych: Alert and oriented normal affect  Neuro: Nonfocal  Most Recent Labs  Lab Results   Component Value Date    WBC 3.4 (L) 10/13/2020    HGB 7.0 (L) 10/13/2020    HCT 22.9 (L) 10/13/2020     10/13/2020     10/11/2020    K 3.8 10/11/2020     10/11/2020    CREATININE 0.8 10/11/2020    BUN 8 10/11/2020    CO2 23 10/11/2020    GLUCOSE 99 10/11/2020    ALT 9 10/11/2020    AST 18 10/11/2020    INR 1.1 10/11/2020    TSH 1.670 09/11/2019    LABA1C 5.1 09/11/2019     No results for input(s): MG in the last 72 hours. Lab Results   Component Value Date    CALCIUM 8.5 (L) 10/11/2020    PHOS 3.8 09/24/2020        US DUP LOWER EXTREMITIES BILATERAL VENOUS   Final Result   No evidence for deep venous thrombosis               XR CHEST PORTABLE   Final Result   Interval improvement aeration left mid lung. Small to moderate left pleural effusion with left lower lobe atelectasis   and/or infiltrate. Small right pleural effusion. XR CHEST (2 VW)   Final Result   Increase in airspace opacity of bilateral lungs may represent atelectasis.    Superimposed infectious etiology may also be considered in the proper   clinical setting. CT CHEST WO CONTRAST   Final Result   Small layering bilateral pleural effusions with bibasilar compressive   atelectatic changes. No consolidations to suggest pneumonia. CT ABDOMEN PELVIS WO CONTRAST Additional Contrast? None   Final Result   1. 6.5 x 4.8 x 6.3 cm fluid collection in the left lower quadrant could be   abscess. 2. Distended gallbladder with cholelithiasis. 3. Bilateral small pleural effusions with bibasilar atelectasis. 4. Several nonobstructing intrarenal calculi bilaterally. 5. Low-density renal lesions are probably cysts although not definitively   characterized on noncontrast imaging. Findings were sent to Radiology Results Communication Center at 2:03 a.m. on   10/10/2020 to be communicated to a licensed caregiver. XR CHEST PORTABLE   Final Result   1. Retrocardiac left lower lobe opacities are present which could suggest   pneumonia or atelectasis. These findings appear similar since prior. 2.  Interstitial prominence bilaterally could indicate pulmonary vascular   congestion. These findings are also similar compared to prior. Assessment    Principal Problem:    Intra-abdominal fluid collection  Active Problems:    CAD (coronary artery disease)    Hyperlipemia    Aorto-iliac atherosclerosis (HCC)    Fever  Resolved Problems:    * No resolved hospital problems.  *      Plan:  13-year-old female history of recent aortobiiliac bypass on 9/15/20 admitted with intra-abdominal fluid collection and fever    Zosyn  Supportive care  COVID-19 negative  Blood cultures NGTD  Urine culture negative  Viral panel negative  ID consult appreciated  Vascular consult appreciated  Medications for other co morbidities cont as appropriate w dosage adjustments as necessary   PT/OT  DVT PPx  DC planning dissipate discharge to SNF 24 to 48 hours        Electronically signed by Terrell Lopez MD on 10/13/2020 at 10:28 AM

## 2020-10-14 LAB
BLOOD CULTURE, ROUTINE: NORMAL
CULTURE, BLOOD 2: NORMAL
CULTURE, RESPIRATORY: NORMAL
HCT VFR BLD CALC: 24.6 % (ref 34–48)
HEMOGLOBIN: 7.2 G/DL (ref 11.5–15.5)
MCH RBC QN AUTO: 29.3 PG (ref 26–35)
MCHC RBC AUTO-ENTMCNC: 29.3 % (ref 32–34.5)
MCV RBC AUTO: 100 FL (ref 80–99.9)
PDW BLD-RTO: 16.2 FL (ref 11.5–15)
PLATELET # BLD: 241 E9/L (ref 130–450)
PMV BLD AUTO: 10 FL (ref 7–12)
RBC # BLD: 2.46 E12/L (ref 3.5–5.5)
SMEAR, RESPIRATORY: NORMAL
WBC # BLD: 4.2 E9/L (ref 4.5–11.5)

## 2020-10-14 PROCEDURE — 94640 AIRWAY INHALATION TREATMENT: CPT

## 2020-10-14 PROCEDURE — 6370000000 HC RX 637 (ALT 250 FOR IP): Performed by: PHYSICIAN ASSISTANT

## 2020-10-14 PROCEDURE — 6360000002 HC RX W HCPCS: Performed by: INTERNAL MEDICINE

## 2020-10-14 PROCEDURE — 2580000003 HC RX 258: Performed by: INTERNAL MEDICINE

## 2020-10-14 PROCEDURE — 2700000000 HC OXYGEN THERAPY PER DAY

## 2020-10-14 PROCEDURE — 36415 COLL VENOUS BLD VENIPUNCTURE: CPT

## 2020-10-14 PROCEDURE — 85027 COMPLETE CBC AUTOMATED: CPT

## 2020-10-14 PROCEDURE — 6360000002 HC RX W HCPCS: Performed by: PHYSICIAN ASSISTANT

## 2020-10-14 PROCEDURE — 2060000000 HC ICU INTERMEDIATE R&B

## 2020-10-14 PROCEDURE — 2580000003 HC RX 258: Performed by: PHYSICIAN ASSISTANT

## 2020-10-14 RX ADMIN — SODIUM CHLORIDE: 9 INJECTION, SOLUTION INTRAVENOUS at 17:21

## 2020-10-14 RX ADMIN — IPRATROPIUM BROMIDE AND ALBUTEROL SULFATE 3 ML: .5; 3 SOLUTION RESPIRATORY (INHALATION) at 11:59

## 2020-10-14 RX ADMIN — ROSUVASTATIN 10 MG: 10 TABLET, FILM COATED ORAL at 08:21

## 2020-10-14 RX ADMIN — GABAPENTIN 300 MG: 300 CAPSULE ORAL at 08:21

## 2020-10-14 RX ADMIN — DORZOLAMIDE HYDROCHLORIDE 1 DROP: 20 SOLUTION/ DROPS OPHTHALMIC at 20:20

## 2020-10-14 RX ADMIN — SODIUM CHLORIDE: 9 INJECTION, SOLUTION INTRAVENOUS at 08:29

## 2020-10-14 RX ADMIN — Medication 10 ML: at 08:22

## 2020-10-14 RX ADMIN — GABAPENTIN 300 MG: 300 CAPSULE ORAL at 13:58

## 2020-10-14 RX ADMIN — ACETAMINOPHEN 650 MG: 325 TABLET, FILM COATED ORAL at 12:57

## 2020-10-14 RX ADMIN — ENOXAPARIN SODIUM 40 MG: 40 INJECTION SUBCUTANEOUS at 08:21

## 2020-10-14 RX ADMIN — IPRATROPIUM BROMIDE AND ALBUTEROL SULFATE 3 ML: .5; 3 SOLUTION RESPIRATORY (INHALATION) at 16:23

## 2020-10-14 RX ADMIN — METOPROLOL SUCCINATE 100 MG: 100 TABLET, EXTENDED RELEASE ORAL at 08:21

## 2020-10-14 RX ADMIN — PIPERACILLIN AND TAZOBACTAM 3.38 G: 3; .375 INJECTION, POWDER, FOR SOLUTION INTRAVENOUS at 12:53

## 2020-10-14 RX ADMIN — GABAPENTIN 300 MG: 300 CAPSULE ORAL at 20:19

## 2020-10-14 RX ADMIN — AMLODIPINE BESYLATE 5 MG: 5 TABLET ORAL at 08:21

## 2020-10-14 RX ADMIN — FAMOTIDINE 20 MG: 20 TABLET ORAL at 08:21

## 2020-10-14 RX ADMIN — ACETAMINOPHEN 650 MG: 325 TABLET, FILM COATED ORAL at 05:35

## 2020-10-14 RX ADMIN — ONDANSETRON 4 MG: 2 INJECTION INTRAMUSCULAR; INTRAVENOUS at 17:55

## 2020-10-14 RX ADMIN — LATANOPROST 1 DROP: 50 SOLUTION OPHTHALMIC at 08:23

## 2020-10-14 RX ADMIN — PIPERACILLIN AND TAZOBACTAM 3.38 G: 3; .375 INJECTION, POWDER, FOR SOLUTION INTRAVENOUS at 05:28

## 2020-10-14 RX ADMIN — ASPIRIN 81 MG: 81 TABLET, COATED ORAL at 08:21

## 2020-10-14 RX ADMIN — PIPERACILLIN AND TAZOBACTAM 3.38 G: 3; .375 INJECTION, POWDER, FOR SOLUTION INTRAVENOUS at 20:20

## 2020-10-14 RX ADMIN — CETIRIZINE HYDROCHLORIDE 10 MG: 10 TABLET, FILM COATED ORAL at 08:21

## 2020-10-14 RX ADMIN — SODIUM CHLORIDE: 9 INJECTION, SOLUTION INTRAVENOUS at 00:13

## 2020-10-14 ASSESSMENT — PAIN DESCRIPTION - PROGRESSION: CLINICAL_PROGRESSION: NOT CHANGED

## 2020-10-14 ASSESSMENT — PAIN SCALES - GENERAL
PAINLEVEL_OUTOF10: 0
PAINLEVEL_OUTOF10: 6
PAINLEVEL_OUTOF10: 0
PAINLEVEL_OUTOF10: 7
PAINLEVEL_OUTOF10: 0

## 2020-10-14 NOTE — PROGRESS NOTES
Vascular:    Sequence of events noted    Patient overall much better today, on supplemental oxygen, breathing better, no wheezing    Patient denies any abdominal pain, back or pelvic pain    Abdomen: Soft, incision healing well, no tenderness    Lab data reviewed    Rediscussed the patient regarding the findings of the CT scan, for now follow conservatively unless patient has any abdominal, pelvic symptoms etc. or any history of fever, chills etc. elevated white cell count, nevertheless, we recommend a repeat CT scan in about 10 to 14 days as an outpatient when discharged and to call return if any symptoms explained

## 2020-10-14 NOTE — PROGRESS NOTES
Subjective:  Feeling better but still feels weak  No CP or SOB  No fever or chills   No uncontrolled pain  No vomiting or diarrhea     Objective:    BP (!) 142/62   Pulse 83   Temp 97.6 °F (36.4 °C) (Temporal)   Resp 16   Ht 5' (1.524 m)   Wt 181 lb 8 oz (82.3 kg)   SpO2 97%   BMI 35.45 kg/m²     24HR INTAKE/OUTPUT:      Intake/Output Summary (Last 24 hours) at 10/14/2020 1118  Last data filed at 10/14/2020 0646  Gross per 24 hour   Intake 1464 ml   Output 1450 ml   Net 14 ml       General appearance: NAD, conversant  Neck: FROM, supple   Lungs: Clear bilaterally no wheezes, no rhonchi, no crackles  CV: RRR, no MRGs; normal carotid upstroke and amplitude without Bruits   Incision CDI  Abdomen: Soft, non-tender; no masses or HSM  Extremities: No edema, no cyanosis, no clubbing  Skin: Intact no rash, no lesions, no ulcers    Psych: Alert and oriented normal affect  Neuro: Nonfocal  Most Recent Labs  Lab Results   Component Value Date    WBC 4.2 (L) 10/14/2020    HGB 7.2 (L) 10/14/2020    HCT 24.6 (L) 10/14/2020     10/14/2020     10/11/2020    K 3.8 10/11/2020     10/11/2020    CREATININE 0.8 10/11/2020    BUN 8 10/11/2020    CO2 23 10/11/2020    GLUCOSE 99 10/11/2020    ALT 9 10/11/2020    AST 18 10/11/2020    INR 1.1 10/11/2020    TSH 1.670 09/11/2019    LABA1C 5.1 09/11/2019     No results for input(s): MG in the last 72 hours. Lab Results   Component Value Date    CALCIUM 8.5 (L) 10/11/2020    PHOS 3.8 09/24/2020        US DUP LOWER EXTREMITIES BILATERAL VENOUS   Final Result   No evidence for deep venous thrombosis               XR CHEST PORTABLE   Final Result   Interval improvement aeration left mid lung. Small to moderate left pleural effusion with left lower lobe atelectasis   and/or infiltrate. Small right pleural effusion. XR CHEST (2 VW)   Final Result   Increase in airspace opacity of bilateral lungs may represent atelectasis.    Superimposed infectious etiology may also be considered in the proper   clinical setting. CT CHEST WO CONTRAST   Final Result   Small layering bilateral pleural effusions with bibasilar compressive   atelectatic changes. No consolidations to suggest pneumonia. CT ABDOMEN PELVIS WO CONTRAST Additional Contrast? None   Final Result   1. 6.5 x 4.8 x 6.3 cm fluid collection in the left lower quadrant could be   abscess. 2. Distended gallbladder with cholelithiasis. 3. Bilateral small pleural effusions with bibasilar atelectasis. 4. Several nonobstructing intrarenal calculi bilaterally. 5. Low-density renal lesions are probably cysts although not definitively   characterized on noncontrast imaging. Findings were sent to Radiology Results Communication Center at 2:03 a.m. on   10/10/2020 to be communicated to a licensed caregiver. XR CHEST PORTABLE   Final Result   1. Retrocardiac left lower lobe opacities are present which could suggest   pneumonia or atelectasis. These findings appear similar since prior. 2.  Interstitial prominence bilaterally could indicate pulmonary vascular   congestion. These findings are also similar compared to prior. Assessment    Principal Problem:    Intra-abdominal fluid collection  Active Problems:    CAD (coronary artery disease)    Hyperlipemia    Aorto-iliac atherosclerosis (HCC)    Fever  Resolved Problems:    * No resolved hospital problems.  *      Plan:  58-year-old female history of recent aortobiiliac bypass on 9/15/20 admitted with intra-abdominal fluid collection and fever    Zosyn completed with discharge  Supportive care  COVID-19 negative  Blood cultures NGTD  Urine culture negative  Viral panel negative  ID consult appreciated  Vascular consult appreciated  Medications for other co morbidities cont as appropriate w dosage adjustments as necessary   PT/OT  DVT PPx  DC planning anticipate discharge to SNF pending bed        Electronically signed by Jeni Leger MD on 10/14/2020 at 11:18 AM

## 2020-10-14 NOTE — DISCHARGE INSTR - COC
Continuity of Care Form    Patient Name: Susan Teresa   :    MRN:  [id number]    Admit date:  10/9/2020  Discharge date:  10/14/2020    Code Status Order: Full Code   Advance Directives:   Advance Care Flowsheet Documentation       Date/Time Healthcare Directive Type of Healthcare Directive Copy in [address] Agent's Name Healthcare Agent's Phone Number    10/09/20 2147  No, patient does not have an advance directive for healthcare treatment -- -- -- -- --            Admitting Physician:  Fabiola Valente MD  PCP: Art Chowdary, APRN - CNP    Discharging Nurse: Inga Zuniga Unit/Room#: 7005/9010-J  Discharging Unit Phone Number: [phone]    Emergency Contact:   Extended Emergency Contact Information  Primary Emergency Contact: Jessy Lopez  Address: [address], [address] [place] [address] Phone: [phone]  Relation: Child  Secondary Emergency Contact: [facility] [address] [facility] Phone: [phone]  Relation: Child    Past Surgical History:  Past Surgical History:   Procedure Laterality Date    ANTERIOR FIXATION OF THORACIC SPINE      AORTO-ILIAC BYPASS GRAFT N/A 9/15/2020    AORTO BIFEMORAL  BYPASS performed by Sweetie Ewing MD at [address] [place] [address]    right    CAROTID ENDARTERECTOMY  2012    left carotid endarterectomy done by Dr. Sydnie Jean-Baptiste      left wrist   [address]  2002    C5 and C6    CORONARY ANGIOPLASTY WITH STENT PLACEMENT  2011    Dr. Zina Schilder      in past for kidney stones   [address] CATH LAB PROCEDURE  11    Emergent cath/PTCA with deployment of 2 overlapping DE stents to the distal, mid and proximal RCA.    HYSTERECTOMY      JOINT REPLACEMENT      right knee twice, left knee    JOINT REPLACEMENT Right     hip    KNEE ARTHROPLASTY Left 4-11-13    KNEE SURGERY      right knee x 2    LITHOTRIPSY Right 11/7/2019    RIGHT ESWL EXTRACORPOREAL SHOCK WAVE LITHOTRIPSY CYSTOSCOPY STENT INSERTION performed [facility] Ozzie Bowie MD at [facility]         Immunization History:   Immunization History   Administered Date(s) Administered    Pneumococcal Conjugate 13-valent ([id number]) 11/20/2015    Pneumococcal Polysaccharide ([id number]) 06/17/2012       Active Problems:  Patient Active Problem List   Diagnosis Code    Hypertension I10    Glaucoma H40.9    Lipid disorder E78.9    Neuropathy G62.9    DJD (degenerative joint disease) M19.90    Bilateral carotid artery disease (HCC) I77.9    CAD (coronary artery disease) I25.10    Hyperlipemia E78.5    Cerebrovascular accident (stroke) ([address] I63.9    Old myocardial infarction, greater than 8 weeks I25.2    Presence of drug coated stent in right coronary artery Z95.5    Accelerated hypertension I10    S/P carotid endarterectomy Z98.890    Carotid bruit R09.89    Hypotension I95.9    Anemia D64.9    H/O total knee replacement Z96.659    Osteoarthritis of right hip M16.11    Fibromyalgia M79.7    Incontinence R32    Rheumatoid arthritis involving both hands ([address] M06.9    Diverticulosis of large intestine without diverticulitis K57.30    Second degree hemorrhoids K64.1    Decreased radial pulse R09.89    Acute unilateral obstructive uropathy N13.9    Hydronephrosis concurrent with and due to calculi of kidney and ureter N13.2    PVD (peripheral vascular disease) with claudication (HCC) I73.9    Aorto-iliac atherosclerosis (HCC) I70.0, I70.8    Atherosclerosis of native arteries of extremity with rest pain ([address] I70.229    Atherosclerosis of native arteries of extremities with rest pain, left leg (HCC) I70.222    Restrictive lung disease J98.4    Hypomagnesemia E83.42    Acute 1590  Gross per 24 hour   Intake 1464 ml   Output 1450 ml   Net 14 ml     I/O last 3 completed shifts: In: 4384 [I.V.:1464]  Out: 1450 [Urine:1450]    Safety Concerns: At Risk for Falls    Impairments/Disabilities:          Nutrition Therapy:  Current Nutrition Therapy:   - Oral Diet:  Cardiac    Routes of Feeding: Oral  Liquids: No Restrictions  Daily Fluid Restriction: no  Last Modified Barium Swallow with Video (Video Swallowing Test): not done    Treatments at the Time of Hospital Discharge:   Respiratory Treatments:   Oxygen Therapy:  is on oxygen at 3 L/min per nasal cannula. Ventilator:    - No ventilator support    Rehab Therapies: Physical Therapy and Occupational Therapy  Weight Bearing Status/Restrictions: No weight bearing restirctions  Other Medical Equipment (for information only, NOT a DME order): Other Treatments:     Patient's personal belongings (please select all that are sent with patient):  None    RN SIGNATURE:  Electronically signed by Lord Kevin RN on 10/14/20 at 3:07 PM EDT    CASE MANAGEMENT/SOCIAL WORK SECTION    Inpatient Status Date: 10/14/20    Readmission Risk Assessment Score:  Readmission Risk              Risk of Unplanned Readmission:        21           Discharging to Facility/ Agency   · Name: [facility]  · Address:  · Phone:  · Fax:    Dialysis Facility (if applicable)   · Name:  · Address:  · Dialysis Schedule:  · Phone:  · Fax:    / signature: Electronically signed by ADAM Guadarrama on 10/14/20 at 1:31 PM EDT    PHYSICIAN SECTION    Prognosis: Good    Condition at Discharge: Stable    Rehab Potential (if transferring to Rehab): Good    Recommended Labs or Other Treatments After Discharge: ***    Physician Certification: I certify the above information and transfer of Hawa Adams  is necessary for the continuing treatment of the diagnosis listed and that she requires [facility] for less 30 days.      Update Admission H&P: {CHP DME Changes in HandP:[id number]:::0}    PHYSICIAN SIGNATURE:  Electronically signed by Saul Squires MD on 10/14/20 at 11:22 AM EDT

## 2020-10-14 NOTE — PROGRESS NOTES
Karli King M.D.,[facility]  Fer Lundberg D.O., F.A.C.O.I., Shaila Pimentel M.D. Vicki Son M.D., Urban Marshall M.D. Deyvi Lobo D.O. Daily Pulmonary Progress Note    Patient:  Brad Lewis 67 y.o. female MRN: [id number]     Date of Service: 10/14/2020      Synopsis     We are following patient for COPD, cough, shortness of breath    \"CC\" shortness of breath    Code status: full      Subjective      Patient was seen and examined lying in bed in no apparent distress. She is on 4L oxygen today and denies cough. She continues Zosyn per ID, waiting on blood cultures. No intervention on abdominal fluid for now. Review of Systems:  Constitutional: Denies fever, weight loss, night sweats, and fatigue  Skin: Denies pigmentation, dark lesions, and rashes   HEENT: Denies hearing loss, tinnitus, ear drainage, epistaxis, sore throat, and hoarseness. Cardiovascular: Denies palpitations, chest pain, and chest pressure. Respiratory: Denies cough, dyspnea at rest, hemoptysis, apnea, and choking.   Gastrointestinal: Denies nausea, vomiting, poor appetite, diarrhea, heartburn or reflux  Genitourinary: Denies dysuria, frequency, urgency or hematuria  Musculoskeletal: Denies myalgias, muscle weakness, and bone pain  Neurological: Denies dizziness, vertigo, headache, and focal weakness  Psychological: Denies anxiety and depression  Endocrine: Denies heat intolerance and cold intolerance  Hematopoietic/Lymphatic: Denies bleeding problems and blood transfusions    24-hour events:  none    Objective   Vitals: BP (!) 151/64   Pulse 84   Temp 97.8 °F (36.6 °C) (Temporal)   Resp 18   Ht 5' (1.524 m)   Wt 181 lb 8 oz (82.3 kg)   SpO2 95%   BMI 35.45 kg/m²     I/O:    Intake/Output Summary (Last 24 hours) at 10/14/2020 1525  Last data filed at 10/14/2020 1357  Gross per 24 hour   Intake 2199 ml   Output 2450 ml   Net -251 ml       Vent Information  SpO2: 95 %                CURRENT MEDS Pulmonary hypertension is mild . Moderate left pleural effusion. Labs:  Lab Results   Component Value Date    WBC 4.2 10/14/2020    HGB 7.2 10/14/2020    HCT 24.6 10/14/2020    .0 10/14/2020    MCH 29.3 10/14/2020    MCHC 29.3 10/14/2020    RDW 16.2 10/14/2020     10/14/2020    MPV 10.0 10/14/2020     Lab Results   Component Value Date     10/11/2020    K 3.8 10/11/2020     10/11/2020    CO2 23 10/11/2020    BUN 8 10/11/2020    CREATININE 0.8 10/11/2020    LABALBU 3.3 10/11/2020    CALCIUM 8.5 10/11/2020    GFRAA >60 10/11/2020    LABGLOM >60 10/11/2020     Lab Results   Component Value Date    PROTIME 12.7 10/11/2020    INR 1.1 10/11/2020     No results for input(s): PROBNP in the last 72 hours. No results for input(s): PROCAL in the last 72 hours. This SmartLink has not been configured with any valid records. Micro:  Recent Labs     10/12/20  1230   CULTRESP Oral Pharyngeal Ena present        Assessment:    1. Acute on chronic respiratory failure with hypoxia   2. Fever unknown etiology  3. intra-abdominal fluid collection  4. Bilateral layering pleural effusions , atelectasis   5. Hx of  Aorto to common iliac bypass on right, external iliac bypass on the left (9/15/20)   6. Restrictive lung disease  7. *PFT (8/2020) mild to moderate ventilatory restriction with mild impairment in gas transfer  8. kyphosis of cervicothoracic region  9. Hx of HTN, HLD, CAD, PAD, PVD      Plan:   1. Will add EZ pap Q 4, pep, IS, add Brovana, Duonebs Q 4   2. Follow imaging   3. Keep pulse ox >92, only on 4L 93% , as tolerated  4. ID consulted, monitor off abx  5.  u/s negative for DVT  6. Vascular following  7.  has refused BiPAP on prior admissions  8. DC to facility from pulmonary standpoint      This plan of care was reviewed in collaboration with Dr. Arcenio Sinclair  Electronically signed by GRACE Gagnon on 10/14/2020 at 3:25 PM    I personally saw, examined, and cared for the patient. Labs, medications, radiographs reviewed. I agree with history exam and plans detailed in NP note.         Electronically signed by Katherine Damian DO on 10/14/2020 at 4:40 PM

## 2020-10-14 NOTE — PLAN OF CARE
Problem: Falls - Risk of:  Goal: Will remain free from falls  Description: Will remain free from falls  10/14/2020 0045 by Marilin Curtis RN  Outcome: Met This Shift     Problem: Falls - Risk of:  Goal: Absence of physical injury  Description: Absence of physical injury  10/14/2020 0045 by Marilin Curtis RN  Outcome: Met This Shift     Problem: Skin Integrity:  Goal: Will show no infection signs and symptoms  Description: Will show no infection signs and symptoms  10/14/2020 0045 by Marilin Curtis RN  Outcome: Met This Shift     Problem: Skin Integrity:  Goal: Absence of new skin breakdown  Description: Absence of new skin breakdown  10/14/2020 0045 by Marilin Curtis RN  Outcome: Met This Shift     Problem: Pain:  Goal: Pain level will decrease  Description: Pain level will decrease  10/14/2020 0045 by Marilin Curtis RN  Outcome: Met This Shift     Problem: Musculor/Skeletal Functional Status  Goal: Highest potential functional level  Outcome: Met This Shift     Problem: Musculor/Skeletal Functional Status  Goal: Absence of falls  Outcome: Met This Shift

## 2020-10-14 NOTE — CARE COORDINATION
Social work received update from Kristi Blakely, [facility], [facility] obtained. Requested plan for Josse, charge RN aware and updated liaison no atb at d/c needed. Discussed need for Ellis Palmira to return with pt, RN aware. Noted dc papers in soft chart, started electronic YOLANDA and completed ambulette form. Social work set up tentative transport for 6:30 pm by [employer] ([phone]), please change if not ready. Patient, family, liaison, RN, and charge RN made aware.   Electronically signed by ADAM Conroy on 10/14/2020 at 3:02 PM

## 2020-10-14 NOTE — PLAN OF CARE
Problem: Falls - Risk of:  Goal: Will remain free from falls  Description: Will remain free from falls  Outcome: Met This Shift     Problem: Falls - Risk of:  Goal: Absence of physical injury  Description: Absence of physical injury  Outcome: Met This Shift     Problem: Skin Integrity:  Goal: Will show no infection signs and symptoms  Description: Will show no infection signs and symptoms  Outcome: Met This Shift     Problem: Skin Integrity:  Goal: Absence of new skin breakdown  Description: Absence of new skin breakdown  Outcome: Met This Shift     Problem: Pain:  Goal: Pain level will decrease  Description: Pain level will decrease  Outcome: Met This Shift     Problem: Pain:  Goal: Control of acute pain  Description: Control of acute pain  Outcome: Met This Shift     Problem: Pain:  Goal: Control of chronic pain  Description: Control of chronic pain  Outcome: Met This Shift

## 2020-10-14 NOTE — PROGRESS NOTES
ALESSANDRA PROGRESS NOTE      Chief complaint: Follow-up of fever     The patient is a 67 y.o. female nursing home resident with history of CAD, stroke, hypertension, severe peripheral vascular disease status post aortoiliac bypass graft surgery on 09/15, presented on 10/09 with fever of 101.1 °F.  On admission, she was afebrile and hemodynamically stable with no leukocytosis. SARS-CoV-2 PCR was not detected. CT chest showed small layering bilateral pleural effusion with bibasilar compressive atelectasis. CT abdomen and pelvis showed distended gallbladder with dependent cholelithiasis, several nonobstructing intrarenal calculi bilaterally, 6.5 x 4.8 x 6.3 cm fluid collection in the left lower quadrant anterior to the iliac vessels, which is suspected to be hematoma or seroma or lymphocele per Vascular Surgery. Urine Streptococcus pneumoniae and Legionella antigens were negative. Sputum Gram stain and culture showed <25 PMNs/lpf and <25 epithelial cells/lpf, present oral pharyngeal jarocho. She was given a dose of ceftriaxone and metronidazole on admission. Piperacillin-tazobactam was started on admission. Subjective: Patient was seen and examined. No chills, has right-sided abdominal pain, no diarrhea, no rash, no itching, no dysuria. Objective:  BP (!) 142/62   Pulse 83   Temp 97.6 °F (36.4 °C) (Temporal)   Resp 16   Ht 5' (1.524 m)   Wt 181 lb 8 oz (82.3 kg)   SpO2 97%   BMI 35.45 kg/m²   Constitutional: Alert, not in distress  Respiratory: Clear breath sounds, no crackles, no wheezes  Cardiovascular: Regular rate and rhythm, no murmurs  Gastrointestinal: Bowel sounds present, soft, nontender. Midline incision clean and dry  Skin: Warm and dry, no active dermatoses  Musculoskeletal: No joint swelling, no joint erythema    Labs, imaging, and medical records/notes were personally reviewed.     Assessment:  Fever, resolved, etiology unclear  Left lower quadrant abdominal fluid collection (suspected to be hematoma or seroma or lymphocele per Vascular Surgery and as such, no intervention for now)   History of recent aortoiliac bypass graft surgery on 09/15  Rheumatoid arthritis on tofacitinib    Recommendations:  Discontinue piperacillin-tazobactam. No further antibiotic therapy indicated for now. Follow up with Vascular Surgery as outpatient regarding intraabdominal fluid collection. Plan was discussed with Dr. Franca Davis. Thank you for involving me in the care of [facility]. I will continue to follow. Please do not hesitate to call for any questions or concerns.     Electronically signed by Ben Diego MD on 10/14/2020 at 10:20 AM

## 2020-10-14 NOTE — PROGRESS NOTES
Dr. Vera Silverman notified 2x via message that patient was accepted to [facility] and no need for atx.   Awaiting return call

## 2020-10-14 NOTE — PROGRESS NOTES
Vascular:    Sequence of events noted    Patient overall much better today, on supplemental oxygen, breathing better, no wheezing    Patient denies any abdominal pain, back or pelvic pain    Abdomen: Soft, incision healing well, no tenderness    Lab data reviewed    We discussed the patient regarding the findings of the CT scan, for now follow conservatively unless patient has any abdominal, pelvic symptoms etc. or any history of fever, chills etc. elevated white cell count, nevertheless, we recommend a repeat CT scan in about 10 to 14 days as an outpatient when discharged and to call return if any symptoms explained

## 2020-10-15 VITALS
TEMPERATURE: 98.2 F | RESPIRATION RATE: 18 BRPM | BODY MASS INDEX: 35.69 KG/M2 | WEIGHT: 181.8 LBS | DIASTOLIC BLOOD PRESSURE: 62 MMHG | OXYGEN SATURATION: 97 % | SYSTOLIC BLOOD PRESSURE: 134 MMHG | HEART RATE: 76 BPM | HEIGHT: 60 IN

## 2020-10-15 LAB
HCT VFR BLD CALC: 25.4 % (ref 34–48)
HEMOGLOBIN: 7.7 G/DL (ref 11.5–15.5)
MCH RBC QN AUTO: 29.5 PG (ref 26–35)
MCHC RBC AUTO-ENTMCNC: 30.3 % (ref 32–34.5)
MCV RBC AUTO: 97.3 FL (ref 80–99.9)
PDW BLD-RTO: 16 FL (ref 11.5–15)
PLATELET # BLD: 258 E9/L (ref 130–450)
PMV BLD AUTO: 9.5 FL (ref 7–12)
RBC # BLD: 2.61 E12/L (ref 3.5–5.5)
WBC # BLD: 4.8 E9/L (ref 4.5–11.5)

## 2020-10-15 PROCEDURE — 2580000003 HC RX 258: Performed by: INTERNAL MEDICINE

## 2020-10-15 PROCEDURE — 97535 SELF CARE MNGMENT TRAINING: CPT

## 2020-10-15 PROCEDURE — 6370000000 HC RX 637 (ALT 250 FOR IP): Performed by: PHYSICIAN ASSISTANT

## 2020-10-15 PROCEDURE — 85027 COMPLETE CBC AUTOMATED: CPT

## 2020-10-15 PROCEDURE — 6360000002 HC RX W HCPCS: Performed by: PHYSICIAN ASSISTANT

## 2020-10-15 PROCEDURE — 97530 THERAPEUTIC ACTIVITIES: CPT

## 2020-10-15 PROCEDURE — 2700000000 HC OXYGEN THERAPY PER DAY

## 2020-10-15 PROCEDURE — 2580000003 HC RX 258: Performed by: PHYSICIAN ASSISTANT

## 2020-10-15 PROCEDURE — 36415 COLL VENOUS BLD VENIPUNCTURE: CPT

## 2020-10-15 PROCEDURE — 94640 AIRWAY INHALATION TREATMENT: CPT

## 2020-10-15 RX ADMIN — IPRATROPIUM BROMIDE AND ALBUTEROL SULFATE 3 ML: .5; 3 SOLUTION RESPIRATORY (INHALATION) at 07:44

## 2020-10-15 RX ADMIN — LATANOPROST 1 DROP: 50 SOLUTION OPHTHALMIC at 08:43

## 2020-10-15 RX ADMIN — ASPIRIN 81 MG: 81 TABLET, COATED ORAL at 08:43

## 2020-10-15 RX ADMIN — GABAPENTIN 300 MG: 300 CAPSULE ORAL at 14:00

## 2020-10-15 RX ADMIN — ROSUVASTATIN 10 MG: 10 TABLET, FILM COATED ORAL at 08:43

## 2020-10-15 RX ADMIN — ENOXAPARIN SODIUM 40 MG: 40 INJECTION SUBCUTANEOUS at 08:43

## 2020-10-15 RX ADMIN — GABAPENTIN 300 MG: 300 CAPSULE ORAL at 08:43

## 2020-10-15 RX ADMIN — SODIUM CHLORIDE: 9 INJECTION, SOLUTION INTRAVENOUS at 00:00

## 2020-10-15 RX ADMIN — FAMOTIDINE 20 MG: 20 TABLET ORAL at 08:43

## 2020-10-15 RX ADMIN — AMLODIPINE BESYLATE 5 MG: 5 TABLET ORAL at 08:43

## 2020-10-15 RX ADMIN — IPRATROPIUM BROMIDE AND ALBUTEROL SULFATE 3 ML: .5; 3 SOLUTION RESPIRATORY (INHALATION) at 11:23

## 2020-10-15 RX ADMIN — ARFORMOTEROL TARTRATE 15 MCG: 15 SOLUTION RESPIRATORY (INHALATION) at 07:44

## 2020-10-15 RX ADMIN — ONDANSETRON 4 MG: 2 INJECTION INTRAMUSCULAR; INTRAVENOUS at 09:45

## 2020-10-15 RX ADMIN — CETIRIZINE HYDROCHLORIDE 10 MG: 10 TABLET, FILM COATED ORAL at 08:43

## 2020-10-15 RX ADMIN — IPRATROPIUM BROMIDE AND ALBUTEROL SULFATE 3 ML: .5; 3 SOLUTION RESPIRATORY (INHALATION) at 15:43

## 2020-10-15 RX ADMIN — METOPROLOL SUCCINATE 100 MG: 100 TABLET, EXTENDED RELEASE ORAL at 08:43

## 2020-10-15 RX ADMIN — Medication 10 ML: at 08:44

## 2020-10-15 ASSESSMENT — PAIN SCALES - GENERAL: PAINLEVEL_OUTOF10: 0

## 2020-10-15 NOTE — PROGRESS NOTES
ALESSANDRA PROGRESS NOTE      Chief complaint: Follow-up of fever     The patient is a 67 y.o. female nursing home resident with history of CAD, stroke, hypertension, severe peripheral vascular disease status post aortoiliac bypass graft surgery on 09/15, presented on 10/09 with fever of 101.1 °F.  On admission, she was afebrile and hemodynamically stable with no leukocytosis. SARS-CoV-2 PCR was not detected. CT chest showed small layering bilateral pleural effusion with bibasilar compressive atelectasis. CT abdomen and pelvis showed distended gallbladder with dependent cholelithiasis, several nonobstructing intrarenal calculi bilaterally, 6.5 x 4.8 x 6.3 cm fluid collection in the left lower quadrant anterior to the iliac vessels, which is suspected to be hematoma or seroma or lymphocele per Vascular Surgery. Urine Streptococcus pneumoniae and Legionella antigens were negative. Sputum Gram stain and culture showed <25 PMNs/lpf and <25 epithelial cells/lpf, present oral pharyngeal jarocho. She was given a dose of ceftriaxone and metronidazole on admission. Piperacillin-tazobactam was started on admission. Subjective: Patient was seen and examined. No chills, no abdominal pain, has diarrhea, no rash, no itching, no dysuria. Objective:  /62   Pulse 76   Temp 98.2 °F (36.8 °C) (Temporal)   Resp 18   Ht 5' (1.524 m)   Wt 181 lb 12.8 oz (82.5 kg)   SpO2 97%   BMI 35.51 kg/m²   Constitutional: Alert, not in distress  Respiratory: Clear breath sounds, no crackles, no wheezes  Cardiovascular: Regular rate and rhythm, no murmurs  Gastrointestinal: Bowel sounds present, soft, nontender. Midline incision clean and dry  Skin: Warm and dry, no active dermatoses  Musculoskeletal: No joint swelling, no joint erythema    Labs, imaging, and medical records/notes were personally reviewed.     Assessment:  Fever, resolved, etiology unclear  Left lower quadrant abdominal fluid collection (suspected to be hematoma or seroma or lymphocele per Vascular Surgery and as such, no intervention for now)   History of recent aortoiliac bypass graft surgery on 09/15  Rheumatoid arthritis on tofacitinib    Recommendations:  Monitor clinically off antibiotics for now. Follow up with Vascular Surgery as outpatient regarding intraabdominal fluid collection. Check stool C difficile toxin assay if diarrhea persists. Thank you for involving me in the care of [facility]. I will continue to follow. Please do not hesitate to call for any questions or concerns.     Electronically signed by Flora Ahn MD on 10/15/2020 at 9:57 AM

## 2020-10-15 NOTE — PROGRESS NOTES
Subjective:  Feeling better but still feels weak  No CP or SOB  No fever or chills   No uncontrolled pain  No vomiting or diarrhea     Objective:    /62   Pulse 76   Temp 98.2 °F (36.8 °C) (Temporal)   Resp 18   Ht 5' (1.524 m)   Wt 181 lb 12.8 oz (82.5 kg)   SpO2 97%   BMI 35.51 kg/m²     24HR INTAKE/OUTPUT:      Intake/Output Summary (Last 24 hours) at 10/15/2020 1041  Last data filed at 10/14/2020 2200  Gross per 24 hour   Intake 1507 ml   Output 2000 ml   Net -493 ml       General appearance: NAD, conversant  Neck: FROM, supple   Lungs: Clear bilaterally no wheezes, no rhonchi, no crackles  CV: RRR, no MRGs; normal carotid upstroke and amplitude without Bruits   Incision CDI  Abdomen: Soft, non-tender; no masses or HSM  Extremities: No edema, no cyanosis, no clubbing  Skin: Intact no rash, no lesions, no ulcers    Psych: Alert and oriented normal affect  Neuro: Nonfocal  Most Recent Labs  Lab Results   Component Value Date    WBC 4.8 10/15/2020    HGB 7.7 (L) 10/15/2020    HCT 25.4 (L) 10/15/2020     10/15/2020     10/11/2020    K 3.8 10/11/2020     10/11/2020    CREATININE 0.8 10/11/2020    BUN 8 10/11/2020    CO2 23 10/11/2020    GLUCOSE 99 10/11/2020    ALT 9 10/11/2020    AST 18 10/11/2020    INR 1.1 10/11/2020    TSH 1.670 09/11/2019    LABA1C 5.1 09/11/2019     No results for input(s): MG in the last 72 hours. Lab Results   Component Value Date    CALCIUM 8.5 (L) 10/11/2020    PHOS 3.8 09/24/2020        US DUP LOWER EXTREMITIES BILATERAL VENOUS   Final Result   No evidence for deep venous thrombosis               XR CHEST PORTABLE   Final Result   Interval improvement aeration left mid lung. Small to moderate left pleural effusion with left lower lobe atelectasis   and/or infiltrate. Small right pleural effusion. XR CHEST (2 VW)   Final Result   Increase in airspace opacity of bilateral lungs may represent atelectasis.    Superimposed infectious etiology may also be considered in the proper   clinical setting. CT CHEST WO CONTRAST   Final Result   Small layering bilateral pleural effusions with bibasilar compressive   atelectatic changes. No consolidations to suggest pneumonia. CT ABDOMEN PELVIS WO CONTRAST Additional Contrast? None   Final Result   1. 6.5 x 4.8 x 6.3 cm fluid collection in the left lower quadrant could be   abscess. 2. Distended gallbladder with cholelithiasis. 3. Bilateral small pleural effusions with bibasilar atelectasis. 4. Several nonobstructing intrarenal calculi bilaterally. 5. Low-density renal lesions are probably cysts although not definitively   characterized on noncontrast imaging. Findings were sent to [facility] at 2:03 a.m. on   10/10/2020 to be communicated to a licensed caregiver. XR CHEST PORTABLE   Final Result   1. Retrocardiac left lower lobe opacities are present which could suggest   pneumonia or atelectasis. These findings appear similar since prior. 2.  Interstitial prominence bilaterally could indicate pulmonary vascular   congestion. These findings are also similar compared to prior. Assessment    Principal Problem:    Intra-abdominal fluid collection  Active Problems:    CAD (coronary artery disease)    Hyperlipemia    Aorto-iliac atherosclerosis (HCC)    Fever  Resolved Problems:    * No resolved hospital problems.  *      Plan:  77-year-old female history of recent aortobiiliac bypass on 9/15/20 admitted with intra-abdominal fluid collection and fever    Zosyn completed with discharge  Supportive care  COVID-19 negative  Blood cultures NGTD  Urine culture negative  Viral panel negative  ID consult appreciated  Vascular consult appreciated  Medications for other co morbidities cont as appropriate w dosage adjustments as necessary   PT/OT  DVT PPx  DC planning anticipate discharge to SNF         Electronically signed by Meenakshi Baxter MD on 10/15/2020 at 10:41 AM

## 2020-10-15 NOTE — PROGRESS NOTES
Physical Therapy  Physical Therapy Treatment Note     Name: Deedee Lundborg  : [birth year]  MRN: [id number]    Referring Provider:  NEGRA Carter     Date of Service: 10/15/2020    Evaluating PT:  Khadra Pittman, PT, DPT. [id number]    Room #:  9015/3642-S  Diagnosis:  Sepsis   Reason for admission:  Fever, SOB, wheezing  Precautions:  Falls, O2  Pertinent PMHx: HTN, HLD, CVA, CAD, PVD  Procedures: none, recent hx of common iliac - aorta and left external iliac - aorta bypass  Equipment Recommendations:  FWW    SUBJECTIVE:  Pt admitted from [facility]. Active with PT using [employer]. Prior to [facility], pt lives alone in a 2nd floor apartment with 3 stair(s) to enter and 1 rail(s) and 8 steps with 1 rail to apartment entrance. Bed is on 1st floor and bath is on 1st floor. Pt ambulated with cane PTA to [facility]. OBJECTIVE:   Initial Evaluation  Date: 10/13 Treatment  10/15/2020   Short Term/ Long Term   Goals   AM-PAC 6 Clicks /    Was pt agreeable to Eval/treatment? yes Yes     Does pt have pain? Back pain Abdominal pain-5/10      Bed Mobility  Rolling: NT  Supine to sit: SBA  Sit to supine: SBA  Scooting: SBA Supine to sit SBA  Scooting SBA Independent    Transfers Sit to stand: SBA  Stand to sit: SBA  Stand pivot: NT Sit to stand min A  Stand to sit min A   Independent    Ambulation    4 feet with [employer] SBA   5 feet with ww with min A  >25 feet with [employer] Mod I   Stair negotiation: ascended and descended  NT NT >8 steps with 1 rail Mod I   ROM BUE:  See OT eval   BLE:  WFL     Strength BUE:  See OT eval   BLE:  knee ext 5/5  Ankle DF 5/5  Increase by 1/3 MMT grade    Balance Sitting EOB:  SBA  Dynamic Standing:  SBA  Sitting EOB:  Independent   Dynamic Standing: Mod I       Therapeutic Exercises:   Ankle pumps ( B LE x20)   laqs (B LE 2 x10)   Marching ( B LE 2x10)     Patient education  Pt educated on hand placement and technique with transfers     Patient response to education:   Pt verbalized understanding Pt demonstrated skill Pt requires further education in this area   x X with verbal cues and assistance  x     ASSESSMENT:    Comments: Nursing cleared pt for physical therapy. Pt in bed upon arrival and agreed to participate in therapy. Pt completed functional mobility as noted above. Pt on 2 liters of O2 throughout treatment session. Pt reported feeling \" woozy \" with sitting on edge of bed. Pt demonstrated good sitting balance. Increased time required with all activities. Pt reported nausea with ambulation and sat pt in chair. Nursing arrived and notified of pt's complaints. Pt completed seated exercises as noted above. Encouragement for pt to remain in chair to promote OOB tolerance. Pt remained in chair with call light in reach    Treatment:  Patient practiced and was instructed in the following treatment:     Bed mobility training -Verbal instruction to facilitate proper sequencing and safety during bed mobility    Transfers training -Verbal instruction for hand placement and technique with transfers to improve safety . Assistance required to complete task.  Gait training- Verbal instruction with walker management and safety to improve balance. Assistance required to complete task. PLAN:    Current Treatment Recommendations   [] Strengthening     [] ROM   [x] Balance Training   [x] Endurance Training   [x] Transfer Training   [x] Gait Training   [x] Stair Training   [] Positioning   [x] Safety and Education Training   [] Patient/Caregiver Education   [] HEP  [] Other       Time in  0935  Time out  1000    Total Treatment Time 25 minutes     Evaluation Time includes thorough review of current medical information, gathering information on past medical history/social history and prior level of function, completion of standardized testing/informal observation of tasks, assessment of data and education on plan of care and goals.     CPT codes:  [] Low Complexity PT evaluation 30214  [] Moderate Complexity PT evaluation 03214  [] High Complexity PT evaluation [id number]  [] PT Re-evaluation 04908  [] Gait training 29930 - minutes  [] Manual therapy 10079 - minutes  [x] Therapeutic activities 52700 25 minutes  [] Therapeutic exercises 25095 - minutes  [] Neuromuscular reeducation 96301 - minutes     Vinny Malik [id number]

## 2020-10-15 NOTE — PROGRESS NOTES
Occupational Therapy  OT BEDSIDE TREATMENT NOTE      Date:10/15/2020  Patient Name: Miranda Locke  MRN: [id number]  : 1945  Room: [address] [place] [address]     Referring Physician:  NEGRA Hamilton     Evaluating OT:  Rosy Fischer, MOT, OTR/L #[id number]     AM-PAC Daily Activity Raw Score:  15/24  Recommended Adaptive Equipment:  TBD as pt progresses      Reason for Admission:  Pt was transferred from SNF w/ Fever of 101, wheezing, nausea. Recent Femoral Bypass     Diagnosis:     1. Abdominal wall abscess    2. Septicemia ([address]    3. Pleural effusion    4. Acute respiratory failure with hypoxia (HCC)    5. Biliary calculus of other site without obstruction       Procedures this admission:  None      Pertinent Medical History:  RA, Bladder Prolapse, CAD, MI, Carotid Stenosis, Glaucoma, PVD, Fixation of Thoracic Spine, Kiran TKR, R THR       Precautions:  Falls  Abdominal Precautions - recent OR  Cardiac Diet  NC O2 LPM     Home Living: Pt lives alone in a single-level apartment, 3+8 CRUZ. Bathroom setup:  Tub-Shower, High Commode, Grab bars throughout  Equipment owned:  [facility], [employer], Shower Chair     Available Family Assist:  Aide 2 hours/day, 2 Days/wk for assist w/ IADLs     Prior Level of Function:  Prior to Recent Abdominal Surgery, pt was IND with Dressing, Toileting, Assist w/ Bathing/IADLs, Mod I w/ Transfers and Mobility using SPC for ambulation.  Was transferred to a SNF post-op - has been receiving assist w/ All ADLs, transfers and limited mobility, participating in therapy  Driving: No  Occupation:  None reported     Pain Level:  5/10 stomach pain ;  Nsg Notified   Additional Complaints:  Reported Moderate Nausea, Recent onset of Head and Facial Pressure - RN Notified - Presented to room for assessment     Cognition: A & O x 4   Able to Follow Multi-Step Commands INDly              Memory:  good (-)              Sequencing:  good (-)              Problem solving:  good (-)              Judgement/safety:  good to stand: Mod A  Stand to sit:  Mod A       Required Mod A to stand from EOB  Mod VCs/Pt ed re: safety/hand placement     Sit to stand:  Min A  Stand to sit:  Min A ; To complete transfer on/off surfaces with Min A and verbal prompting for proper hand placement. Min A   Functional Mobility Min A w/ [address]     Side-stepping short distance along EOB - unsafe to step away from EOB    Min A with w/w SUP   Balance Sitting:      Static:  SUP EOB    Dynamic:  Close SUP w/ functional ax EOB     Standing:      Static:  Min A w/ [address]    Dynamic:  Min-Mod A w/ functional ax/mobility w/ [address]     Sitting:  Indep     Standing: Min A w/w     Activity Tolerance Fair  Limited by moderate Nausea, Pressure of head/face, general weakness, abdominal incision      Tolerated Sitting:  EOB > 20 mins w/ functional ax      Tolerated Standing:  ~ 2-3 mins w/ functional ax      Fair+;    Pt is willing to participate in therapy with good participation.      Visual/  Perceptual WFL  Glasses:  None at b/s   WF     Hearing WFL  Hearing Aids  None at b/s  WF        Hand dominance: Right     UE ROM:        RUE:    Shoulder flex to ~ 40*, Elbow/Wrist WFL, Severe Ulnar deviation of Digits r/t RA                          LUE:    Shoulder flex to ~ 30*, Elbow/Wrist WFL, Severe Ulnar deviation of Digits r/t RA     Strength:        RUE:    grossly 3/5                                 LUE:    grossly 3/5      Strength:  WFL Kiran UEs     Fine Motor Coordination:  Functional/Fair(-) Kiran UEs     Sensation:  Denies numbness or tingling Krian UEs  Tone:  WFL Kiran UEs  Edema:  None Noted                            Comments: Upon arrival, patient was found in supine. Pt showing good participation with therapy staff. No Family present during session. At the end of the session, patient was properly positioned in the chair with the tray table in front of her. Call light and phone within reach, all lines and tubes intact. Oriented pt to call bell.   Made all appropriate Environmental Modifications to facilitate pt's level of IND and safety. All needs met.            Overall patient demonstrated decreased independence and safety during completion of ADL/functional transfer/mobility tasks. Pt would benefit from continued skilled OT to increase safety and independence with completion of ADL/IADL tasks for functional independence and quality of life.     Treatment: Provided Skilled SUP/Assist w/ Pt safety, Proper Positioning, ADLs, Functional Transfers and Functional Mobility as noted above, as well as set up and clean up for session. Skilled monitoring of Vitals and pts response to treatment.   Consulted RN      Time In: 8408              Time Out:  0388  Total Treatment Time:  70 mins       Treatment Charges: Mins Units   OT Eval Low 04691       OT Eval Medium 61237     OT Eval High 60268       OT Re-Eval 77805       Therapeutic Ex  93028       Therapeutic Activities 80348 15  1   ADL/Self Care 87837 30 2   Neuro Re-ed 97962       Orthotic manage/training  80771       Non-Billable Time       Total Timed Treatment 45 [address] [facility],[address], [address] [place] [address] Rd

## 2020-10-15 NOTE — PLAN OF CARE
Problem: Falls - Risk of:  Goal: Will remain free from falls  Description: Will remain free from falls  Outcome: Completed  Goal: Absence of physical injury  Description: Absence of physical injury  Outcome: Completed     Problem: Skin Integrity:  Goal: Will show no infection signs and symptoms  Description: Will show no infection signs and symptoms  Outcome: Completed  Goal: Absence of new skin breakdown  Description: Absence of new skin breakdown  Outcome: Completed     Problem: Pain:  Description: Pain management should include both nonpharmacologic and pharmacologic interventions.   Goal: Pain level will decrease  Description: Pain level will decrease  Outcome: Completed  Goal: Control of acute pain  Description: Control of acute pain  Outcome: Completed  Goal: Control of chronic pain  Description: Control of chronic pain  Outcome: Completed     Problem: Musculor/Skeletal Functional Status  Goal: Highest potential functional level  Outcome: Completed  Goal: Absence of falls  Outcome: Completed

## 2020-10-15 NOTE — DISCHARGE SUMMARY
Physician Discharge Summary     Patient ID:  Susana Beltran  [id number]  22 y.o.  1945    Admit date: 10/9/2020    Discharge date and time:  10/15/2020    Discharge Diagnoses: Principal Problem:    Intra-abdominal fluid collection  Active Problems:    CAD (coronary artery disease)    Hyperlipemia    Aorto-iliac atherosclerosis ([facility]    Fever  Resolved Problems:    * No resolved hospital problems.  *      Consults: IP CONSULT TO PRIMARY CARE PROVIDER  IP CONSULT TO INFECTIOUS DISEASES  IP CONSULT TO SOCIAL WORK  IP CONSULT TO PULMONOLOGY    Procedures: See below    Hospital Course: 50-year-old female history of recent aortobiiliac bypass on 9/15/20 admitted with intra-abdominal fluid collection and fever     Zosyn completed with discharge  Supportive care  COVID-19 negative  Blood cultures NGTD  Urine culture negative  Viral panel negative  ID consult appreciated-- monitor off Abx  - check CDiff if diarrhea  Vascular consult appreciated  Medications for other co morbidities cont as appropriate w dosage adjustments as necessary   PT/OT  DVT PPx  DC planning anticipate discharge to SNF        Discharge Exam:  See progress note from today    Condition:  Stable    Disposition: SNF    Patient Instructions:   Current Discharge Medication List      CONTINUE these medications which have NOT CHANGED    Details   melatonin 3 MG TABS tablet Take 2 tablets by mouth nightly as needed (insomnia)  Qty: 20 tablet, Refills: 0    Associated Diagnoses: PVD (peripheral vascular disease) with claudication ([facility]      ipratropium-albuterol (DUONEB) 0.5-2.5 (3) MG/3ML SOLN nebulizer solution Inhale 3 mLs into the lungs every 4 hours (while awake)  Qty: 360 mL, Refills: 2      albuterol (PROVENTIL) (2.5 MG/3ML) 0.083% nebulizer solution Take 3 mLs by nebulization every 6 hours as needed for Wheezing or Shortness of Breath  Qty: 120 each, Refills: 3      triamcinolone (KENALOG) 0.1 % cream Apply 1 applicator topically 2 times daily DAILY      Cholecalciferol (VITAMIN D) 2000 units TABS tablet Take 2,000 Units by mouth daily            Activity: activity as tolerated  Diet: cardiac diet    Follow-up with 1 wk PCP, Vascular    Note that over 30 minutes was spent in preparing discharge papers, discussing discharge with patient, staff, consultants, medication review, arranging follow up, etc.    Signed:  Janey Lynch MD  10/15/2020  1:42 PM

## 2020-10-21 ENCOUNTER — TELEPHONE (OUTPATIENT)
Dept: VASCULAR SURGERY | Age: 75
End: 2020-10-21

## 2020-10-21 NOTE — TELEPHONE ENCOUNTER
No answer at her unit  at  [facility]  to confirm appt for 10- with Dr. Rod Matthews.

## 2020-10-22 ENCOUNTER — TELEPHONE (OUTPATIENT)
Dept: VASCULAR SURGERY | Age: 75
End: 2020-10-22

## 2020-10-22 NOTE — TELEPHONE ENCOUNTER
Notified Pj Hudson at [facility] of CT scan abd/pelvis scheduled at [facility] on Tuesday, 10-27-20 at 1:00 pm. [place] at 12:30 pm.  Scheduled follow up appointment with Dr. Raghu Rios on 10-29-20 at 1:45 pm.

## 2020-10-28 ENCOUNTER — TELEPHONE (OUTPATIENT)
Dept: VASCULAR SURGERY | Age: 75
End: 2020-10-28

## 2020-10-30 ENCOUNTER — TELEPHONE (OUTPATIENT)
Dept: VASCULAR SURGERY | Age: 75
End: 2020-10-30

## 2020-10-30 NOTE — TELEPHONE ENCOUNTER
Spoke with Ju, patient was discharged to home yesterday. Called patient and scheduled appt for 11-12-20 at 2:00 pm , but she stated she did not even want to schedule an appt due to transportation. I asked which daughter I should check with regarding the appt and she said \"neither\". Not sure she even wrote down the appt date. She states she still feels \"worn out\" and is waiting for home therapy to begin.

## 2020-11-11 ENCOUNTER — TELEPHONE (OUTPATIENT)
Dept: VASCULAR SURGERY | Age: 75
End: 2020-11-11

## 2020-11-11 NOTE — TELEPHONE ENCOUNTER
Pt rescheduled tomorrow's post-op visit with Dr. Santana Mccabe stating diarrhea. Confirmed that home health is still seeing her as she has not been seen post-operatively yet. Rescheduled to 11/25.

## 2020-11-23 LAB
EKG ATRIAL RATE: 93 BPM
EKG P AXIS: 67 DEGREES
EKG P-R INTERVAL: 144 MS
EKG Q-T INTERVAL: 404 MS
EKG QRS DURATION: 120 MS
EKG QTC CALCULATION (BAZETT): 502 MS
EKG R AXIS: 94 DEGREES
EKG T AXIS: 36 DEGREES
EKG VENTRICULAR RATE: 93 BPM

## 2020-11-24 ENCOUNTER — TELEPHONE (OUTPATIENT)
Dept: VASCULAR SURGERY | Age: 75
End: 2020-11-24

## 2020-11-25 ENCOUNTER — OFFICE VISIT (OUTPATIENT)
Dept: VASCULAR SURGERY | Age: 75
End: 2020-11-25

## 2020-11-25 ENCOUNTER — TELEPHONE (OUTPATIENT)
Dept: VASCULAR SURGERY | Age: 75
End: 2020-11-25

## 2020-11-25 PROBLEM — E83.42 HYPOMAGNESEMIA: Status: RESOLVED | Noted: 2020-09-16 | Resolved: 2020-11-25

## 2020-11-25 PROBLEM — Z98.890 POST-OPERATIVE STATE: Status: ACTIVE | Noted: 2020-11-25

## 2020-11-25 PROCEDURE — 99024 POSTOP FOLLOW-UP VISIT: CPT | Performed by: SURGERY

## 2020-11-25 NOTE — TELEPHONE ENCOUNTER
Left message regarding CT scan at [address] [facility]) on Wed, 12-2-20 at 9:00 am. [place] at 8:30 am.  [employer] could not change place of service to [employer])

## 2020-11-25 NOTE — PROGRESS NOTES
Chief Complaint:   Chief Complaint   Patient presents with    Post-Op Check     s/p iliac bypass         HPI: Patient came to the office with her daughter, for evaluation of vascular status post aorto left external iliac artery and right common iliac bypass, was in the hospital postoperatively, CAT scan done revealed evidence of fluid collection around the graft of the left lower quad, in fact recommend to have a CAT scan in October, patient was at the nursing home facility, did not want to go to the hospital to have the testing done even now refused to have the CAT scan done    Patient denies any history of fever or chills    Patient denies any history of abdominal pelvic or back pain      Patient denies any focal lateralizing neurological symptoms like loss of speech, vision or loss of function of extremity    Patient can short distance slowly, left leg much better, and denies any symptoms of rest pain    Allergies   Allergen Reactions    Iodine Shortness Of Breath    Macrodantin [Nitrofurantoin] Shortness Of Breath    Prednisone Shortness Of Breath and Palpitations    Sulfa Antibiotics Shortness Of Breath    5-Alpha Reductase Inhibitors     Phenergan [Promethazine Hcl]        Current Outpatient Medications   Medication Sig Dispense Refill    melatonin 3 MG TABS tablet Take 2 tablets by mouth nightly as needed (insomnia) 20 tablet 0    metoprolol succinate (TOPROL XL) 100 MG extended release tablet Take 1 tablet by mouth daily 30 tablet 3    amLODIPine (NORVASC) 5 MG tablet Take 1 tablet by mouth daily 30 tablet 3    gabapentin (NEURONTIN) 300 MG capsule Take 300 mg by mouth 3 times daily.  Tofacitinib Citrate ER (XELJANZ XR) 11 MG TB24 Take 11 mg by mouth Daily      cilostazol (PLETAL) 100 MG tablet Take 1 tablet by mouth 2 times daily 60 tablet 11    latanoprost (XALATAN) 0.005 % ophthalmic solution INSTILL 1 DROP INTO BOTH EYES EVERY DAY 2.5 mL 0    Misc.  Devices (ROLLATOR ULTRA-LIGHT) MISC 1 Device by Does not apply route continuous 1 each 0    rosuvastatin (CRESTOR) 10 MG tablet TAKE 1 TABLET BY MOUTH EVERY DAY 90 tablet 0    loratadine (CLARITIN) 10 MG tablet TAKE ONE TABLET BY MOUTH EVERY DAY. 90 tablet 0    Omega-3 Fatty Acids (FISH OIL) 1000 MG CPDR Take 1,000 mg by mouth daily STOP PREOP MED      famotidine (PEPCID) 40 MG tablet Take 1 tablet by mouth daily (Patient taking differently: Take 40 mg by mouth daily TAKES PRN) 60 tablet 3    aspirin 81 MG EC tablet TAKE 1 TABLET BY MOUTH DAILY (Patient taking differently: Take 325 mg by mouth daily ) 90 tablet 3    hydroxychloroquine (PLAQUENIL) 200 MG tablet Take 200 mg by mouth 2 times daily TAKES ONLY DAILY      Cholecalciferol (VITAMIN D) 2000 units TABS tablet Take 2,000 Units by mouth daily       triamcinolone (KENALOG) 0.1 % cream Apply 1 applicator topically 2 times daily      Multiple Vitamins-Minerals (VITAMIN D3 COMPLETE PO) Take 2,000 Units by mouth      brinzolamide (AZOPT) 1 % ophthalmic suspension Place 1 drop into the left eye 3 times daily (Patient not taking: Reported on 11/25/2020) 1 Bottle 2     No current facility-administered medications for this visit. Past Medical History:   Diagnosis Date    Aorto-iliac atherosclerosis ([address] 7/1/2020    Arthritis     Atherosclerosis of native arteries of extremity with rest pain ([address] 7/16/2020    Bladder prolapse     CAD (coronary artery disease)     [place]  2011    Carotid artery stenosis     Right carotid stenosis.     Carotid bruit 1/10/2013    Carotid stenosis, left 12/13/2012    Cerebrovascular accident (stroke) ([address]     pt states 13 yrs ago    Decreased radial pulse 7/6/2017    Gastric reflux     Glaucoma     History of blood transfusion     Hyperlipemia     Hypertension     Insomnia     Post-operative state 11/25/2020    PVD (peripheral vascular disease) with claudication ([address] 6/4/2020    Restless leg syndrome     Sinusitis        Past Surgical History: Procedure Laterality Date    ANTERIOR FIXATION OF THORACIC SPINE      AORTO-ILIAC BYPASS GRAFT N/A 9/15/2020    AORTO BIFEMORAL  BYPASS performed by Bryn Harris MD at [address]    right    CAROTID ENDARTERECTOMY  2012    left carotid endarterectomy done by Dr. Vikash Voss      left wrist    CATARACT REMOVAL      CERVICAL DISC SURGERY      C5 and C6    CORONARY ANGIOPLASTY WITH STENT PLACEMENT  2011    Dr. Shawna Kang      in past for kidney stones    DENTAL SURGERY      DIAGNOSTIC CARDIAC CATH LAB PROCEDURE  11    Emergent cath/PTCA with deployment of 2 overlapping DE stents to the distal, mid and proximal RCA.    HYSTERECTOMY      JOINT REPLACEMENT      right knee twice, left knee    JOINT REPLACEMENT Right     hip    KNEE ARTHROPLASTY Left 13    KNEE SURGERY      right knee x 2    LITHOTRIPSY Right 2019    RIGHT ESWL EXTRACORPOREAL SHOCK WAVE LITHOTRIPSY CYSTOSCOPY STENT INSERTION performed by Leonid Quiñonez MD at [address] [facility]         History reviewed. No pertinent family history.     Social History     Socioeconomic History    Marital status:      Spouse name: Not on file    Number of children: Not on file    Years of education: Not on file    Highest education level: Not on file   Occupational History    Occupation: disabled- floral shop   Social Needs    Financial resource strain: Not on file    Food insecurity     Worry: Not on file     Inability: Not on file    Transportation needs     Medical: Not on file     Non-medical: Not on file   Tobacco Use    Smoking status: Former Smoker     Packs/day: 2.50     Years: 40.00     Pack years: 100.00     Types: Cigarettes     Last attempt to quit: 9/3/2000     Years since quittin.2    Smokeless tobacco: Never Used   Substance and Sexual Activity    Alcohol use: No     Comment: drinks 3 cups of tea daily    Drug use: No    Sexual activity: Not on file   Lifestyle    Physical activity     Days per week: Not on file     Minutes per session: Not on file    Stress: Not on file   Relationships    Social connections     Talks on phone: Not on file     Gets together: Not on file     Attends Voodoo service: Not on file     Active member of club or organization: Not on file     Attends meetings of clubs or organizations: Not on file     Relationship status: Not on file    Intimate partner violence     Fear of current or ex partner: Not on file     Emotionally abused: Not on file     Physically abused: Not on file     Forced sexual activity: Not on file   Other Topics Concern    Not on file   Social History Narrative    Not on file       Review of Systems:    Eyes:  No blurring, diplopia or vision loss. Respiratory:  No cough, pleuritic chest pain, dyspnea, or wheezing. Cardiovascular: No angina, palpitations . Hypertension, hyperlipidemia    musculoskeletal:  No arthritis or weakness. Neurologic:  No paralysis, paresis, paresthesia, seizures or headache. Physical Exam:  General appearance:  Alert, awake, oriented x 3. No distress. Eyes:  Conjunctivae appear normal; PERRL  Neck:  No jugular venous distention, lymphadenopathy or thyromegaly. Carotid bruit noted  Lungs:  Clear to ausculation bilaterally. No rhonchi, crackles, wheezes  Heart:  Regular rate and rhythm. No rub or murmur  Abdomen:  Soft, non-tender. No masses, organomegaly. Musculoskeletal : No joint effusions, tenderness swelling    Neuro: Speech is intact. Moving all extremities. No focal motor or sensory deficits      Extremities:  Both feet are warm to touch.  The color of both feet is normal.    No leg swelling    Pulses Right  Left    Brachial 3 3    Radial    3=normal   Femoral 2 2  2=diminished   Popliteal    1=barely palpable   Dorsalis pedis    0=absent   Posterior tibial 0 0 4=aneurysmal             Other pertinent information:1. The past medical records were reviewed. 2.  The last ankle-brachial is postop, was reviewed, last CT scan was reviewed    Assessment:    1. Post-operative state              Plan:       Discussed the patient and her daughter, patient is reassured, recommend follow-up CT scan, patient refusing, finally agreed to have it done at the outpatient facility, does not want to go to the hospital because of Covid situation, patient history of chronic pain if any symptoms explained              Patient was instructed to continue walking program and to call if any worsening of symptoms and to call if any focal lateralizing neurological symptoms like loss of speech, vision or loss of function of extremity. All the questions were answered. Indicated follow-up: Return in about 6 months (around 5/25/2021), or if symptoms worsen or fail to improve.

## 2020-12-25 PROBLEM — Z98.890 POST-OPERATIVE STATE: Status: RESOLVED | Noted: 2020-11-25 | Resolved: 2020-12-25

## 2021-05-26 ENCOUNTER — TELEPHONE (OUTPATIENT)
Dept: VASCULAR SURGERY | Age: 76
End: 2021-05-26

## 2021-05-26 NOTE — TELEPHONE ENCOUNTER
Called to confirm appointment for 5/27/21 at 130, left message with date, time, and phone number for patient.

## 2021-10-12 RX ORDER — CILOSTAZOL 100 MG/1
TABLET ORAL
Qty: 180 TABLET | Refills: 0 | Status: SHIPPED
Start: 2021-10-12 | End: 2022-10-23

## 2022-08-15 NOTE — PROGRESS NOTES
ALESSANDRA PROGRESS NOTE      Chief complaint: Follow-up of fever     The patient is a 67 y.o. female nursing home resident with history of CAD, stroke, hypertension, severe peripheral vascular disease status post aortoiliac bypass graft surgery on 09/15, presented on 10/09 with fever of 101.1 °F.  On admission, she was afebrile and hemodynamically stable with no leukocytosis. SARS-CoV-2 PCR was not detected. CT chest showed small layering bilateral pleural effusion with bibasilar compressive atelectasis. CT abdomen and pelvis showed distended gallbladder with dependent cholelithiasis, several nonobstructing intrarenal calculi bilaterally, 6.5 x 4.8 x 6.3 cm fluid collection in the left lower quadrant anterior to the iliac vessels, which is suspected to be hematoma or seroma or lymphocele per Vascular Surgery. She was given a dose of ceftriaxone and metronidazole on admission. Piperacillin-tazobactam was started on admission. Subjective: Patient was seen and examined. No chills, no abdominal pain, no diarrhea, no rash, no itching, no dysuria. Objective:  /62   Pulse 84   Temp 98.8 °F (37.1 °C) (Temporal)   Resp 18   Ht 5' (1.524 m)   Wt 165 lb (74.8 kg)   SpO2 92%   BMI 32.22 kg/m²   Constitutional: Alert, not in distress  Respiratory: Clear breath sounds, no crackles, no wheezes  Cardiovascular: Regular rate and rhythm, no murmurs  Gastrointestinal: Bowel sounds present, soft, nontender. Midline incision clean and dry with staples in place  Skin: Warm and dry, no active dermatoses  Musculoskeletal: No joint swelling, no joint erythema    Labs, imaging, and medical records/notes were personally reviewed. Assessment:  Fever, etiology unclear  Left lower quadrant abdominal fluid collection (suspected to be hematoma or seroma or lymphocele per Vascular Surgery)   History of recent aortoiliac bypass graft surgery on 09/15    Recommendations:  Continue piperacillin-tazobactam 3.375g q8h for now. Plan to discontinue antibiotics if blood cultures showed no growth. Follow up blood and urine cultures. Follow up Vascular Surgery recommendations regarding LLQ abdominal fluid collection. Case was discussed with Dr. Moisés Barrios     Thank you for involving me in the care of [facility]. I will continue to follow. Please do not hesitate to call for any questions or concerns.     Electronically signed by Destini Austin MD on 10/11/2020 at 9:50 AM stated

## 2022-10-22 ENCOUNTER — APPOINTMENT (OUTPATIENT)
Dept: CT IMAGING | Age: 77
DRG: 690 | End: 2022-10-22
Payer: COMMERCIAL

## 2022-10-22 ENCOUNTER — APPOINTMENT (OUTPATIENT)
Dept: GENERAL RADIOLOGY | Age: 77
DRG: 690 | End: 2022-10-22
Payer: COMMERCIAL

## 2022-10-22 ENCOUNTER — HOSPITAL ENCOUNTER (INPATIENT)
Age: 77
LOS: 6 days | Discharge: NURSING, REHAB OR LONG-TERM CARE | DRG: 690 | End: 2022-10-28
Attending: EMERGENCY MEDICINE | Admitting: FAMILY MEDICINE
Payer: COMMERCIAL

## 2022-10-22 DIAGNOSIS — N30.00 ACUTE CYSTITIS WITHOUT HEMATURIA: Primary | ICD-10-CM

## 2022-10-22 DIAGNOSIS — R26.2 UNABLE TO AMBULATE: ICD-10-CM

## 2022-10-22 PROBLEM — N39.0 UTI (URINARY TRACT INFECTION): Status: ACTIVE | Noted: 2022-10-22

## 2022-10-22 LAB
ALBUMIN SERPL-MCNC: 4.5 G/DL (ref 3.5–5.2)
ALP BLD-CCNC: 64 U/L (ref 35–104)
ALT SERPL-CCNC: 13 U/L (ref 0–32)
AMPHETAMINE SCREEN, URINE: NOT DETECTED
ANION GAP SERPL CALCULATED.3IONS-SCNC: 12 MMOL/L (ref 7–16)
APTT: <20 SEC (ref 24.5–35.1)
AST SERPL-CCNC: 26 U/L (ref 0–31)
BACTERIA: ABNORMAL /HPF
BARBITURATE SCREEN URINE: NOT DETECTED
BASOPHILS ABSOLUTE: 0.03 E9/L (ref 0–0.2)
BASOPHILS RELATIVE PERCENT: 0.6 % (ref 0–2)
BENZODIAZEPINE SCREEN, URINE: NOT DETECTED
BILIRUB SERPL-MCNC: 0.4 MG/DL (ref 0–1.2)
BILIRUBIN URINE: NEGATIVE
BLOOD, URINE: NEGATIVE
BUN BLDV-MCNC: 15 MG/DL (ref 6–23)
CALCIUM SERPL-MCNC: 9.6 MG/DL (ref 8.6–10.2)
CANNABINOID SCREEN URINE: NOT DETECTED
CHLORIDE BLD-SCNC: 106 MMOL/L (ref 98–107)
CHP ED QC CHECK: NORMAL
CLARITY: CLEAR
CO2: 23 MMOL/L (ref 22–29)
COCAINE METABOLITE SCREEN URINE: NOT DETECTED
COLOR: YELLOW
CREAT SERPL-MCNC: 0.9 MG/DL (ref 0.5–1)
EOSINOPHILS ABSOLUTE: 0.07 E9/L (ref 0.05–0.5)
EOSINOPHILS RELATIVE PERCENT: 1.4 % (ref 0–6)
EPITHELIAL CELLS, UA: ABNORMAL /HPF
FENTANYL SCREEN, URINE: NOT DETECTED
GFR SERPL CREATININE-BSD FRML MDRD: >60 ML/MIN/1.73
GLUCOSE BLD-MCNC: 107 MG/DL (ref 74–99)
GLUCOSE BLD-MCNC: 92 MG/DL
GLUCOSE URINE: NEGATIVE MG/DL
HCT VFR BLD CALC: 38.8 % (ref 34–48)
HEMOGLOBIN: 12.2 G/DL (ref 11.5–15.5)
IMMATURE GRANULOCYTES #: 0.01 E9/L
IMMATURE GRANULOCYTES %: 0.2 % (ref 0–5)
INR BLD: 1
KETONES, URINE: NEGATIVE MG/DL
LEUKOCYTE ESTERASE, URINE: ABNORMAL
LYMPHOCYTES ABSOLUTE: 0.81 E9/L (ref 1.5–4)
LYMPHOCYTES RELATIVE PERCENT: 15.8 % (ref 20–42)
Lab: ABNORMAL
MCH RBC QN AUTO: 31.4 PG (ref 26–35)
MCHC RBC AUTO-ENTMCNC: 31.4 % (ref 32–34.5)
MCV RBC AUTO: 99.7 FL (ref 80–99.9)
METER GLUCOSE: 92 MG/DL (ref 74–99)
METHADONE SCREEN, URINE: NOT DETECTED
MONOCYTES ABSOLUTE: 0.52 E9/L (ref 0.1–0.95)
MONOCYTES RELATIVE PERCENT: 10.1 % (ref 2–12)
NEUTROPHILS ABSOLUTE: 3.69 E9/L (ref 1.8–7.3)
NEUTROPHILS RELATIVE PERCENT: 71.9 % (ref 43–80)
NITRITE, URINE: POSITIVE
OPIATE SCREEN URINE: NOT DETECTED
OXYCODONE URINE: POSITIVE
PDW BLD-RTO: 13.6 FL (ref 11.5–15)
PH UA: 6 (ref 5–9)
PHENCYCLIDINE SCREEN URINE: NOT DETECTED
PLATELET # BLD: 181 E9/L (ref 130–450)
PMV BLD AUTO: 11.4 FL (ref 7–12)
POTASSIUM REFLEX MAGNESIUM: 5 MMOL/L (ref 3.5–5)
PRO-BNP: 688 PG/ML (ref 0–450)
PROTEIN UA: NEGATIVE MG/DL
PROTHROMBIN TIME: 10.5 SEC (ref 9.3–12.4)
RBC # BLD: 3.89 E12/L (ref 3.5–5.5)
RBC UA: ABNORMAL /HPF (ref 0–2)
SARS-COV-2, NAAT: NOT DETECTED
SODIUM BLD-SCNC: 141 MMOL/L (ref 132–146)
SPECIFIC GRAVITY UA: 1.02 (ref 1–1.03)
TOTAL PROTEIN: 7 G/DL (ref 6.4–8.3)
TROPONIN, HIGH SENSITIVITY: <6 NG/L (ref 0–9)
TROPONIN, HIGH SENSITIVITY: <6 NG/L (ref 0–9)
UROBILINOGEN, URINE: 0.2 E.U./DL
WBC # BLD: 5.1 E9/L (ref 4.5–11.5)
WBC UA: ABNORMAL /HPF (ref 0–5)

## 2022-10-22 PROCEDURE — 2580000003 HC RX 258: Performed by: STUDENT IN AN ORGANIZED HEALTH CARE EDUCATION/TRAINING PROGRAM

## 2022-10-22 PROCEDURE — 85610 PROTHROMBIN TIME: CPT

## 2022-10-22 PROCEDURE — 81001 URINALYSIS AUTO W/SCOPE: CPT

## 2022-10-22 PROCEDURE — 71045 X-RAY EXAM CHEST 1 VIEW: CPT

## 2022-10-22 PROCEDURE — 1200000000 HC SEMI PRIVATE

## 2022-10-22 PROCEDURE — 93005 ELECTROCARDIOGRAM TRACING: CPT | Performed by: STUDENT IN AN ORGANIZED HEALTH CARE EDUCATION/TRAINING PROGRAM

## 2022-10-22 PROCEDURE — 87635 SARS-COV-2 COVID-19 AMP PRB: CPT

## 2022-10-22 PROCEDURE — 70450 CT HEAD/BRAIN W/O DYE: CPT

## 2022-10-22 PROCEDURE — 99285 EMERGENCY DEPT VISIT HI MDM: CPT

## 2022-10-22 PROCEDURE — 80053 COMPREHEN METABOLIC PANEL: CPT

## 2022-10-22 PROCEDURE — 6370000000 HC RX 637 (ALT 250 FOR IP): Performed by: STUDENT IN AN ORGANIZED HEALTH CARE EDUCATION/TRAINING PROGRAM

## 2022-10-22 PROCEDURE — 85025 COMPLETE CBC W/AUTO DIFF WBC: CPT

## 2022-10-22 PROCEDURE — 84484 ASSAY OF TROPONIN QUANT: CPT

## 2022-10-22 PROCEDURE — 87088 URINE BACTERIA CULTURE: CPT

## 2022-10-22 PROCEDURE — 83880 ASSAY OF NATRIURETIC PEPTIDE: CPT

## 2022-10-22 PROCEDURE — 82962 GLUCOSE BLOOD TEST: CPT

## 2022-10-22 PROCEDURE — 85730 THROMBOPLASTIN TIME PARTIAL: CPT

## 2022-10-22 PROCEDURE — 80307 DRUG TEST PRSMV CHEM ANLYZR: CPT

## 2022-10-22 PROCEDURE — 36415 COLL VENOUS BLD VENIPUNCTURE: CPT

## 2022-10-22 PROCEDURE — 6360000002 HC RX W HCPCS: Performed by: STUDENT IN AN ORGANIZED HEALTH CARE EDUCATION/TRAINING PROGRAM

## 2022-10-22 RX ORDER — HYDROCODONE BITARTRATE AND ACETAMINOPHEN 5; 325 MG/1; MG/1
1 TABLET ORAL ONCE
Status: COMPLETED | OUTPATIENT
Start: 2022-10-22 | End: 2022-10-22

## 2022-10-22 RX ADMIN — HYDROCODONE BITARTRATE AND ACETAMINOPHEN 1 TABLET: 5; 325 TABLET ORAL at 21:56

## 2022-10-22 RX ADMIN — CEFTRIAXONE 1000 MG: 1 INJECTION, POWDER, FOR SOLUTION INTRAMUSCULAR; INTRAVENOUS at 23:25

## 2022-10-22 ASSESSMENT — ENCOUNTER SYMPTOMS
NAUSEA: 0
EYE DISCHARGE: 0
EYE PAIN: 0
COUGH: 0
SINUS PRESSURE: 0
WHEEZING: 0
EYE REDNESS: 0
SORE THROAT: 0
ABDOMINAL DISTENTION: 0
VOMITING: 0
BACK PAIN: 0
DIARRHEA: 0
SHORTNESS OF BREATH: 0

## 2022-10-22 ASSESSMENT — PAIN DESCRIPTION - ORIENTATION: ORIENTATION: LOWER

## 2022-10-22 ASSESSMENT — PAIN DESCRIPTION - DESCRIPTORS: DESCRIPTORS: ACHING;GNAWING;NAGGING

## 2022-10-22 ASSESSMENT — PAIN SCALES - GENERAL: PAINLEVEL_OUTOF10: 8

## 2022-10-22 ASSESSMENT — PAIN DESCRIPTION - LOCATION: LOCATION: BACK

## 2022-10-22 ASSESSMENT — PAIN - FUNCTIONAL ASSESSMENT: PAIN_FUNCTIONAL_ASSESSMENT: NONE - DENIES PAIN

## 2022-10-22 NOTE — LETTER
[place] Department Medicaid  CERTIFICATION OF NECESSITY  FOR NON-EMERGENCY TRANSPORTATION   BY GROUND AMBULANCE      Individual Information   1. Name: Mayur Peace 2. [place] Medicaid Billing Number:    3. Address: [phone]      Transportation Provider Information   4. Provider Name:    5. [place] Medicaid Provider Number:  National Provider Identifier (NPI):     Certification  7. Criteria:  During transport, this individual requires:  [x] Medical treatment or continuous     supervision by an EMT. [] The administration or regulation of oxygen by another person. [] Supervised protective restraint. 8. Period Beginning Date: 10/24   9. Length  [x] Not more than  5 day(s)  [] One Year     Additional Information Relevant to Certification   10. Comments or Explanations, If Necessary or Appropriate   H/o cva uti  Pain arthritis     Certifying Practitioner Information   11. Name of Practitioner: Jad Khoury   12. [place] Medicaid Provider Number, If Applicabl 13. National Provider Identifier (NPI)     Signature Information   14. Date of Signature:  13. Name of Person Signin. Signature and Professional Designation:     [facility] [id number]  Rev. 2015      [address] [place] [address] Encounter Date/Time: 10/22/2022 [address] Account: [de-identified]    MRN: [id number]    Patient: Adi Chaparro Serial #: [id number]      ENCOUNTER          Patient Class: I Private Enc? No Unit  BD: [facility] [id number]   Hospital Service:  INM   Encounter DX: UTI (urinary tract infec*   ADM Provider: Bautista Giles DO   Procedure:     ATT Provider: Jad Khoury MD   REF Provider:        Admission DX: UTI (urinary tract infection) and DX codes: N39.0      PATIENT                 Name: Mayur Peace : 1945 (77 yrs)   Address: [address] [place] [address]* Sex: Female   [facility] [address]         Marital Status:    Employer: DISABLED         Anglican: [employer]ism   Primary Care Provider: GRACE Holliday - *         Primary Phone: [phone]   EMERGENCY CONTACT   Contact Name Legal Guardian? Relationship to Patient Home Phone Work Phone   1. Jessy Lopez  2. Tisha Thibodeaux      Child  Child [phone]              GUARANTOR            Guarantor: Betty Mora     : 1945   Address: [address] [place] [address] [address] Sex: Female   [place] [id number]     Relation to Patient: Self       Home Phone: [phone]   Guarantor ID: [id number]       Work Phone:     Guarantor Employer: UNEMPLOYED         Status: NOT EMPLO*      COVERAGE        PRIMARY INSURANCE   Payor: William CHACON* Plan: Kanchan CALLE*   Payor Address: [place] [address],  [place], [address] [place] [address]       Group Number: [id number] Insurance Type: INDEMNITY   Subscriber Name: Jean-Claude Burk : 1945   Subscriber ID: [id number] Pat. Rel. to Sub: Self   SECONDARY INSURANCE   Payor:   Plan:     Payor Address:  ,           Group Number:   Insurance Type:     Subscriber Name:   Subscriber :     Subscriber ID:   Pat.  Rel. to Sub:           CSN: [id number]

## 2022-10-22 NOTE — ED PROVIDER NOTES
[facility]  Department of Emergency Medicine     Written by: Alejandra Maguire DO  Patient Name: Steven Salazar  Attending Provider: Ankit Mar DO  Admit Date: 10/22/2022  3:05 PM  MRN: [id number]                   : 1945        No chief complaint on file. - Chief complaint    Patient is a 67-year-old female past medical history of hypertension, CAD, hyperlipidemia and CVA. Patient presents with chief complaint of generalized weakness. Patient states that earlier today she began to develop generalized weakness. She notes that she has had weakness to her bilateral lower extremities. Patient states that she found it difficult to ambulate. She notes that weakness is slightly worse on the right than the left. Patient also notes that she has had chronic arthritis to her right arm that is slightly worsened over the last day or so. Patient also notes that she has been feeling slightly depressed secondary to her son recently passing away. She denies any suicidal homicidal ideations. Patient states that symptoms have been moderate in severity and constant since onset. She denies any exacerbating or relieving factors. Patient denies any similar episodes in the past.  Patient denies any fevers, chills, nausea, vomiting, abdominal pain, constipation or diarrhea. The history is provided by the patient. No  was used. Review of Systems   Constitutional:  Negative for chills and fever. HENT:  Negative for ear pain, sinus pressure and sore throat. Eyes:  Negative for pain, discharge and redness. Respiratory:  Negative for cough, shortness of breath and wheezing. Cardiovascular:  Negative for chest pain. Gastrointestinal:  Negative for abdominal distention, diarrhea, nausea and vomiting. Genitourinary:  Negative for dysuria and frequency. Musculoskeletal:  Positive for arthralgias. Negative for back pain.    Skin:  Negative for rash and wound.   Neurological:  Positive for weakness. Negative for headaches. Hematological:  Negative for adenopathy. Psychiatric/Behavioral:  Positive for dysphoric mood. All other systems reviewed and are negative. Physical Exam  Vitals and nursing note reviewed. Constitutional:       General: She is not in acute distress. Appearance: Normal appearance. HENT:      Head: Normocephalic and atraumatic. Nose: No congestion or rhinorrhea. Mouth/Throat:      Mouth: Mucous membranes are moist.      Pharynx: Oropharynx is clear. Eyes:      Extraocular Movements: Extraocular movements intact. Pupils: Pupils are equal, round, and reactive to light. Cardiovascular:      Rate and Rhythm: Normal rate and regular rhythm. Heart sounds: No murmur heard. No gallop. Pulmonary:      Effort: Pulmonary effort is normal. No respiratory distress. Breath sounds: No wheezing, rhonchi or rales. Abdominal:      General: Abdomen is flat. Palpations: Abdomen is soft. There is no mass. Tenderness: There is no abdominal tenderness. There is no guarding. Hernia: No hernia is present. Musculoskeletal:         General: No swelling, tenderness or signs of injury. Cervical back: Normal range of motion. No rigidity. No muscular tenderness. Skin:     General: Skin is warm and dry. Capillary Refill: Capillary refill takes less than 2 seconds. Neurological:      General: No focal deficit present. Mental Status: She is alert and oriented to person, place, and time. Mental status is at baseline. Comments: On neurological exam patient is awake alert oriented x3. Patient does have generalized weakness to bilateral upper extremities. Patient does have weakness to bilateral lower extremities as well. Sensation is intact. No ataxia noted, no slurred speech.    Psychiatric:         Mood and Affect: Mood normal.         Behavior: Behavior normal.        Procedures MDM  Number of Diagnoses or Management Options  Acute cystitis without hematuria  Unable to ambulate  Diagnosis management comments: Patient is a 78-year-old female past medical history of hypertension, CAD, hyperlipidemia and CVA. Patient comes in complain generalized weakness. Vital signs stable presentation. On physical exam heart regular rate and rhythm, lungs are auscultation bilaterally, abdomen soft nontender no rebound or guarding. On examination of the lower extremities there is bilateral lower extremity weakness right slightly greater than left. No lateralizing symptoms. Pupils equal round reactive to light, extraocular efforts are intact, strength is globally decreased. EKG obtained demonstrate no acute ischemic changes. CMP obtained demonstrated no acute abnormalities. Urinalysis was indicative of infection with positive nitrite small excite esterase, urine drug screen positive for oxycodone, COVID-negative, proBNP 688, troponin was obtained was less than 6 and repeat less than 6. CT scan of the head demonstrated no acute maladies, chest x-ray demonstrated no acute maladies. Signs assessment generalized weakness in the setting of UTI. Attempts are made to ambulate patient patient was unable to ambulate. Decision made to admit patient. Patient given dose of Rocephin urine culture sent. Case discussed with sound who agreed to admit patient. Plan of care discussed with patient including admission, all questions were answered, patient was in agreement plan of care and admitted to the hospital in stable condition.        Amount and/or Complexity of Data Reviewed  Clinical lab tests: ordered and reviewed  Tests in the radiology section of CPT®: ordered and reviewed    Risk of Complications, Morbidity, and/or Mortality  Presenting problems: moderate  Diagnostic procedures: moderate  Management options: moderate    Patient Progress  Patient progress: stable --------------------------------------------- PAST HISTORY ---------------------------------------------  Past Medical History:  has a past medical history of Aorto-iliac atherosclerosis ([facility] Arthritis, Atherosclerosis of native arteries of extremity with rest pain [facility]), Bladder prolapse, CAD (coronary artery disease), Carotid artery stenosis, Carotid bruit, Carotid stenosis, left, Cerebrovascular accident (stroke) ([facility] Decreased radial pulse, Gastric reflux, Glaucoma, History of blood transfusion, Hyperlipemia, Hypertension, Insomnia, Post-operative state, PVD (peripheral vascular disease) with [facility]ication [facility] Restless leg syndrome, and Sinusitis. Past Surgical History:  has a past surgical history that includes Cataract removal; Hysterectomy; knee surgery; skin biopsy; Carpal tunnel release; Dental surgery; Tonsillectomy; Coronary angioplasty with stent (09/06/2011); Carotid endarterectomy (1998); Diagnostic Cardiac Cath Lab Procedure (9/6/11); Anterior Fixation of Thoracic Spine; Cervical disc surgery (2002); Carotid endarterectomy (12/18/2012); Knee Arthroplasty (Left, 4-11-13); Cystoscopy; Lithotripsy (Right, 11/7/2019); Lithotripsy; joint replacement; joint replacement (Right); Aorto-iliac Bypass Graft (N/A, 9/15/2020); and Cardiac surgery. Social History:  reports that she quit smoking about 22 years ago. Her smoking use included cigarettes. She has a 100.00 pack-year smoking history. She has never used smokeless tobacco. She reports that she does not drink alcohol and does not use drugs. Family History: family history is not on file. The patients home medications have been reviewed.     Allergies: Iodine, Macrodantin [nitrofurantoin], Prednisone, Sulfa antibiotics, 5-alpha reductase inhibitors, and Phenergan [promethazine hcl]    -------------------------------------------------- RESULTS -------------------------------------------------    LABS:  Results for orders placed or performed during the hospital encounter of 10/22/22   COVID-19, Rapid    Specimen: Nasopharyngeal Swab   Result Value Ref Range    SARS-CoV-2, NAAT Not Detected Not Detected   CBC with Auto Differential   Result Value Ref Range    WBC 5.1 4.5 - 11.5 E9/L    RBC 3.89 3.50 - 5.50 E12/L    Hemoglobin 12.2 11.5 - 15.5 g/dL    Hematocrit 38.8 34.0 - 48.0 %    MCV 99.7 80.0 - 99.9 fL    MCH 31.4 26.0 - 35.0 pg    MCHC 31.4 (L) 32.0 - 34.5 %    RDW 13.6 11.5 - 15.0 fL    Platelets 753 788 - 699 E9/L    MPV 11.4 7.0 - 12.0 fL    Neutrophils % 71.9 43.0 - 80.0 %    Immature Granulocytes % 0.2 0.0 - 5.0 %    Lymphocytes % 15.8 (L) 20.0 - 42.0 %    Monocytes % 10.1 2.0 - 12.0 %    Eosinophils % 1.4 0.0 - 6.0 %    Basophils % 0.6 0.0 - 2.0 %    Neutrophils Absolute 3.69 1.80 - 7.30 E9/L    Immature Granulocytes # 0.01 E9/L    Lymphocytes Absolute 0.81 (L) 1.50 - 4.00 E9/L    Monocytes Absolute 0.52 0.10 - 0.95 E9/L    Eosinophils Absolute 0.07 0.05 - 0.50 E9/L    Basophils Absolute 0.03 0.00 - 0.20 E9/L   Comprehensive Metabolic Panel w/ Reflex to MG   Result Value Ref Range    Sodium 141 132 - 146 mmol/L    Potassium reflex Magnesium 5.0 3.5 - 5.0 mmol/L    Chloride 106 98 - 107 mmol/L    CO2 23 22 - 29 mmol/L    Anion Gap 12 7 - 16 mmol/L    Glucose 107 (H) 74 - 99 mg/dL    BUN 15 6 - 23 mg/dL    Creatinine 0.9 0.5 - 1.0 mg/dL    Est, Glom Filt Rate >60 >=60 mL/min/1.73    Calcium 9.6 8.6 - 10.2 mg/dL    Total Protein 7.0 6.4 - 8.3 g/dL    Albumin 4.5 3.5 - 5.2 g/dL    Total Bilirubin 0.4 0.0 - 1.2 mg/dL    Alkaline Phosphatase 64 35 - 104 U/L    ALT 13 0 - 32 U/L    AST 26 0 - 31 U/L   Troponin   Result Value Ref Range    Troponin, High Sensitivity <6 0 - 9 ng/L   Brain Natriuretic Peptide   Result Value Ref Range    Pro- (H) 0 - 450 pg/mL   Protime-INR   Result Value Ref Range    Protime 10.5 9.3 - 12.4 sec    INR 1.0    APTT   Result Value Ref Range    aPTT <20.0 (L) 24.5 - 35.1 sec   Urinalysis with Microscopic   Result Value Ref Range    Color, UA Yellow Straw/Yellow    Clarity, UA Clear Clear    Glucose, Ur Negative Negative mg/dL    Bilirubin Urine Negative Negative    Ketones, Urine Negative Negative mg/dL    Specific Gravity, UA 1.025 1.005 - 1.030    Blood, Urine Negative Negative    pH, UA 6.0 5.0 - 9.0    Protein, UA Negative Negative mg/dL    Urobilinogen, Urine 0.2 <2.0 E.U./dL    Nitrite, Urine POSITIVE (A) Negative    Leukocyte Esterase, Urine SMALL (A) Negative    WBC, UA 1-3 0 - 5 /HPF    RBC, UA NONE 0 - 2 /HPF    Epithelial Cells, UA FEW /HPF    Bacteria, UA RARE (A) None Seen /HPF   URINE DRUG SCREEN   Result Value Ref Range    Amphetamine Screen, Urine NOT DETECTED Negative <1000 ng/mL    Barbiturate Screen, Ur NOT DETECTED Negative < 200 ng/mL    Benzodiazepine Screen, Urine NOT DETECTED Negative < 200 ng/mL    Cannabinoid Scrn, Ur NOT DETECTED Negative < 50ng/mL    Cocaine Metabolite Screen, Urine NOT DETECTED Negative < 300 ng/mL    Opiate Scrn, Ur NOT DETECTED Negative < 300ng/mL    PCP Screen, Urine NOT DETECTED Negative < 25 ng/mL    Methadone Screen, Urine NOT DETECTED Negative <300 ng/mL    Oxycodone Urine POSITIVE (A) Negative <100 ng/mL    FENTANYL SCREEN, URINE NOT DETECTED Negative <1 ng/mL    Drug Screen Comment: see below    Troponin   Result Value Ref Range    Troponin, High Sensitivity <6 0 - 9 ng/L   Comprehensive Metabolic Panel w/ Reflex to MG   Result Value Ref Range    Sodium 143 132 - 146 mmol/L    Potassium reflex Magnesium 4.3 3.5 - 5.0 mmol/L    Chloride 109 (H) 98 - 107 mmol/L    CO2 22 22 - 29 mmol/L    Anion Gap 12 7 - 16 mmol/L    Glucose 97 74 - 99 mg/dL    BUN 12 6 - 23 mg/dL    Creatinine 0.8 0.5 - 1.0 mg/dL    Est, Glom Filt Rate >60 >=60 mL/min/1.73    Calcium 9.6 8.6 - 10.2 mg/dL    Total Protein 6.6 6.4 - 8.3 g/dL    Albumin 4.3 3.5 - 5.2 g/dL    Total Bilirubin 0.4 0.0 - 1.2 mg/dL    Alkaline Phosphatase 57 35 - 104 U/L    ALT 10 0 - 32 U/L    AST 20 0 - 31 U/L   CBC with Auto Differential   Result Value Ref Range    WBC 5.0 4.5 - 11.5 E9/L    RBC 3.55 3.50 - 5.50 E12/L    Hemoglobin 11.4 (L) 11.5 - 15.5 g/dL    Hematocrit 34.5 34.0 - 48.0 %    MCV 97.2 80.0 - 99.9 fL    MCH 32.1 26.0 - 35.0 pg    MCHC 33.0 32.0 - 34.5 %    RDW 13.3 11.5 - 15.0 fL    Platelets 698 126 - 355 E9/L    MPV 10.8 7.0 - 12.0 fL    Neutrophils % 65.2 43.0 - 80.0 %    Immature Granulocytes % 0.2 0.0 - 5.0 %    Lymphocytes % 18.2 (L) 20.0 - 42.0 %    Monocytes % 14.8 (H) 2.0 - 12.0 %    Eosinophils % 1.0 0.0 - 6.0 %    Basophils % 0.6 0.0 - 2.0 %    Neutrophils Absolute 3.27 1.80 - 7.30 E9/L    Immature Granulocytes # 0.01 E9/L    Lymphocytes Absolute 0.91 (L) 1.50 - 4.00 E9/L    Monocytes Absolute 0.74 0.10 - 0.95 E9/L    Eosinophils Absolute 0.05 0.05 - 0.50 E9/L    Basophils Absolute 0.03 0.00 - 0.20 E9/L   POCT Glucose   Result Value Ref Range    Glucose 92 mg/dL    QC OK? ok    POCT Glucose   Result Value Ref Range    Meter Glucose 92 74 - 99 mg/dL   EKG 12 Lead   Result Value Ref Range    Ventricular Rate 63 BPM    Atrial Rate 63 BPM    P-R Interval 154 ms    QRS Duration 124 ms    Q-T Interval 434 ms    QTc Calculation (Bazett) 444 ms    P Axis 59 degrees    R Axis 77 degrees    T Axis 61 degrees       RADIOLOGY:  CT Head WO Contrast   Final Result   1. There is no acute intracranial abnormality. Specifically, there is no   intracranial hemorrhage. 2. Atrophy and periventricular leukomalacia,   3. Old right frontal lobe infarct. .         XR CHEST PORTABLE   Final Result   Mild cardiomegaly. There are no findings of failure or pneumonia. EKG:  This EKG is signed and interpreted by me. Rate: 63  Rhythm: Sinus  Interpretation: EKG obtained demonstrated normal sinus rhythm, rate 63, normal axis, , right bundle branch block noted no acute ST segment changes.   Comparison: stable as compared to patient's most recent EKG      ------------------------- NURSING NOTES AND VITALS REVIEWED ---------------------------  Date / Time Roomed:  10/22/2022  3:05 PM  ED Bed Assignment:  [id number]    The nursing notes within the ED encounter and vital signs as below have been reviewed. Patient Vitals for the past 24 hrs:   BP Temp Temp src Pulse Resp SpO2 Height   10/23/22 1230 120/77 97.3 °F (36.3 °C) Oral 60 16 96 % --   10/23/22 1215 -- -- -- -- -- -- 4' 11.84\" (1.52 m)   10/23/22 1122 -- -- -- 64 17 99 % --   10/23/22 0955 (!) 134/47 -- -- 60 18 98 % --   10/23/22 0721 (!) 127/57 -- -- 67 -- -- --   10/23/22 0627 (!) 152/51 -- -- 71 16 94 % --   10/23/22 0306 (!) 154/70 98.4 °F (36.9 °C) -- 65 20 94 % --   10/22/22 2236 137/64 98 °F (36.7 °C) Oral 67 17 94 % --   10/22/22 2156 -- -- -- -- 21 -- --   10/22/22 1915 134/79 97.7 °F (36.5 °C) Oral 65 18 96 % --       Oxygen Saturation Interpretation: Normal    ------------------------------------------ PROGRESS NOTES ------------------------------------------  Re-evaluation(s):  Time: 1800  Patients symptoms show no change  Repeat physical examination is not changed    Counseling:  I have spoken with the patient and discussed todays results, in addition to providing specific details for the plan of care and counseling regarding the diagnosis and prognosis. Their questions are answered at this time and they are agreeable with the plan of admission.    --------------------------------- ADDITIONAL PROVIDER NOTES ---------------------------------  Consultations:  Time: 8504. Spoke with [facility]. Discussed case. They will admit the patient. This patient's ED course included: a personal history and physicial examination, re-evaluation prior to disposition, and multiple bedside re-evaluations    This patient has remained hemodynamically stable during their ED course. Diagnosis:  1. Acute cystitis without hematuria    2. Unable to ambulate        Disposition:  Patient's disposition: Admit to telemetry  Patient's condition is stable.           Patient was seen and evaluated by myself and my attending Danica Blanco DO. Assessment and Plan discussed with attending provider, please see attestation for final plan of care. DO Constanza Pineda DO  Resident  10/23/22 0734          ATTENDING PROVIDER ATTESTATION:     Emilee Valladares presented to the emergency department for evaluation of No chief complaint on file. I have reviewed and discussed the case, including pertinent history (medical, surgical, family and social) and exam findings with the Resident and the Nurse assigned to Emilee Valladares. I have personally performed and/or participated in the history, exam, medical decision making, and procedures and agree with all pertinent clinical information. I have reviewed my findings and recommendations with Emilee Valladares and members of family present at the time of disposition. I, Dr. Jacy Vallecillo am the primary provider of record    I have personally reviewed all laboratory radiology results from today's visit. I have personally reviewed and agree with resident interpretation of EKG for all EKGs from today's visit. My findings/plan: The primary encounter diagnosis was Acute cystitis without hematuria. A diagnosis of Unable to ambulate was also pertinent to this visit.   Current Discharge Medication List        DO Danica Fenton DO  10/24/22 2857

## 2022-10-23 LAB
ALBUMIN SERPL-MCNC: 4.3 G/DL (ref 3.5–5.2)
ALP BLD-CCNC: 57 U/L (ref 35–104)
ALT SERPL-CCNC: 10 U/L (ref 0–32)
ANION GAP SERPL CALCULATED.3IONS-SCNC: 12 MMOL/L (ref 7–16)
AST SERPL-CCNC: 20 U/L (ref 0–31)
BASOPHILS ABSOLUTE: 0.03 E9/L (ref 0–0.2)
BASOPHILS RELATIVE PERCENT: 0.6 % (ref 0–2)
BILIRUB SERPL-MCNC: 0.4 MG/DL (ref 0–1.2)
BUN BLDV-MCNC: 12 MG/DL (ref 6–23)
CALCIUM SERPL-MCNC: 9.6 MG/DL (ref 8.6–10.2)
CHLORIDE BLD-SCNC: 109 MMOL/L (ref 98–107)
CO2: 22 MMOL/L (ref 22–29)
CREAT SERPL-MCNC: 0.8 MG/DL (ref 0.5–1)
EOSINOPHILS ABSOLUTE: 0.05 E9/L (ref 0.05–0.5)
EOSINOPHILS RELATIVE PERCENT: 1 % (ref 0–6)
GFR SERPL CREATININE-BSD FRML MDRD: >60 ML/MIN/1.73
GLUCOSE BLD-MCNC: 97 MG/DL (ref 74–99)
HCT VFR BLD CALC: 34.5 % (ref 34–48)
HEMOGLOBIN: 11.4 G/DL (ref 11.5–15.5)
IMMATURE GRANULOCYTES #: 0.01 E9/L
IMMATURE GRANULOCYTES %: 0.2 % (ref 0–5)
LYMPHOCYTES ABSOLUTE: 0.91 E9/L (ref 1.5–4)
LYMPHOCYTES RELATIVE PERCENT: 18.2 % (ref 20–42)
MCH RBC QN AUTO: 32.1 PG (ref 26–35)
MCHC RBC AUTO-ENTMCNC: 33 % (ref 32–34.5)
MCV RBC AUTO: 97.2 FL (ref 80–99.9)
MONOCYTES ABSOLUTE: 0.74 E9/L (ref 0.1–0.95)
MONOCYTES RELATIVE PERCENT: 14.8 % (ref 2–12)
NEUTROPHILS ABSOLUTE: 3.27 E9/L (ref 1.8–7.3)
NEUTROPHILS RELATIVE PERCENT: 65.2 % (ref 43–80)
PDW BLD-RTO: 13.3 FL (ref 11.5–15)
PLATELET # BLD: 198 E9/L (ref 130–450)
PMV BLD AUTO: 10.8 FL (ref 7–12)
POTASSIUM REFLEX MAGNESIUM: 4.3 MMOL/L (ref 3.5–5)
RBC # BLD: 3.55 E12/L (ref 3.5–5.5)
SODIUM BLD-SCNC: 143 MMOL/L (ref 132–146)
TOTAL PROTEIN: 6.6 G/DL (ref 6.4–8.3)
WBC # BLD: 5 E9/L (ref 4.5–11.5)

## 2022-10-23 PROCEDURE — 6370000000 HC RX 637 (ALT 250 FOR IP): Performed by: FAMILY MEDICINE

## 2022-10-23 PROCEDURE — 85025 COMPLETE CBC W/AUTO DIFF WBC: CPT

## 2022-10-23 PROCEDURE — 2580000003 HC RX 258: Performed by: FAMILY MEDICINE

## 2022-10-23 PROCEDURE — 6360000002 HC RX W HCPCS: Performed by: FAMILY MEDICINE

## 2022-10-23 PROCEDURE — 1200000000 HC SEMI PRIVATE

## 2022-10-23 PROCEDURE — 87088 URINE BACTERIA CULTURE: CPT

## 2022-10-23 PROCEDURE — 80053 COMPREHEN METABOLIC PANEL: CPT

## 2022-10-23 RX ORDER — CELECOXIB 100 MG/1
100 CAPSULE ORAL 2 TIMES DAILY
COMMUNITY

## 2022-10-23 RX ORDER — AMOXICILLIN 875 MG/1
875 TABLET, COATED ORAL 2 TIMES DAILY
Status: ON HOLD | COMMUNITY
End: 2022-10-26 | Stop reason: HOSPADM

## 2022-10-23 RX ORDER — ROSUVASTATIN CALCIUM 10 MG/1
10 TABLET, COATED ORAL DAILY
Status: DISCONTINUED | OUTPATIENT
Start: 2022-10-23 | End: 2022-10-28 | Stop reason: HOSPADM

## 2022-10-23 RX ORDER — HYDROXYCHLOROQUINE SULFATE 200 MG/1
200 TABLET, FILM COATED ORAL 2 TIMES DAILY
Status: DISCONTINUED | OUTPATIENT
Start: 2022-10-23 | End: 2022-10-28 | Stop reason: HOSPADM

## 2022-10-23 RX ORDER — GABAPENTIN 300 MG/1
300 CAPSULE ORAL 3 TIMES DAILY
Status: DISCONTINUED | OUTPATIENT
Start: 2022-10-23 | End: 2022-10-28 | Stop reason: HOSPADM

## 2022-10-23 RX ORDER — LATANOPROST 50 UG/ML
1 SOLUTION/ DROPS OPHTHALMIC NIGHTLY
Status: DISCONTINUED | OUTPATIENT
Start: 2022-10-23 | End: 2022-10-28 | Stop reason: HOSPADM

## 2022-10-23 RX ORDER — ENOXAPARIN SODIUM 100 MG/ML
40 INJECTION SUBCUTANEOUS DAILY
Status: DISCONTINUED | OUTPATIENT
Start: 2022-10-23 | End: 2022-10-25

## 2022-10-23 RX ORDER — AMLODIPINE BESYLATE 5 MG/1
5 TABLET ORAL DAILY
Status: DISCONTINUED | OUTPATIENT
Start: 2022-10-23 | End: 2022-10-28 | Stop reason: HOSPADM

## 2022-10-23 RX ORDER — ERGOCALCIFEROL (VITAMIN D2) 1250 MCG
50000 CAPSULE ORAL WEEKLY
COMMUNITY

## 2022-10-23 RX ORDER — DORZOLAMIDE HCL 20 MG/ML
1 SOLUTION/ DROPS OPHTHALMIC 3 TIMES DAILY
Status: DISCONTINUED | OUTPATIENT
Start: 2022-10-23 | End: 2022-10-23

## 2022-10-23 RX ORDER — SENNA AND DOCUSATE SODIUM 50; 8.6 MG/1; MG/1
1 TABLET, FILM COATED ORAL DAILY PRN
COMMUNITY

## 2022-10-23 RX ORDER — SODIUM CHLORIDE 9 MG/ML
INJECTION, SOLUTION INTRAVENOUS PRN
Status: DISCONTINUED | OUTPATIENT
Start: 2022-10-23 | End: 2022-10-28 | Stop reason: HOSPADM

## 2022-10-23 RX ORDER — SODIUM CHLORIDE 0.9 % (FLUSH) 0.9 %
10 SYRINGE (ML) INJECTION PRN
Status: DISCONTINUED | OUTPATIENT
Start: 2022-10-23 | End: 2022-10-28 | Stop reason: HOSPADM

## 2022-10-23 RX ORDER — ACETAMINOPHEN 325 MG/1
650 TABLET ORAL EVERY 6 HOURS PRN
Status: DISCONTINUED | OUTPATIENT
Start: 2022-10-23 | End: 2022-10-28 | Stop reason: HOSPADM

## 2022-10-23 RX ORDER — POLYETHYLENE GLYCOL 3350 17 G/17G
17 POWDER, FOR SOLUTION ORAL DAILY PRN
Status: DISCONTINUED | OUTPATIENT
Start: 2022-10-23 | End: 2022-10-28 | Stop reason: HOSPADM

## 2022-10-23 RX ORDER — ALPRAZOLAM 0.25 MG/1
0.25 TABLET ORAL 2 TIMES DAILY
COMMUNITY

## 2022-10-23 RX ORDER — ASPIRIN 81 MG/1
81 TABLET ORAL DAILY
Status: DISCONTINUED | OUTPATIENT
Start: 2022-10-23 | End: 2022-10-28 | Stop reason: HOSPADM

## 2022-10-23 RX ORDER — SODIUM CHLORIDE 0.9 % (FLUSH) 0.9 %
10 SYRINGE (ML) INJECTION EVERY 12 HOURS SCHEDULED
Status: DISCONTINUED | OUTPATIENT
Start: 2022-10-23 | End: 2022-10-28 | Stop reason: HOSPADM

## 2022-10-23 RX ORDER — METOPROLOL SUCCINATE 100 MG/1
100 TABLET, EXTENDED RELEASE ORAL DAILY
Status: DISCONTINUED | OUTPATIENT
Start: 2022-10-23 | End: 2022-10-28 | Stop reason: HOSPADM

## 2022-10-23 RX ORDER — ACETAMINOPHEN 650 MG/1
650 SUPPOSITORY RECTAL EVERY 6 HOURS PRN
Status: DISCONTINUED | OUTPATIENT
Start: 2022-10-23 | End: 2022-10-28 | Stop reason: HOSPADM

## 2022-10-23 RX ORDER — CHOLECALCIFEROL (VITAMIN D3) 50 MCG
2000 TABLET ORAL DAILY
Status: DISCONTINUED | OUTPATIENT
Start: 2022-10-23 | End: 2022-10-28 | Stop reason: HOSPADM

## 2022-10-23 RX ORDER — FAMOTIDINE 20 MG/1
40 TABLET, FILM COATED ORAL DAILY
Status: DISCONTINUED | OUTPATIENT
Start: 2022-10-23 | End: 2022-10-25

## 2022-10-23 RX ORDER — CILOSTAZOL 100 MG/1
100 TABLET ORAL
Status: DISCONTINUED | OUTPATIENT
Start: 2022-10-23 | End: 2022-10-28 | Stop reason: HOSPADM

## 2022-10-23 RX ORDER — OXYCODONE AND ACETAMINOPHEN 10; 325 MG/1; MG/1
1 TABLET ORAL 2 TIMES DAILY PRN
COMMUNITY

## 2022-10-23 RX ORDER — LANOLIN ALCOHOL/MO/W.PET/CERES
6 CREAM (GRAM) TOPICAL NIGHTLY PRN
Status: DISCONTINUED | OUTPATIENT
Start: 2022-10-23 | End: 2022-10-28 | Stop reason: HOSPADM

## 2022-10-23 RX ORDER — CETIRIZINE HYDROCHLORIDE 5 MG/1
5 TABLET ORAL DAILY
Status: DISCONTINUED | OUTPATIENT
Start: 2022-10-23 | End: 2022-10-28 | Stop reason: HOSPADM

## 2022-10-23 RX ADMIN — ACETAMINOPHEN 650 MG: 325 TABLET, FILM COATED ORAL at 06:20

## 2022-10-23 RX ADMIN — FAMOTIDINE 40 MG: 20 TABLET, FILM COATED ORAL at 09:58

## 2022-10-23 RX ADMIN — GABAPENTIN 300 MG: 300 CAPSULE ORAL at 09:58

## 2022-10-23 RX ADMIN — CEFTRIAXONE 1000 MG: 1 INJECTION, POWDER, FOR SOLUTION INTRAMUSCULAR; INTRAVENOUS at 22:07

## 2022-10-23 RX ADMIN — ACETAMINOPHEN 650 MG: 325 TABLET, FILM COATED ORAL at 09:57

## 2022-10-23 RX ADMIN — GABAPENTIN 300 MG: 300 CAPSULE ORAL at 20:50

## 2022-10-23 RX ADMIN — CILOSTAZOL 100 MG: 100 TABLET ORAL at 15:27

## 2022-10-23 RX ADMIN — Medication 2000 UNITS: at 09:58

## 2022-10-23 RX ADMIN — GABAPENTIN 300 MG: 300 CAPSULE ORAL at 15:27

## 2022-10-23 RX ADMIN — Medication 10 ML: at 20:55

## 2022-10-23 RX ADMIN — AMLODIPINE BESYLATE 5 MG: 5 TABLET ORAL at 09:58

## 2022-10-23 RX ADMIN — ENOXAPARIN SODIUM 40 MG: 100 INJECTION SUBCUTANEOUS at 09:56

## 2022-10-23 RX ADMIN — ASPIRIN 81 MG: 81 TABLET, COATED ORAL at 09:58

## 2022-10-23 ASSESSMENT — PAIN SCALES - GENERAL
PAINLEVEL_OUTOF10: 5
PAINLEVEL_OUTOF10: 0
PAINLEVEL_OUTOF10: 0
PAINLEVEL_OUTOF10: 9
PAINLEVEL_OUTOF10: 7
PAINLEVEL_OUTOF10: 3

## 2022-10-23 ASSESSMENT — PAIN DESCRIPTION - LOCATION
LOCATION: GENERALIZED
LOCATION: GENERALIZED

## 2022-10-23 ASSESSMENT — PAIN - FUNCTIONAL ASSESSMENT
PAIN_FUNCTIONAL_ASSESSMENT: 0-10
PAIN_FUNCTIONAL_ASSESSMENT: 0-10

## 2022-10-23 NOTE — PROGRESS NOTES
Renuka Newell was ordered Tofacitinib Citrate ER (XELJANZ XR) which is a nonformulary medication. This medication will need to be supplied by the patient as the pharmacy does not carry this non-formulary medication. If the medication has not been administered by 1400 on the following day from the time the order was placed, a pharmacist will follow-up with the nurse of the patient to assess the capability of the patient to bring in the medication. If it is determined that the patient cannot supply the medication and it is not available to be dispensed from the pharmacy, the provider will be notified.

## 2022-10-23 NOTE — PROGRESS NOTES
Hospitalist Progress Note      Synopsis: Patient admitted for   Briefly, patient with a past medical history significant for aorto iliac atherosclerosis, coronary artery disease, carotid artery stenosis, CVA presented to ED with complaints of generalized weakness. Specifically in her bilateral lower extremities and has had a difficult time ambulating. Additionally patient has been going through bouts of depression secondary to her son passing away recently. ER course largely unremarkable with exception of urinalysis showing positive nitrates, small leukocyte esterase. Patient was given ceftriaxone    Hospital day 1     Subjective:  Patient seen and examined at bedside, patient states that previously she has had no weakness in her lower legs and over the last week she laid down to watch television and when she got up she was not able to ambulate. Patient denies back pain or any traumatic injury. Stable overnight. No issues reported. Patient seen and examined  Records reviewed. Temp (24hrs), Av.8 °F (36.6 °C), Min:97 °F (36.1 °C), Max:98.4 °F (36.9 °C)    DIET: ADULT DIET; Regular  CODE: Full Code  No intake or output data in the 24 hours ending 10/23/22 1046    Review of Systems: All bolded are positive; please see HPI  General:  Fever, chills, diaphoresis, fatigue, malaise, night sweats, weight loss  Psychological:  Anxiety, disorientation, hallucinations. ENT:  Epistaxis, headaches, vertigo, visual changes. Cardiovascular:  Chest pain, irregular heartbeats, palpitations, paroxysmal nocturnal dyspnea. Respiratory:  Shortness of breath, coughing, sputum production, hemoptysis, wheezing, orthopnea.   Gastrointestinal:  Nausea, vomiting, diarrhea, heartburn, constipation, abdominal pain, hematemesis, hematochezia, melena, acholic stools  Genito-Urinary:  Dysuria, urgency, frequency, hematuria  Musculoskeletal:  Joint pain, joint stiffness, joint swelling, muscle pain  Neurology:  Headache, focal neurological deficits, weakness, numbness, paresthesia  Derm:  Rashes, ulcers, excoriations, bruising  Extremities:  Decreased ROM, peripheral edema, mottling    Objective:    BP (!) 134/47   Pulse 60   Temp 98.4 °F (36.9 °C)   Resp 18   Wt 181 lb (82.1 kg)   SpO2 98%   BMI 35.35 kg/m²     General appearance: No apparent distress, appears stated age and cooperative. HEENT: Conjunctivae/corneas clear. Mucous membranes moist.  Neck: Supple. No JVD. Respiratory:  Clear to auscultation bilaterally. Normal respiratory effort. Cardiovascular:  RRR. S1, S2 without MRG. PV: Pulses palpable. No edema. Abdomen: Soft, non-tender, non-distended. +BS  Musculoskeletal: No obvious deformities. Skin: Normal skin color. No rashes or lesions. Good turgor. Neurologic:  Grossly non-focal. Awake, alert, following commands.    Psychiatric: Alert and oriented, thought content appropriate, normal insight and judgement    Medications:  REVIEWED DAILY    Infusion Medications    sodium chloride       Scheduled Medications    cefTRIAXone (ROCEPHIN) IV  1,000 mg IntraVENous Q24H    amLODIPine  5 mg Oral Daily    aspirin  81 mg Oral Daily    dorzolamide  1 drop Left Eye TID    vitamin D  2,000 Units Oral Daily    cilostazol  100 mg Oral BID AC    famotidine  40 mg Oral Daily    gabapentin  300 mg Oral TID    hydroxychloroquine  200 mg Oral BID    latanoprost  1 drop Both Eyes Nightly    cetirizine  5 mg Oral Daily    metoprolol succinate  100 mg Oral Daily    rosuvastatin  10 mg Oral Daily    Tofacitinib Citrate ER  11 mg Oral Daily    sodium chloride flush  10 mL IntraVENous 2 times per day    enoxaparin  40 mg SubCUTAneous Daily     PRN Meds: melatonin, sodium chloride flush, sodium chloride, polyethylene glycol, acetaminophen **OR** acetaminophen    Labs:     Recent Labs     10/22/22  1522 10/23/22  0620   WBC 5.1 5.0   HGB 12.2 11.4*   HCT 38.8 34.5    198       Recent Labs     10/22/22  1522 10/23/22  7750  143   K 5.0 4.3    109*   CO2 23 22   BUN 15 12   CREATININE 0.9 0.8   CALCIUM 9.6 9.6       Recent Labs     10/22/22  1522 10/23/22  0620   PROT 7.0 6.6   ALKPHOS 64 57   ALT 13 10   AST 26 20   BILITOT 0.4 0.4       Recent Labs     10/22/22  1522   INR 1.0       No results for input(s): Patti Earnest in the last 72 hours. Chronic labs:    Lab Results   Component Value Date    CHOL 316 (H) 09/11/2019    TRIG 184 (H) 09/11/2019    HDL 61 09/11/2019    LDLCALC 218 (H) 09/11/2019    TSH 1.670 09/11/2019    INR 1.0 10/22/2022    LABA1C 5.1 09/11/2019       Radiology: REVIEWED DAILY    Assessment:  Urinary tract infection  Ambulatory dysfunction  Generalized weakness  History of coronary artery disease  Hypertension  Hyperlipidemia  GERD  Plan:  Ceftriaxone IV  Urine culture sent  PT OT consult placed  Continue rest of home medications      DVT Prophylaxis [x] Lovenox  []  Heparin [] DOAC [] PCDs [] Ambulation    GI Prophylaxis [] PPI  [] H2 Blocker   [] Carafate  [x] Diet/Tube Feeds   Level of care [] Med/Surg  [x] Intermediate  []  ICU   Diet ADULT DIET; Regular    Family contact [x]  N/A    [] At bedside  [] Phone call   Disposition Patient requires continued admission further evaluation of ambulatory dysfunction   MDM [] Low    [x] Moderate  []   High       Discharge Plan: To home possibly acute care rehab when clinically stable    +++++++++++++++++++++++++++++++++++++++++++++++++  Tiesha Garay, APRN  [employer] - [address], [facility]  +++++++++++++++++++++++++++++++++++++++++++++++++  NOTE: This report was transcribed using voice recognition software. Every effort was made to ensure accuracy; however, inadvertent computerized transcription errors may be present.

## 2022-10-23 NOTE — ED NOTES
Received call from lab that urine culture needs recollected.  Order placed and section RN notified     Paresh Key RN  10/22/22 6692

## 2022-10-23 NOTE — H&P
Hospitalist History & Physical      PCP: Delbert Dennis, APRN - CNP    Date of Service: Pt seen/examined on 10/22/2022    Chief Complaint:  had no chief complaint listed for this encounter. History Of Present Illness:    Ms. Maria E Nogueira, a 65y.o. year old female  who  has a past medical history of Aorto-iliac atherosclerosis ([facility] [address] [address] Arthritis, Atherosclerosis of native arteries of extremity with rest pain [facility]), Bladder prolapse, CAD (coronary artery disease), Carotid artery stenosis, Carotid bruit, Carotid stenosis, left, Cerebrovascular accident (stroke) ([facility] [address] Decreased radial pulse, Gastric reflux, Glaucoma, History of blood transfusion, Hyperlipemia, Hypertension, Insomnia, Post-operative state, PVD (peripheral vascular disease) with claudication ([facility] [address] Restless leg syndrome, and Sinusitis. Patient is a 78-year-old female past medical history of hypertension, CAD, hyperlipidemia and CVA. Patient presents with chief complaint of generalized weakness. Patient states that earlier today she began to develop generalized weakness. She notes that she has had weakness to her bilateral lower extremities. Patient states that she found it difficult to ambulate. She notes that weakness is slightly worse on the right than the left. Patient also notes that she has had chronic arthritis to her right arm that is slightly worsened over the last day or so. Patient also notes that she has been feeling slightly depressed secondary to her son recently passing away. She denies any suicidal homicidal ideations. Patient states that symptoms have been moderate in severity and constant since onset. She denies any exacerbating or relieving factors. Patient denies any similar episodes in the past.  Patient denies any fevers, chills, nausea, vomiting, abdominal pain, constipation or diarrhea. Vital signs within norm limits and stable. Laboratory studies were unremarkable.   Urinalysis shows positive nitrites, small leukocyte esterase. Patient given ceftriaxone. Attempt was made to ambulate the patient but she cannot ambulate. Past Medical History:   Diagnosis Date    Aorto-iliac atherosclerosis ([address] 7/1/2020    Arthritis     Atherosclerosis of native arteries of extremity with rest pain ([address] 7/16/2020    Bladder prolapse     CAD (coronary artery disease)     MI  2011    Carotid artery stenosis     Right carotid stenosis. Carotid bruit 1/10/2013    Carotid stenosis, left 12/13/2012    Cerebrovascular accident (stroke) ([address]     pt states 13 yrs ago    Decreased radial pulse 7/6/2017    Gastric reflux     Glaucoma     History of blood transfusion     Hyperlipemia     Hypertension     Insomnia     Post-operative state 11/25/2020    PVD (peripheral vascular disease) with claudication ([address] 6/4/2020    Restless leg syndrome     Sinusitis        Past Surgical History:   Procedure Laterality Date    ANTERIOR FIXATION OF THORACIC SPINE      AORTO-ILIAC BYPASS GRAFT N/A 9/15/2020    AORTO BIFEMORAL  BYPASS performed by Leandra Gee MD at [facility]    right    CAROTID ENDARTERECTOMY  12/18/2012    left carotid endarterectomy done by Dr. Laura Tejeda      left wrist    CATARACT REMOVAL      CERVICAL [address] SURGERY  2002    C5 and C6    CORONARY ANGIOPLASTY WITH STENT PLACEMENT  09/06/2011    Dr. Annie Zurita      in past for kidney stones    DENTAL SURGERY      DIAGNOSTIC CARDIAC CATH LAB PROCEDURE  9/6/11    Emergent cath/PTCA with deployment of 2 overlapping DE stents to the distal, mid and proximal RCA.     HYSTERECTOMY (CERVIX STATUS UNKNOWN)      JOINT REPLACEMENT      right knee twice, left knee    JOINT REPLACEMENT Right     hip    KNEE ARTHROPLASTY Left 4-11-13    KNEE SURGERY      right knee x 2    LITHOTRIPSY Right 11/7/2019    RIGHT ESWL EXTRACORPOREAL SHOCK WAVE LITHOTRIPSY CYSTOSCOPY STENT INSERTION performed by Keiry Pan A MD Pk at [address] [place] [address]         Prior to Admission medications    Medication Sig Start Date End Date Taking? Authorizing Provider   cilostazol (PLETAL) 100 MG tablet TAKE 1 TABLET BY MOUTH TWICE A DAY 10/12/21   Sandrine Marques MD   melatonin 3 MG TABS tablet Take 2 tablets by mouth nightly as needed (insomnia) 9/24/20   Abraham Allen APRN - CNP   triamcinolone (KENALOG) 0.1 % cream Apply 1 applicator topically 2 times daily 8/4/20   Historical Provider, MD   metoprolol succinate (TOPROL XL) 100 MG extended release tablet Take 1 tablet by mouth daily 7/3/20   Jessenia Simon MD   amLODIPine (NORVASC) 5 MG tablet Take 1 tablet by mouth daily 7/3/20   Jessenia Simon MD   gabapentin (NEURONTIN) 300 MG capsule Take 300 mg by mouth 3 times daily. Historical Provider, MD   Tofacitinib Citrate ER (XELJANZ XR) 11 MG TB24 Take 11 mg by mouth Daily    Historical Provider, MD   latanoprost (XALATAN) 0.005 % ophthalmic solution INSTILL 1 DROP INTO BOTH EYES EVERY DAY 2/14/20   Tyrel Vargas, DO   Misc.  Devices (ROLLATOR ULTRA-LIGHT) MISC 1 Device by Does not apply route continuous 2/11/20   Tyrel Díaz, DO   rosuvastatin (CRESTOR) 10 MG tablet TAKE 1 TABLET BY MOUTH EVERY DAY 12/17/19   Tyrel Díaz, DO   loratadine (CLARITIN) 10 MG tablet TAKE ONE TABLET BY MOUTH EVERY DAY. 12/2/19   Zo Díaz, DO   Multiple Vitamins-Minerals (VITAMIN D3 COMPLETE PO) Take 2,000 Units by mouth    Historical Provider, MD   Omega-3 Fatty Acids (FISH OIL) 1000 MG CPDR Take 1,000 mg by mouth daily STOP PREOP MED    Historical Provider, MD   famotidine (PEPCID) 40 MG tablet Take 1 tablet by mouth daily  Patient taking differently: Take 40 mg by mouth daily TAKES PRN 11/4/19   Pat Carter MD   aspirin 81 MG EC tablet TAKE 1 TABLET BY MOUTH DAILY  Patient taking differently: Take 325 mg by mouth daily  7/19/19   [address], MD brinzolamide (AZOPT) 1 % ophthalmic suspension Place 1 drop into the left eye 3 times daily  Patient not taking: Reported on 11/25/2020 9/17/18   Christina Díaz DO   hydroxychloroquine (PLAQUENIL) 200 MG tablet Take 200 mg by mouth 2 times daily TAKES ONLY DAILY 8/9/17   Historical Provider, MD   Cholecalciferol (VITAMIN D) 2000 units TABS tablet Take 2,000 Units by mouth daily  7/13/17   Historical Provider, MD         Allergies:  Iodine, Macrodantin [nitrofurantoin], Prednisone, Sulfa antibiotics, 5-alpha reductase inhibitors, and Phenergan [promethazine hcl]    Social History:    TOBACCO:   reports that she quit smoking about 22 years ago. Her smoking use included cigarettes. She has a 100.00 pack-year smoking history. She has never used smokeless tobacco.  ETOH:   reports no history of alcohol use. Family History:    Reviewed in detail and negative for DM, CAD, Cancer, CVA. Positive as follows\"  History reviewed. No pertinent family history. REVIEW OF SYSTEMS:   Pertinent positives as noted in the HPI. All other systems reviewed and negative. PHYSICAL EXAM:  /79   Pulse 65   Temp 97.7 °F (36.5 °C) (Oral)   Resp 21   Wt 181 lb (82.1 kg)   SpO2 96%   BMI 35.35 kg/m²   General appearance: No apparent distress, appears stated age and cooperative. HEENT: Normal cephalic, atraumatic without obvious deformity. Pupils equal, round, and reactive to light. Extra ocular muscles intact. Conjunctivae/corneas clear. Neck: Supple, with full range of motion. No jugular venous distention. Trachea midline. Respiratory: CTA  Cardiovascular: RRR  Abdomen: S/NT/ND  Musculoskeletal: No clubbing, cyanosis, edema of bilateral lower extremities. Brisk capillary refill. Skin: Normal skin color. No rashes or lesions. Neurologic:  Neurovascularly intact without any focal sensory/motor deficits.  Cranial nerves: II-XII intact, grossly non-focal.  Psychiatric: Alert and oriented, thought content appropriate, normal insight    Reviewed EKG and CXR personally      CBC:   Recent Labs     10/22/22  1522   WBC 5.1   RBC 3.89   HGB 12.2   HCT 38.8   MCV 99.7   RDW 13.6        BMP:   Recent Labs     10/22/22  1522      K 5.0      CO2 23   BUN 15   CREATININE 0.9     LFT:  Recent Labs     10/22/22  1522   PROT 7.0   ALKPHOS 64   ALT 13   AST 26   BILITOT 0.4     CE:  No results for input(s): Jesusita Johnson in the last 72 hours. PT/INR:   Recent Labs     10/22/22  1522   INR 1.0   APTT <20.0*     BNP: No results for input(s): BNP in the last 72 hours.   ESR:   Lab Results   Component Value Date    SEDRATE 25 (H) 11/02/2019     CRP:   Lab Results   Component Value Date    CRP <0.1 11/02/2019     D Dimer:   Lab Results   Component Value Date    DDIMER 980 12/05/2012      Folate and B12:   Lab Results   Component Value Date    YUNFPRSZ03 251 09/18/2020   ,   Lab Results   Component Value Date    FOLATE 12.1 09/18/2020     Lactic Acid:   Lab Results   Component Value Date    LACTA 0.9 10/11/2020     Thyroid Studies:   Lab Results   Component Value Date    TSH 1.670 09/11/2019    R6SZNUM 7.5 07/17/2013       Oupatient labs:  Lab Results   Component Value Date    CHOL 316 (H) 09/11/2019    TRIG 184 (H) 09/11/2019    HDL 61 09/11/2019    LDLCALC 218 (H) 09/11/2019    TSH 1.670 09/11/2019    INR 1.0 10/22/2022    LABA1C 5.1 09/11/2019       Urinalysis:    Lab Results   Component Value Date/Time    NITRU POSITIVE 10/22/2022 05:13 PM    WBCUA 1-3 10/22/2022 05:13 PM    WBCUA 10-20 11/04/2011 01:00 PM    BACTERIA RARE 10/22/2022 05:13 PM    RBCUA NONE 10/22/2022 05:13 PM    RBCUA NONE 04/04/2013 01:00 PM    BLOODU Negative 10/22/2022 05:13 PM    SPECGRAV 1.025 10/22/2022 05:13 PM    GLUCOSEU Negative 10/22/2022 05:13 PM    GLUCOSEU NEGATIVE 11/04/2011 01:00 PM       Imaging:  CT Head WO Contrast    Result Date: 10/22/2022  EXAMINATION: CT OF THE HEAD WITHOUT CONTRAST  10/22/2022 4:30 pm TECHNIQUE: CT of the head was performed without the administration of intravenous contrast. Automated exposure control, iterative reconstruction, and/or weight based adjustment of the mA/kV was utilized to reduce the radiation dose to as low as reasonably achievable. COMPARISON: None. HISTORY: ORDERING SYSTEM PROVIDED HISTORY: weakness TECHNOLOGIST PROVIDED HISTORY: Reason for exam:->weakness Has a \"code stroke\" or \"stroke alert\" been called? ->No Decision Support Exception - unselect if not a suspected or confirmed emergency medical condition->Emergency Medical Condition (MA) What reading provider will be dictating this exam?->CRC FINDINGS: BRAIN/VENTRICLES: There is no acute intracranial hemorrhage, mass effect or midline shift. No abnormal extra-axial fluid collection. The gray-white differentiation is maintained without evidence of an acute infarct. There is no evidence of hydrocephalus. The ventricles, cisterns and sulci are prominent consistent with atrophy. There is decreased attenuation within the periventricular white matter consistent with periventricular leukomalacia. There is encephalomalacia is seen within the right frontal lobe consistent old right frontal lobe infarct. ORBITS: The visualized portion of the orbits demonstrate no acute abnormality. SINUSES: The visualized paranasal sinuses and mastoid air cells demonstrate no acute abnormality. SOFT TISSUES/SKULL:  No acute abnormality of the visualized skull or soft tissues. 1.  There is no acute intracranial abnormality. Specifically, there is no intracranial hemorrhage. 2. Atrophy and periventricular leukomalacia, 3. Old right frontal lobe infarct. .     XR CHEST PORTABLE    Result Date: 10/22/2022  EXAMINATION: ONE XRAY VIEW OF THE CHEST 10/22/2022 4:24 pm COMPARISON: None.  HISTORY: ORDERING SYSTEM PROVIDED HISTORY: AMS TECHNOLOGIST PROVIDED HISTORY: Reason for exam:->AMS What reading provider will be dictating this exam?->CRC FINDINGS: The heart is mildly enlarged. There is no mediastinal widening. The lungs are clear. There is no focal infiltrate or effusion. Mild cardiomegaly. There are no findings of failure or pneumonia. ASSESSMENT:  -Urinary tract infection  -Ambulatory dysfunction  -Generalized weakness  -Coronary artery disease  -Hypertension  -Hyperlipidemia  -GERD      PLAN:  -Admit to medicine  -Ceftriaxone 1 g daily  -Urine culture  -PT/OT  -Continue home medications        Diet: No diet orders on file  Code Status: Prior  Surrogate decision maker confirmed with patient:   Extended Emergency Contact Information  Primary Emergency Contact: Jessy Lopez  Address: [address], [address] [place] [address] Phone: [phone]  Relation: Child  Secondary Emergency Contact: [address] Phone: [phone]  Relation: Child    DVT Prophylaxis: []Lovenox []Heparin []PCD [] [address] []Encouraged ambulation  Disposition: []Med/Surg [] Intermediate [] ICU/CCU  Admit status: [] Observation [] Inpatient     +++++++++++++++++++++++++++++++++++++++++++++++++  Moe Miles,   +++++++++++++++++++++++++++++++++++++++++++++++++  NOTE: This report was transcribed using voice recognition software. Every effort was made to ensure accuracy; however, inadvertent computerized transcription errors may be present.

## 2022-10-24 LAB
ALBUMIN SERPL-MCNC: 4 G/DL (ref 3.5–5.2)
ALP BLD-CCNC: 57 U/L (ref 35–104)
ALT SERPL-CCNC: 9 U/L (ref 0–32)
ANION GAP SERPL CALCULATED.3IONS-SCNC: 16 MMOL/L (ref 7–16)
AST SERPL-CCNC: 20 U/L (ref 0–31)
BASOPHILS ABSOLUTE: 0 E9/L (ref 0–0.2)
BASOPHILS RELATIVE PERCENT: 0.7 % (ref 0–2)
BILIRUB SERPL-MCNC: 0.4 MG/DL (ref 0–1.2)
BUN BLDV-MCNC: 20 MG/DL (ref 6–23)
CALCIUM SERPL-MCNC: 9.8 MG/DL (ref 8.6–10.2)
CHLORIDE BLD-SCNC: 108 MMOL/L (ref 98–107)
CO2: 19 MMOL/L (ref 22–29)
CREAT SERPL-MCNC: 1.6 MG/DL (ref 0.5–1)
EOSINOPHILS ABSOLUTE: 0.11 E9/L (ref 0.05–0.5)
EOSINOPHILS RELATIVE PERCENT: 2.6 % (ref 0–6)
GFR SERPL CREATININE-BSD FRML MDRD: 33 ML/MIN/1.73
GLUCOSE BLD-MCNC: 114 MG/DL (ref 74–99)
HCT VFR BLD CALC: 36.3 % (ref 34–48)
HEMOGLOBIN: 11.5 G/DL (ref 11.5–15.5)
LYMPHOCYTES ABSOLUTE: 0.48 E9/L (ref 1.5–4)
LYMPHOCYTES RELATIVE PERCENT: 11.4 % (ref 20–42)
MAGNESIUM: 2 MG/DL (ref 1.6–2.6)
MCH RBC QN AUTO: 31.3 PG (ref 26–35)
MCHC RBC AUTO-ENTMCNC: 31.7 % (ref 32–34.5)
MCV RBC AUTO: 98.9 FL (ref 80–99.9)
MONOCYTES ABSOLUTE: 0.22 E9/L (ref 0.1–0.95)
MONOCYTES RELATIVE PERCENT: 5.3 % (ref 2–12)
NEUTROPHILS ABSOLUTE: 3.56 E9/L (ref 1.8–7.3)
NEUTROPHILS RELATIVE PERCENT: 80.7 % (ref 43–80)
OVALOCYTES: ABNORMAL
PDW BLD-RTO: 13.3 FL (ref 11.5–15)
PLATELET # BLD: 168 E9/L (ref 130–450)
PMV BLD AUTO: 11.3 FL (ref 7–12)
POLYCHROMASIA: ABNORMAL
POTASSIUM SERPL-SCNC: 4.1 MMOL/L (ref 3.5–5)
RBC # BLD: 3.67 E12/L (ref 3.5–5.5)
SODIUM BLD-SCNC: 143 MMOL/L (ref 132–146)
TOTAL PROTEIN: 6.4 G/DL (ref 6.4–8.3)
URINE CULTURE, ROUTINE: NORMAL
WBC # BLD: 4.4 E9/L (ref 4.5–11.5)

## 2022-10-24 PROCEDURE — 6360000002 HC RX W HCPCS: Performed by: FAMILY MEDICINE

## 2022-10-24 PROCEDURE — 2580000003 HC RX 258: Performed by: FAMILY MEDICINE

## 2022-10-24 PROCEDURE — 97165 OT EVAL LOW COMPLEX 30 MIN: CPT

## 2022-10-24 PROCEDURE — 97161 PT EVAL LOW COMPLEX 20 MIN: CPT

## 2022-10-24 PROCEDURE — 97530 THERAPEUTIC ACTIVITIES: CPT

## 2022-10-24 PROCEDURE — 1200000000 HC SEMI PRIVATE

## 2022-10-24 PROCEDURE — 6370000000 HC RX 637 (ALT 250 FOR IP): Performed by: FAMILY MEDICINE

## 2022-10-24 PROCEDURE — 36415 COLL VENOUS BLD VENIPUNCTURE: CPT

## 2022-10-24 PROCEDURE — 83735 ASSAY OF MAGNESIUM: CPT

## 2022-10-24 PROCEDURE — 85025 COMPLETE CBC W/AUTO DIFF WBC: CPT

## 2022-10-24 PROCEDURE — 80053 COMPREHEN METABOLIC PANEL: CPT

## 2022-10-24 PROCEDURE — 97535 SELF CARE MNGMENT TRAINING: CPT

## 2022-10-24 PROCEDURE — 6370000000 HC RX 637 (ALT 250 FOR IP)

## 2022-10-24 RX ORDER — DOCUSATE SODIUM 100 MG/1
100 CAPSULE, LIQUID FILLED ORAL PRN
Status: DISCONTINUED | OUTPATIENT
Start: 2022-10-24 | End: 2022-10-28 | Stop reason: HOSPADM

## 2022-10-24 RX ORDER — SENNA PLUS 8.6 MG/1
2 TABLET ORAL DAILY
Status: DISCONTINUED | OUTPATIENT
Start: 2022-10-24 | End: 2022-10-28 | Stop reason: HOSPADM

## 2022-10-24 RX ORDER — DOCUSATE SODIUM 100 MG/1
100 CAPSULE, LIQUID FILLED ORAL DAILY
Status: DISCONTINUED | OUTPATIENT
Start: 2022-10-24 | End: 2022-10-24

## 2022-10-24 RX ORDER — OXYCODONE HYDROCHLORIDE AND ACETAMINOPHEN 5; 325 MG/1; MG/1
1 TABLET ORAL EVERY 6 HOURS PRN
Status: DISCONTINUED | OUTPATIENT
Start: 2022-10-24 | End: 2022-10-28 | Stop reason: HOSPADM

## 2022-10-24 RX ADMIN — ASPIRIN 81 MG: 81 TABLET, COATED ORAL at 08:29

## 2022-10-24 RX ADMIN — LATANOPROST 1 DROP: 50 SOLUTION OPHTHALMIC at 20:00

## 2022-10-24 RX ADMIN — Medication 10 ML: at 20:00

## 2022-10-24 RX ADMIN — CEFTRIAXONE 1000 MG: 1 INJECTION, POWDER, FOR SOLUTION INTRAMUSCULAR; INTRAVENOUS at 20:00

## 2022-10-24 RX ADMIN — AMLODIPINE BESYLATE 5 MG: 5 TABLET ORAL at 08:29

## 2022-10-24 RX ADMIN — CETIRIZINE HYDROCHLORIDE 5 MG: 5 TABLET ORAL at 08:29

## 2022-10-24 RX ADMIN — CILOSTAZOL 100 MG: 100 TABLET ORAL at 14:52

## 2022-10-24 RX ADMIN — ROSUVASTATIN CALCIUM 10 MG: 10 TABLET, COATED ORAL at 08:29

## 2022-10-24 RX ADMIN — FAMOTIDINE 40 MG: 20 TABLET, FILM COATED ORAL at 08:29

## 2022-10-24 RX ADMIN — HYDROXYCHLOROQUINE SULFATE 200 MG: 200 TABLET ORAL at 19:59

## 2022-10-24 RX ADMIN — Medication 2000 UNITS: at 08:29

## 2022-10-24 RX ADMIN — METOPROLOL SUCCINATE 100 MG: 100 TABLET, EXTENDED RELEASE ORAL at 08:29

## 2022-10-24 RX ADMIN — Medication 10 ML: at 08:30

## 2022-10-24 RX ADMIN — MELATONIN 3 MG ORAL TABLET 6 MG: 3 TABLET ORAL at 01:34

## 2022-10-24 RX ADMIN — GABAPENTIN 300 MG: 300 CAPSULE ORAL at 19:59

## 2022-10-24 RX ADMIN — GABAPENTIN 300 MG: 300 CAPSULE ORAL at 08:29

## 2022-10-24 RX ADMIN — GABAPENTIN 300 MG: 300 CAPSULE ORAL at 14:51

## 2022-10-24 RX ADMIN — HYDROXYCHLOROQUINE SULFATE 200 MG: 200 TABLET ORAL at 08:29

## 2022-10-24 RX ADMIN — ENOXAPARIN SODIUM 40 MG: 100 INJECTION SUBCUTANEOUS at 08:30

## 2022-10-24 RX ADMIN — OXYCODONE AND ACETAMINOPHEN 1 TABLET: 5; 325 TABLET ORAL at 20:02

## 2022-10-24 RX ADMIN — SENNOSIDES 17.2 MG: 8.6 TABLET, FILM COATED ORAL at 14:51

## 2022-10-24 RX ADMIN — CILOSTAZOL 100 MG: 100 TABLET ORAL at 06:40

## 2022-10-24 ASSESSMENT — PAIN SCALES - GENERAL
PAINLEVEL_OUTOF10: 0
PAINLEVEL_OUTOF10: 10
PAINLEVEL_OUTOF10: 0

## 2022-10-24 ASSESSMENT — PAIN - FUNCTIONAL ASSESSMENT: PAIN_FUNCTIONAL_ASSESSMENT: ACTIVITIES ARE NOT PREVENTED

## 2022-10-24 ASSESSMENT — PAIN DESCRIPTION - DESCRIPTORS: DESCRIPTORS: ACHING

## 2022-10-24 ASSESSMENT — PAIN DESCRIPTION - ORIENTATION: ORIENTATION: RIGHT;LEFT

## 2022-10-24 ASSESSMENT — PAIN DESCRIPTION - LOCATION: LOCATION: ARM

## 2022-10-24 NOTE — PROGRESS NOTES
Hospitalist Progress Note      Synopsis:   Briefly, patient with a past medical history significant for aorto iliac atherosclerosis, coronary artery disease, carotid artery stenosis, CVA presented to ED with complaints of generalized weakness. Specifically in her bilateral lower extremities and has had a difficult time ambulating. Additionally patient has been going through bouts of depression secondary to her son passing away recently. ER course largely unremarkable with exception of urinalysis showing positive nitrates, small leukocyte esterase. Patient was given ceftriaxone, and PT OT was consulted to regulate patient's difficulty ambulating. Hospital day 2     Subjective:  Patient seen and examined, PT OT getting ready to work with patient. Stable overnight. No issues reported. Patient seen and examined  Records reviewed. Temp (24hrs), Av.5 °F (36.4 °C), Min:97.3 °F (36.3 °C), Max:97.6 °F (36.4 °C)    DIET: ADULT DIET; Regular  CODE: Full Code  No intake or output data in the 24 hours ending 10/24/22 0912    Review of Systems: All bolded are positive; please see HPI  General:  Fever, chills, diaphoresis, fatigue, malaise, night sweats, weight loss  Psychological:  Anxiety, disorientation, hallucinations. ENT:  Epistaxis, headaches, vertigo, visual changes. Cardiovascular:  Chest pain, irregular heartbeats, palpitations, paroxysmal nocturnal dyspnea. Respiratory:  Shortness of breath, coughing, sputum production, hemoptysis, wheezing, orthopnea.   Gastrointestinal:  Nausea, vomiting, diarrhea, heartburn, constipation, abdominal pain, hematemesis, hematochezia, melena, acholic stools  Genito-Urinary:  Dysuria, urgency, frequency, hematuria  Musculoskeletal:  Joint pain, joint stiffness, joint swelling, muscle pain  Neurology:  Headache, focal neurological deficits, weakness, numbness, paresthesia  Derm:  Rashes, ulcers, excoriations, bruising  Extremities:  Decreased ROM, peripheral edema, mottling    Objective:    /66   Pulse 65   Temp 97.5 °F (36.4 °C) (Oral)   Resp 18   Ht 4' 11.84\" (1.52 m) Comment: from previous encounter  Wt 181 lb (82.1 kg)   SpO2 94%   BMI 35.53 kg/m²     General appearance: No apparent distress, appears stated age and cooperative. HEENT: Conjunctivae/corneas clear. Mucous membranes moist.  Neck: Supple. No JVD. Respiratory:  Clear to auscultation bilaterally. Normal respiratory effort. Cardiovascular:  RRR. S1, S2 without MRG. PV: Pulses palpable. No edema. Abdomen: Soft, non-tender, non-distended. +BS  Musculoskeletal: No obvious deformities. Skin: Normal skin color. No rashes or lesions. Good turgor. Neurologic:  Grossly non-focal. Awake, alert, following commands.    Psychiatric: Alert and oriented, thought content appropriate, normal insight and judgement    Medications:  REVIEWED DAILY    Infusion Medications    sodium chloride       Scheduled Medications    cefTRIAXone (ROCEPHIN) IV  1,000 mg IntraVENous Q24H    amLODIPine  5 mg Oral Daily    aspirin  81 mg Oral Daily    vitamin D  2,000 Units Oral Daily    cilostazol  100 mg Oral BID AC    famotidine  40 mg Oral Daily    gabapentin  300 mg Oral TID    hydroxychloroquine  200 mg Oral BID    latanoprost  1 drop Both Eyes Nightly    cetirizine  5 mg Oral Daily    metoprolol succinate  100 mg Oral Daily    rosuvastatin  10 mg Oral Daily    Tofacitinib Citrate ER  11 mg Oral Daily    sodium chloride flush  10 mL IntraVENous 2 times per day    enoxaparin  40 mg SubCUTAneous Daily     PRN Meds: melatonin, sodium chloride flush, sodium chloride, polyethylene glycol, acetaminophen **OR** acetaminophen    Labs:     Recent Labs     10/22/22  1522 10/23/22  0620   WBC 5.1 5.0   HGB 12.2 11.4*   HCT 38.8 34.5    198       Recent Labs     10/22/22  1522 10/23/22  0620    143   K 5.0 4.3    109*   CO2 23 22   BUN 15 12   CREATININE 0.9 0.8   CALCIUM 9.6 9.6       Recent Labs 10/22/22  1522 10/23/22  0620   PROT 7.0 6.6   ALKPHOS 64 57   ALT 13 10   AST 26 20   BILITOT 0.4 0.4       Recent Labs     10/22/22  1522   INR 1.0       No results for input(s): Claudio Betancourt in the last 72 hours. Chronic labs:    Lab Results   Component Value Date    CHOL 316 (H) 09/11/2019    TRIG 184 (H) 09/11/2019    HDL 61 09/11/2019    LDLCALC 218 (H) 09/11/2019    TSH 1.670 09/11/2019    INR 1.0 10/22/2022    LABA1C 5.1 09/11/2019       Radiology: REVIEWED DAILY    Assessment:  Urinary tract infection  Ambulatory dysfunction  Generalized weakness  History of coronary artery disease  Hypertension  Hyperlipidemia  GERD  Plan:  Ceftriaxone IV  Urine culture sent  PT OT consult placed  Consider nephrology consult  Continue rest of home medications      DVT Prophylaxis [x] Lovenox  []  Heparin [] DOAC [] PCDs [] Ambulation    GI Prophylaxis [] PPI  [] H2 Blocker   [] Carafate  [x] Diet/Tube Feeds   Level of care [] Med/Surg  [x] Intermediate  []  ICU   Diet ADULT DIET; Regular    Family contact []  N/A    [] At bedside  [] Phone call   Disposition Patient requires continued admission for evaluation of ambulatory dysfunction and UTI   MDM [] Low    [x] Moderate  []   High       Discharge Plan: To home possibly acute care rehab when clinically stable    +++++++++++++++++++++++++++++++++++++++++++++++++  GRACE Burks Physician - [facility], [place]  +++++++++++++++++++++++++++++++++++++++++++++++++  NOTE: This report was transcribed using voice recognition software. Every effort was made to ensure accuracy; however, inadvertent computerized transcription errors may be present.

## 2022-10-24 NOTE — PLAN OF CARE
Problem: Pain  Goal: Verbalizes/displays adequate comfort level or baseline comfort level  10/24/2022 1914 by Mallory Avalos RN  Outcome: Progressing  10/24/2022 0827 by Allyson Washington RN  Outcome: Progressing     Problem: Skin/Tissue Integrity  Goal: Absence of new skin breakdown  Description: 1. Monitor for areas of redness and/or skin breakdown  2. Assess vascular access sites hourly  3. Every 4-6 hours minimum:  Change oxygen saturation probe site  4. Every 4-6 hours:  If on nasal continuous positive airway pressure, respiratory therapy assess nares and determine need for appliance change or resting period.   10/24/2022 1914 by Mallory Avalos RN  Outcome: Progressing  10/24/2022 0827 by Allyson Washington RN  Outcome: Progressing     Problem: Safety - Adult  Goal: Free from fall injury  10/24/2022 1914 by Mallory Avalos RN  Outcome: Progressing  10/24/2022 0827 by Allyson Washington RN  Outcome: Progressing     Problem: ABCDS Injury Assessment  Goal: Absence of physical injury  Outcome: Progressing

## 2022-10-24 NOTE — PROGRESS NOTES
[address]        Date:10/24/2022                                                  Patient Name: Thuy Floyd    MRN: [id number]    : 1945    Room: [address] [place] [address]      Evaluating OT: Ty Petty, 81 Tabitha Sims OTR/L; [id number]      Referring Provider: Hedy Boast, DO    Specific Provider Orders/Date: OT Eval and Treat 10/23/22      Diagnosis: UTI (urinary tract infection)      Surgery: none this admission     Pertinent Medical History:  has a past medical history of Aorto-iliac atherosclerosis ([facility] [address] Arthritis, Atherosclerosis of native arteries of extremity with rest pain [facility]), Bladder prolapse, CAD (coronary artery disease), Carotid artery stenosis, Carotid bruit, Carotid stenosis, left, Cerebrovascular accident (stroke) ([facility] [address] Decreased radial pulse, Gastric reflux, Glaucoma, History of blood transfusion, Hyperlipemia, Hypertension, Insomnia, Post-operative state, PVD (peripheral vascular disease) with claudication ([facility] [address] Restless leg syndrome, and Sinusitis.       Reason for Admission: bilateral lower extremity weakness difficulty ambulating    Recommended Adaptive Equipment:  TBD pending progression     Precautions:  Fall Risk, Cognition, Hx CVA, B wrist/hand deformity due to RA, Pure Wick, Bed Alarm     Assessment of current deficits:    [x] Functional mobility  [x]ADLs  [x] Strength               [x]Cognition    [x] Functional transfers   [x] IADLs         [x] Safety Awareness   [x]Endurance    [x] Fine Coordination              [x] Balance      [] Vision/perception   []Sensation     []Gross Motor Coordination  [] ROM  [] Delirium                   [] Motor Control     OT PLAN OF CARE   OT POC based on physician orders, patient diagnosis and results of clinical assessment    Frequency/Duration: 1-3 days/wk for 2 weeks PRN   Specific OT Treatment Interventions to include: * Instruction/training on adapted ADL techniques and AE recommendations to increase functional independence within precautions       * Training on energy conservation strategies, correct breathing pattern and techniques to improve independence/tolerance for self-care routine  * Functional transfer/mobility training/DME recommendations for increased independence, safety, and fall prevention  * Patient/Family education to increase follow through with safety techniques and functional independence  * Recommendation of environmental modifications for increased safety with functional transfers/mobility and ADLs  * Therapeutic exercise to improve motor endurance, ROM, and functional strength for ADLs/functional transfers  * Therapeutic activities to facilitate/challenge dynamic balance, stand tolerance for increased safety and independence with ADLs    Home Living: Pt lives alone in ground level apartment with 6-7 steps to enter and 1HR.     Bathroom setup: tub/shower unit with grab bars and shower chair, raised commode   Equipment owned: shower chair, SPC, ww    Prior Level of Function: ADLs - assist from aides with bathing tasks/transfers    Dependent on aides with IADLs - meals laundry and cleaning  ambulated with [facility] prior to admission  Driving: no - aide provides transportation     Assist Available/Support: Aides Tuesday and Thursday for 2 hours      Pain Level: bilateral shoulder pain and back pain  Cognition: A&O: 4/4 - limited insight into deficits   Follows single 1 step directions   Memory:  fair   Sequencing:  fair   Problem solving:  fair   Judgement/safety:  fair     Functional Assessment:  AM-PAC Daily Activity Raw Score: 13/24   Initial Eval Status  Date: 10/24/22 Treatment Status  Date: STGs = LTGs  Time frame: 10-14 days   Feeding Stand by Assist   Poor  strength for use of utensils   Independent    Grooming Stand by Assist   Limited functional reach   [address] [facility] gown as robe seated EOB  Independent    LB Dressing Dependent   Minimal Assist    Bathing Maximal Assist  Minimal Assist    Toileting Dependent   Pure wick  Minimal Assist    Bed Mobility  Log Roll: NT  Supine to sit: Min A  Sit to supine: Mod A x 2   Supine to sit: Stand by Assist   Sit to supine: Stand by Assist    Functional Transfers Sit to stand: Min A   Stand to sit: Min A   Stand pivot: NT  Commode: NT  Sit to stand:SBA  Stand to sit: SBA   Stand pivot: SBA with AD  Commode: SBA  with AD    Functional Mobility Mod A with HHA bilaterally   Side step EOB  SBA with AD    Balance Sitting:     Static - CGA <> Min A  Posterior lean with prolonged sitting EOB     Dynamic - Min A  Standing: Mod A x 2  Sitting:  Static: Sup  Dynamic: Sup  Standing: SBA with AD   Activity Tolerance FAIR  Limited d/t overall fatigue/weakness  GOOD   Visual/  Perceptual Glasses: no    pt able to read clock on wall       Vitals WFL         BUE  ROM/Strength/  Fine motor Coordination Hand dominance: Right     RUE: ~90 degree sh. Flexion   Full elbow flexion/extension  Less than full extension of all digits (contractures)      Strength: 3-/5 proximally, 3/5 distally      Strength: poor     Coordination:  poor     LUE: ~90 degree sh.  Flexion   Full elbow flexion/extension  Less than full extension of all digits (contractures)     Strength: 3-/5 proximally, 3/5 distally      Strength: poor     Coordination:  poor  increase BUE muscle strength for improved indep with functional transfers       Hearing: WFL   Sensation:  bilateral LE numbness/tingling  d/t chronic neuropathy   Tone: WFL   Edema:     Patient/Family Goal: pt goal is to get better   Based on patient's functional performance as stated below and level of assistance needed prior to admission, this therapist believes that the patient would benefit from further skilled OT following hospital stay in an effort to increase safety, functional independence, and quality of life.    Comment: Cleared by RN to see pt. Upon arrival patient lying semi supine in bed and agreeable to OT session. At end of session, patient lying supine in bed with call light and phone within reach, all lines and tubes intact. Overall patient demonstrated  decreased independence and safety during completion of ADL/functional transfer/mobility tasks. Pt would benefit from continued skilled OT to increase safety and independence with completion of ADL/IADL tasks for functional independence and quality of life. Treatment:  Pt required vc's for proper technique/safety with hand placement/body mechanics/posture for bed mobility/ADLs/functional tranfers/mobility/ww management. Pt required vc's for sequencing/initiation of ADLs/functional transfers. Pt able to  sit EOB ~10 mins to increase core strength/balance/activity tolerance for ease with ADLs. Pt required increased time to complete ADLs/functional transfers due to management of multiple lines. Pt required skilled monitoring of SpO2 during session and education on energy conservation techniques. Pt required rest breaks during session and educated on pursed lip breathing. Pt appeared to have tolerated session well and appears motivated/cooperative/pleasant . Pt instructed on use of call light for assistance and fall prevention. Pt demo'ing fair understanding of education provided. Continue to educate. Rehab Potential: Good  for established goals       LTG: maximize independence with ADLs to return to PLOF    Patient and/or family were instructed on functional diagnosis, prognosis/goals and OT plan of care. Demonstrated FAIR understanding. [] Malnutrition indicators have been identified and nursing has been notified to ensure a dietitian consult is ordered.         Eval Complexity: Low   Evaluation time includes thorough review of current medical information, gathering information on past medical & social history & PLOF, completion of standardized testing, informal observation of tasks, consultation with other medical professions/disciplines, assessment of data & development of POC/goals. Time In: 1050  Time Out: 1120  Total Treatment Time: 15    Min Units   OT Eval Low 37394  x     OT Eval Medium 51352      OT Eval High 04214      OT Re-Eval [id number]       Therapeutic Ex 77259       Therapeutic Activities 26079       ADL/Self Care 51295  15  1   Orthotic Management 97594       Manual 31670     Neuro Re-Ed 78863       Non-Billable Time          Evaluation Time additionally includes thorough review of current medical information, gathering information on past medical history/social history and prior level of function, interpretation of standardized testing/informal observation of tasks, assessment of data and development of plan of care and goals.               GLENNA Kumar OTR/L; [id number]

## 2022-10-24 NOTE — PROGRESS NOTES
Physical Therapy  Physical Therapy Initial Assessment     Name: Josefa Grant  :   MRN: [id number]      Date of Service: 10/24/2022    Evaluating PT:  Reese Steele PT, DPT [id number]    Room #:  8105/1756-K  Diagnosis:  UTI (urinary tract infection) [N39.0]  PMHx/PSHx:    Past Medical History:   Diagnosis Date    Aorto-iliac atherosclerosis ([facility] [address] 2020    Arthritis     Atherosclerosis of native arteries of extremity with rest pain ([facility] [address] 2020    Bladder prolapse     CAD (coronary artery disease)     MI      Carotid artery stenosis     Right carotid stenosis. Carotid bruit 1/10/2013    Carotid stenosis, left 2012    Cerebrovascular accident (stroke) ([facility] [address]     pt states 13 yrs ago    Decreased radial pulse 2017    Gastric reflux     Glaucoma     History of blood transfusion     Hyperlipemia     Hypertension     Insomnia     Post-operative state 2020    PVD (peripheral vascular disease) with claudication ([facility] [address] 2020    Restless leg syndrome     Sinusitis      Precautions:  Fall risk, Alarms, Cognition, Hx CVA, B wrist/hand deformity due to RA  Equipment Needs:  TBD    SUBJECTIVE:    Pt lives alone in a one story home with 7 stairs to enter and one rail. Bed is on first floor and bath is on first floor. Pt ambulated with SPC PTA. Pt had aides  and  for two hours to assist with grocery shopping and cleaning, and provide supervision for showers   Equipment Owned: grab bars in bathroom, shower chair/bench, SPC    OBJECTIVE:   Initial Evaluation  Date: 10/24/22 Treatment Short Term/ Long Term   Goals   AM-PAC 6 Clicks 51/95     Was pt agreeable to Eval/treatment? Yes     Does pt have pain? Yes B shoulders and wrists unrated     Bed Mobility  Rolling: NT  Supine to sit: Min A   Sit to supine:  Mod x2  Scooting: SBA  SBA   Transfers Sit to stand: Min A  Stand to sit: Min A  Stand pivot: NT  SBA with AAD   Ambulation    1 feet side stepping with FWW Mod A  >25 feet with AAD SBA   Stair negotiation: ascended and descended  NT  7 steps with unilateral rail Min A   ROM BUE:  limited due to B shoulder arthritis and pain  BLE:  WFL     Strength BUE:  Refer to OT  BLE:  B LE 4/5  4+/5   Balance Sitting EOB:  SBA static, Min A dynamic  Dynamic Standing: Mod A  Sitting EOB:  Supervision  Dynamic Standing:  SBA with AAD     Pt is A & O x 3 (location, month, year, self) patient did have poor insight and recall regarding prior health reports     Sensation:  Pt confirms numbness and tingling to toes B feet; states she was diagnosed with neuropathy  Kinesthesia: WNLs  Proprioception: WNLs  No facial droop or asymmetries noted    Edema:  unremarkable  Vitals: WNLs    Patient education  Pt educated on role and benefits of physical therapy, safety and technique for mobility    Patient response to education:   Pt verbalized understanding Pt demonstrated skill Pt requires further education in this area   x x x     ASSESSMENT:    Conditions Requiring Skilled Therapeutic Intervention:    [x]Decreased strength     []Decreased ROM  [x]Decreased functional mobility  [x]Decreased balance   [x]Decreased endurance   [x]Decreased posture  [x]Decreased sensation  []Decreased coordination   []Decreased vision  [x]Decreased safety awareness   [x]Increased pain       Comments:  Patient deemed medically stable prior to therapy evaluation. Patient was semi supine in bed upon therapy arrival and provided consent to evaluation. Patient was thoroughly screen for evidence or implications of CVA. See above. Patient required cues and assistance for sitting balance EOB with increased time due to back pain and mild dizziness. Transfer did occur to [facility] toward [facility] for optimal positioning and yoana placement. Difficulty initiating weight shift to step bilaterally, as well as assistance to maintain stability. Repeat of onset of dizziness leading to return to supine.  Patient left semi supine with HOB elevated, with all needs met and call light in reach for safety. Pt would benefit from skilled PT post DC in order to return to PLOF and increase safety. Treatment:  Patient practiced and was instructed in the following treatment:    Bed mobility training - pt given verbal and tactile cues to facilitate proper sequencing and safety during rolling and supine>sit as well as provided with physical assistance to complete task   Sitting EOB for >10 minutes for upright tolerance, postural awareness and BLE ROM, neurological screen  Transfer training - pt was given verbal and tactile cues to facilitate proper hand placement, technique and safety during sit to stand and stand to sit as well as provided with physical assistance. Gait training- pt was given verbal and tactile cues to facilitate safe and appropriate technique during ambulation as well as provided with physical assistance. Pt's/ family goals   1. Return to PLOF    Prognosis is good for reaching above PT goals. Patient and or family understand(s) diagnosis, prognosis, and plan of care.   Yes    PHYSICAL THERAPY PLAN OF CARE:    PT POC is established based on physician order and patient diagnosis     Referring provider/PT Order:     PT eval and treat  Start:  10/23/22 0315,   End:  10/23/22 0315,   ONE TIME,   Standing Count:  1 Occurrences,   R           Carter Reyes MD  Diagnosis:  UTI (urinary tract infection) [N39.0]  Specific instructions for next treatment:  progress ambulatory distances, up to chair as able     Current Treatment Recommendations:     [x] Strengthening to improve independence with functional mobility   [] ROM to improve independence with functional mobility   [x] Balance Training to improve static/dynamic balance and to reduce fall risk  [x] Endurance Training to improve activity tolerance during functional mobility   [x] Transfer Training to improve safety and independence with all functional transfers   [x] Gait Training to improve gait mechanics, endurance and assess need for appropriate assistive device  [x] Stair Training in preparation for safe discharge home and/or into the community   [] Positioning to prevent skin breakdown and contractures  [x] Safety and Education Training   [x] Patient/Caregiver Education   [] HEP  [] Other     PT long term treatment goals are located in above grid    Frequency of treatments: 2-5x/week x 1-2 weeks. Time in  1050  Time out  1120    Total Treatment Time  15 minutes     Evaluation Time includes thorough review of current medical information, gathering information on past medical history/social history and prior level of function, completion of standardized testing/informal observation of tasks, assessment of data and education on plan of care and goals.     CPT codes:  [x] Low Complexity PT evaluation 99824  [] Moderate Complexity PT evaluation 26051  [] High Complexity PT evaluation 44620  [] PT Re-evaluation 70828  [] Gait training 98973 - minutes  [] Manual therapy 57336 - minutes  [x] Therapeutic activities 41296 15 minutes  [] Therapeutic exercises 24561 - minutes  [] Neuromuscular reeducation 90984 - minutes     Abby Tripathi, PT, DPT [id number]

## 2022-10-24 NOTE — CARE COORDINATION
Patient with a past medical history of Aorto-iliac atherosclerosis ([facility] [address] Arthritis, Atherosclerosis of native arteries of extremity with rest pain [facility]), Bladder prolapse, CAD (coronary artery disease), Carotid artery stenosis, Carotid bruit, Carotid stenosis, left, Cerebrovascular accident (stroke) ([facility] [address] Decreased radial pulse, Gastric reflux, Glaucoma, History of blood transfusion, Hyperlipemia, Hypertension, Insomnia, Post-operative state, PVD (peripheral vascular disease) with claudication ([facility] [address] Restless leg syndrome, and Sinusitis is admitted with Urinary tract infection, Ambulatory dysfunction and Generalized weakness. Per internal med note today, Consider nephrology consult. No order has been placed yet. I met with patient and son in law in room to explain role, discuss therapy evals and transition of care. She lives alone in ground level apartment with 6-7 steps to enter and 1HR. Bathroom setup: tub/shower unit with grab bars and shower chair, raised commode. Equipment owned: shower chair, [facility] and grab bars in bathroom. Prior Level of Function: ADLs - assist from aides with bathing tasks/transfers. Dependent on aides with IADLs - meals laundry and cleaning. ambulated with [facility] prior to admission. Driving: no - aide provides transportation. Assist Available/Support: Aides Tuesday and Thursday for 2 hours to assist with grocery shopping and cleaning, and provide supervision for showers. Pain Level: bilateral shoulder pain and back pain. Cognition: A&O: 4/4 - limited insight into deficits. Follows single 1 step directions. Patient feels she needs [facility] before returning home. I reviewed the list of facilities and she would like to go to [facility]. If [facility] cannot accept then she asked me to confer with her daughter Nancy Laguerre who works for [employer] about other choices. Message left for Radha at [address] to make referral. Await call back.  A 7000 will need to be started for the accepting facility and then completed once the discharge order goes in. Ambulance form in envelope will need to be dated and signed on day of discharge by nursing. Darvin Shepard RN   [phone]        The Plan for Transition of Care is related to the following treatment goals: increase functional ability    The Patient and/or patient representative patient Hollie Bailey was provided with a choice of provider and agrees   with the discharge plan. [x]Yes [] No    Freedom of choice list was provided with basic dialogue that supports the patient's individualized plan of care/goals, treatment preferences and shares the quality data associated with the providers.  [x] Yes [] No

## 2022-10-25 LAB
ALBUMIN SERPL-MCNC: 3.5 G/DL (ref 3.5–5.2)
ALP BLD-CCNC: 51 U/L (ref 35–104)
ALT SERPL-CCNC: 9 U/L (ref 0–32)
ANION GAP SERPL CALCULATED.3IONS-SCNC: 11 MMOL/L (ref 7–16)
AST SERPL-CCNC: 15 U/L (ref 0–31)
BASOPHILS ABSOLUTE: 0.02 E9/L (ref 0–0.2)
BASOPHILS RELATIVE PERCENT: 0.6 % (ref 0–2)
BILIRUB SERPL-MCNC: 0.3 MG/DL (ref 0–1.2)
BILIRUBIN URINE: NEGATIVE
BLOOD, URINE: NEGATIVE
BUN BLDV-MCNC: 29 MG/DL (ref 6–23)
CALCIUM SERPL-MCNC: 9.2 MG/DL (ref 8.6–10.2)
CHLORIDE BLD-SCNC: 102 MMOL/L (ref 98–107)
CLARITY: CLEAR
CO2: 25 MMOL/L (ref 22–29)
COLOR: YELLOW
CREAT SERPL-MCNC: 1.9 MG/DL (ref 0.5–1)
CREATININE URINE: 241 MG/DL (ref 29–226)
EKG ATRIAL RATE: 63 BPM
EKG P AXIS: 59 DEGREES
EKG P-R INTERVAL: 154 MS
EKG Q-T INTERVAL: 434 MS
EKG QRS DURATION: 124 MS
EKG QTC CALCULATION (BAZETT): 444 MS
EKG R AXIS: 77 DEGREES
EKG T AXIS: 61 DEGREES
EKG VENTRICULAR RATE: 63 BPM
EOSINOPHILS ABSOLUTE: 0.08 E9/L (ref 0.05–0.5)
EOSINOPHILS RELATIVE PERCENT: 2.2 % (ref 0–6)
GFR SERPL CREATININE-BSD FRML MDRD: 27 ML/MIN/1.73
GLUCOSE BLD-MCNC: 111 MG/DL (ref 74–99)
GLUCOSE URINE: NEGATIVE MG/DL
HCT VFR BLD CALC: 30.9 % (ref 34–48)
HEMOGLOBIN: 10.3 G/DL (ref 11.5–15.5)
IMMATURE GRANULOCYTES #: 0.01 E9/L
IMMATURE GRANULOCYTES %: 0.3 % (ref 0–5)
KETONES, URINE: NEGATIVE MG/DL
LEUKOCYTE ESTERASE, URINE: NEGATIVE
LYMPHOCYTES ABSOLUTE: 0.54 E9/L (ref 1.5–4)
LYMPHOCYTES RELATIVE PERCENT: 15 % (ref 20–42)
MCH RBC QN AUTO: 32.7 PG (ref 26–35)
MCHC RBC AUTO-ENTMCNC: 33.3 % (ref 32–34.5)
MCV RBC AUTO: 98.1 FL (ref 80–99.9)
MICROALBUMIN UR-MCNC: 14.9 MG/L
MICROALBUMIN/CREAT UR-RTO: 6.2 (ref 0–30)
MONOCYTES ABSOLUTE: 0.51 E9/L (ref 0.1–0.95)
MONOCYTES RELATIVE PERCENT: 14.2 % (ref 2–12)
NEUTROPHILS ABSOLUTE: 2.43 E9/L (ref 1.8–7.3)
NEUTROPHILS RELATIVE PERCENT: 67.7 % (ref 43–80)
NITRITE, URINE: NEGATIVE
OVALOCYTES: ABNORMAL
PDW BLD-RTO: 13.2 FL (ref 11.5–15)
PH UA: 5.5 (ref 5–9)
PLATELET # BLD: 152 E9/L (ref 130–450)
PMV BLD AUTO: 10.6 FL (ref 7–12)
POIKILOCYTES: ABNORMAL
POLYCHROMASIA: ABNORMAL
POTASSIUM SERPL-SCNC: 3.8 MMOL/L (ref 3.5–5)
PROTEIN UA: NEGATIVE MG/DL
RBC # BLD: 3.15 E12/L (ref 3.5–5.5)
SODIUM BLD-SCNC: 138 MMOL/L (ref 132–146)
SODIUM URINE: 43 MMOL/L
SPECIFIC GRAVITY UA: >=1.03 (ref 1–1.03)
TOTAL PROTEIN: 5.7 G/DL (ref 6.4–8.3)
URINE CULTURE, ROUTINE: NORMAL
UROBILINOGEN, URINE: 0.2 E.U./DL
WBC # BLD: 3.6 E9/L (ref 4.5–11.5)

## 2022-10-25 PROCEDURE — 82570 ASSAY OF URINE CREATININE: CPT

## 2022-10-25 PROCEDURE — 81003 URINALYSIS AUTO W/O SCOPE: CPT

## 2022-10-25 PROCEDURE — 6370000000 HC RX 637 (ALT 250 FOR IP): Performed by: FAMILY MEDICINE

## 2022-10-25 PROCEDURE — 85025 COMPLETE CBC W/AUTO DIFF WBC: CPT

## 2022-10-25 PROCEDURE — 1200000000 HC SEMI PRIVATE

## 2022-10-25 PROCEDURE — 82044 UR ALBUMIN SEMIQUANTITATIVE: CPT

## 2022-10-25 PROCEDURE — 93010 ELECTROCARDIOGRAM REPORT: CPT | Performed by: INTERNAL MEDICINE

## 2022-10-25 PROCEDURE — 6360000002 HC RX W HCPCS: Performed by: FAMILY MEDICINE

## 2022-10-25 PROCEDURE — 80053 COMPREHEN METABOLIC PANEL: CPT

## 2022-10-25 PROCEDURE — 36415 COLL VENOUS BLD VENIPUNCTURE: CPT

## 2022-10-25 PROCEDURE — 6370000000 HC RX 637 (ALT 250 FOR IP)

## 2022-10-25 PROCEDURE — 2580000003 HC RX 258: Performed by: FAMILY MEDICINE

## 2022-10-25 PROCEDURE — 84300 ASSAY OF URINE SODIUM: CPT

## 2022-10-25 RX ORDER — ENOXAPARIN SODIUM 100 MG/ML
30 INJECTION SUBCUTANEOUS DAILY
Status: DISCONTINUED | OUTPATIENT
Start: 2022-10-26 | End: 2022-10-27

## 2022-10-25 RX ORDER — FAMOTIDINE 20 MG/1
20 TABLET, FILM COATED ORAL DAILY
Status: DISCONTINUED | OUTPATIENT
Start: 2022-10-26 | End: 2022-10-28 | Stop reason: HOSPADM

## 2022-10-25 RX ADMIN — LATANOPROST 1 DROP: 50 SOLUTION OPHTHALMIC at 20:36

## 2022-10-25 RX ADMIN — Medication 10 ML: at 10:01

## 2022-10-25 RX ADMIN — CILOSTAZOL 100 MG: 100 TABLET ORAL at 16:03

## 2022-10-25 RX ADMIN — ASPIRIN 81 MG: 81 TABLET, COATED ORAL at 09:54

## 2022-10-25 RX ADMIN — CEFTRIAXONE 1000 MG: 1 INJECTION, POWDER, FOR SOLUTION INTRAMUSCULAR; INTRAVENOUS at 22:31

## 2022-10-25 RX ADMIN — OXYCODONE AND ACETAMINOPHEN 1 TABLET: 5; 325 TABLET ORAL at 20:39

## 2022-10-25 RX ADMIN — Medication 10 ML: at 20:36

## 2022-10-25 RX ADMIN — HYDROXYCHLOROQUINE SULFATE 200 MG: 200 TABLET ORAL at 20:36

## 2022-10-25 RX ADMIN — ROSUVASTATIN CALCIUM 10 MG: 10 TABLET, COATED ORAL at 09:55

## 2022-10-25 RX ADMIN — GABAPENTIN 300 MG: 300 CAPSULE ORAL at 15:08

## 2022-10-25 RX ADMIN — MELATONIN 3 MG ORAL TABLET 6 MG: 3 TABLET ORAL at 20:36

## 2022-10-25 RX ADMIN — GABAPENTIN 300 MG: 300 CAPSULE ORAL at 09:54

## 2022-10-25 RX ADMIN — CILOSTAZOL 100 MG: 100 TABLET ORAL at 05:43

## 2022-10-25 RX ADMIN — ENOXAPARIN SODIUM 40 MG: 100 INJECTION SUBCUTANEOUS at 09:53

## 2022-10-25 RX ADMIN — HYDROXYCHLOROQUINE SULFATE 200 MG: 200 TABLET ORAL at 10:03

## 2022-10-25 RX ADMIN — GABAPENTIN 300 MG: 300 CAPSULE ORAL at 20:36

## 2022-10-25 RX ADMIN — SENNOSIDES 17.2 MG: 8.6 TABLET, FILM COATED ORAL at 05:43

## 2022-10-25 RX ADMIN — Medication 2000 UNITS: at 09:55

## 2022-10-25 RX ADMIN — CETIRIZINE HYDROCHLORIDE 5 MG: 5 TABLET ORAL at 09:54

## 2022-10-25 ASSESSMENT — PAIN SCALES - GENERAL
PAINLEVEL_OUTOF10: 0
PAINLEVEL_OUTOF10: 10
PAINLEVEL_OUTOF10: 10

## 2022-10-25 ASSESSMENT — PAIN DESCRIPTION - LOCATION: LOCATION: ARM

## 2022-10-25 ASSESSMENT — PAIN DESCRIPTION - PAIN TYPE: TYPE: ACUTE PAIN

## 2022-10-25 ASSESSMENT — PAIN DESCRIPTION - DESCRIPTORS: DESCRIPTORS: ACHING;DISCOMFORT;SORE

## 2022-10-25 ASSESSMENT — PAIN DESCRIPTION - ORIENTATION: ORIENTATION: RIGHT;LEFT

## 2022-10-25 ASSESSMENT — PAIN - FUNCTIONAL ASSESSMENT: PAIN_FUNCTIONAL_ASSESSMENT: PREVENTS OR INTERFERES SOME ACTIVE ACTIVITIES AND ADLS

## 2022-10-25 NOTE — PROGRESS NOTES
Hospitalist Progress Note      Synopsis:   Briefly, patient with a past medical history significant for aorto iliac atherosclerosis, coronary artery disease, carotid artery stenosis, CVA presented to ED with complaints of generalized weakness. Specifically in her bilateral lower extremities and has had a difficult time ambulating. Additionally patient has been going through bouts of depression secondary to her son passing away recently. ER course largely unremarkable with exception of urinalysis showing positive nitrates, small leukocyte esterase. Patient was given ceftriaxone, and PT OT was consulted to regulate patient's difficulty ambulating. Hospital day 3     Subjective:  Patient seen and examined. Temp (24hrs), Av.8 °F (36.6 °C), Min:97.7 °F (36.5 °C), Max:97.9 °F (36.6 °C)    DIET: ADULT DIET; Regular  CODE: Full Code  No intake or output data in the 24 hours ending 10/25/22 1624      Objective:    BP (!) 95/49   Pulse 65   Temp 97.7 °F (36.5 °C) (Oral)   Resp 18   Ht 4' 11.84\" (1.52 m) Comment: from previous encounter  Wt 181 lb (82.1 kg)   SpO2 90%   BMI 35.53 kg/m²     General appearance: No apparent distress, appears stated age and cooperative. HEENT: Conjunctivae/corneas clear. Mucous membranes moist.  Neck: Supple. No JVD. Respiratory:  Clear to auscultation bilaterally. Normal respiratory effort. Cardiovascular:  RRR. S1, S2 without MRG. PV: Pulses palpable. No edema. Abdomen: Soft, non-tender, non-distended. +BS  Musculoskeletal: No obvious deformities. Skin: Normal skin color. No rashes or lesions. Good turgor. Neurologic:  Grossly non-focal. Awake, alert, following commands.    Psychiatric: Alert and oriented, thought content appropriate, normal insight and judgement    Medications:  REVIEWED DAILY    Infusion Medications    sodium chloride       Scheduled Medications    [START ON 10/26/2022] famotidine  20 mg Oral Daily    [START ON 10/26/2022] enoxaparin  30 mg SubCUTAneous Daily    senna  2 tablet Oral Daily    cefTRIAXone (ROCEPHIN) IV  1,000 mg IntraVENous Q24H    amLODIPine  5 mg Oral Daily    aspirin  81 mg Oral Daily    vitamin D  2,000 Units Oral Daily    cilostazol  100 mg Oral BID AC    gabapentin  300 mg Oral TID    hydroxychloroquine  200 mg Oral BID    latanoprost  1 drop Both Eyes Nightly    cetirizine  5 mg Oral Daily    metoprolol succinate  100 mg Oral Daily    rosuvastatin  10 mg Oral Daily    Tofacitinib Citrate ER  11 mg Oral Daily    sodium chloride flush  10 mL IntraVENous 2 times per day     PRN Meds: oxyCODONE-acetaminophen, docusate sodium, melatonin, sodium chloride flush, sodium chloride, polyethylene glycol, acetaminophen **OR** acetaminophen    Labs:     Recent Labs     10/23/22  0620 10/24/22  1633 10/25/22  0436   WBC 5.0 4.4* 3.6*   HGB 11.4* 11.5 10.3*   HCT 34.5 36.3 30.9*    168 152         Recent Labs     10/23/22  0620 10/24/22  1633 10/25/22  0436    143 138   K 4.3 4.1 3.8   * 108* 102   CO2 22 19* 25   BUN 12 20 29*   CREATININE 0.8 1.6* 1.9*   CALCIUM 9.6 9.8 9.2         Recent Labs     10/23/22  0620 10/24/22  1633 10/25/22  0436   PROT 6.6 6.4 5.7*   ALKPHOS 57 57 51   ALT 10 9 9   AST 20 20 15   BILITOT 0.4 0.4 0.3         No results for input(s): INR in the last 72 hours. No results for input(s): Donruth Rands in the last 72 hours. Chronic labs:    Lab Results   Component Value Date    CHOL 316 (H) 09/11/2019    TRIG 184 (H) 09/11/2019    HDL 61 09/11/2019    LDLCALC 218 (H) 09/11/2019    TSH 1.670 09/11/2019    INR 1.0 10/22/2022    LABA1C 5.1 09/11/2019       Radiology: REVIEWED DAILY    Assessment:  Urinary tract infection  Ambulatory dysfunction  Generalized weakness  History of coronary artery disease  Hypertension  Hyperlipidemia  GERD  Plan:  Ceftriaxone IV  Urine culture light growth of proteus. PT OT consult placed  Monitoring renal function.     DVT Prophylaxis [x] Lovenox  []  Heparin [] DOAC [] PCDs [] Ambulation    GI Prophylaxis [] PPI  [] H2 Blocker   [] Carafate  [x] Diet/Tube Feeds   Level of care [] Med/Surg  [x] Intermediate  []  ICU   Diet ADULT DIET; Regular    Family contact []  N/A    [] At bedside  [] Phone call   Disposition Patient requires continued admission for evaluation of ambulatory dysfunction and UTI   MDM [] Low    [x] Moderate  []   High       Discharge Plan: To home possibly acute care rehab when clinically stable    +++++++++++++++++++++++++++++++++++++++++++++++++  Lydia Head, APRN  [employer] - [address] [place] [address]  +++++++++++++++++++++++++++++++++++++++++++++++++  NOTE: This report was transcribed using voice recognition software. Every effort was made to ensure accuracy; however, inadvertent computerized transcription errors may be present. DISPLAY PLAN FREE TEXT

## 2022-10-25 NOTE — CARE COORDINATION
Per Vikki Larsen from [facility], patient is accepted to [facility] and will start precert today. Patient notified and voicemail message left with daughter Michelle Arambula regarding the same. 7000 started and will need to be completed once the discharge order goes in. Ambulance form in envelope will need to be dated and signed on day of discharge by nursing.    Destiny Crain RN   [phone]

## 2022-10-25 NOTE — CARE COORDINATION
Referral made to [facility] at [facility], they do not accept the patent's insurance. Voicemail message left for patient's daughter Corky Bean to obtain more choices. Await call back. A 7000 will need to be started for the accepting facility and then completed once the discharge order goes in. Ambulance form in envelope will need to be dated and signed on day of discharge by nursing. Eloy Baxter RN CM  [phone]      I received a call back from the patient's daughter Corky Bean who said she would like 1. [facility]  2. [place] [address]  3. [address]. Referral made to Vincent Genao at [place], they have no beds. Therefore referral made to Luis Burkett at [facility] and [place]. Await acceptance to one of their facilities. PT/OT notes are in from yesterday 10/24.   Eloy Baxter RN CM  [phone]

## 2022-10-25 NOTE — PROGRESS NOTES
Dr. Junior Huggins MD,    Your patient is on a medication that requires a renal dose adjustment. Renal Function Assessment:    Date Body Weight IBW  Adjusted BW SCr  CrCl Dialysis status   10/25/2022 181 lb (82.1 kg)  Ideal body weight: 50.2 kg (110 lb 9.9 oz)  Adjusted ideal body weight: 62.9 kg (138 lb 12.3 oz) Serum creatinine: 1.9 mg/dL (H) 10/25/22 0436  Estimated creatinine clearance: 24 mL/min (A) N/a       Pharmacy has renally dose-adjusted the following medication(s):    Date Original Order Renally Adjusted Order   10/25/2022 Famotidine 40mg daily Famotidine 20mg once daily    Enoxaparin 40mg daily Enoxaparin 30mg daily       These changes were made per protocol according to the [facility] Renal Function-Based Dose Adjustments Policy    *Please note this dose may need readjusted if your patient's renal function significantly improves. Please contact pharmacy with any questions regarding these changes.     Deena Jennings, [facility]  10/25/2022  12:49 PM

## 2022-10-26 LAB
ALBUMIN SERPL-MCNC: 3.5 G/DL (ref 3.5–5.2)
ALP BLD-CCNC: 53 U/L (ref 35–104)
ALT SERPL-CCNC: 8 U/L (ref 0–32)
ANION GAP SERPL CALCULATED.3IONS-SCNC: 13 MMOL/L (ref 7–16)
AST SERPL-CCNC: 14 U/L (ref 0–31)
BASOPHILIC STIPPLING: ABNORMAL
BASOPHILS ABSOLUTE: 0 E9/L (ref 0–0.2)
BASOPHILS RELATIVE PERCENT: 0.3 % (ref 0–2)
BILIRUB SERPL-MCNC: 0.3 MG/DL (ref 0–1.2)
BUN BLDV-MCNC: 23 MG/DL (ref 6–23)
CALCIUM SERPL-MCNC: 9.1 MG/DL (ref 8.6–10.2)
CHLORIDE BLD-SCNC: 109 MMOL/L (ref 98–107)
CO2: 22 MMOL/L (ref 22–29)
CREAT SERPL-MCNC: 1.3 MG/DL (ref 0.5–1)
EOSINOPHILS ABSOLUTE: 0.05 E9/L (ref 0.05–0.5)
EOSINOPHILS RELATIVE PERCENT: 1.7 % (ref 0–6)
GFR SERPL CREATININE-BSD FRML MDRD: 42 ML/MIN/1.73
GLUCOSE BLD-MCNC: 105 MG/DL (ref 74–99)
HCT VFR BLD CALC: 30.7 % (ref 34–48)
HEMOGLOBIN: 10.1 G/DL (ref 11.5–15.5)
LYMPHOCYTES ABSOLUTE: 0.54 E9/L (ref 1.5–4)
LYMPHOCYTES RELATIVE PERCENT: 16.5 % (ref 20–42)
MCH RBC QN AUTO: 32.3 PG (ref 26–35)
MCHC RBC AUTO-ENTMCNC: 32.9 % (ref 32–34.5)
MCV RBC AUTO: 98.1 FL (ref 80–99.9)
MONOCYTES ABSOLUTE: 0.19 E9/L (ref 0.1–0.95)
MONOCYTES RELATIVE PERCENT: 6.1 % (ref 2–12)
NEUTROPHILS ABSOLUTE: 2.43 E9/L (ref 1.8–7.3)
NEUTROPHILS RELATIVE PERCENT: 75.7 % (ref 43–80)
OVALOCYTES: ABNORMAL
PDW BLD-RTO: 13.2 FL (ref 11.5–15)
PLATELET # BLD: 150 E9/L (ref 130–450)
PMV BLD AUTO: 11.2 FL (ref 7–12)
POIKILOCYTES: ABNORMAL
POLYCHROMASIA: ABNORMAL
POTASSIUM SERPL-SCNC: 3.8 MMOL/L (ref 3.5–5)
RBC # BLD: 3.13 E12/L (ref 3.5–5.5)
SODIUM BLD-SCNC: 144 MMOL/L (ref 132–146)
TOTAL PROTEIN: 5.7 G/DL (ref 6.4–8.3)
WBC # BLD: 3.2 E9/L (ref 4.5–11.5)

## 2022-10-26 PROCEDURE — 85025 COMPLETE CBC W/AUTO DIFF WBC: CPT

## 2022-10-26 PROCEDURE — 6370000000 HC RX 637 (ALT 250 FOR IP): Performed by: FAMILY MEDICINE

## 2022-10-26 PROCEDURE — 1200000000 HC SEMI PRIVATE

## 2022-10-26 PROCEDURE — 2580000003 HC RX 258: Performed by: FAMILY MEDICINE

## 2022-10-26 PROCEDURE — 6360000002 HC RX W HCPCS: Performed by: FAMILY MEDICINE

## 2022-10-26 PROCEDURE — 36415 COLL VENOUS BLD VENIPUNCTURE: CPT

## 2022-10-26 PROCEDURE — 80053 COMPREHEN METABOLIC PANEL: CPT

## 2022-10-26 PROCEDURE — 6370000000 HC RX 637 (ALT 250 FOR IP)

## 2022-10-26 RX ORDER — ROSUVASTATIN CALCIUM 10 MG/1
10 TABLET, COATED ORAL DAILY
Qty: 30 TABLET | Refills: 3 | Status: SHIPPED | OUTPATIENT
Start: 2022-10-27

## 2022-10-26 RX ORDER — CEFDINIR 300 MG/1
300 CAPSULE ORAL 2 TIMES DAILY
Qty: 10 CAPSULE | Refills: 0 | Status: SHIPPED | OUTPATIENT
Start: 2022-10-26 | End: 2022-10-31

## 2022-10-26 RX ADMIN — ROSUVASTATIN CALCIUM 10 MG: 10 TABLET, COATED ORAL at 08:56

## 2022-10-26 RX ADMIN — GABAPENTIN 300 MG: 300 CAPSULE ORAL at 13:52

## 2022-10-26 RX ADMIN — HYDROXYCHLOROQUINE SULFATE 200 MG: 200 TABLET ORAL at 09:04

## 2022-10-26 RX ADMIN — AMLODIPINE BESYLATE 5 MG: 5 TABLET ORAL at 08:56

## 2022-10-26 RX ADMIN — GABAPENTIN 300 MG: 300 CAPSULE ORAL at 08:56

## 2022-10-26 RX ADMIN — CILOSTAZOL 100 MG: 100 TABLET ORAL at 05:48

## 2022-10-26 RX ADMIN — GABAPENTIN 300 MG: 300 CAPSULE ORAL at 20:34

## 2022-10-26 RX ADMIN — CETIRIZINE HYDROCHLORIDE 5 MG: 5 TABLET ORAL at 08:56

## 2022-10-26 RX ADMIN — MELATONIN 3 MG ORAL TABLET 6 MG: 3 TABLET ORAL at 20:34

## 2022-10-26 RX ADMIN — Medication 2000 UNITS: at 08:56

## 2022-10-26 RX ADMIN — FAMOTIDINE 20 MG: 20 TABLET, FILM COATED ORAL at 08:56

## 2022-10-26 RX ADMIN — Medication 10 ML: at 08:56

## 2022-10-26 RX ADMIN — Medication 10 ML: at 20:34

## 2022-10-26 RX ADMIN — ENOXAPARIN SODIUM 30 MG: 100 INJECTION SUBCUTANEOUS at 08:56

## 2022-10-26 RX ADMIN — SENNOSIDES 17.2 MG: 8.6 TABLET, FILM COATED ORAL at 05:48

## 2022-10-26 RX ADMIN — LATANOPROST 1 DROP: 50 SOLUTION OPHTHALMIC at 20:34

## 2022-10-26 RX ADMIN — METOPROLOL SUCCINATE 100 MG: 100 TABLET, EXTENDED RELEASE ORAL at 08:56

## 2022-10-26 RX ADMIN — HYDROXYCHLOROQUINE SULFATE 200 MG: 200 TABLET ORAL at 20:34

## 2022-10-26 RX ADMIN — ASPIRIN 81 MG: 81 TABLET, COATED ORAL at 08:56

## 2022-10-26 RX ADMIN — CILOSTAZOL 100 MG: 100 TABLET ORAL at 16:23

## 2022-10-26 RX ADMIN — OXYCODONE AND ACETAMINOPHEN 1 TABLET: 5; 325 TABLET ORAL at 20:34

## 2022-10-26 RX ADMIN — OXYCODONE AND ACETAMINOPHEN 1 TABLET: 5; 325 TABLET ORAL at 05:48

## 2022-10-26 RX ADMIN — CEFTRIAXONE 1000 MG: 1 INJECTION, POWDER, FOR SOLUTION INTRAMUSCULAR; INTRAVENOUS at 22:45

## 2022-10-26 ASSESSMENT — PAIN SCALES - GENERAL: PAINLEVEL_OUTOF10: 0

## 2022-10-26 ASSESSMENT — PAIN DESCRIPTION - DESCRIPTORS: DESCRIPTORS: ACHING;DISCOMFORT;SORE

## 2022-10-26 ASSESSMENT — PAIN - FUNCTIONAL ASSESSMENT: PAIN_FUNCTIONAL_ASSESSMENT: PREVENTS OR INTERFERES SOME ACTIVE ACTIVITIES AND ADLS

## 2022-10-26 ASSESSMENT — PAIN DESCRIPTION - ORIENTATION: ORIENTATION: RIGHT;LEFT

## 2022-10-26 ASSESSMENT — PAIN DESCRIPTION - LOCATION: LOCATION: ARM

## 2022-10-26 NOTE — DISCHARGE SUMMARY
Hospital Medicine Discharge Summary    Patient ID: Johanny Parent      Patient's PCP: Nix Laurenrima, APRN - CNP    Admit Date: 10/22/2022     Discharge Date:   10/26/22      Admitting Physician: Yanna Linder DO     Discharge Physician: Sarmad Isabel MD     Discharge Diagnoses:  Urinary tract infection  Ambulatory dysfunction  Generalized weakness  History of coronary artery disease  Hypertension  Hyperlipidemia  GERD         Active Hospital Problems    Diagnosis Date Noted    UTI (urinary tract infection) [N39.0] 10/22/2022     Priority: Medium       The patient was seen and examined on day of discharge and this discharge summary is in conjunction with any daily progress note from day of discharge. Hospital Course:   Patient with a past medical history significant for aorto iliac atherosclerosis, coronary artery disease, carotid artery stenosis, CVA presented to ED with complaints of generalized weakness. Specifically in her bilateral lower extremities and has had a difficult time ambulating. Additionally patient has been going through bouts of depression secondary to her son passing away recently. ER course largely unremarkable with exception of urinalysis showing positive nitrates, small leukocyte esterase. Patient was given ceftriaxone, and PT OT was consulted to regulate patient's difficulty ambulating. Patient was treated with IV antibiotics with improvement. Urine culture grew light Proteus. Patient will be continued on 5 days of Omnicef upon discharge based on sensitivities. Patient will be discharged to skilled nursing facility in stable condition. Patient should follow-up with PCP within 1 to 2 weeks.       Exam:     BP (!) 108/50   Pulse 74   Temp 97.7 °F (36.5 °C) (Oral)   Resp 18   Ht 4' 11.84\" (1.52 m) Comment: from previous encounter  Wt 181 lb (82.1 kg)   SpO2 93%   BMI 35.53 kg/m²     General appearance: No apparent distress, appears stated age and Problem: Patient Education: Go to Patient Education Activity  Goal: Patient/Family Education  Outcome: Progressing Towards Goal     Problem: Normal Oral: Birth to 24 Hours  Goal: Activity/Safety  Outcome: Progressing Towards Goal  Goal: Consults, if ordered  Outcome: Progressing Towards Goal  Goal: Diagnostic Test/Procedures  Outcome: Progressing Towards Goal  Goal: Nutrition/Diet  Outcome: Progressing Towards Goal  Goal: Discharge Planning  Outcome: Progressing Towards Goal  Goal: Medications  Outcome: Progressing Towards Goal  Goal: Respiratory  Outcome: Progressing Towards Goal  Goal: Treatments/Interventions/Procedures  Outcome: Progressing Towards Goal  Goal: *Vital signs within defined limits  Outcome: Progressing Towards Goal  Goal: *Labs within defined limits  Outcome: Progressing Towards Goal  Goal: *Appropriate parent-infant bonding  Outcome: Progressing Towards Goal  Goal: *Tolerating diet  Outcome: Progressing Towards Goal  Goal: *Adequate stool/void  Outcome: Progressing Towards Goal  Goal: *No signs and symptoms of infection  Outcome: Progressing Towards Goal cooperative. HEENT: Pupils equal, round, and reactive to light. Conjunctivae/corneas clear. Neck: Supple, with full range of motion. No jugular venous distention. Trachea midline. Respiratory:  Normal respiratory effort. Clear to auscultation, bilaterally without Rales/Wheezes/Rhonchi. Cardiovascular: Regular rate and rhythm with normal S1/S2 without murmurs, rubs or gallops. Abdomen: Soft, non-tender, non-distended with normal bowel sounds. Musculoskeletal: No clubbing, cyanosis or edema bilaterally. Full range of motion without deformity. Skin: Skin color, texture, turgor normal.  No rashes or lesions. Neurologic:  Neurovascularly intact without any focal sensory/motor deficits. Cranial nerves: II-XII intact, grossly non-focal.  Psychiatric: Alert and oriented, thought content appropriate, normal insight      Consults:     IP CONSULT TO INTERNAL MEDICINE  IP CONSULT TO SPIRITUAL SERVICES      Disposition:  SNF    Code Status:  Full Code     Activity: activity as tolerated        Labs:  For convenience and continuity at follow-up the following most recent labs are provided:      CBC:    Lab Results   Component Value Date/Time    WBC 3.2 10/26/2022 04:13 AM    HGB 10.1 10/26/2022 04:13 AM    HCT 30.7 10/26/2022 04:13 AM     10/26/2022 04:13 AM       Renal:    Lab Results   Component Value Date/Time     10/26/2022 04:13 AM    K 3.8 10/26/2022 04:13 AM    K 4.3 10/23/2022 06:20 AM     10/26/2022 04:13 AM    CO2 22 10/26/2022 04:13 AM    BUN 23 10/26/2022 04:13 AM    CREATININE 1.3 10/26/2022 04:13 AM    CALCIUM 9.1 10/26/2022 04:13 AM    PHOS 3.8 09/24/2020 05:40 AM       Discharge Medications:     Current Discharge Medication List             Details   cefdinir (OMNICEF) 300 MG capsule Take 1 capsule by mouth 2 times daily for 5 days  Qty: 10 capsule, Refills: 0                Details   rosuvastatin (CRESTOR) 10 MG tablet Take 1 tablet by mouth daily  Qty: 30 tablet, Refills: 3 Details   ALPRAZolam (XANAX) 0.25 MG tablet Take 0.25 mg by mouth in the morning and at bedtime. celecoxib (CELEBREX) 100 MG capsule Take 100 mg by mouth 2 times daily      ergocalciferol (ERGOCALCIFEROL) 1.25 MG (95016 UT) capsule Take 50,000 Units by mouth once a week      sennosides-docusate sodium (SENOKOT-S) 8.6-50 MG tablet Take 1 tablet by mouth daily as needed for Constipation      oxyCODONE-acetaminophen (PERCOCET)  MG per tablet Take 1 tablet by mouth 2 times daily as needed for Pain.      melatonin 3 MG TABS tablet Take 2 tablets by mouth nightly as needed (insomnia)  Qty: 20 tablet, Refills: 0    Associated Diagnoses: PVD (peripheral vascular disease) with claudication (HCC)      metoprolol succinate (TOPROL XL) 100 MG extended release tablet Take 1 tablet by mouth daily  Qty: 30 tablet, Refills: 3      amLODIPine (NORVASC) 5 MG tablet Take 1 tablet by mouth daily  Qty: 30 tablet, Refills: 3      Tofacitinib Citrate ER (XELJANZ XR) 11 MG TB24 Take 11 mg by mouth Daily      latanoprost (XALATAN) 0.005 % ophthalmic solution INSTILL 1 DROP INTO BOTH EYES EVERY DAY  Qty: 2.5 mL, Refills: 0    Associated Diagnoses: Cataract, unspecified cataract type, unspecified laterality      Misc. Devices (ROLLATOR ULTRA-LIGHT) MISC 1 Device by Does not apply route continuous  Qty: 1 each, Refills: 0    Associated Diagnoses: Cerebrovascular accident (CVA), unspecified mechanism ([address] History of total knee replacement, unspecified laterality; Neuropathy; Rheumatoid arthritis involving both hands with positive rheumatoid factor (HCC)      loratadine (CLARITIN) 10 MG tablet TAKE ONE TABLET BY MOUTH EVERY DAY.   Qty: 90 tablet, Refills: 0    Associated Diagnoses: Sinusitis, unspecified chronicity, unspecified location      Multiple Vitamins-Minerals (VITAMIN D3 COMPLETE PO) Take 2,000 Units by mouth      Omega-3 Fatty Acids (FISH OIL) 1000 MG CPDR Take 1,000 mg by mouth daily STOP PREOP MED      famotidine (PEPCID) 40 MG tablet Take 1 tablet by mouth daily  Qty: 60 tablet, Refills: 3      aspirin 81 MG EC tablet TAKE 1 TABLET BY MOUTH DAILY  Qty: 90 tablet, Refills: 3      hydroxychloroquine (PLAQUENIL) 200 MG tablet Take 200 mg by mouth 2 times daily TAKES ONLY DAILY             Time Spent on discharge is more than 20 minutes in the examination, evaluation, counseling and review of medications and discharge plan.       Signed:    Stefani Bruno MD   10/26/2022

## 2022-10-26 NOTE — PROGRESS NOTES
Physician Progress Note      Nehal ESCAMILLA #:                  [id number]  :                       1945  ADMIT DATE:       10/22/2022 3:05 PM  [address] [place] DATE:  RESPONDING  PROVIDER #:        Janelle Cuellar MD          QUERY TEXT:    Patient admitted with UTI. Patient noted to have creatinine 0.9 on admission   -> 1.6  1.9  GFR >60->  33  27  42. If possible, please document in the   progress notes and discharge summary if you are evaluating and/or treating any   of the following: The medical record reflects the following:  Risk Factors: 65 yof UTI  Clinical Indicators: creatinine 0.9 on admission -> 1.6  1.9  GFR >60->  33    27  42  Treatment: IVF ceftriaxone UA culture  pertinent labs    Thank you  Carlita BARFIELD RN CDS    M-F 6am-2pm    Defined by Kidney Disease Improving Global Outcomes (KDIGO) clinical practice   guideline for acute kidney injury:  -Increase in SCr by greater than or equal to 0.3 mg/dl within 48 hours; or  -Increase or decrease in SCr to greater than or equal to 1.5 times baseline,   which is known or presumed to have occurred within the prior 7 days; or  -Urine volume < 0.5ml/kg/h for 6 hours  Options provided:  -- Acute kidney failure  -- Acute kidney failure with acute tubular necrosis  -- Acute kidney injury  -- Other - I will add my own diagnosis  -- Disagree - Not applicable / Not valid  -- Disagree - Clinically unable to determine / Unknown  -- Refer to Clinical Documentation Reviewer    PROVIDER RESPONSE TEXT:    This patient has an Acute kidney injury.     Query created by: Nruia Samaniego on 10/26/2022 7:21 AM      Electronically signed by:  Janelle Cuellar MD 10/26/2022 4:05 PM

## 2022-10-26 NOTE — CARE COORDINATION
Discharge order noted. Per Sadia Baird from [facility], precert is still pending which was started yesterday. 7000 started and will need to be completed once the discharge order goes in. Ambulance form in envelope will need to be dated and signed on day of discharge by nursing.    Holly Rendon RN CM  [phone]

## 2022-10-27 PROCEDURE — 1200000000 HC SEMI PRIVATE

## 2022-10-27 PROCEDURE — 36600 WITHDRAWAL OF ARTERIAL BLOOD: CPT

## 2022-10-27 PROCEDURE — 6370000000 HC RX 637 (ALT 250 FOR IP): Performed by: FAMILY MEDICINE

## 2022-10-27 PROCEDURE — 6370000000 HC RX 637 (ALT 250 FOR IP)

## 2022-10-27 PROCEDURE — 2580000003 HC RX 258: Performed by: FAMILY MEDICINE

## 2022-10-27 PROCEDURE — 6360000002 HC RX W HCPCS: Performed by: FAMILY MEDICINE

## 2022-10-27 RX ORDER — ENOXAPARIN SODIUM 100 MG/ML
40 INJECTION SUBCUTANEOUS DAILY
Status: DISCONTINUED | OUTPATIENT
Start: 2022-10-28 | End: 2022-10-28 | Stop reason: HOSPADM

## 2022-10-27 RX ADMIN — ASPIRIN 81 MG: 81 TABLET, COATED ORAL at 08:17

## 2022-10-27 RX ADMIN — ROSUVASTATIN CALCIUM 10 MG: 10 TABLET, COATED ORAL at 08:16

## 2022-10-27 RX ADMIN — AMLODIPINE BESYLATE 5 MG: 5 TABLET ORAL at 08:17

## 2022-10-27 RX ADMIN — HYDROXYCHLOROQUINE SULFATE 200 MG: 200 TABLET ORAL at 08:16

## 2022-10-27 RX ADMIN — OXYCODONE AND ACETAMINOPHEN 1 TABLET: 5; 325 TABLET ORAL at 21:04

## 2022-10-27 RX ADMIN — CILOSTAZOL 100 MG: 100 TABLET ORAL at 16:09

## 2022-10-27 RX ADMIN — OXYCODONE AND ACETAMINOPHEN 1 TABLET: 5; 325 TABLET ORAL at 05:44

## 2022-10-27 RX ADMIN — MELATONIN 3 MG ORAL TABLET 6 MG: 3 TABLET ORAL at 21:04

## 2022-10-27 RX ADMIN — GABAPENTIN 300 MG: 300 CAPSULE ORAL at 08:16

## 2022-10-27 RX ADMIN — ENOXAPARIN SODIUM 30 MG: 100 INJECTION SUBCUTANEOUS at 08:14

## 2022-10-27 RX ADMIN — Medication 2000 UNITS: at 08:17

## 2022-10-27 RX ADMIN — CEFTRIAXONE 1000 MG: 1 INJECTION, POWDER, FOR SOLUTION INTRAMUSCULAR; INTRAVENOUS at 22:32

## 2022-10-27 RX ADMIN — CETIRIZINE HYDROCHLORIDE 5 MG: 5 TABLET ORAL at 08:17

## 2022-10-27 RX ADMIN — CILOSTAZOL 100 MG: 100 TABLET ORAL at 05:44

## 2022-10-27 RX ADMIN — METOPROLOL SUCCINATE 100 MG: 100 TABLET, EXTENDED RELEASE ORAL at 08:19

## 2022-10-27 RX ADMIN — GABAPENTIN 300 MG: 300 CAPSULE ORAL at 13:46

## 2022-10-27 RX ADMIN — GABAPENTIN 300 MG: 300 CAPSULE ORAL at 21:05

## 2022-10-27 RX ADMIN — LATANOPROST 1 DROP: 50 SOLUTION OPHTHALMIC at 21:05

## 2022-10-27 RX ADMIN — FAMOTIDINE 20 MG: 20 TABLET, FILM COATED ORAL at 08:16

## 2022-10-27 RX ADMIN — HYDROXYCHLOROQUINE SULFATE 200 MG: 200 TABLET ORAL at 21:08

## 2022-10-27 RX ADMIN — SENNOSIDES 17.2 MG: 8.6 TABLET, FILM COATED ORAL at 05:44

## 2022-10-27 RX ADMIN — Medication 10 ML: at 21:05

## 2022-10-27 RX ADMIN — Medication 10 ML: at 08:17

## 2022-10-27 ASSESSMENT — PAIN SCALES - GENERAL: PAINLEVEL_OUTOF10: 8

## 2022-10-27 ASSESSMENT — PAIN DESCRIPTION - LOCATION
LOCATION: ARM;NECK;SHOULDER
LOCATION: ARM;BACK;NECK;SHOULDER

## 2022-10-27 ASSESSMENT — PAIN DESCRIPTION - DESCRIPTORS: DESCRIPTORS: ACHING;SORE

## 2022-10-27 ASSESSMENT — PAIN DESCRIPTION - ORIENTATION
ORIENTATION: RIGHT;LEFT
ORIENTATION: RIGHT;LEFT

## 2022-10-27 NOTE — PROGRESS NOTES
Hospitalist Progress Note      Synopsis:   Briefly, patient with a past medical history significant for aorto iliac atherosclerosis, coronary artery disease, carotid artery stenosis, CVA presented to ED with complaints of generalized weakness. Specifically in her bilateral lower extremities and has had a difficult time ambulating. Additionally patient has been going through bouts of depression secondary to her son passing away recently. ER course largely unremarkable with exception of urinalysis showing positive nitrates, small leukocyte esterase. Patient was given ceftriaxone, and PT OT was consulted to regulate patient's difficulty ambulating. Hospital day 5     Subjective:  Patient seen and examined. Temp (24hrs), Av.9 °F (36.6 °C), Min:97.8 °F (36.6 °C), Max:97.9 °F (36.6 °C)    DIET: ADULT DIET; Regular  CODE: Full Code    Intake/Output Summary (Last 24 hours) at 10/27/2022 1838  Last data filed at 10/27/2022 1632  Gross per 24 hour   Intake 240 ml   Output 25 ml   Net 215 ml         Objective:    BP (!) 120/45   Pulse 72   Temp 97.8 °F (36.6 °C) (Oral)   Resp 18   Ht 4' 11.84\" (1.52 m) Comment: from previous encounter  Wt 181 lb (82.1 kg)   SpO2 93%   BMI 35.53 kg/m²     General appearance: No apparent distress, appears stated age and cooperative. HEENT: Conjunctivae/corneas clear. Mucous membranes moist.  Neck: Supple. No JVD. Respiratory:  Clear to auscultation bilaterally. Normal respiratory effort. Cardiovascular:  RRR. S1, S2 without MRG. PV: Pulses palpable. No edema. Abdomen: Soft, non-tender, non-distended. +BS  Musculoskeletal: No obvious deformities. Skin: Normal skin color. No rashes or lesions. Good turgor. Neurologic:  Grossly non-focal. Awake, alert, following commands.    Psychiatric: Alert and oriented, thought content appropriate, normal insight and judgement    Medications:  REVIEWED DAILY    Infusion Medications    sodium chloride       Scheduled Medications    [START ON 10/28/2022] enoxaparin  40 mg SubCUTAneous Daily    famotidine  20 mg Oral Daily    senna  2 tablet Oral Daily    cefTRIAXone (ROCEPHIN) IV  1,000 mg IntraVENous Q24H    amLODIPine  5 mg Oral Daily    aspirin  81 mg Oral Daily    vitamin D  2,000 Units Oral Daily    cilostazol  100 mg Oral BID AC    gabapentin  300 mg Oral TID    hydroxychloroquine  200 mg Oral BID    latanoprost  1 drop Both Eyes Nightly    cetirizine  5 mg Oral Daily    metoprolol succinate  100 mg Oral Daily    rosuvastatin  10 mg Oral Daily    Tofacitinib Citrate ER  11 mg Oral Daily    sodium chloride flush  10 mL IntraVENous 2 times per day     PRN Meds: oxyCODONE-acetaminophen, docusate sodium, melatonin, sodium chloride flush, sodium chloride, polyethylene glycol, acetaminophen **OR** acetaminophen    Labs:     Recent Labs     10/25/22  0436 10/26/22  0413   WBC 3.6* 3.2*   HGB 10.3* 10.1*   HCT 30.9* 30.7*    150         Recent Labs     10/25/22  0436 10/26/22  0413    144   K 3.8 3.8    109*   CO2 25 22   BUN 29* 23   CREATININE 1.9* 1.3*   CALCIUM 9.2 9.1         Recent Labs     10/25/22  0436 10/26/22  0413   PROT 5.7* 5.7*   ALKPHOS 51 53   ALT 9 8   AST 15 14   BILITOT 0.3 0.3         No results for input(s): INR in the last 72 hours. No results for input(s): [place] in the last 72 hours. Chronic labs:    Lab Results   Component Value Date    CHOL 316 (H) 09/11/2019    TRIG 184 (H) 09/11/2019    HDL 61 09/11/2019    LDLCALC 218 (H) 09/11/2019    TSH 1.670 09/11/2019    INR 1.0 10/22/2022    LABA1C 5.1 09/11/2019       Radiology: REVIEWED DAILY    Assessment:  Urinary tract infection  Ambulatory dysfunction  Generalized weakness  History of coronary artery disease  Hypertension  Hyperlipidemia  GERD  Plan:  Ceftriaxone IV. Patient is awaiting precertification for discharge to skilled nursing facility. Urine culture light growth of proteus.    PT OT consult placed  Monitoring renal function. DVT Prophylaxis [x] Lovenox  []  Heparin [] DOAC [] PCDs [] Ambulation    GI Prophylaxis [] PPI  [] H2 Blocker   [] Carafate  [x] Diet/Tube Feeds   Level of care [] Med/Surg  [x] Intermediate  []  ICU   Diet ADULT DIET; Regular    Family contact []  N/A    [] At bedside  [] Phone call   Disposition Patient requires continued admission for evaluation of ambulatory dysfunction and UTI   MDM [] Low    [x] Moderate  []   High       Discharge Plan: To home possibly acute care rehab when clinically stable    +++++++++++++++++++++++++++++++++++++++++++++++++  GRACE Barnett Physician - [address] [place] [address]  +++++++++++++++++++++++++++++++++++++++++++++++++  NOTE: This report was transcribed using voice recognition software. Every effort was made to ensure accuracy; however, inadvertent computerized transcription errors may be present.

## 2022-10-27 NOTE — PROGRESS NOTES
Hospitalist Progress Note      Synopsis:   Briefly, patient with a past medical history significant for aorto iliac atherosclerosis, coronary artery disease, carotid artery stenosis, CVA presented to ED with complaints of generalized weakness. Specifically in her bilateral lower extremities and has had a difficult time ambulating. Additionally patient has been going through bouts of depression secondary to her son passing away recently. ER course largely unremarkable with exception of urinalysis showing positive nitrates, small leukocyte esterase. Patient was given ceftriaxone, and PT OT was consulted to regulate patient's difficulty ambulating. Hospital day 5     Subjective:  Patient seen and examined. Temp (24hrs), Av.9 °F (36.6 °C), Min:97.8 °F (36.6 °C), Max:97.9 °F (36.6 °C)    DIET: ADULT DIET; Regular  CODE: Full Code    Intake/Output Summary (Last 24 hours) at 10/27/2022 1847  Last data filed at 10/27/2022 1632  Gross per 24 hour   Intake 240 ml   Output 25 ml   Net 215 ml           Objective:    BP (!) 120/45   Pulse 72   Temp 97.8 °F (36.6 °C) (Oral)   Resp 18   Ht 4' 11.84\" (1.52 m) Comment: from previous encounter  Wt 181 lb (82.1 kg)   SpO2 93%   BMI 35.53 kg/m²     General appearance: No apparent distress, appears stated age and cooperative. HEENT: Conjunctivae/corneas clear. Mucous membranes moist.  Neck: Supple. No JVD. Respiratory:  Clear to auscultation bilaterally. Normal respiratory effort. Cardiovascular:  RRR. S1, S2 without MRG. PV: Pulses palpable. No edema. Abdomen: Soft, non-tender, non-distended. +BS  Musculoskeletal: No obvious deformities. Skin: Normal skin color. No rashes or lesions. Good turgor. Neurologic:  Grossly non-focal. Awake, alert, following commands.    Psychiatric: Alert and oriented, thought content appropriate, normal insight and judgement    Medications:  REVIEWED DAILY    Infusion Medications    sodium chloride       Scheduled Medications    [START ON 10/28/2022] enoxaparin  40 mg SubCUTAneous Daily    famotidine  20 mg Oral Daily    senna  2 tablet Oral Daily    cefTRIAXone (ROCEPHIN) IV  1,000 mg IntraVENous Q24H    amLODIPine  5 mg Oral Daily    aspirin  81 mg Oral Daily    vitamin D  2,000 Units Oral Daily    cilostazol  100 mg Oral BID AC    gabapentin  300 mg Oral TID    hydroxychloroquine  200 mg Oral BID    latanoprost  1 drop Both Eyes Nightly    cetirizine  5 mg Oral Daily    metoprolol succinate  100 mg Oral Daily    rosuvastatin  10 mg Oral Daily    Tofacitinib Citrate ER  11 mg Oral Daily    sodium chloride flush  10 mL IntraVENous 2 times per day     PRN Meds: oxyCODONE-acetaminophen, docusate sodium, melatonin, sodium chloride flush, sodium chloride, polyethylene glycol, acetaminophen **OR** acetaminophen    Labs:     Recent Labs     10/25/22  0436 10/26/22  0413   WBC 3.6* 3.2*   HGB 10.3* 10.1*   HCT 30.9* 30.7*    150         Recent Labs     10/25/22  0436 10/26/22  0413    144   K 3.8 3.8    109*   CO2 25 22   BUN 29* 23   CREATININE 1.9* 1.3*   CALCIUM 9.2 9.1         Recent Labs     10/25/22  0436 10/26/22  0413   PROT 5.7* 5.7*   ALKPHOS 51 53   ALT 9 8   AST 15 14   BILITOT 0.3 0.3         No results for input(s): INR in the last 72 hours. No results for input(s): [place] in the last 72 hours. Chronic labs:    Lab Results   Component Value Date    CHOL 316 (H) 09/11/2019    TRIG 184 (H) 09/11/2019    HDL 61 09/11/2019    LDLCALC 218 (H) 09/11/2019    TSH 1.670 09/11/2019    INR 1.0 10/22/2022    LABA1C 5.1 09/11/2019       Radiology: REVIEWED DAILY    Assessment:  Urinary tract infection  Ambulatory dysfunction  Generalized weakness  History of coronary artery disease  Hypertension  Hyperlipidemia  GERD  Plan:  Ceftriaxone IV. Patient is awaiting precertification for discharge to skilled nursing facility. Urine culture light growth of proteus.    PT OT consult placed  Monitoring renal function. DVT Prophylaxis [x] Lovenox  []  Heparin [] DOAC [] PCDs [] Ambulation    GI Prophylaxis [] PPI  [] H2 Blocker   [] Carafate  [x] Diet/Tube Feeds   Level of care [] Med/Surg  [x] Intermediate  []  ICU   Diet ADULT DIET; Regular    Family contact []  N/A    [] At bedside  [] Phone call   Disposition Patient requires continued admission for evaluation of ambulatory dysfunction and UTI   MDM [] Low    [x] Moderate  []   High       Discharge Plan: To home possibly acute care rehab when clinically stable    +++++++++++++++++++++++++++++++++++++++++++++++++  GRACE Almanza Physician - [address] [place] [address]  +++++++++++++++++++++++++++++++++++++++++++++++++  NOTE: This report was transcribed using voice recognition software. Every effort was made to ensure accuracy; however, inadvertent computerized transcription errors may be present.

## 2022-10-27 NOTE — CARE COORDINATION
Discharge order is in. Per Olimpia Saenz from [facility], precert is still pending which was started Tuesday. Patient's daughter Ishaan Barillas updated. 7000 started and will need to be completed once the discharge order goes in. Ambulance form in envelope will need to be dated and signed on day of discharge by nursing.    Milana Otto RN   [phone]

## 2022-10-28 VITALS
HEIGHT: 60 IN | BODY MASS INDEX: 35.53 KG/M2 | RESPIRATION RATE: 18 BRPM | HEART RATE: 74 BPM | SYSTOLIC BLOOD PRESSURE: 110 MMHG | WEIGHT: 181 LBS | DIASTOLIC BLOOD PRESSURE: 53 MMHG | TEMPERATURE: 98 F | OXYGEN SATURATION: 92 %

## 2022-10-28 PROCEDURE — 6360000002 HC RX W HCPCS: Performed by: FAMILY MEDICINE

## 2022-10-28 PROCEDURE — 6370000000 HC RX 637 (ALT 250 FOR IP): Performed by: FAMILY MEDICINE

## 2022-10-28 PROCEDURE — 2580000003 HC RX 258: Performed by: FAMILY MEDICINE

## 2022-10-28 PROCEDURE — 6370000000 HC RX 637 (ALT 250 FOR IP)

## 2022-10-28 RX ADMIN — SENNOSIDES 17.2 MG: 8.6 TABLET, FILM COATED ORAL at 05:59

## 2022-10-28 RX ADMIN — CILOSTAZOL 100 MG: 100 TABLET ORAL at 16:24

## 2022-10-28 RX ADMIN — ENOXAPARIN SODIUM 40 MG: 100 INJECTION SUBCUTANEOUS at 07:34

## 2022-10-28 RX ADMIN — FAMOTIDINE 20 MG: 20 TABLET, FILM COATED ORAL at 07:36

## 2022-10-28 RX ADMIN — METOPROLOL SUCCINATE 100 MG: 100 TABLET, EXTENDED RELEASE ORAL at 07:34

## 2022-10-28 RX ADMIN — GABAPENTIN 300 MG: 300 CAPSULE ORAL at 07:35

## 2022-10-28 RX ADMIN — GABAPENTIN 300 MG: 300 CAPSULE ORAL at 13:26

## 2022-10-28 RX ADMIN — OXYCODONE AND ACETAMINOPHEN 1 TABLET: 5; 325 TABLET ORAL at 15:03

## 2022-10-28 RX ADMIN — AMLODIPINE BESYLATE 5 MG: 5 TABLET ORAL at 07:35

## 2022-10-28 RX ADMIN — HYDROXYCHLOROQUINE SULFATE 200 MG: 200 TABLET ORAL at 07:34

## 2022-10-28 RX ADMIN — Medication 10 ML: at 08:59

## 2022-10-28 RX ADMIN — CILOSTAZOL 100 MG: 100 TABLET ORAL at 06:01

## 2022-10-28 RX ADMIN — ROSUVASTATIN CALCIUM 10 MG: 10 TABLET, COATED ORAL at 07:35

## 2022-10-28 RX ADMIN — ASPIRIN 81 MG: 81 TABLET, COATED ORAL at 07:35

## 2022-10-28 RX ADMIN — Medication 2000 UNITS: at 07:35

## 2022-10-28 RX ADMIN — CETIRIZINE HYDROCHLORIDE 5 MG: 5 TABLET ORAL at 07:34

## 2022-10-28 RX ADMIN — OXYCODONE AND ACETAMINOPHEN 1 TABLET: 5; 325 TABLET ORAL at 06:01

## 2022-10-28 ASSESSMENT — PAIN SCALES - GENERAL: PAINLEVEL_OUTOF10: 8

## 2022-10-28 ASSESSMENT — PAIN DESCRIPTION - DESCRIPTORS: DESCRIPTORS: ACHING;SORE

## 2022-10-28 ASSESSMENT — PAIN DESCRIPTION - LOCATION: LOCATION: ARM

## 2022-10-28 ASSESSMENT — PAIN DESCRIPTION - ORIENTATION: ORIENTATION: RIGHT;LEFT

## 2022-10-28 NOTE — DISCHARGE INSTR - COC
Continuity of Care Form    Patient Name: Gustavo Roland   :    MRN:  [id number]    Admit date:  10/22/2022  Discharge date:  10/28    Code Status Order: Full Code   Advance Directives:     Admitting Physician:  Teresa Abebe DO  PCP: GRACE Diaz CNP    Discharging Nurse: janet  [address] Unit/Room#: 0141/7983-P  Discharging Unit Phone Number: 10/    Emergency Contact:   Extended Emergency Contact Information  Primary Emergency Contact: Jessy Lopez  Address: [address], [address] [place] [address] [address] [facility] Phone: [phone]  Relation: Child  Secondary Emergency Contact: [facility] [address] [facility] Phone: [phone]  Relation: Child    Past Surgical History:  Past Surgical History:   Procedure Laterality Date    ANTERIOR FIXATION OF THORACIC SPINE      AORTO-ILIAC BYPASS GRAFT N/A 9/15/2020    AORTO BIFEMORAL  BYPASS performed by Mer Valdes MD at [facility]    right    CAROTID ENDARTERECTOMY  2012    left carotid endarterectomy done by Dr. Alessandro Sauer      left wrist    [address] [place] [address] SURGERY  2002    C5 and C6    CORONARY ANGIOPLASTY WITH STENT PLACEMENT  2011    Dr. Chikis Krause      in past for kidney stones    DENTAL SURGERY      DIAGNOSTIC CARDIAC CATH LAB PROCEDURE  11    Emergent cath/PTCA with deployment of 2 overlapping DE stents to the distal, mid and proximal RCA.     HYSTERECTOMY (CERVIX STATUS UNKNOWN)      JOINT REPLACEMENT      right knee twice, left knee    JOINT REPLACEMENT Right     hip    KNEE ARTHROPLASTY Left 13    KNEE SURGERY      right knee x 2    LITHOTRIPSY Right 2019    RIGHT ESWL EXTRACORPOREAL SHOCK WAVE LITHOTRIPSY CYSTOSCOPY STENT INSERTION performed by Francisco Murphy MD at [address] [place] [address]         Immunization History: Immunization History   Administered Date(s) Administered    Pneumococcal Conjugate 13-valent ([id number]) 11/20/2015    Pneumococcal Polysaccharide ([id number]) 06/17/2012       Active Problems:  Patient Active Problem List   Diagnosis Code    Glaucoma H40.9    Lipid disorder E78.9    Neuropathy G62.9    DJD (degenerative joint disease) M19.90    Bilateral carotid artery disease (HCC) I77.9    CAD (coronary artery disease) I25.10    Hyperlipemia E78.5    Cerebrovascular accident (stroke) ([facility] I63.9    Old myocardial infarction, greater than 8 weeks I25.2    Presence of drug coated stent in right coronary artery Z95.5    Accelerated hypertension I10    S/P carotid endarterectomy Z98.890    Carotid bruit R09.89    Anemia D64.9    H/O total knee replacement Z96.659    Osteoarthritis of right hip M16.11    Fibromyalgia M79.7    Incontinence R32    Rheumatoid arthritis involving both hands ([facility] M06.9    Diverticulosis of large intestine without diverticulitis K57.30    Second degree hemorrhoids K64.1    Decreased radial pulse R09.89    Acute unilateral obstructive uropathy N13.9    Hydronephrosis concurrent with and due to calculi of kidney and ureter N13.2    PVD (peripheral vascular disease) with claudication (HCC) I73.9    Aorto-iliac atherosclerosis (HCC) I70.0, I70.8    Atherosclerosis of native arteries of extremity with rest pain (HCC) I70.229    Atherosclerosis of native arteries of extremities with rest pain, left leg (HCC) I70.222    Restrictive lung disease J98.4    Acute respiratory failure with hypoxia and hypercapnia (HCC) J96.01, J96.02    Intra-abdominal fluid collection R18.8    Fever R50.9    UTI (urinary tract infection) N39.0       Isolation/Infection:   Isolation            No Isolation          Patient Infection Status       Infection Onset Added Last Indicated Last Indicated By Review Planned Expiration Resolved Resolved By    None active    Resolved    COVID-19 (Rule Out) 10/09/20 10/09/20 Oral  Liquids: No Restrictions  Daily Fluid Restriction: no  Last Modified Barium Swallow with Video (Video Swallowing Test): not done    Treatments at the Time of Hospital Discharge:   Respiratory Treatments: ***  Oxygen Therapy:  is not on home oxygen therapy. Ventilator:    - No ventilator support    Rehab Therapies: Physical Therapy and Occupational Therapy  Weight Bearing Status/Restrictions: No weight bearing restrictions  Other Medical Equipment (for information only, NOT a DME order):  walker  Other Treatments: ***    Patient's personal belongings (please select all that are sent with patient):  None    RN SIGNATURE:  Electronically signed by Chadd Naranjo RN on 10/28/22 at 5:15 PM EDT    CASE MANAGEMENT/SOCIAL WORK SECTION    Inpatient Status Date: ***    Readmission Risk Assessment Score:  Readmission Risk              Risk of Unplanned Readmission:  13           Discharging to Facility/ Agency   Name:   Address:  Phone:  Fax:    Dialysis Facility (if applicable)   Name:  Address:  Dialysis Schedule:  Phone:  Fax:    / signature: {Esignature:[id number]}    PHYSICIAN SECTION    Prognosis: {Prognosis:6716007072}    Condition at Discharge: [address] [place] [address] Patient Condition:[id number]}    Rehab Potential (if transferring to Rehab): {Prognosis:2622051798}    Recommended Labs or Other Treatments After Discharge: ***    Physician Certification: I certify the above information and transfer of Miranda Molina  is necessary for the continuing treatment of the diagnosis listed and that she requires {Admit to Appropriate Level of Care:67081} for {GREATER/LESS:015552202} 30 days.      Update Admission H&P: [facility]7468[facility]70}    PHYSICIAN SIGNATURE:  {Esignature:[id number]}

## 2022-10-28 NOTE — PROGRESS NOTES
Hospitalist Progress Note      Synopsis:   Briefly, patient with a past medical history significant for aorto iliac atherosclerosis, coronary artery disease, carotid artery stenosis, CVA presented to ED with complaints of generalized weakness. Specifically in her bilateral lower extremities and has had a difficult time ambulating. Additionally patient has been going through bouts of depression secondary to her son passing away recently. ER course largely unremarkable with exception of urinalysis showing positive nitrates, small leukocyte esterase. Patient was given ceftriaxone, and PT OT was consulted to regulate patient's difficulty ambulating. Hospital day 6     Subjective:  Patient seen and examined. Temp (24hrs), Av.9 °F (36.6 °C), Min:97.4 °F (36.3 °C), Max:98.4 °F (36.9 °C)    DIET: ADULT DIET; Regular  CODE: Full Code    Intake/Output Summary (Last 24 hours) at 10/28/2022 1638  Last data filed at 10/28/2022 1349  Gross per 24 hour   Intake 120 ml   Output 100 ml   Net 20 ml           Objective:    BP (!) 110/53   Pulse 74   Temp 98 °F (36.7 °C) (Oral)   Resp 18   Ht 4' 11.84\" (1.52 m) Comment: from previous encounter  Wt 181 lb (82.1 kg)   SpO2 92%   BMI 35.53 kg/m²     General appearance: No apparent distress, appears stated age and cooperative. HEENT: Conjunctivae/corneas clear. Mucous membranes moist.  Neck: Supple. No JVD. Respiratory:  Clear to auscultation bilaterally. Normal respiratory effort. Cardiovascular:  RRR. S1, S2 without MRG. PV: Pulses palpable. No edema. Abdomen: Soft, non-tender, non-distended. +BS  Musculoskeletal: No obvious deformities. Skin: Normal skin color. No rashes or lesions. Good turgor. Neurologic:  Grossly non-focal. Awake, alert, following commands.    Psychiatric: Alert and oriented, thought content appropriate, normal insight and judgement    Medications:  REVIEWED DAILY    Infusion Medications    sodium chloride       Scheduled Medications    enoxaparin  40 mg SubCUTAneous Daily    famotidine  20 mg Oral Daily    senna  2 tablet Oral Daily    cefTRIAXone (ROCEPHIN) IV  1,000 mg IntraVENous Q24H    amLODIPine  5 mg Oral Daily    aspirin  81 mg Oral Daily    vitamin D  2,000 Units Oral Daily    cilostazol  100 mg Oral BID AC    gabapentin  300 mg Oral TID    hydroxychloroquine  200 mg Oral BID    latanoprost  1 drop Both Eyes Nightly    cetirizine  5 mg Oral Daily    metoprolol succinate  100 mg Oral Daily    rosuvastatin  10 mg Oral Daily    Tofacitinib Citrate ER  11 mg Oral Daily    sodium chloride flush  10 mL IntraVENous 2 times per day     PRN Meds: oxyCODONE-acetaminophen, docusate sodium, melatonin, sodium chloride flush, sodium chloride, polyethylene glycol, acetaminophen **OR** acetaminophen    Labs:     Recent Labs     10/26/22  0413   WBC 3.2*   HGB 10.1*   HCT 30.7*            Recent Labs     10/26/22  0413      K 3.8   *   CO2 22   BUN 23   CREATININE 1.3*   CALCIUM 9.1         Recent Labs     10/26/22  0413   PROT 5.7*   ALKPHOS 53   ALT 8   AST 14   BILITOT 0.3         No results for input(s): INR in the last 72 hours. No results for input(s): Estle Carrow in the last 72 hours. Chronic labs:    Lab Results   Component Value Date    CHOL 316 (H) 09/11/2019    TRIG 184 (H) 09/11/2019    HDL 61 09/11/2019    LDLCALC 218 (H) 09/11/2019    TSH 1.670 09/11/2019    INR 1.0 10/22/2022    LABA1C 5.1 09/11/2019       Radiology: REVIEWED DAILY    Assessment:  Urinary tract infection  Ambulatory dysfunction  Generalized weakness  History of coronary artery disease  Hypertension  Hyperlipidemia  GERD  Plan:  Ceftriaxone IV. Patient is awaiting precertification for discharge to skilled nursing facility. Urine culture light growth of proteus. PT OT consult placed  Monitoring renal function.     DVT Prophylaxis [x] Lovenox  []  Heparin [] DOAC [] PCDs [] Ambulation    GI Prophylaxis [] PPI  [] H2 Blocker [] Carafate  [x] Diet/Tube Feeds   Level of care [] Med/Surg  [x] Intermediate  []  ICU   Diet ADULT DIET; Regular    Family contact []  N/A    [] At bedside  [] Phone call   Disposition Patient requires continued admission for evaluation of ambulatory dysfunction and UTI   MDM [] Low    [x] Moderate  []   High       Discharge Plan: To home possibly acute care rehab when clinically stable    +++++++++++++++++++++++++++++++++++++++++++++++++  GRACE Haynes Physician - [address] [place] [address]  +++++++++++++++++++++++++++++++++++++++++++++++++  NOTE: This report was transcribed using voice recognition software. Every effort was made to ensure accuracy; however, inadvertent computerized transcription errors may be present.

## 2022-10-28 NOTE — PLAN OF CARE
Problem: Pain  Goal: Verbalizes/displays adequate comfort level or baseline comfort level  Outcome: Completed     Problem: Skin/Tissue Integrity  Goal: Absence of new skin breakdown  Description: 1. Monitor for areas of redness and/or skin breakdown  2. Assess vascular access sites hourly  3. Every 4-6 hours minimum:  Change oxygen saturation probe site  4. Every 4-6 hours:  If on nasal continuous positive airway pressure, respiratory therapy assess nares and determine need for appliance change or resting period.   Outcome: Completed     Problem: Safety - Adult  Goal: Free from fall injury  Outcome: Completed     Problem: ABCDS Injury Assessment  Goal: Absence of physical injury  Outcome: Completed

## 2022-10-28 NOTE — CARE COORDINATION
Discharge order continues. Per Annie Gant from [facility], precert is still pending which was started Tuesday. Michelle Andersen, our case management assistant will check on status of precert and let me know. Patient's daughter Marifer Living updated. 7000 completed in system. Ambulance form in envelope will need to be dated and signed on day of discharge by nursing. Erna Aguilar RN CM  [phone]      Per Annie Gant from [facility], precert has been obtained. Trena set up transportation via their facility  for 7 pm today. RN and daughter Marifer Living both notified. 7000 completed in system.   Erna Aguilar RN CM  [phone]

## 2022-10-28 NOTE — PROGRESS NOTES
Nutrition Note    Type and Reason for Visit: RD Nutrition Re-Screen/LOS (RD Re-Screen Negative)    Nutrition Assessment:  Pt re-screened per LOS protocol. Chart reviewed. Pt currently eating ~75% of most meals and w/ no other significant nutritional issues noted at this time. Will follow-up per policy. Please consult if RD needed.     Susi Fine RD, LD  Contact: ext [phone]

## 2022-11-21 PROBLEM — N39.0 UTI (URINARY TRACT INFECTION): Status: RESOLVED | Noted: 2022-10-22 | Resolved: 2022-11-21

## 2024-03-02 ENCOUNTER — HOSPITAL ENCOUNTER (EMERGENCY)
Age: 79
Discharge: HOME | End: 2024-03-02
Attending: EMERGENCY MEDICINE
Payer: COMMERCIAL

## 2024-03-02 ENCOUNTER — APPOINTMENT (OUTPATIENT)
Dept: CT IMAGING | Age: 79
End: 2024-03-02
Attending: EMERGENCY MEDICINE
Payer: COMMERCIAL

## 2024-03-02 VITALS
RESPIRATION RATE: 17 BRPM | TEMPERATURE: 97.6 F | DIASTOLIC BLOOD PRESSURE: 66 MMHG | SYSTOLIC BLOOD PRESSURE: 163 MMHG | OXYGEN SATURATION: 98 % | HEART RATE: 60 BPM

## 2024-03-02 DIAGNOSIS — R42 DIZZINESS: Primary | ICD-10-CM

## 2024-03-02 DIAGNOSIS — N30.00 ACUTE CYSTITIS WITHOUT HEMATURIA: ICD-10-CM

## 2024-03-02 LAB
ALBUMIN SERPL-MCNC: 4.2 G/DL (ref 3.5–5.2)
ALP SERPL-CCNC: 98 U/L (ref 35–104)
ALT SERPL-CCNC: 7 U/L (ref 0–32)
ANION GAP SERPL CALCULATED.3IONS-SCNC: 16 MMOL/L (ref 7–16)
AST SERPL-CCNC: 21 U/L (ref 0–31)
BACTERIA URNS QL MICRO: ABNORMAL
BASOPHILS # BLD: 0.02 K/UL (ref 0–0.2)
BASOPHILS NFR BLD: 0 % (ref 0–2)
BILIRUB SERPL-MCNC: 0.3 MG/DL (ref 0–1.2)
BILIRUB UR QL STRIP: NEGATIVE
BUN SERPL-MCNC: 9 MG/DL (ref 6–23)
CALCIUM SERPL-MCNC: 9.5 MG/DL (ref 8.6–10.2)
CHLORIDE SERPL-SCNC: 110 MMOL/L (ref 98–107)
CLARITY UR: CLEAR
CO2 SERPL-SCNC: 21 MMOL/L (ref 22–29)
COLOR UR: YELLOW
CREAT SERPL-MCNC: 0.8 MG/DL (ref 0.5–1)
EOSINOPHIL # BLD: 0.1 K/UL (ref 0.05–0.5)
EOSINOPHILS RELATIVE PERCENT: 2 % (ref 0–6)
ERYTHROCYTE [DISTWIDTH] IN BLOOD BY AUTOMATED COUNT: 14.6 % (ref 11.5–15)
GFR SERPL CREATININE-BSD FRML MDRD: >60 ML/MIN/1.73M2
GLUCOSE SERPL-MCNC: 104 MG/DL (ref 74–99)
GLUCOSE UR STRIP-MCNC: NEGATIVE MG/DL
HCT VFR BLD AUTO: 33.2 % (ref 34–48)
HGB BLD-MCNC: 10.4 G/DL (ref 11.5–15.5)
HGB UR QL STRIP.AUTO: ABNORMAL
IMM GRANULOCYTES # BLD AUTO: <0.03 K/UL (ref 0–0.58)
IMM GRANULOCYTES NFR BLD: 0 % (ref 0–5)
KETONES UR STRIP-MCNC: NEGATIVE MG/DL
LEUKOCYTE ESTERASE UR QL STRIP: ABNORMAL
LYMPHOCYTES NFR BLD: 0.55 K/UL (ref 1.5–4)
LYMPHOCYTES RELATIVE PERCENT: 9 % (ref 20–42)
MCH RBC QN AUTO: 29.3 PG (ref 26–35)
MCHC RBC AUTO-ENTMCNC: 31.3 G/DL (ref 32–34.5)
MCV RBC AUTO: 93.5 FL (ref 80–99.9)
MONOCYTES NFR BLD: 0.59 K/UL (ref 0.1–0.95)
MONOCYTES NFR BLD: 10 % (ref 2–12)
NEUTROPHILS NFR BLD: 78 % (ref 43–80)
NEUTS SEG NFR BLD: 4.59 K/UL (ref 1.8–7.3)
NITRITE UR QL STRIP: POSITIVE
PH UR STRIP: 6 [PH] (ref 5–9)
PLATELET # BLD AUTO: 278 K/UL (ref 130–450)
PMV BLD AUTO: 10.4 FL (ref 7–12)
POTASSIUM SERPL-SCNC: 4.1 MMOL/L (ref 3.5–5)
PROT SERPL-MCNC: 7.3 G/DL (ref 6.4–8.3)
PROT UR STRIP-MCNC: NEGATIVE MG/DL
RBC # BLD AUTO: 3.55 M/UL (ref 3.5–5.5)
RBC # BLD: ABNORMAL 10*6/UL
RBC #/AREA URNS HPF: ABNORMAL /HPF
SODIUM SERPL-SCNC: 147 MMOL/L (ref 132–146)
SP GR UR STRIP: 1.01 (ref 1–1.03)
TROPONIN I SERPL HS-MCNC: 8 NG/L (ref 0–9)
UROBILINOGEN UR STRIP-ACNC: 1 EU/DL (ref 0–1)
WBC #/AREA URNS HPF: ABNORMAL /HPF
WBC OTHER # BLD: 5.9 K/UL (ref 4.5–11.5)

## 2024-03-02 PROCEDURE — 70450 CT HEAD/BRAIN W/O DYE: CPT

## 2024-03-02 PROCEDURE — 80053 COMPREHEN METABOLIC PANEL: CPT

## 2024-03-02 PROCEDURE — 81001 URINALYSIS AUTO W/SCOPE: CPT

## 2024-03-02 PROCEDURE — 84484 ASSAY OF TROPONIN QUANT: CPT

## 2024-03-02 PROCEDURE — 85025 COMPLETE CBC W/AUTO DIFF WBC: CPT

## 2024-03-02 PROCEDURE — 6370000000 HC RX 637 (ALT 250 FOR IP): Performed by: EMERGENCY MEDICINE

## 2024-03-02 PROCEDURE — 99284 EMERGENCY DEPT VISIT MOD MDM: CPT

## 2024-03-02 PROCEDURE — 87077 CULTURE AEROBIC IDENTIFY: CPT

## 2024-03-02 PROCEDURE — 87086 URINE CULTURE/COLONY COUNT: CPT

## 2024-03-02 RX ORDER — CEFDINIR 300 MG/1
300 CAPSULE ORAL ONCE
Status: COMPLETED | OUTPATIENT
Start: 2024-03-02 | End: 2024-03-02

## 2024-03-02 RX ORDER — MECLIZINE HCL 12.5 MG/1
12.5 TABLET ORAL ONCE
Status: DISCONTINUED | OUTPATIENT
Start: 2024-03-02 | End: 2024-03-02 | Stop reason: HOSPADM

## 2024-03-02 RX ORDER — CEFDINIR 300 MG/1
300 CAPSULE ORAL 2 TIMES DAILY
Qty: 14 CAPSULE | Refills: 0 | Status: SHIPPED | OUTPATIENT
Start: 2024-03-02 | End: 2024-03-09

## 2024-03-02 RX ADMIN — CEFDINIR 300 MG: 300 CAPSULE ORAL at 18:16

## 2024-03-02 ASSESSMENT — LIFESTYLE VARIABLES
HOW MANY STANDARD DRINKS CONTAINING ALCOHOL DO YOU HAVE ON A TYPICAL DAY: PATIENT DOES NOT DRINK
HOW OFTEN DO YOU HAVE A DRINK CONTAINING ALCOHOL: NEVER

## 2024-03-02 NOTE — ED PROVIDER NOTES
HPI:  3/2/24, Time: 1:55 PM ALONSO Shi is a 78 y.o. female presenting to the ED for dizziness beginning around 3 AM today.  She describes it as a room spinning sensation.  Nothing makes symptoms better or worse.  They have somewhat subsided since coming to the ED.  No falls or trauma.  Reports pressure in her head but no severe pain.  She denies chest pain, shortness of breath, vision changes, focal numbness or weakness, dysarthria, aphasia, abdominal pain, nausea, vomiting, diarrhea, cough, dysuria.  Patient presents from home.  Patient is not anticoagulated.    --------------------------------------------- PAST HISTORY ---------------------------------------------  Past Medical History:  has a past medical history of Aorto-iliac atherosclerosis (HCC), Arthritis, Atherosclerosis of native arteries of extremity with rest pain (HCC), Bladder prolapse, CAD (coronary artery disease), Carotid artery stenosis, Carotid bruit, Carotid stenosis, left, Cerebrovascular accident (stroke) (HCC), Decreased radial pulse, Gastric reflux, Glaucoma, History of blood transfusion, Hyperlipemia, Hypertension, Insomnia, Post-operative state, PVD (peripheral vascular disease) with claudication (HCC), Restless leg syndrome, and Sinusitis.    Past Surgical History:  has a past surgical history that includes Cataract removal; Hysterectomy; knee surgery; skin biopsy; Carpal tunnel release; Dental surgery; Tonsillectomy; Coronary angioplasty with stent (09/06/2011); Carotid endarterectomy (1998); Diagnostic Cardiac Cath Lab Procedure (9/6/11); Anterior Fixation of Thoracic Spine; Cervical disc surgery (2002); Carotid endarterectomy (12/18/2012); Knee Arthroplasty (Left, 4-11-13); Cystoscopy; Lithotripsy (Right, 11/7/2019); Lithotripsy; joint replacement; joint replacement (Right); Aorto-iliac Bypass Graft (N/A, 9/15/2020); and Cardiac surgery.    Social History:  reports that she quit smoking about 23 years ago. Her smoking  Course.  ECG/medicine tests: ordered and independent interpretation performed. Decision-making details documented in ED Course.    Risk  Prescription drug management.           ED Course as of 03/02/24 2131   Sat Mar 02, 2024   1253 I reviewed the patient's chart.    Patient admitted on 10/22/2022 for UTI and weakness. [JA]   1354 EKG:  This EKG is signed and interpreted by me, Dr. Keshawn MD    Rate: 61  Rhythm: Sinus  Interpretation: Normal sinus rhythm, normal MS interval, normal QRS, normal axis, normal QT interval, no acute ST or T wave changes, nonspecific T wave abnormalities do not appear to be significantly changed when compared to prior EKG  Comparison: stable as compared to patient's most recent EKG   [JA]   1749 Patient ambulated without any dizziness or ataxia [JA]   1754 I reevaluated the patient.  Patient is eating dinner, asymptomatic, no acute complaints.  Family at bedside.  Patient feels well for discharge home. [JA]      ED Course User Index  [JA] Eli Liao MD       Patient remained hemodynamically stable throughout ED course.     Discharge Medication List as of 3/2/2024  6:20 PM        START taking these medications    Details   cefdinir (OMNICEF) 300 MG capsule Take 1 capsule by mouth 2 times daily for 7 days, Disp-14 capsule, R-0Normal               Counseling:   The emergency provider has spoken with the patient and discussed today’s results, in addition to providing specific details for the plan of care and counseling regarding the diagnosis and prognosis.  Questions are answered at this time and they are agreeable with the plan.      --------------------------------- IMPRESSION AND DISPOSITION ---------------------------------    IMPRESSION  1. Dizziness    2. Acute cystitis without hematuria        DISPOSITION  Disposition: Discharge to home  Patient condition is stable      NOTE: This report was transcribed using voice recognition software. Every effort was made to

## 2024-03-02 NOTE — PROGRESS NOTES
Patient's jewelry removed and placed in an envelope. Envelope given to patient. Patient left CT department with jewelry. Earrings

## 2024-03-03 LAB
MICROORGANISM SPEC CULT: ABNORMAL
SPECIMEN DESCRIPTION: ABNORMAL

## 2024-03-05 LAB
MICROORGANISM SPEC CULT: ABNORMAL
SPECIMEN DESCRIPTION: ABNORMAL

## 2024-10-28 ENCOUNTER — APPOINTMENT (OUTPATIENT)
Dept: CT IMAGING | Age: 79
End: 2024-10-28
Attending: EMERGENCY MEDICINE
Payer: COMMERCIAL

## 2024-10-28 ENCOUNTER — APPOINTMENT (OUTPATIENT)
Dept: GENERAL RADIOLOGY | Age: 79
End: 2024-10-28
Payer: COMMERCIAL

## 2024-10-28 ENCOUNTER — HOSPITAL ENCOUNTER (OUTPATIENT)
Age: 79
Setting detail: OBSERVATION
Discharge: OTHER INSTITUTION | End: 2024-11-01
Attending: EMERGENCY MEDICINE | Admitting: FAMILY MEDICINE
Payer: COMMERCIAL

## 2024-10-28 DIAGNOSIS — M25.551 RIGHT HIP PAIN: Primary | ICD-10-CM

## 2024-10-28 DIAGNOSIS — R26.2 UNABLE TO AMBULATE: ICD-10-CM

## 2024-10-28 DIAGNOSIS — M25.561 ACUTE PAIN OF RIGHT KNEE: ICD-10-CM

## 2024-10-28 PROCEDURE — 85025 COMPLETE CBC W/AUTO DIFF WBC: CPT

## 2024-10-28 PROCEDURE — 80048 BASIC METABOLIC PNL TOTAL CA: CPT

## 2024-10-28 PROCEDURE — 73502 X-RAY EXAM HIP UNI 2-3 VIEWS: CPT

## 2024-10-28 PROCEDURE — 81001 URINALYSIS AUTO W/SCOPE: CPT

## 2024-10-28 PROCEDURE — 99285 EMERGENCY DEPT VISIT HI MDM: CPT

## 2024-10-28 PROCEDURE — 73564 X-RAY EXAM KNEE 4 OR MORE: CPT

## 2024-10-28 PROCEDURE — 72131 CT LUMBAR SPINE W/O DYE: CPT

## 2024-10-28 ASSESSMENT — PAIN - FUNCTIONAL ASSESSMENT
PAIN_FUNCTIONAL_ASSESSMENT: 0-10
PAIN_FUNCTIONAL_ASSESSMENT: PREVENTS OR INTERFERES WITH ALL ACTIVE AND SOME PASSIVE ACTIVITIES

## 2024-10-28 ASSESSMENT — PAIN DESCRIPTION - LOCATION: LOCATION: HIP;KNEE

## 2024-10-28 ASSESSMENT — PAIN DESCRIPTION - DESCRIPTORS: DESCRIPTORS: ACHING;DISCOMFORT;NAGGING

## 2024-10-28 ASSESSMENT — PAIN SCALES - GENERAL: PAINLEVEL_OUTOF10: 7

## 2024-10-28 ASSESSMENT — PAIN DESCRIPTION - ORIENTATION: ORIENTATION: RIGHT

## 2024-10-28 ASSESSMENT — PAIN DESCRIPTION - FREQUENCY: FREQUENCY: INTERMITTENT

## 2024-10-28 NOTE — ED NOTES
Received report from LENA Mcdonald; assumed care of pt at this time; resting in bed; awaiting CT and xray; needs met; will monitor

## 2024-10-28 NOTE — ED PROVIDER NOTES
ED PROVIDER NOTE    Chief Complaint   Patient presents with    Hip Pain    Knee Pain     R hip and R knee pain. Fall last month from standing position        HPI:  10/28/24,   Time: 5:01 PM EDT       Hien Shi is a 79 y.o. female presenting to the ED for right hip and knee pain.  Ongoing for the last 5 days.  Pain is worse with weightbearing on the right lower extremity.  Patient has history of right hip and knee replacements.  She does report a fall 1 month ago.  She was never evaluated for this.  She also reports low back pain.  Denies associated fever, chills, cough, chest pain, shortness of breath, abdominal pain, nausea, vomiting, diarrhea.  No numbness or tingling in the extremities.  Normal p.o. intake and urine output.  No changes in bowel or bladder function.    Chart review: hx of CAD, CVA, PVD, HTN, HLD    Reviewed internal medicine discharge summary from 10/26/2022 by Dr. No:  Patient presented with generalized weakness and difficulty ambulating.  She did have positive urinalysis.  She was treated with antibiotics.  She improved and was discharged to nursing facility.      Review of Systems:     Review of Systems  Pertinent positives and negatives as stated in HPI     --------------------------------------------- PAST HISTORY ---------------------------------------------  Past Medical History:   Past Medical History:   Diagnosis Date    Aorto-iliac atherosclerosis ([facility]) 7/1/2020    Arthritis     Atherosclerosis of native arteries of extremity with rest pain ([facility]) 7/16/2020    Bladder prolapse     CAD (coronary artery disease)     MI  2011    Carotid artery stenosis     Right carotid stenosis.    Carotid bruit 1/10/2013    Carotid stenosis, left 12/13/2012    Cerebrovascular accident (stroke) ([facility])     pt states 13 yrs ago    Decreased radial pulse 7/6/2017    Gastric reflux     Glaucoma     History of blood transfusion     Hyperlipemia     Hypertension     Insomnia     Post-operative state  -------------------------------------------------  I have personally reviewed all laboratory and imaging results for this patient. Results are listed below.       RADIOLOGY:  Interpreted personally and by Radiologist.  XR HIP 2-3 VW W PELVIS RIGHT    (Results Pending)   XR KNEE RIGHT (MIN 4 VIEWS)    (Results Pending)   CT LUMBAR SPINE WO CONTRAST    (Results Pending)         ------------------------- NURSING NOTES AND VITALS REVIEWED ---------------------------   The nursing notes within the ED encounter and vital signs as below have been reviewed by myself.  /86   Pulse 98   Temp 98.3 °F (36.8 °C) (Oral)   Resp 18   SpO2 97%   Oxygen Saturation Interpretation: Normal    The patient’s available past medical records and past encounters were reviewed.        ------------------------------ ED COURSE/MEDICAL DECISION MAKING----------------------    The patient presents with a new acute problem or complaint.        Counseling:   The emergency provider has spoken with the patient and family and discussed today’s results, in addition to providing specific details for the plan of care and counseling regarding the diagnosis and prognosis.  Questions are answered at this time and they are agreeable with the plan.    ED Course/Medical Decision Makin y.o. female here with right hip and knee pain. Non-toxic appearing, afebrile, hemodynamically stable, and in no acute distress. Breathing comfortably on room air without respiratory distress. RLE Neurovascularly intact.   Imaging pending at time of sign out to oncoming physician.       --------------------------------- IMPRESSION AND DISPOSITION ---------------------------------    IMPRESSION  1. Right hip pain    2. Acute pain of right knee        DISPOSITION  Disposition: pending imaging  Patient condition is stable    Current Discharge Medication List            NOTE: This report was transcribed using voice recognition software. Every effort was made to ensure

## 2024-10-29 PROBLEM — R26.2 UNABLE TO AMBULATE: Status: ACTIVE | Noted: 2024-10-29

## 2024-10-29 LAB
ANION GAP SERPL CALCULATED.3IONS-SCNC: 16 MMOL/L (ref 7–16)
ANION GAP SERPL CALCULATED.3IONS-SCNC: 17 MMOL/L (ref 7–16)
BACTERIA URNS QL MICRO: ABNORMAL
BASOPHILS # BLD: 0.01 K/UL (ref 0–0.2)
BASOPHILS # BLD: 0.02 K/UL (ref 0–0.2)
BASOPHILS NFR BLD: 0 % (ref 0–2)
BASOPHILS NFR BLD: 0 % (ref 0–2)
BILIRUB UR QL STRIP: NEGATIVE
BUN SERPL-MCNC: 8 MG/DL (ref 6–23)
BUN SERPL-MCNC: 8 MG/DL (ref 6–23)
CALCIUM SERPL-MCNC: 8.9 MG/DL (ref 8.6–10.2)
CALCIUM SERPL-MCNC: 8.9 MG/DL (ref 8.6–10.2)
CHLORIDE SERPL-SCNC: 106 MMOL/L (ref 98–107)
CHLORIDE SERPL-SCNC: 110 MMOL/L (ref 98–107)
CLARITY UR: CLEAR
CO2 SERPL-SCNC: 20 MMOL/L (ref 22–29)
CO2 SERPL-SCNC: 20 MMOL/L (ref 22–29)
COLOR UR: YELLOW
CREAT SERPL-MCNC: 0.6 MG/DL (ref 0.5–1)
CREAT SERPL-MCNC: 0.7 MG/DL (ref 0.5–1)
EOSINOPHIL # BLD: 0.07 K/UL (ref 0.05–0.5)
EOSINOPHIL # BLD: 0.15 K/UL (ref 0.05–0.5)
EOSINOPHILS RELATIVE PERCENT: 2 % (ref 0–6)
EOSINOPHILS RELATIVE PERCENT: 3 % (ref 0–6)
EPI CELLS #/AREA URNS HPF: ABNORMAL /HPF
ERYTHROCYTE [DISTWIDTH] IN BLOOD BY AUTOMATED COUNT: 14.6 % (ref 11.5–15)
ERYTHROCYTE [DISTWIDTH] IN BLOOD BY AUTOMATED COUNT: 14.6 % (ref 11.5–15)
GFR, ESTIMATED: 89 ML/MIN/1.73M2
GFR, ESTIMATED: 90 ML/MIN/1.73M2
GLUCOSE SERPL-MCNC: 81 MG/DL (ref 74–99)
GLUCOSE SERPL-MCNC: 93 MG/DL (ref 74–99)
GLUCOSE UR STRIP-MCNC: NEGATIVE MG/DL
HCT VFR BLD AUTO: 31.3 % (ref 34–48)
HCT VFR BLD AUTO: 33.9 % (ref 34–48)
HGB BLD-MCNC: 10.6 G/DL (ref 11.5–15.5)
HGB BLD-MCNC: 9.5 G/DL (ref 11.5–15.5)
HGB UR QL STRIP.AUTO: NEGATIVE
IMM GRANULOCYTES # BLD AUTO: <0.03 K/UL (ref 0–0.58)
IMM GRANULOCYTES # BLD AUTO: <0.03 K/UL (ref 0–0.58)
IMM GRANULOCYTES NFR BLD: 0 % (ref 0–5)
IMM GRANULOCYTES NFR BLD: 0 % (ref 0–5)
KETONES UR STRIP-MCNC: 40 MG/DL
LEUKOCYTE ESTERASE UR QL STRIP: NEGATIVE
LYMPHOCYTES NFR BLD: 0.66 K/UL (ref 1.5–4)
LYMPHOCYTES NFR BLD: 0.81 K/UL (ref 1.5–4)
LYMPHOCYTES RELATIVE PERCENT: 14 % (ref 20–42)
LYMPHOCYTES RELATIVE PERCENT: 16 % (ref 20–42)
MAGNESIUM SERPL-MCNC: 1.9 MG/DL (ref 1.6–2.6)
MCH RBC QN AUTO: 28.6 PG (ref 26–35)
MCH RBC QN AUTO: 29.2 PG (ref 26–35)
MCHC RBC AUTO-ENTMCNC: 30.4 G/DL (ref 32–34.5)
MCHC RBC AUTO-ENTMCNC: 31.3 G/DL (ref 32–34.5)
MCV RBC AUTO: 93.4 FL (ref 80–99.9)
MCV RBC AUTO: 94.3 FL (ref 80–99.9)
MONOCYTES NFR BLD: 0.56 K/UL (ref 0.1–0.95)
MONOCYTES NFR BLD: 0.62 K/UL (ref 0.1–0.95)
MONOCYTES NFR BLD: 12 % (ref 2–12)
MONOCYTES NFR BLD: 12 % (ref 2–12)
NEUTROPHILS NFR BLD: 68 % (ref 43–80)
NEUTROPHILS NFR BLD: 71 % (ref 43–80)
NEUTS SEG NFR BLD: 3.27 K/UL (ref 1.8–7.3)
NEUTS SEG NFR BLD: 3.48 K/UL (ref 1.8–7.3)
NITRITE UR QL STRIP: NEGATIVE
PH UR STRIP: 6 [PH] (ref 5–9)
PLATELET # BLD AUTO: 287 K/UL (ref 130–450)
PLATELET # BLD AUTO: 308 K/UL (ref 130–450)
PMV BLD AUTO: 9.4 FL (ref 7–12)
PMV BLD AUTO: 9.6 FL (ref 7–12)
POTASSIUM SERPL-SCNC: 3.3 MMOL/L (ref 3.5–5)
POTASSIUM SERPL-SCNC: 3.5 MMOL/L (ref 3.5–5)
PROT UR STRIP-MCNC: NEGATIVE MG/DL
RBC # BLD AUTO: 3.32 M/UL (ref 3.5–5.5)
RBC # BLD AUTO: 3.63 M/UL (ref 3.5–5.5)
RBC #/AREA URNS HPF: ABNORMAL /HPF
SODIUM SERPL-SCNC: 143 MMOL/L (ref 132–146)
SODIUM SERPL-SCNC: 146 MMOL/L (ref 132–146)
SP GR UR STRIP: 1.02 (ref 1–1.03)
UROBILINOGEN UR STRIP-ACNC: 0.2 EU/DL (ref 0–1)
WBC #/AREA URNS HPF: ABNORMAL /HPF
WBC OTHER # BLD: 4.6 K/UL (ref 4.5–11.5)
WBC OTHER # BLD: 5.1 K/UL (ref 4.5–11.5)

## 2024-10-29 PROCEDURE — 99222 1ST HOSP IP/OBS MODERATE 55: CPT | Performed by: FAMILY MEDICINE

## 2024-10-29 PROCEDURE — 36415 COLL VENOUS BLD VENIPUNCTURE: CPT

## 2024-10-29 PROCEDURE — 85025 COMPLETE CBC W/AUTO DIFF WBC: CPT

## 2024-10-29 PROCEDURE — 96372 THER/PROPH/DIAG INJ SC/IM: CPT

## 2024-10-29 PROCEDURE — G0378 HOSPITAL OBSERVATION PER HR: HCPCS

## 2024-10-29 PROCEDURE — 51798 US URINE CAPACITY MEASURE: CPT

## 2024-10-29 PROCEDURE — 97161 PT EVAL LOW COMPLEX 20 MIN: CPT

## 2024-10-29 PROCEDURE — 2580000003 HC RX 258: Performed by: FAMILY MEDICINE

## 2024-10-29 PROCEDURE — 6370000000 HC RX 637 (ALT 250 FOR IP): Performed by: STUDENT IN AN ORGANIZED HEALTH CARE EDUCATION/TRAINING PROGRAM

## 2024-10-29 PROCEDURE — 6360000002 HC RX W HCPCS: Performed by: FAMILY MEDICINE

## 2024-10-29 PROCEDURE — 83735 ASSAY OF MAGNESIUM: CPT

## 2024-10-29 PROCEDURE — 97535 SELF CARE MNGMENT TRAINING: CPT

## 2024-10-29 PROCEDURE — 80048 BASIC METABOLIC PNL TOTAL CA: CPT

## 2024-10-29 PROCEDURE — 97530 THERAPEUTIC ACTIVITIES: CPT

## 2024-10-29 PROCEDURE — 6370000000 HC RX 637 (ALT 250 FOR IP): Performed by: FAMILY MEDICINE

## 2024-10-29 PROCEDURE — 97166 OT EVAL MOD COMPLEX 45 MIN: CPT

## 2024-10-29 RX ORDER — DULOXETIN HYDROCHLORIDE 30 MG/1
30 CAPSULE, DELAYED RELEASE ORAL DAILY
Status: DISCONTINUED | OUTPATIENT
Start: 2024-10-29 | End: 2024-11-01 | Stop reason: HOSPADM

## 2024-10-29 RX ORDER — CIDER VINEGAR 300 MG
1000 TABLET ORAL DAILY
Status: DISCONTINUED | OUTPATIENT
Start: 2024-10-29 | End: 2024-10-29

## 2024-10-29 RX ORDER — POTASSIUM CHLORIDE 7.45 MG/ML
10 INJECTION INTRAVENOUS PRN
Status: DISCONTINUED | OUTPATIENT
Start: 2024-10-29 | End: 2024-11-01 | Stop reason: HOSPADM

## 2024-10-29 RX ORDER — ENOXAPARIN SODIUM 100 MG/ML
40 INJECTION SUBCUTANEOUS DAILY
Status: DISCONTINUED | OUTPATIENT
Start: 2024-10-29 | End: 2024-11-01 | Stop reason: HOSPADM

## 2024-10-29 RX ORDER — CELECOXIB 100 MG/1
100 CAPSULE ORAL 2 TIMES DAILY
Status: DISCONTINUED | OUTPATIENT
Start: 2024-10-29 | End: 2024-10-29

## 2024-10-29 RX ORDER — ACETAMINOPHEN 325 MG/1
650 TABLET ORAL EVERY 6 HOURS PRN
Status: DISCONTINUED | OUTPATIENT
Start: 2024-10-29 | End: 2024-11-01 | Stop reason: HOSPADM

## 2024-10-29 RX ORDER — FAMOTIDINE 20 MG/1
40 TABLET, FILM COATED ORAL DAILY
Status: DISCONTINUED | OUTPATIENT
Start: 2024-10-29 | End: 2024-10-29

## 2024-10-29 RX ORDER — MECOBALAMIN 5000 MCG
5 TABLET,DISINTEGRATING ORAL NIGHTLY
Status: DISCONTINUED | OUTPATIENT
Start: 2024-10-29 | End: 2024-11-01 | Stop reason: HOSPADM

## 2024-10-29 RX ORDER — POTASSIUM CHLORIDE 1500 MG/1
40 TABLET, EXTENDED RELEASE ORAL PRN
Status: DISCONTINUED | OUTPATIENT
Start: 2024-10-29 | End: 2024-11-01 | Stop reason: HOSPADM

## 2024-10-29 RX ORDER — POLYETHYLENE GLYCOL 3350 17 G/17G
17 POWDER, FOR SOLUTION ORAL DAILY PRN
Status: DISCONTINUED | OUTPATIENT
Start: 2024-10-29 | End: 2024-11-01 | Stop reason: HOSPADM

## 2024-10-29 RX ORDER — AMLODIPINE BESYLATE 5 MG/1
5 TABLET ORAL DAILY
Status: DISCONTINUED | OUTPATIENT
Start: 2024-10-29 | End: 2024-11-01 | Stop reason: HOSPADM

## 2024-10-29 RX ORDER — SODIUM CHLORIDE 9 MG/ML
INJECTION, SOLUTION INTRAVENOUS PRN
Status: DISCONTINUED | OUTPATIENT
Start: 2024-10-29 | End: 2024-11-01 | Stop reason: HOSPADM

## 2024-10-29 RX ORDER — ONDANSETRON 2 MG/ML
4 INJECTION INTRAMUSCULAR; INTRAVENOUS EVERY 6 HOURS PRN
Status: DISCONTINUED | OUTPATIENT
Start: 2024-10-29 | End: 2024-11-01 | Stop reason: HOSPADM

## 2024-10-29 RX ORDER — LANOLIN ALCOHOL/MO/W.PET/CERES
6 CREAM (GRAM) TOPICAL NIGHTLY PRN
Status: DISCONTINUED | OUTPATIENT
Start: 2024-10-29 | End: 2024-10-29 | Stop reason: CLARIF

## 2024-10-29 RX ORDER — ACETAMINOPHEN 650 MG/1
650 SUPPOSITORY RECTAL EVERY 6 HOURS PRN
Status: DISCONTINUED | OUTPATIENT
Start: 2024-10-29 | End: 2024-11-01 | Stop reason: HOSPADM

## 2024-10-29 RX ORDER — ASPIRIN 81 MG/1
81 TABLET ORAL DAILY
Status: DISCONTINUED | OUTPATIENT
Start: 2024-10-29 | End: 2024-11-01 | Stop reason: HOSPADM

## 2024-10-29 RX ORDER — LATANOPROST 50 UG/ML
1 SOLUTION/ DROPS OPHTHALMIC DAILY
Status: DISCONTINUED | OUTPATIENT
Start: 2024-10-29 | End: 2024-11-01 | Stop reason: HOSPADM

## 2024-10-29 RX ORDER — MAGNESIUM SULFATE IN WATER 40 MG/ML
2000 INJECTION, SOLUTION INTRAVENOUS PRN
Status: DISCONTINUED | OUTPATIENT
Start: 2024-10-29 | End: 2024-11-01 | Stop reason: HOSPADM

## 2024-10-29 RX ORDER — OXYCODONE AND ACETAMINOPHEN 5; 325 MG/1; MG/1
1 TABLET ORAL 2 TIMES DAILY PRN
Status: DISCONTINUED | OUTPATIENT
Start: 2024-10-29 | End: 2024-10-31

## 2024-10-29 RX ORDER — SENNA AND DOCUSATE SODIUM 50; 8.6 MG/1; MG/1
1 TABLET, FILM COATED ORAL DAILY PRN
Status: DISCONTINUED | OUTPATIENT
Start: 2024-10-29 | End: 2024-11-01 | Stop reason: HOSPADM

## 2024-10-29 RX ORDER — ERGOCALCIFEROL 1.25 MG/1
50000 CAPSULE, LIQUID FILLED ORAL
Status: DISCONTINUED | OUTPATIENT
Start: 2024-11-01 | End: 2024-11-01 | Stop reason: HOSPADM

## 2024-10-29 RX ORDER — SODIUM CHLORIDE 0.9 % (FLUSH) 0.9 %
5-40 SYRINGE (ML) INJECTION PRN
Status: DISCONTINUED | OUTPATIENT
Start: 2024-10-29 | End: 2024-11-01 | Stop reason: HOSPADM

## 2024-10-29 RX ORDER — HYDROXYCHLOROQUINE SULFATE 200 MG/1
200 TABLET, FILM COATED ORAL 2 TIMES DAILY
Status: DISCONTINUED | OUTPATIENT
Start: 2024-10-29 | End: 2024-11-01 | Stop reason: HOSPADM

## 2024-10-29 RX ORDER — MULTIVITAMIN WITH IRON
1 TABLET ORAL DAILY
Status: DISCONTINUED | OUTPATIENT
Start: 2024-10-29 | End: 2024-11-01 | Stop reason: HOSPADM

## 2024-10-29 RX ORDER — CETIRIZINE HYDROCHLORIDE 10 MG/1
10 TABLET ORAL DAILY
Status: DISCONTINUED | OUTPATIENT
Start: 2024-10-29 | End: 2024-10-29

## 2024-10-29 RX ORDER — ONDANSETRON 4 MG/1
4 TABLET, ORALLY DISINTEGRATING ORAL EVERY 8 HOURS PRN
Status: DISCONTINUED | OUTPATIENT
Start: 2024-10-29 | End: 2024-11-01 | Stop reason: HOSPADM

## 2024-10-29 RX ORDER — ROSUVASTATIN CALCIUM 5 MG/1
10 TABLET, COATED ORAL DAILY
Status: DISCONTINUED | OUTPATIENT
Start: 2024-10-29 | End: 2024-11-01 | Stop reason: HOSPADM

## 2024-10-29 RX ORDER — METOPROLOL SUCCINATE 100 MG/1
100 TABLET, EXTENDED RELEASE ORAL DAILY
Status: DISCONTINUED | OUTPATIENT
Start: 2024-10-29 | End: 2024-11-01 | Stop reason: HOSPADM

## 2024-10-29 RX ORDER — SODIUM CHLORIDE 0.9 % (FLUSH) 0.9 %
5-40 SYRINGE (ML) INJECTION EVERY 12 HOURS SCHEDULED
Status: DISCONTINUED | OUTPATIENT
Start: 2024-10-29 | End: 2024-11-01 | Stop reason: HOSPADM

## 2024-10-29 RX ORDER — ALPRAZOLAM 0.25 MG/1
0.25 TABLET ORAL 2 TIMES DAILY
Status: DISCONTINUED | OUTPATIENT
Start: 2024-10-29 | End: 2024-10-29

## 2024-10-29 RX ORDER — CYCLOBENZAPRINE HCL 10 MG
10 TABLET ORAL 3 TIMES DAILY PRN
Status: DISCONTINUED | OUTPATIENT
Start: 2024-10-29 | End: 2024-11-01 | Stop reason: HOSPADM

## 2024-10-29 RX ADMIN — CYCLOBENZAPRINE 10 MG: 10 TABLET, FILM COATED ORAL at 18:32

## 2024-10-29 RX ADMIN — ROSUVASTATIN 10 MG: 10 TABLET, FILM COATED ORAL at 11:22

## 2024-10-29 RX ADMIN — HYDROXYCHLOROQUINE SULFATE 200 MG: 200 TABLET ORAL at 20:09

## 2024-10-29 RX ADMIN — DULOXETINE HYDROCHLORIDE 30 MG: 30 CAPSULE, DELAYED RELEASE ORAL at 11:20

## 2024-10-29 RX ADMIN — OXYCODONE HYDROCHLORIDE AND ACETAMINOPHEN 1 TABLET: 5; 325 TABLET ORAL at 14:39

## 2024-10-29 RX ADMIN — SODIUM CHLORIDE, PRESERVATIVE FREE 10 ML: 5 INJECTION INTRAVENOUS at 08:21

## 2024-10-29 RX ADMIN — ACETAMINOPHEN 650 MG: 325 TABLET ORAL at 08:19

## 2024-10-29 RX ADMIN — ENOXAPARIN SODIUM 40 MG: 100 INJECTION SUBCUTANEOUS at 08:20

## 2024-10-29 RX ADMIN — POTASSIUM CHLORIDE 40 MEQ: 1500 TABLET, EXTENDED RELEASE ORAL at 18:26

## 2024-10-29 RX ADMIN — AMLODIPINE BESYLATE 5 MG: 5 TABLET ORAL at 11:23

## 2024-10-29 RX ADMIN — HYDROXYCHLOROQUINE SULFATE 200 MG: 200 TABLET ORAL at 11:24

## 2024-10-29 RX ADMIN — ASPIRIN 81 MG: 81 TABLET, COATED ORAL at 11:22

## 2024-10-29 RX ADMIN — SENNOSIDES AND DOCUSATE SODIUM 1 TABLET: 50; 8.6 TABLET ORAL at 18:41

## 2024-10-29 RX ADMIN — Medication 5 MG: at 20:09

## 2024-10-29 RX ADMIN — METOPROLOL SUCCINATE 100 MG: 100 TABLET, EXTENDED RELEASE ORAL at 11:22

## 2024-10-29 RX ADMIN — SODIUM CHLORIDE, PRESERVATIVE FREE 10 ML: 5 INJECTION INTRAVENOUS at 20:09

## 2024-10-29 RX ADMIN — CYCLOBENZAPRINE 10 MG: 10 TABLET, FILM COATED ORAL at 11:20

## 2024-10-29 ASSESSMENT — PAIN - FUNCTIONAL ASSESSMENT
PAIN_FUNCTIONAL_ASSESSMENT: PREVENTS OR INTERFERES SOME ACTIVE ACTIVITIES AND ADLS
PAIN_FUNCTIONAL_ASSESSMENT: PREVENTS OR INTERFERES WITH ALL ACTIVE AND SOME PASSIVE ACTIVITIES

## 2024-10-29 ASSESSMENT — PAIN SCALES - GENERAL
PAINLEVEL_OUTOF10: 8
PAINLEVEL_OUTOF10: 9

## 2024-10-29 ASSESSMENT — PAIN DESCRIPTION - LOCATION
LOCATION: LEG
LOCATION: HIP;LEG
LOCATION: HIP;LEG
LOCATION: LEG

## 2024-10-29 ASSESSMENT — PAIN DESCRIPTION - DESCRIPTORS
DESCRIPTORS: ACHING
DESCRIPTORS: ACHING;SORE;SPASM
DESCRIPTORS: SPASM
DESCRIPTORS: ACHING
DESCRIPTORS: SPASM

## 2024-10-29 ASSESSMENT — PAIN DESCRIPTION - ORIENTATION
ORIENTATION: RIGHT;LEFT
ORIENTATION: RIGHT
ORIENTATION: RIGHT;LEFT

## 2024-10-29 NOTE — PROGRESS NOTES
Occupational Therapy    OCCUPATIONAL THERAPY INITIAL EVALUATION    [facility]  [address], [place]        Date:10/29/2024                                                  Patient Name: Hien Shi    MRN: [id number]    : 1945    Room: [address] [place] [address]          Evaluating OT: Silvana Brown, OTD, OTR/L; [id number]      Occupational therapy physician order:   Start   Ordering Provider    10/29/24 0515  OT eval and treat  Start:  10/29/24 0515,   End:  10/29/24 0515,   ONE TIME,   Standing Count:  1 Occurrences,   R         Travis Rowland MD          Pt presents to ED with R hip and R knee pain after a fall last month.       Diagnosis:    1. Right hip pain    2. Acute pain of right knee       Patient Active Problem List   Diagnosis    Glaucoma    Lipid disorder    Neuropathy    DJD (degenerative joint disease)    Bilateral carotid artery disease (HCC)    CAD (coronary artery disease)    Hyperlipemia    Cerebrovascular accident (stroke) ([facility])    Old myocardial infarction, greater than 8 weeks    Presence of drug coated stent in right coronary artery    Accelerated hypertension    S/P carotid endarterectomy    Carotid bruit    Anemia    H/O total knee replacement    Osteoarthritis of right hip    Fibromyalgia    Incontinence    Rheumatoid arthritis involving both hands ([facility])    Diverticulosis of large intestine without diverticulitis    Second degree hemorrhoids    Decreased radial pulse    Acute unilateral obstructive uropathy    Hydronephrosis concurrent with and due to calculi of kidney and ureter    PVD (peripheral vascular disease) with claudication (HCC)    Aorto-iliac atherosclerosis (HCC)    Atherosclerosis of native arteries of extremity with rest pain (HCC)    Atherosclerosis of native arteries of extremities with rest pain, left leg ([facility])    Restrictive lung disease    Acute respiratory failure with hypoxia and hypercapnia     Activities 17711       ADL/Self Care 50469  8 1    Orthotic Management 63410       Manual [id number]     Neuro Re-Ed 48043       Non-Billable Time                  Silvana Brown, WARREN, OTR/L; [id number]

## 2024-10-29 NOTE — ACP (ADVANCE CARE PLANNING)
Advance Care Planning   Healthcare Decision Maker:    Primary Decision Maker: Jessy Lopez - Child - [phone]    Primary Decision Maker: Lexi Méndez - Child - [phone]    Primary Decision Maker: Georgette Allen - Child - [phone]    Today we documented Decision Maker(s) consistent with Legal Next of Kin hierarchy.    Electronically signed by TEJAS Strickland on 10/29/2024 at 3:19 PM

## 2024-10-29 NOTE — ED NOTES
Attempted to ambulate patient, patient was able to sit up and stand with full assistance. Patient cannot bend right knee and drags the right leg while attempting to walk. Patient was only able to take about 3 steps. Dr. Sánchez notified.

## 2024-10-29 NOTE — PROGRESS NOTES
4 Eyes Skin Assessment     NAME:  Hien Shi  YOB: 1945  MEDICAL RECORD NUMBER:  [id number]    The patient is being assessed for  Admission    I agree that at least one RN has performed a thorough Head to Toe Skin Assessment on the patient. ALL assessment sites listed below have been assessed.      Areas assessed by both nurses:    Head, Face, Ears, Shoulders, Back, Chest, Arms, Elbows, Hands, Sacrum. Buttock, Coccyx, Ischium, Legs. Feet and Heels, Under Medical Devices , and Other          Does the Patient have a Wound? No noted wound(s)     Excoriation under breast and abd folds, old scars both knees.   Greg Prevention initiated by RN: Yes  Wound Care Orders initiated by RN: Yes    Pressure Injury (Stage 3,4, Unstageable, DTI, NWPT, and Complex wounds) if present, place Wound referral order by RN under : No    New Ostomies, if present place, Ostomy referral order under : No     Nurse 1 eSignature: Electronically signed by Veena Baltazar RN on 10/29/24 at 5:52 AM EDT    **SHARE this note so that the co-signing nurse can place an eSignature**    Nurse 2 eSignature: Electronically signed by Mayela Pittman RN on 10/29/24 at 6:52 AM EDT

## 2024-10-29 NOTE — H&P
Pertinent positives as noted in the HPI. All other systems reviewed and negative.    PHYSICAL EXAM:    BP (!) 143/61   Pulse 99   Temp 98.2 °F (36.8 °C) (Oral)   Resp 16   SpO2 98%     General appearance:  No apparent distress, appears stated age and cooperative.  HEENT:  Normal cephalic, atraumatic without obvious deformity. Pupils equal, round, and reactive to light.  Extra ocular muscles intact. Conjunctivae/corneas clear.  Neck: Supple, with full range of motion. No jugular venous distention. Trachea midline.  Respiratory:  Normal respiratory effort. Clear to auscultation, bilaterally without Rales/Wheezes/Rhonchi.  Cardiovascular:  Regular rate and rhythm with normal S1/S2 without murmurs, rubs or gallops.  Abdomen: Soft, non-tender, non-distended with normal bowel sounds.  Musculoskeletal:  No clubbing, cyanosis or edema bilaterally.  Limited range of motion right lower extremity   skin: Skin color, texture, turgor normal.  No rashes or lesions.  Neurologic:  Neurovascularly intact without any focal sensory/motor deficits. Cranial nerves: II-XII intact, grossly non-focal.  Psychiatric:  Alert and oriented, thought content appropriate, normal insight    CT lumbar spine:  I have reviewed the CT lumbar spine with the following interpretation: No acute findings of fracture      Labs:     Recent Labs     10/28/24  2319   WBC 4.6   HGB 10.6*   HCT 33.9*        Recent Labs     10/28/24  2319      K 3.5      CO2 20*   BUN 8   CREATININE 0.7   CALCIUM 8.9     No results for input(s): \"AST\", \"ALT\", \"BILIDIR\", \"BILITOT\", \"ALKPHOS\" in the last 72 hours.  No results for input(s): \"INR\" in the last 72 hours.  No results for input(s): \"CKTOTAL\", \"TROPONINI\" in the last 72 hours.    Urinalysis:      Lab Results   Component Value Date/Time    NITRU NEGATIVE 10/28/2024 11:19 PM    WBCUA 0 TO 5 10/28/2024 11:19 PM    WBCUA 10-20 11/04/2011 01:00 PM    BACTERIA TRACE 10/28/2024 11:19 PM    RBCUA 0 TO 2

## 2024-10-29 NOTE — PROGRESS NOTES
Physical Therapy  Initial Assessment     Name: Hien Shi  : 1945  MRN: [id number]      Date of Service: 10/29/2024    Evaluating PT: Anthony Ballesteros, PT, DPT [id number]      Room #:  8405/8405-B  Diagnosis:  Right hip pain [M25.551]  Unable to ambulate [R26.2]  Acute pain of right knee [M25.561]  PMHx/PSHx:   has a past medical history of Aorto-iliac atherosclerosis (HCC), Arthritis, Atherosclerosis of native arteries of extremity with rest pain (HCC), Bladder prolapse, CAD (coronary artery disease), Carotid artery stenosis, Carotid bruit, Carotid stenosis, left, Cerebrovascular accident (stroke) (HCC), Decreased radial pulse, Gastric reflux, Glaucoma, History of blood transfusion, Hyperlipemia, Hypertension, Insomnia, Post-operative state, PVD (peripheral vascular disease) with claudication (HCC), Restless leg syndrome, and Sinusitis.  Procedure/Surgery:  N/A  Precautions:  Fall risk, alarms, hx CVA, B wrist/hand deformity due to RA, pure wick  Equipment Needs:  TBD, owns cane and ww    SUBJECTIVE:    Pt lives alone in a 2nd story apartment with 3 stair(s) and 1 rail(s) to enter. Pt has 6-7 steps with 2 handrails to get to her apartment after entering the building. Pt ambulated with ww prior to admission.    OBJECTIVE:   Initial Evaluation  Date: 10/29/24 Treatment Date: Short Term/ Long Term   Goals   AM-PAC 6 Clicks      Was pt agreeable to Eval/treatment? yes     Does pt have pain? R knee spasms, bilateral arm pain 9/10     Bed Mobility  Rolling: NT  Supine to sit: ModA x2  Sit to supine: ModA x2  Scooting: NT  Rolling: Romel  Supine to sit: Romel  Sit to supine: Romel  Scooting: Romel   Transfers Sit to stand: Romel  Stand to sit: Romel  Stand pivot: NT  Sit to stand: Ind  Stand to sit: Ind  Stand pivot: Ind with ww   Ambulation   Side steps with ww with Romel  50' feet with ww with Ind   Stair negotiation: ascended and descended NT  >3 step(s) with 1 rail(s) with Romel   ROM BUE: Refer to OT note  BLE:  history/social history and prior level of function, completion of standardized testing/informal observation of tasks, assessment of data and education on plan of care and goals.    CPT codes:  [x] Low Complexity PT evaluation 06806  [] Moderate Complexity PT evaluation 05477  [] High Complexity PT evaluation 75497  [] PT Re-evaluation 71006  [] Gait training 38379 -- minutes  [] Manual therapy 96251 -- minutes  [x] Therapeutic activities 44055 11 minutes  [] Therapeutic exercises 18522 -- minutes  [] Neuromuscular reeducation 87579 -- minutes     Robert Joy, SPT    Anthony Ballesteros, PT, DPT  [id number]

## 2024-10-29 NOTE — PROGRESS NOTES
The patient was very quiet. I assured her with best wishes and encouragement.    If you need additional support, you may reach out to us at [facility], [phone].     Chaz Langston; KAYLYNN Cortez

## 2024-10-29 NOTE — FLOWSHEET NOTE
10/29/24 1836   Output (mL)   Urine 110 mL   Urine Assessment   Urine Color Yellow/straw   Urine Appearance Clear   Bladder Scan Volume (mL) 105 mL  (post void)   $ Bladder scan $ Yes

## 2024-10-29 NOTE — PLAN OF CARE
Problem: Chronic Conditions and Co-morbidities  Goal: Patient's chronic conditions and co-morbidity symptoms are monitored and maintained or improved  Outcome: Progressing     Problem: Pain  Goal: Verbalizes/displays adequate comfort level or baseline comfort level  Outcome: Progressing     Problem: Skin/Tissue Integrity  Goal: Absence of new skin breakdown  Description: 1.  Monitor for areas of redness and/or skin breakdown  2.  Assess vascular access sites hourly  3.  Every 4-6 hours minimum:  Change oxygen saturation probe site  4.  Every 4-6 hours:  If on nasal continuous positive airway pressure, respiratory therapy assess nares and determine need for appliance change or resting period.  Outcome: Progressing     Problem: Safety - Adult  Goal: Free from fall injury  Outcome: Progressing

## 2024-10-29 NOTE — CARE COORDINATION
Patient presented to the ED due to right hip and knee pain; currently in observation status. Met with patient at bedside for transition of care planning. Patient reports she lives alone in a 2nd floor apartment, 6-7 steps to enter, was previously independent at home, ambulated with a walker, states she is pretty weak; has shower chair and bedside commode. Uses [facility] ([facility]) and PCP is Mirian Kc. Discussed PT/OT evals today, PT [facility] 12/24; [facility] recommended. Patient states she has been to [facility] in the past and would like a referral made there. If additional MAN[facility] choices are needed, patient requesting her daughter, Jessy be called. Referral made to Sylvia at [facility], she will review and follow-up regarding acceptance. Call made to patient's daughter, Jessy, provided update. If additional [facility] choices are needed, she would like referrals made to [facility] and [facility].    Case Management Assessment  Initial Evaluation    Date/Time of Evaluation: 10/29/2024 3:16 PM  Assessment Completed by: TEJAS Strickland    If patient is discharged prior to next notation, then this note serves as note for discharge by case management.    Patient Name: Hien Shi                   YOB: 1945  Diagnosis: Right hip pain [M25.551]  Unable to ambulate [R26.2]  Acute pain of right knee [M25.561]                   Date / Time: 10/28/2024  4:04 PM    Patient Admission Status: Observation   Readmission Risk (Low < 19, Mod (19-27), High > 27): No data recorded  Current PCP: Leeann Chamorro APRN - CNP  PCP verified by CM? Yes    Chart Reviewed: Yes      History Provided by: Patient, Medical Record  Patient Orientation: Alert and Oriented    Patient Cognition: Alert    Hospitalization in the last 30 days (Readmission):  No    If yes, Readmission Assessment in  Navigator will be completed.    Advance Directives:      Code Status: Full Code   Patient's Primary Decision Maker

## 2024-10-30 ENCOUNTER — APPOINTMENT (OUTPATIENT)
Dept: CT IMAGING | Age: 79
End: 2024-10-30
Attending: INTERNAL MEDICINE
Payer: COMMERCIAL

## 2024-10-30 LAB
ANION GAP SERPL CALCULATED.3IONS-SCNC: 14 MMOL/L (ref 7–16)
BASOPHILS # BLD: 0.04 K/UL (ref 0–0.2)
BASOPHILS NFR BLD: 1 % (ref 0–2)
BUN SERPL-MCNC: 12 MG/DL (ref 6–23)
CALCIUM SERPL-MCNC: 8.8 MG/DL (ref 8.6–10.2)
CHLORIDE SERPL-SCNC: 109 MMOL/L (ref 98–107)
CO2 SERPL-SCNC: 20 MMOL/L (ref 22–29)
CREAT SERPL-MCNC: 0.8 MG/DL (ref 0.5–1)
EOSINOPHIL # BLD: 0.24 K/UL (ref 0.05–0.5)
EOSINOPHILS RELATIVE PERCENT: 5 % (ref 0–6)
ERYTHROCYTE [DISTWIDTH] IN BLOOD BY AUTOMATED COUNT: 14.4 % (ref 11.5–15)
GFR, ESTIMATED: 72 ML/MIN/1.73M2
GLUCOSE SERPL-MCNC: 88 MG/DL (ref 74–99)
HCT VFR BLD AUTO: 30.2 % (ref 34–48)
HGB BLD-MCNC: 9.4 G/DL (ref 11.5–15.5)
IMM GRANULOCYTES # BLD AUTO: 0.03 K/UL (ref 0–0.58)
IMM GRANULOCYTES NFR BLD: 1 % (ref 0–5)
LYMPHOCYTES NFR BLD: 0.71 K/UL (ref 1.5–4)
LYMPHOCYTES RELATIVE PERCENT: 13 % (ref 20–42)
MCH RBC QN AUTO: 29.2 PG (ref 26–35)
MCHC RBC AUTO-ENTMCNC: 31.1 G/DL (ref 32–34.5)
MCV RBC AUTO: 93.8 FL (ref 80–99.9)
MONOCYTES NFR BLD: 0.73 K/UL (ref 0.1–0.95)
MONOCYTES NFR BLD: 14 % (ref 2–12)
NEUTROPHILS NFR BLD: 67 % (ref 43–80)
NEUTS SEG NFR BLD: 3.56 K/UL (ref 1.8–7.3)
PLATELET # BLD AUTO: 262 K/UL (ref 130–450)
PMV BLD AUTO: 9.3 FL (ref 7–12)
POTASSIUM SERPL-SCNC: 4.2 MMOL/L (ref 3.5–5)
RBC # BLD AUTO: 3.22 M/UL (ref 3.5–5.5)
SODIUM SERPL-SCNC: 143 MMOL/L (ref 132–146)
WBC OTHER # BLD: 5.3 K/UL (ref 4.5–11.5)

## 2024-10-30 PROCEDURE — 99232 SBSQ HOSP IP/OBS MODERATE 35: CPT | Performed by: INTERNAL MEDICINE

## 2024-10-30 PROCEDURE — 36415 COLL VENOUS BLD VENIPUNCTURE: CPT

## 2024-10-30 PROCEDURE — 96372 THER/PROPH/DIAG INJ SC/IM: CPT

## 2024-10-30 PROCEDURE — 80048 BASIC METABOLIC PNL TOTAL CA: CPT

## 2024-10-30 PROCEDURE — 85025 COMPLETE CBC W/AUTO DIFF WBC: CPT

## 2024-10-30 PROCEDURE — 6370000000 HC RX 637 (ALT 250 FOR IP): Performed by: FAMILY MEDICINE

## 2024-10-30 PROCEDURE — 73700 CT LOWER EXTREMITY W/O DYE: CPT

## 2024-10-30 PROCEDURE — 6370000000 HC RX 637 (ALT 250 FOR IP): Performed by: INTERNAL MEDICINE

## 2024-10-30 PROCEDURE — 6370000000 HC RX 637 (ALT 250 FOR IP): Performed by: STUDENT IN AN ORGANIZED HEALTH CARE EDUCATION/TRAINING PROGRAM

## 2024-10-30 PROCEDURE — 6360000002 HC RX W HCPCS: Performed by: FAMILY MEDICINE

## 2024-10-30 PROCEDURE — G0378 HOSPITAL OBSERVATION PER HR: HCPCS

## 2024-10-30 PROCEDURE — 2580000003 HC RX 258: Performed by: FAMILY MEDICINE

## 2024-10-30 RX ORDER — BISACODYL 10 MG
10 SUPPOSITORY, RECTAL RECTAL DAILY PRN
Status: DISCONTINUED | OUTPATIENT
Start: 2024-10-30 | End: 2024-11-01 | Stop reason: HOSPADM

## 2024-10-30 RX ORDER — OXYCODONE AND ACETAMINOPHEN 10; 325 MG/1; MG/1
1 TABLET ORAL 2 TIMES DAILY PRN
Qty: 6 TABLET | Refills: 0 | Status: SHIPPED | OUTPATIENT
Start: 2024-10-30 | End: 2024-11-01 | Stop reason: HOSPADM

## 2024-10-30 RX ORDER — BISACODYL 10 MG
10 SUPPOSITORY, RECTAL RECTAL DAILY PRN
Qty: 30 SUPPOSITORY | Refills: 0 | Status: SHIPPED | OUTPATIENT
Start: 2024-10-30 | End: 2024-11-29

## 2024-10-30 RX ORDER — CYCLOBENZAPRINE HCL 10 MG
5 TABLET ORAL 3 TIMES DAILY PRN
Qty: 15 TABLET | Refills: 0 | Status: SHIPPED | OUTPATIENT
Start: 2024-10-30 | End: 2024-11-09

## 2024-10-30 RX ADMIN — HYDROXYCHLOROQUINE SULFATE 200 MG: 200 TABLET ORAL at 11:30

## 2024-10-30 RX ADMIN — SODIUM CHLORIDE, PRESERVATIVE FREE 10 ML: 5 INJECTION INTRAVENOUS at 10:24

## 2024-10-30 RX ADMIN — SODIUM CHLORIDE, PRESERVATIVE FREE 10 ML: 5 INJECTION INTRAVENOUS at 20:03

## 2024-10-30 RX ADMIN — ROSUVASTATIN 10 MG: 10 TABLET, FILM COATED ORAL at 10:24

## 2024-10-30 RX ADMIN — METOPROLOL SUCCINATE 100 MG: 100 TABLET, EXTENDED RELEASE ORAL at 10:24

## 2024-10-30 RX ADMIN — AMLODIPINE BESYLATE 5 MG: 5 TABLET ORAL at 10:24

## 2024-10-30 RX ADMIN — OXYCODONE HYDROCHLORIDE AND ACETAMINOPHEN 1 TABLET: 5; 325 TABLET ORAL at 20:02

## 2024-10-30 RX ADMIN — POLYETHYLENE GLYCOL 3350 17 G: 17 POWDER, FOR SOLUTION ORAL at 01:55

## 2024-10-30 RX ADMIN — BISACODYL 10 MG: 10 SUPPOSITORY RECTAL at 17:31

## 2024-10-30 RX ADMIN — Medication 1 TABLET: at 11:30

## 2024-10-30 RX ADMIN — ENOXAPARIN SODIUM 40 MG: 100 INJECTION SUBCUTANEOUS at 10:24

## 2024-10-30 RX ADMIN — ACETAMINOPHEN 650 MG: 325 TABLET ORAL at 01:57

## 2024-10-30 RX ADMIN — DULOXETINE HYDROCHLORIDE 30 MG: 30 CAPSULE, DELAYED RELEASE ORAL at 10:24

## 2024-10-30 RX ADMIN — SENNOSIDES AND DOCUSATE SODIUM 1 TABLET: 50; 8.6 TABLET ORAL at 20:03

## 2024-10-30 RX ADMIN — HYDROXYCHLOROQUINE SULFATE 200 MG: 200 TABLET ORAL at 20:03

## 2024-10-30 RX ADMIN — CYCLOBENZAPRINE 10 MG: 10 TABLET, FILM COATED ORAL at 20:03

## 2024-10-30 RX ADMIN — Medication 5 MG: at 20:03

## 2024-10-30 RX ADMIN — ASPIRIN 81 MG: 81 TABLET, COATED ORAL at 10:24

## 2024-10-30 RX ADMIN — OXYCODONE HYDROCHLORIDE AND ACETAMINOPHEN 1 TABLET: 5; 325 TABLET ORAL at 10:46

## 2024-10-30 ASSESSMENT — PAIN DESCRIPTION - DESCRIPTORS
DESCRIPTORS: ACHING;DISCOMFORT;SORE
DESCRIPTORS: ACHING;CRAMPING;DISCOMFORT
DESCRIPTORS: THROBBING;STABBING;SHOOTING

## 2024-10-30 ASSESSMENT — PAIN SCALES - GENERAL
PAINLEVEL_OUTOF10: 4
PAINLEVEL_OUTOF10: 8
PAINLEVEL_OUTOF10: 4
PAINLEVEL_OUTOF10: 8

## 2024-10-30 ASSESSMENT — PAIN - FUNCTIONAL ASSESSMENT: PAIN_FUNCTIONAL_ASSESSMENT: PREVENTS OR INTERFERES SOME ACTIVE ACTIVITIES AND ADLS

## 2024-10-30 ASSESSMENT — PAIN DESCRIPTION - LOCATION
LOCATION: ABDOMEN;HIP;KNEE
LOCATION: HIP;KNEE
LOCATION: KNEE;HIP

## 2024-10-30 ASSESSMENT — PAIN DESCRIPTION - ORIENTATION
ORIENTATION: RIGHT
ORIENTATION: RIGHT

## 2024-10-30 NOTE — PROGRESS NOTES
[facility] Hospitalist Progress Note    Admitting Date and Time: 10/28/2024  4:04 PM  Admit Dx: Right hip pain [M25.551]  Unable to ambulate [R26.2]  Acute pain of right knee [M25.561]    Subjective:  Patient is being followed for Right hip pain [M25.551]  Unable to ambulate [R26.2]  Acute pain of right knee [M25.561]     She is complaining of excruciating pain in the right knee.  Unable to bear weight.  ROS: denies fever, chills, cp, sob, n/v, HA unless stated above.      sodium chloride flush  5-40 mL IntraVENous 2 times per day    enoxaparin  40 mg SubCUTAneous Daily    amLODIPine  5 mg Oral Daily    aspirin  81 mg Oral Daily    [START ON 11/1/2024] vitamin D  50,000 Units Oral Every Friday    latanoprost  1 drop Both Eyes Daily    hydroxychloroquine  200 mg Oral BID    metoprolol succinate  100 mg Oral Daily    vitamin B and C  1 tablet Oral Daily    rosuvastatin  10 mg Oral Daily    DULoxetine  30 mg Oral Daily    melatonin  5 mg Oral Nightly     bisacodyl, 10 mg, Daily PRN  sodium chloride flush, 5-40 mL, PRN  sodium chloride, , PRN  potassium chloride, 40 mEq, PRN   Or  potassium alternative oral replacement, 40 mEq, PRN   Or  potassium chloride, 10 mEq, PRN  magnesium sulfate, 2,000 mg, PRN  ondansetron, 4 mg, Q8H PRN   Or  ondansetron, 4 mg, Q6H PRN  polyethylene glycol, 17 g, Daily PRN  acetaminophen, 650 mg, Q6H PRN   Or  acetaminophen, 650 mg, Q6H PRN  sennosides-docusate sodium, 1 tablet, Daily PRN  oxyCODONE-acetaminophen, 1 tablet, BID PRN  cyclobenzaprine, 10 mg, TID PRN         Objective:    /77   Pulse 81   Temp 96.9 °F (36.1 °C) (Temporal)   Resp 18   Ht 1.524 m (5')   Wt 73.7 kg (162 lb 7.7 oz)   SpO2 91%   BMI 31.73 kg/m²     General Appearance: alert and oriented to person, place and time and in no acute distress  Skin: warm and dry  Head: normocephalic and atraumatic  Eyes: pupils equal, round, and reactive to light, extraocular eye movements intact, conjunctivae

## 2024-10-30 NOTE — PROGRESS NOTES
Nutrition Assessment     Type and Reason for Visit: Initial, Positive Nutrition Screen    Nutrition Recommendations/Plan:     Continue Current Diet, Start Oral Nutrition Supplement     Malnutrition Assessment:  Malnutrition Status: Insufficient data    Nutrition Assessment:  Pt admit 2/2 R hip pain & inability to ambulate (fall x 1 mon ago) pending workup. PMHx CAD, CVA, PVD. Noted nutrition screen for wt loss/ poor appetite- limited data on file to assess at this time. Will add Magic Cup BID & monitor.      Nutrition Related Findings:   Pt alert, fluid bal WNL, +2 edema, active BS Wound Type: None    Current Nutrition Therapies:    ADULT DIET; Regular    Anthropometric Measures:  Height: 152.4 cm (5')  Current Body Wt: 73.7 kg (162 lb 7.7 oz) (10/29 no method)   BMI: 31.7        Nutrition Diagnosis:   No nutrition diagnosis at this time (limited data/ pt observation at this time)    Nutrition Interventions:   Nutrition Education/Counseling: Education not indicated  Coordination of Nutrition Care: Continue to monitor while inpatient    Goals:  Goals: PO intake 75% or greater, by next RD assessment    Nutrition Monitoring and Evaluation:  Food/Nutrient Intake Outcomes: Food and Nutrient Intake, Supplement Intake  Physical Signs/Symptoms Outcomes: Biochemical Data, Nutrition Focused Physical Findings, GI Status, Fluid Status or Edema, Weight, Skin    Discharge Planning:    Too soon to determine     Dot Bee RD, LD  Contact: ext [phone]

## 2024-10-30 NOTE — DISCHARGE INSTR - COC
Continuity of Care Form    Patient Name: Hien Shi   :  1945  MRN:  [id number]    Admit date:  10/28/2024  Discharge date:  2024    Code Status Order: Full Code   Advance Directives:   Advance Care Flowsheet Documentation             Admitting Physician:  Travis Rowland MD  PCP: Leeann Chamorro APRN - CNP    Discharging Nurse: AB  Discharging Hospital Unit/Room#: 8209/8209-A  Discharging Unit Phone Number: [phone]    Emergency Contact:   Extended Emergency Contact Information  Primary Emergency Contact: Jessy Lopez  Address: [address] [place] [address]  Home Phone: [phone]  Mobile Phone: [phone]  Relation: Child  Secondary Emergency Contact: Lexi Méndez   [facility]  Home Phone: [phone]  Mobile Phone: [phone]  Relation: Child    Past Surgical History:  Past Surgical History:   Procedure Laterality Date    ANTERIOR FIXATION OF THORACIC SPINE      AORTO-ILIAC BYPASS GRAFT N/A 9/15/2020    AORTO BIFEMORAL  BYPASS performed by Julien Pedraza MD at [facility] OR    CARDIAC SURGERY      CAROTID ENDARTERECTOMY      right    CAROTID ENDARTERECTOMY  2012    left carotid endarterectomy done by Dr. Pedraza    CARPAL TUNNEL RELEASE      left wrist    CATARACT REMOVAL      CERVICAL DISC SURGERY      C5 and C6    CORONARY ANGIOPLASTY WITH STENT PLACEMENT  2011    Dr. Jacobs  RCA    CYSTOSCOPY      in past for kidney stones    DENTAL SURGERY      DIAGNOSTIC CARDIAC CATH LAB PROCEDURE  11    Emergent cath/PTCA with deployment of 2 overlapping DE stents to the distal, mid and proximal RCA.    HYSTERECTOMY (CERVIX STATUS UNKNOWN)      JOINT REPLACEMENT      right knee twice, left knee    JOINT REPLACEMENT Right     hip    KNEE ARTHROPLASTY Left 13    KNEE SURGERY      right knee x 2    LITHOTRIPSY Right 2019    RIGHT ESWL EXTRACORPOREAL SHOCK WAVE LITHOTRIPSY CYSTOSCOPY STENT INSERTION performed by

## 2024-10-30 NOTE — CARE COORDINATION
Patient remains hospitalized with right hip pain and inability to ambulate.   PT  and OT 12/24. Referral was made to Sylvia at [facility] and await acceptance. If additional [facility] choices are needed, [facility] and [facility] are other choices. Ambulette form in envelope in soft chart. Will need accepting facility placed on envelope. CM/SW will continue to follow Patient has been accepted by [facility]. Lu from CM department will start precert.  Patient has been  informed and she will let daughter know. Exemp 7000 done and in envelope with ambulette form that will need completed at discharge. YOLANDA and destination done

## 2024-10-30 NOTE — PROGRESS NOTES
Patient received the Sacrament of the Anointing of the Sick by Father Wilmer Jeronimo on 10/29/2024.    If additional support is requested or needed please reach out to Spiritual [facility] ([phone]).    Chap. Benjie Kaiser MDIV, BCC

## 2024-10-31 ENCOUNTER — APPOINTMENT (OUTPATIENT)
Dept: CT IMAGING | Age: 79
End: 2024-10-31
Payer: COMMERCIAL

## 2024-10-31 LAB
ANION GAP SERPL CALCULATED.3IONS-SCNC: 14 MMOL/L (ref 7–16)
BASOPHILS # BLD: 0.02 K/UL (ref 0–0.2)
BASOPHILS NFR BLD: 0 % (ref 0–2)
BILIRUB UR QL STRIP: ABNORMAL
BUN SERPL-MCNC: 12 MG/DL (ref 6–23)
CALCIUM SERPL-MCNC: 9.2 MG/DL (ref 8.6–10.2)
CHLORIDE SERPL-SCNC: 106 MMOL/L (ref 98–107)
CLARITY UR: CLEAR
CO2 SERPL-SCNC: 21 MMOL/L (ref 22–29)
COLOR UR: YELLOW
CREAT SERPL-MCNC: 0.7 MG/DL (ref 0.5–1)
EOSINOPHIL # BLD: 0.05 K/UL (ref 0.05–0.5)
EOSINOPHILS RELATIVE PERCENT: 1 % (ref 0–6)
ERYTHROCYTE [DISTWIDTH] IN BLOOD BY AUTOMATED COUNT: 14.2 % (ref 11.5–15)
GFR, ESTIMATED: 88 ML/MIN/1.73M2
GLUCOSE SERPL-MCNC: 107 MG/DL (ref 74–99)
GLUCOSE UR STRIP-MCNC: NEGATIVE MG/DL
HCT VFR BLD AUTO: 35.1 % (ref 34–48)
HGB BLD-MCNC: 10.8 G/DL (ref 11.5–15.5)
HGB UR QL STRIP.AUTO: NEGATIVE
IMM GRANULOCYTES # BLD AUTO: 0.03 K/UL (ref 0–0.58)
IMM GRANULOCYTES NFR BLD: 0 % (ref 0–5)
KETONES UR STRIP-MCNC: 15 MG/DL
LEUKOCYTE ESTERASE UR QL STRIP: NEGATIVE
LYMPHOCYTES NFR BLD: 0.49 K/UL (ref 1.5–4)
LYMPHOCYTES RELATIVE PERCENT: 5 % (ref 20–42)
MCH RBC QN AUTO: 28.5 PG (ref 26–35)
MCHC RBC AUTO-ENTMCNC: 30.8 G/DL (ref 32–34.5)
MCV RBC AUTO: 92.6 FL (ref 80–99.9)
MONOCYTES NFR BLD: 0.85 K/UL (ref 0.1–0.95)
MONOCYTES NFR BLD: 8 % (ref 2–12)
NEUTROPHILS NFR BLD: 86 % (ref 43–80)
NEUTS SEG NFR BLD: 9.1 K/UL (ref 1.8–7.3)
NITRITE UR QL STRIP: NEGATIVE
PH UR STRIP: 6 [PH] (ref 5–9)
PLATELET # BLD AUTO: 292 K/UL (ref 130–450)
PMV BLD AUTO: 10 FL (ref 7–12)
POTASSIUM SERPL-SCNC: 4 MMOL/L (ref 3.5–5)
PROT UR STRIP-MCNC: ABNORMAL MG/DL
RBC # BLD AUTO: 3.79 M/UL (ref 3.5–5.5)
RBC # BLD: ABNORMAL 10*6/UL
RBC #/AREA URNS HPF: NORMAL /HPF
SODIUM SERPL-SCNC: 141 MMOL/L (ref 132–146)
SP GR UR STRIP: >1.03 (ref 1–1.03)
TSH SERPL DL<=0.05 MIU/L-ACNC: 0.81 UIU/ML (ref 0.27–4.2)
UROBILINOGEN UR STRIP-ACNC: 0.2 EU/DL (ref 0–1)
WBC #/AREA URNS HPF: NORMAL /HPF
WBC OTHER # BLD: 10.5 K/UL (ref 4.5–11.5)

## 2024-10-31 PROCEDURE — 87086 URINE CULTURE/COLONY COUNT: CPT

## 2024-10-31 PROCEDURE — 85025 COMPLETE CBC W/AUTO DIFF WBC: CPT

## 2024-10-31 PROCEDURE — G0378 HOSPITAL OBSERVATION PER HR: HCPCS

## 2024-10-31 PROCEDURE — 6360000002 HC RX W HCPCS: Performed by: FAMILY MEDICINE

## 2024-10-31 PROCEDURE — 2580000003 HC RX 258: Performed by: FAMILY MEDICINE

## 2024-10-31 PROCEDURE — 81001 URINALYSIS AUTO W/SCOPE: CPT

## 2024-10-31 PROCEDURE — 99232 SBSQ HOSP IP/OBS MODERATE 35: CPT | Performed by: INTERNAL MEDICINE

## 2024-10-31 PROCEDURE — 70450 CT HEAD/BRAIN W/O DYE: CPT

## 2024-10-31 PROCEDURE — 6370000000 HC RX 637 (ALT 250 FOR IP): Performed by: FAMILY MEDICINE

## 2024-10-31 PROCEDURE — 6370000000 HC RX 637 (ALT 250 FOR IP): Performed by: STUDENT IN AN ORGANIZED HEALTH CARE EDUCATION/TRAINING PROGRAM

## 2024-10-31 PROCEDURE — 96372 THER/PROPH/DIAG INJ SC/IM: CPT

## 2024-10-31 PROCEDURE — 87077 CULTURE AEROBIC IDENTIFY: CPT

## 2024-10-31 PROCEDURE — 84443 ASSAY THYROID STIM HORMONE: CPT

## 2024-10-31 PROCEDURE — 51701 INSERT BLADDER CATHETER: CPT

## 2024-10-31 PROCEDURE — 2500000003 HC RX 250 WO HCPCS: Performed by: INTERNAL MEDICINE

## 2024-10-31 PROCEDURE — 80048 BASIC METABOLIC PNL TOTAL CA: CPT

## 2024-10-31 RX ADMIN — ROSUVASTATIN 10 MG: 10 TABLET, FILM COATED ORAL at 08:42

## 2024-10-31 RX ADMIN — HYDROXYCHLOROQUINE SULFATE 200 MG: 200 TABLET ORAL at 08:43

## 2024-10-31 RX ADMIN — Medication 1 TABLET: at 08:43

## 2024-10-31 RX ADMIN — MICONAZOLE NITRATE: 20.6 POWDER TOPICAL at 21:13

## 2024-10-31 RX ADMIN — SODIUM CHLORIDE, PRESERVATIVE FREE 10 ML: 5 INJECTION INTRAVENOUS at 08:43

## 2024-10-31 RX ADMIN — Medication 5 MG: at 21:11

## 2024-10-31 RX ADMIN — SODIUM CHLORIDE, PRESERVATIVE FREE 10 ML: 5 INJECTION INTRAVENOUS at 21:13

## 2024-10-31 RX ADMIN — AMLODIPINE BESYLATE 5 MG: 5 TABLET ORAL at 08:43

## 2024-10-31 RX ADMIN — ASPIRIN 81 MG: 81 TABLET, COATED ORAL at 08:43

## 2024-10-31 RX ADMIN — HYDROXYCHLOROQUINE SULFATE 200 MG: 200 TABLET ORAL at 21:12

## 2024-10-31 RX ADMIN — METOPROLOL SUCCINATE 100 MG: 100 TABLET, EXTENDED RELEASE ORAL at 08:43

## 2024-10-31 RX ADMIN — LATANOPROST 1 DROP: 50 SOLUTION OPHTHALMIC at 08:47

## 2024-10-31 RX ADMIN — MICONAZOLE NITRATE: 20.6 POWDER TOPICAL at 18:00

## 2024-10-31 RX ADMIN — DULOXETINE HYDROCHLORIDE 30 MG: 30 CAPSULE, DELAYED RELEASE ORAL at 08:43

## 2024-10-31 RX ADMIN — CYCLOBENZAPRINE 10 MG: 10 TABLET, FILM COATED ORAL at 21:12

## 2024-10-31 RX ADMIN — ACETAMINOPHEN 650 MG: 325 TABLET ORAL at 03:00

## 2024-10-31 RX ADMIN — ENOXAPARIN SODIUM 40 MG: 100 INJECTION SUBCUTANEOUS at 08:42

## 2024-10-31 ASSESSMENT — PAIN DESCRIPTION - DESCRIPTORS: DESCRIPTORS: ACHING

## 2024-10-31 ASSESSMENT — PAIN DESCRIPTION - LOCATION: LOCATION: HIP;LEG;KNEE

## 2024-10-31 ASSESSMENT — PAIN SCALES - GENERAL: PAINLEVEL_OUTOF10: 3
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
## 2024-10-31 NOTE — PROGRESS NOTES
Rash/excoriation with slight odor noted to right abdominal fold and beneath right breast. Dr notified with Micotin ordered.

## 2024-10-31 NOTE — CARE COORDINATION
Transition of care update. Discharge order noted. Auth in for [facility]. Transport set up with Alen Cruz at 12 pm. Updated RN, Patient, son Lyndon and [facility] liaison. 7000 and ambulance form is completed and is in envelope on the soft chart. YOLANDA and destination updated.   Electronically signed by Lizandro Finley RN on 10/31/2024 at 11:40 AM    Addendum: per RN, while getting the patient ready for transport, she was extremely confused, which is not her baseline. Discharge is on hold. Updated son and liaison. Auth is good through 11/4/24. MB

## 2024-10-31 NOTE — PROGRESS NOTES
[facility] Hospitalist Progress Note    Admitting Date and Time: 10/28/2024  4:04 PM  Admit Dx: Right hip pain [M25.551]  Unable to ambulate [R26.2]  Acute pain of right knee [M25.561]    Subjective:  Patient is being followed for Right hip pain [M25.551]  Unable to ambulate [R26.2]  Acute pain of right knee [M25.561]     During rounds she complained of right knee pain.  Otherwise denied any acute complaints.  We discussed about importance of rehab and CT scan findings.  Notified by RN later today that patient is confused and agitated.  Also reported loose stool and burning urination.    ROS: denies fever, chills, cp, sob, n/v, HA unless stated above.      sodium chloride flush  5-40 mL IntraVENous 2 times per day    enoxaparin  40 mg SubCUTAneous Daily    amLODIPine  5 mg Oral Daily    aspirin  81 mg Oral Daily    [START ON 11/1/2024] vitamin D  50,000 Units Oral Every Friday    latanoprost  1 drop Both Eyes Daily    hydroxychloroquine  200 mg Oral BID    metoprolol succinate  100 mg Oral Daily    vitamin B and C  1 tablet Oral Daily    rosuvastatin  10 mg Oral Daily    DULoxetine  30 mg Oral Daily    melatonin  5 mg Oral Nightly     bisacodyl, 10 mg, Daily PRN  sodium chloride flush, 5-40 mL, PRN  sodium chloride, , PRN  potassium chloride, 40 mEq, PRN   Or  potassium alternative oral replacement, 40 mEq, PRN   Or  potassium chloride, 10 mEq, PRN  magnesium sulfate, 2,000 mg, PRN  ondansetron, 4 mg, Q8H PRN   Or  ondansetron, 4 mg, Q6H PRN  polyethylene glycol, 17 g, Daily PRN  acetaminophen, 650 mg, Q6H PRN   Or  acetaminophen, 650 mg, Q6H PRN  sennosides-docusate sodium, 1 tablet, Daily PRN  oxyCODONE-acetaminophen, 1 tablet, BID PRN  cyclobenzaprine, 10 mg, TID PRN         Objective:    /60   Pulse 83   Temp 97.6 °F (36.4 °C) (Oral)   Resp 17   Ht 1.524 m (5')   Wt 73.7 kg (162 lb 7.7 oz)   SpO2 95%   BMI 31.73 kg/m²     General Appearance: alert and oriented to person, place and time and

## 2024-11-01 VITALS
TEMPERATURE: 97.5 F | OXYGEN SATURATION: 97 % | BODY MASS INDEX: 31.9 KG/M2 | HEIGHT: 60 IN | WEIGHT: 162.48 LBS | DIASTOLIC BLOOD PRESSURE: 58 MMHG | HEART RATE: 75 BPM | SYSTOLIC BLOOD PRESSURE: 134 MMHG | RESPIRATION RATE: 18 BRPM

## 2024-11-01 LAB
ANION GAP SERPL CALCULATED.3IONS-SCNC: 17 MMOL/L (ref 7–16)
BASOPHILS # BLD: 0.03 K/UL (ref 0–0.2)
BASOPHILS NFR BLD: 0 % (ref 0–2)
BUN SERPL-MCNC: 12 MG/DL (ref 6–23)
CALCIUM SERPL-MCNC: 9.2 MG/DL (ref 8.6–10.2)
CHLORIDE SERPL-SCNC: 106 MMOL/L (ref 98–107)
CO2 SERPL-SCNC: 18 MMOL/L (ref 22–29)
CREAT SERPL-MCNC: 0.7 MG/DL (ref 0.5–1)
EOSINOPHIL # BLD: 0.08 K/UL (ref 0.05–0.5)
EOSINOPHILS RELATIVE PERCENT: 1 % (ref 0–6)
ERYTHROCYTE [DISTWIDTH] IN BLOOD BY AUTOMATED COUNT: 14.4 % (ref 11.5–15)
GFR, ESTIMATED: 89 ML/MIN/1.73M2
GLUCOSE SERPL-MCNC: 105 MG/DL (ref 74–99)
HCT VFR BLD AUTO: 33 % (ref 34–48)
HGB BLD-MCNC: 10 G/DL (ref 11.5–15.5)
IMM GRANULOCYTES # BLD AUTO: 0.05 K/UL (ref 0–0.58)
IMM GRANULOCYTES NFR BLD: 1 % (ref 0–5)
LYMPHOCYTES NFR BLD: 0.74 K/UL (ref 1.5–4)
LYMPHOCYTES RELATIVE PERCENT: 8 % (ref 20–42)
MCH RBC QN AUTO: 28.7 PG (ref 26–35)
MCHC RBC AUTO-ENTMCNC: 30.3 G/DL (ref 32–34.5)
MCV RBC AUTO: 94.6 FL (ref 80–99.9)
MONOCYTES NFR BLD: 0.86 K/UL (ref 0.1–0.95)
MONOCYTES NFR BLD: 10 % (ref 2–12)
NEUTROPHILS NFR BLD: 81 % (ref 43–80)
NEUTS SEG NFR BLD: 7.34 K/UL (ref 1.8–7.3)
PLATELET # BLD AUTO: 269 K/UL (ref 130–450)
PMV BLD AUTO: 9.5 FL (ref 7–12)
POTASSIUM SERPL-SCNC: 3.7 MMOL/L (ref 3.5–5)
RBC # BLD AUTO: 3.49 M/UL (ref 3.5–5.5)
SODIUM SERPL-SCNC: 141 MMOL/L (ref 132–146)
WBC OTHER # BLD: 9.1 K/UL (ref 4.5–11.5)

## 2024-11-01 PROCEDURE — 85025 COMPLETE CBC W/AUTO DIFF WBC: CPT

## 2024-11-01 PROCEDURE — 80048 BASIC METABOLIC PNL TOTAL CA: CPT

## 2024-11-01 PROCEDURE — G0378 HOSPITAL OBSERVATION PER HR: HCPCS

## 2024-11-01 PROCEDURE — 96372 THER/PROPH/DIAG INJ SC/IM: CPT

## 2024-11-01 PROCEDURE — 2580000003 HC RX 258: Performed by: FAMILY MEDICINE

## 2024-11-01 PROCEDURE — 36415 COLL VENOUS BLD VENIPUNCTURE: CPT

## 2024-11-01 PROCEDURE — 6370000000 HC RX 637 (ALT 250 FOR IP): Performed by: STUDENT IN AN ORGANIZED HEALTH CARE EDUCATION/TRAINING PROGRAM

## 2024-11-01 PROCEDURE — 99239 HOSP IP/OBS DSCHRG MGMT >30: CPT | Performed by: INTERNAL MEDICINE

## 2024-11-01 PROCEDURE — 6360000002 HC RX W HCPCS: Performed by: FAMILY MEDICINE

## 2024-11-01 PROCEDURE — 6370000000 HC RX 637 (ALT 250 FOR IP): Performed by: FAMILY MEDICINE

## 2024-11-01 RX ADMIN — ROSUVASTATIN 10 MG: 10 TABLET, FILM COATED ORAL at 07:33

## 2024-11-01 RX ADMIN — ERGOCALCIFEROL 50000 UNITS: 1.25 CAPSULE ORAL at 07:34

## 2024-11-01 RX ADMIN — SODIUM CHLORIDE, PRESERVATIVE FREE 10 ML: 5 INJECTION INTRAVENOUS at 07:41

## 2024-11-01 RX ADMIN — Medication 5 MG: at 21:15

## 2024-11-01 RX ADMIN — Medication 1 TABLET: at 07:34

## 2024-11-01 RX ADMIN — ACETAMINOPHEN 650 MG: 325 TABLET ORAL at 07:37

## 2024-11-01 RX ADMIN — MICONAZOLE NITRATE: 20.6 POWDER TOPICAL at 07:41

## 2024-11-01 RX ADMIN — SODIUM CHLORIDE, PRESERVATIVE FREE 10 ML: 5 INJECTION INTRAVENOUS at 21:15

## 2024-11-01 RX ADMIN — ASPIRIN 81 MG: 81 TABLET, COATED ORAL at 07:33

## 2024-11-01 RX ADMIN — MICONAZOLE NITRATE: 20.6 POWDER TOPICAL at 21:16

## 2024-11-01 RX ADMIN — HYDROXYCHLOROQUINE SULFATE 200 MG: 200 TABLET ORAL at 07:34

## 2024-11-01 RX ADMIN — ENOXAPARIN SODIUM 40 MG: 100 INJECTION SUBCUTANEOUS at 07:33

## 2024-11-01 RX ADMIN — AMLODIPINE BESYLATE 5 MG: 5 TABLET ORAL at 07:33

## 2024-11-01 RX ADMIN — METOPROLOL SUCCINATE 100 MG: 100 TABLET, EXTENDED RELEASE ORAL at 07:33

## 2024-11-01 RX ADMIN — HYDROXYCHLOROQUINE SULFATE 200 MG: 200 TABLET ORAL at 21:15

## 2024-11-01 ASSESSMENT — PAIN SCALES - GENERAL: PAINLEVEL_OUTOF10: 0

## 2024-11-01 NOTE — DISCHARGE SUMMARY
[facility] Hospitalist Physician Discharge Summary       [facility]  [address]  [facility]  [phone]        Leeann Chamorro, APRN - CNP  [address] [facility] [address] [phone]    Follow up      Fabián Dyer MD  [address] [facility] [address]  [phone]    Follow up        Activity level: As tolerated     Dispo: [facility]    Condition on discharge: Stable     Patient ID:  Hien Shi  [id number]  79 y.o.  1945    Admit date: 10/28/2024    Discharge date and time:  11/1/2024  10:31 AM    Admission Diagnoses: Principal Problem:    Unable to ambulate  Resolved Problems:    * No resolved hospital problems. *      Discharge Diagnoses: Principal Problem:    Unable to ambulate  Resolved Problems:    * No resolved hospital problems. *      Consults:  IP CONSULT TO INTERNAL MEDICINE    Hospital Course:     79-year lady presented to ER due to right hip and knee pain going on for last 5 days having difficulty bearing weight and ambulating.  She does report a history of hip and knee replacement in the past on the right side.  Patient reported she had a fall a month ago after that she is been having difficulty moving.  Urinalysis in the ER with no evidence of UTI.  X-ray of the right hip and right knee were obtained.  Which showed postsurgical changes in the right hip and right knee and no obvious complication no acute findings.  CT lumbar spine with no acute lumbar fracture or trauma.  Given difficulty ambulating she has been admitted for placement/rehab.  She mentions that she feels like her left knee just gives out on her and she is weak diffusely.    She was admitted to hospitalist service for placement.  She was started on prn percocet and cymbalta for pain control, she takes Xanax 0.25mg BID prn at home.  CT scan of right knee was completed- Status post right knee arthroplasty with orthopedic hardware in good alignment and position.  tablet  Commonly known as: SENOKOT-S     VITAMIN D3 COMPLETE PO            STOP taking these medications      loratadine 10 MG tablet  Commonly known as: CLARITIN     oxyCODONE-acetaminophen  MG per tablet  Commonly known as: PERCOCET     Xeljanz XR 11 MG Tb24  Generic drug: Tofacitinib Citrate ER               Where to Get Your Medications        These medications were sent to [facility] [place] [address] [address] - P [phone] - F [phone]  [facility], [facility] [address]      Phone: [phone]   bisacodyl 10 MG suppository       You can get these medications from any pharmacy    Bring a paper prescription for each of these medications  cyclobenzaprine 10 MG tablet       Information about where to get these medications is not yet available    Ask your nurse or doctor about these medications  miconazole 2 % powder           Note that more than 30 minutes was spent in preparing discharge papers, discussing discharge with patient, medication review, etc.    Signed:  Electronically signed by Deena Michel MD on 11/1/2024 at 10:31 AM

## 2024-11-01 NOTE — CARE COORDINATION
Transition of care update. Awaiting urine culture. Confused and agitated yesterday, and discharge to SNF put on hold. Attending also holding percocet and Cymbalta. Discharge plan when medically stable is [facility]. Authorization is good through 11/4/24 per liaison.  7000 and ambulance form is completed and is in envelope on the soft chart. YOLANDA and destination updated. CM will follow.  Electronically signed by Lizandro Finley RN on 11/1/2024 at 10:12 AM    Addendum: Discharge order noted. Transport set up with [facility] for 3 pm. Updated RN and liaison. RN stated she will update patient and family. MB

## 2024-11-02 LAB
MICROORGANISM SPEC CULT: ABNORMAL
MICROORGANISM SPEC CULT: ABNORMAL
SERVICE CMNT-IMP: ABNORMAL
SPECIMEN DESCRIPTION: ABNORMAL

## 2024-11-06 ENCOUNTER — OUTSIDE SERVICES (OUTPATIENT)
Dept: PRIMARY CARE CLINIC | Age: 79
End: 2024-11-06

## 2024-11-06 DIAGNOSIS — R26.2 UNABLE TO AMBULATE: Primary | ICD-10-CM

## 2024-11-06 DIAGNOSIS — I10 ACCELERATED HYPERTENSION: ICD-10-CM

## 2024-11-06 DIAGNOSIS — R53.1 GENERALIZED WEAKNESS: ICD-10-CM

## 2024-11-06 DIAGNOSIS — M79.7 FIBROMYALGIA: ICD-10-CM
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
## 2024-11-06 DIAGNOSIS — G62.9 NEUROPATHY: Chronic | ICD-10-CM

## 2024-11-06 DIAGNOSIS — E78.2 MIXED HYPERLIPIDEMIA: ICD-10-CM

## 2024-11-06 DIAGNOSIS — M25.561 RIGHT KNEE PAIN, UNSPECIFIED CHRONICITY: ICD-10-CM

## 2024-11-06 DIAGNOSIS — G25.81 RESTLESS LEG: ICD-10-CM

## 2024-11-06 DIAGNOSIS — Z91.81 AT MAXIMUM RISK FOR FALL: ICD-10-CM

## 2024-11-06 DIAGNOSIS — I25.10 CORONARY ARTERY DISEASE INVOLVING NATIVE CORONARY ARTERY OF NATIVE HEART WITHOUT ANGINA PECTORIS: ICD-10-CM

## 2024-11-06 DIAGNOSIS — R53.81 PHYSICAL DECONDITIONING: ICD-10-CM

## 2024-11-06 NOTE — PROGRESS NOTES
LITHOTRIPSY      SKIN BIOPSY      TONSILLECTOMY        No family history on file.   Social History     Socioeconomic History    Marital status:    Occupational History    Occupation: disabled- [employer]   Tobacco Use    Smoking status: Former     Current packs/day: 0.00     Average packs/day: 2.5 packs/day for 40.0 years (100.0 ttl pk-yrs)     Types: Cigarettes     Start date: 9/3/1960     Quit date: 9/3/2000     Years since quittin.1    Smokeless tobacco: Never   Vaping Use    Vaping status: Never Used   Substance and Sexual Activity    Alcohol use: No     Comment: drinks 3 cups of tea daily    Drug use: No     Social Determinants of Health     Food Insecurity: No Food Insecurity (10/29/2024)    Hunger Vital Sign     Worried About Running Out of Food in the Last Year: Never true     Ran Out of Food in the Last Year: Never true   Transportation Needs: No Transportation Needs (10/29/2024)    PRAPARE - Transportation     Lack of Transportation (Medical): No     Lack of Transportation (Non-Medical): No   Housing Stability: Low Risk  (10/29/2024)    Housing Stability Vital Sign     Unable to Pay for Housing in the Last Year: No     Number of Times Moved in the Last Year: 1     Homeless in the Last Year: No        HPI    Hien Shi is a pleasant and cooperative 79-year-old female.  Hospital course discussed.  Patient was admitted for fall and inability to ambulate.  She improved and was thus discharged to facility stable condition.  Currently, patient states that she still having pain in her right knee and it feels more like a soreness.  She otherwise states she is doing okay.  She denies any chest pain, shortness of breath, lightheadedness, dizziness, nausea, vomiting, or issues moving her bowels or urinating.     Allergies   Allergen Reactions    Iodine Shortness Of Breath    Macrodantin [Nitrofurantoin] Shortness Of Breath    Prednisone Shortness Of Breath and Palpitations    Sulfa Antibiotics

## 2024-11-08 ENCOUNTER — OUTSIDE SERVICES (OUTPATIENT)
Dept: PRIMARY CARE CLINIC | Age: 79
End: 2024-11-08

## 2024-11-08 DIAGNOSIS — M06.9 RHEUMATOID ARTHRITIS INVOLVING BOTH HANDS, UNSPECIFIED WHETHER RHEUMATOID FACTOR PRESENT (HCC): ICD-10-CM

## 2024-11-08 DIAGNOSIS — R26.2 UNABLE TO AMBULATE: ICD-10-CM

## 2024-11-08 DIAGNOSIS — I25.10 CORONARY ARTERY DISEASE INVOLVING NATIVE CORONARY ARTERY OF NATIVE HEART WITHOUT ANGINA PECTORIS: ICD-10-CM

## 2024-11-08 DIAGNOSIS — I10 ACCELERATED HYPERTENSION: ICD-10-CM

## 2024-11-08 DIAGNOSIS — G62.9 NEUROPATHY: ICD-10-CM

## 2024-11-08 DIAGNOSIS — R53.1 GENERALIZED WEAKNESS: ICD-10-CM

## 2024-11-08 DIAGNOSIS — I73.9 PVD (PERIPHERAL VASCULAR DISEASE) WITH CLAUDICATION (HCC): Primary | ICD-10-CM

## 2024-11-08 DIAGNOSIS — M25.561 RIGHT KNEE PAIN, UNSPECIFIED CHRONICITY: ICD-10-CM

## 2024-11-08 DIAGNOSIS — E78.2 MIXED HYPERLIPIDEMIA: ICD-10-CM
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
## 2024-11-08 DIAGNOSIS — M79.7 FIBROMYALGIA: ICD-10-CM

## 2024-11-08 DIAGNOSIS — G25.81 RESTLESS LEG: ICD-10-CM

## 2024-11-11 ENCOUNTER — OUTSIDE SERVICES (OUTPATIENT)
Dept: PRIMARY CARE CLINIC | Age: 79
End: 2024-11-11

## 2024-11-11 DIAGNOSIS — I10 ACCELERATED HYPERTENSION: ICD-10-CM

## 2024-11-11 DIAGNOSIS — I25.10 CORONARY ARTERY DISEASE INVOLVING NATIVE CORONARY ARTERY OF NATIVE HEART WITHOUT ANGINA PECTORIS: ICD-10-CM

## 2024-11-11 DIAGNOSIS — M06.9 RHEUMATOID ARTHRITIS INVOLVING BOTH HANDS, UNSPECIFIED WHETHER RHEUMATOID FACTOR PRESENT (HCC): ICD-10-CM

## 2024-11-11 DIAGNOSIS — R53.1 GENERALIZED WEAKNESS: ICD-10-CM

## 2024-11-11 DIAGNOSIS — G62.9 NEUROPATHY: ICD-10-CM

## 2024-11-11 DIAGNOSIS — I73.9 PVD (PERIPHERAL VASCULAR DISEASE) WITH CLAUDICATION (HCC): Primary | ICD-10-CM

## 2024-11-11 DIAGNOSIS — M25.561 RIGHT KNEE PAIN, UNSPECIFIED CHRONICITY: ICD-10-CM

## 2024-11-11 DIAGNOSIS — R26.2 UNABLE TO AMBULATE: ICD-10-CM

## 2024-11-11 DIAGNOSIS — G25.81 RESTLESS LEG: ICD-10-CM

## 2024-11-11 DIAGNOSIS — E78.2 MIXED HYPERLIPIDEMIA: ICD-10-CM

## 2024-11-11 DIAGNOSIS — M79.7 FIBROMYALGIA: ICD-10-CM
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
## 2024-11-13 ENCOUNTER — OUTSIDE SERVICES (OUTPATIENT)
Dept: PRIMARY CARE CLINIC | Age: 79
End: 2024-11-13

## 2024-11-13 DIAGNOSIS — M06.9 RHEUMATOID ARTHRITIS INVOLVING BOTH HANDS, UNSPECIFIED WHETHER RHEUMATOID FACTOR PRESENT (HCC): ICD-10-CM

## 2024-11-13 DIAGNOSIS — R53.1 GENERALIZED WEAKNESS: ICD-10-CM

## 2024-11-13 DIAGNOSIS — E78.2 MIXED HYPERLIPIDEMIA: ICD-10-CM

## 2024-11-13 DIAGNOSIS — I25.10 CORONARY ARTERY DISEASE INVOLVING NATIVE CORONARY ARTERY OF NATIVE HEART WITHOUT ANGINA PECTORIS: ICD-10-CM

## 2024-11-13 DIAGNOSIS — I73.9 PVD (PERIPHERAL VASCULAR DISEASE) WITH CLAUDICATION (HCC): Primary | ICD-10-CM

## 2024-11-13 DIAGNOSIS — M25.561 RIGHT KNEE PAIN, UNSPECIFIED CHRONICITY: ICD-10-CM
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
## 2024-11-13 DIAGNOSIS — G62.9 NEUROPATHY: ICD-10-CM

## 2024-11-13 DIAGNOSIS — I10 ACCELERATED HYPERTENSION: ICD-10-CM

## 2024-11-13 DIAGNOSIS — R26.2 UNABLE TO AMBULATE: ICD-10-CM

## 2024-11-13 DIAGNOSIS — G25.81 RESTLESS LEG: ICD-10-CM

## 2024-11-13 DIAGNOSIS — M79.7 FIBROMYALGIA: ICD-10-CM

## 2024-11-15 ENCOUNTER — OUTSIDE SERVICES (OUTPATIENT)
Dept: PRIMARY CARE CLINIC | Age: 79
End: 2024-11-15

## 2024-11-15 DIAGNOSIS — I10 ACCELERATED HYPERTENSION: ICD-10-CM

## 2024-11-15 DIAGNOSIS — G25.81 RESTLESS LEG: ICD-10-CM

## 2024-11-15 DIAGNOSIS — G62.9 NEUROPATHY: ICD-10-CM

## 2024-11-15 DIAGNOSIS — M79.7 FIBROMYALGIA: ICD-10-CM

## 2024-11-15 DIAGNOSIS — I73.9 PVD (PERIPHERAL VASCULAR DISEASE) WITH CLAUDICATION (HCC): Primary | ICD-10-CM

## 2024-11-15 DIAGNOSIS — R53.1 GENERALIZED WEAKNESS: ICD-10-CM

## 2024-11-15 DIAGNOSIS — R26.2 UNABLE TO AMBULATE: ICD-10-CM

## 2024-11-15 DIAGNOSIS — E78.2 MIXED HYPERLIPIDEMIA: ICD-10-CM
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
## 2024-11-15 DIAGNOSIS — M06.9 RHEUMATOID ARTHRITIS INVOLVING BOTH HANDS, UNSPECIFIED WHETHER RHEUMATOID FACTOR PRESENT (HCC): ICD-10-CM

## 2024-11-15 DIAGNOSIS — M25.561 RIGHT KNEE PAIN, UNSPECIFIED CHRONICITY: ICD-10-CM

## 2024-11-15 DIAGNOSIS — I25.10 CORONARY ARTERY DISEASE INVOLVING NATIVE CORONARY ARTERY OF NATIVE HEART WITHOUT ANGINA PECTORIS: ICD-10-CM

## 2024-11-18 ENCOUNTER — OUTSIDE SERVICES (OUTPATIENT)
Dept: PRIMARY CARE CLINIC | Age: 79
End: 2024-11-18

## 2024-11-18 DIAGNOSIS — R53.1 GENERALIZED WEAKNESS: ICD-10-CM

## 2024-11-18 DIAGNOSIS — M06.9 RHEUMATOID ARTHRITIS INVOLVING BOTH HANDS, UNSPECIFIED WHETHER RHEUMATOID FACTOR PRESENT (HCC): ICD-10-CM

## 2024-11-18 DIAGNOSIS — M79.7 FIBROMYALGIA: ICD-10-CM

## 2024-11-18 DIAGNOSIS — G62.9 NEUROPATHY: ICD-10-CM

## 2024-11-18 DIAGNOSIS — M25.561 RIGHT KNEE PAIN, UNSPECIFIED CHRONICITY: ICD-10-CM
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
## 2024-11-18 DIAGNOSIS — G25.81 RESTLESS LEG: ICD-10-CM

## 2024-11-18 DIAGNOSIS — I25.10 CORONARY ARTERY DISEASE INVOLVING NATIVE CORONARY ARTERY OF NATIVE HEART WITHOUT ANGINA PECTORIS: ICD-10-CM

## 2024-11-18 DIAGNOSIS — I10 ACCELERATED HYPERTENSION: ICD-10-CM

## 2024-11-18 DIAGNOSIS — R26.2 UNABLE TO AMBULATE: ICD-10-CM

## 2024-11-18 DIAGNOSIS — E78.2 MIXED HYPERLIPIDEMIA: ICD-10-CM

## 2024-11-18 DIAGNOSIS — I73.9 PVD (PERIPHERAL VASCULAR DISEASE) WITH CLAUDICATION (HCC): Primary | ICD-10-CM

## 2024-11-20 ENCOUNTER — OUTSIDE SERVICES (OUTPATIENT)
Dept: PRIMARY CARE CLINIC | Age: 79
End: 2024-11-20

## 2024-11-20 DIAGNOSIS — M79.7 FIBROMYALGIA: ICD-10-CM

## 2024-11-20 DIAGNOSIS — I25.10 CORONARY ARTERY DISEASE INVOLVING NATIVE CORONARY ARTERY OF NATIVE HEART WITHOUT ANGINA PECTORIS: ICD-10-CM

## 2024-11-20 DIAGNOSIS — G25.81 RESTLESS LEG: ICD-10-CM

## 2024-11-20 DIAGNOSIS — M25.561 RIGHT KNEE PAIN, UNSPECIFIED CHRONICITY: ICD-10-CM

## 2024-11-20 DIAGNOSIS — G62.9 NEUROPATHY: ICD-10-CM

## 2024-11-20 DIAGNOSIS — E78.2 MIXED HYPERLIPIDEMIA: ICD-10-CM

## 2024-11-20 DIAGNOSIS — R26.2 UNABLE TO AMBULATE: ICD-10-CM
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
## 2024-11-20 DIAGNOSIS — M06.9 RHEUMATOID ARTHRITIS INVOLVING BOTH HANDS, UNSPECIFIED WHETHER RHEUMATOID FACTOR PRESENT (HCC): ICD-10-CM

## 2024-11-20 DIAGNOSIS — I10 ACCELERATED HYPERTENSION: ICD-10-CM

## 2024-11-20 DIAGNOSIS — I73.9 PVD (PERIPHERAL VASCULAR DISEASE) WITH CLAUDICATION (HCC): Primary | ICD-10-CM

## 2024-11-20 DIAGNOSIS — R53.1 GENERALIZED WEAKNESS: ICD-10-CM

## 2024-11-22 ENCOUNTER — OUTSIDE SERVICES (OUTPATIENT)
Dept: PRIMARY CARE CLINIC | Age: 79
End: 2024-11-22

## 2024-11-22 ASSESSMENT — ENCOUNTER SYMPTOMS
SHORTNESS OF BREATH: 0
COUGH: 0
RHINORRHEA: 0
ABDOMINAL DISTENTION: 0
COUGH: 0
RHINORRHEA: 0
CONSTIPATION: 0
CHEST TIGHTNESS: 0
SORE THROAT: 0
SORE THROAT: 0
CHEST TIGHTNESS: 0
PHOTOPHOBIA: 0
VOMITING: 0
VOMITING: 0
COUGH: 0
SORE THROAT: 0
EYE ITCHING: 0
SORE THROAT: 0
CHEST TIGHTNESS: 0
SINUS PRESSURE: 0
CHEST TIGHTNESS: 0
ABDOMINAL DISTENTION: 0
PHOTOPHOBIA: 0
CONSTIPATION: 0
RHINORRHEA: 0
CONSTIPATION: 0
DIARRHEA: 0
PHOTOPHOBIA: 0
DIARRHEA: 0
SINUS PRESSURE: 0
PHOTOPHOBIA: 0
COUGH: 0
NAUSEA: 0
ABDOMINAL PAIN: 0
ABDOMINAL PAIN: 0
SINUS PRESSURE: 0
CHEST TIGHTNESS: 0
NAUSEA: 0
EYE PAIN: 0
SORE THROAT: 0
EYE REDNESS: 0
VOMITING: 0
EYE DISCHARGE: 0
NAUSEA: 0
RHINORRHEA: 0
DIARRHEA: 0
WHEEZING: 0
WHEEZING: 0
ABDOMINAL DISTENTION: 0
VOMITING: 0
DIARRHEA: 0
SHORTNESS OF BREATH: 0
NAUSEA: 0
CONSTIPATION: 0
WHEEZING: 0
EYE ITCHING: 0
PHOTOPHOBIA: 0
CHEST TIGHTNESS: 0
ABDOMINAL PAIN: 0
WHEEZING: 0
EYE ITCHING: 0
VOMITING: 0
VOMITING: 0
EYE DISCHARGE: 0
EYE PAIN: 0
PHOTOPHOBIA: 0
NAUSEA: 0
SHORTNESS OF BREATH: 0
CONSTIPATION: 0
SORE THROAT: 0
CHEST TIGHTNESS: 0
NAUSEA: 0
ABDOMINAL PAIN: 0
CONSTIPATION: 0
ABDOMINAL PAIN: 0
SHORTNESS OF BREATH: 0
SHORTNESS OF BREATH: 0
WHEEZING: 0
DIARRHEA: 0
VOMITING: 0
ABDOMINAL DISTENTION: 0
EYE REDNESS: 0
RHINORRHEA: 0
WHEEZING: 0
SINUS PRESSURE: 0
CONSTIPATION: 0
EYE ITCHING: 0
SHORTNESS OF BREATH: 0
SINUS PRESSURE: 0
SORE THROAT: 0
EYE DISCHARGE: 0
EYE REDNESS: 0
WHEEZING: 0
EYE REDNESS: 0
EYE REDNESS: 0
RHINORRHEA: 0
EYE ITCHING: 0
PHOTOPHOBIA: 0
EYE ITCHING: 0
COUGH: 0
EYE DISCHARGE: 0
COUGH: 0
ABDOMINAL PAIN: 0
DIARRHEA: 0
SINUS PRESSURE: 0
EYE DISCHARGE: 0
DIARRHEA: 0
NAUSEA: 0
EYE PAIN: 0
EYE REDNESS: 0
EYE ITCHING: 0
ABDOMINAL DISTENTION: 0
EYE PAIN: 0
EYE PAIN: 0
EYE DISCHARGE: 0
EYE PAIN: 0
COUGH: 0
RHINORRHEA: 0
SHORTNESS OF BREATH: 0
EYE PAIN: 0
ABDOMINAL DISTENTION: 0
EYE DISCHARGE: 0
SINUS PRESSURE: 0
ABDOMINAL DISTENTION: 0
ABDOMINAL PAIN: 0
EYE REDNESS: 0

## 2024-11-23 NOTE — PROGRESS NOTES
Hien Shi (:  1945) is a 79 y.o. female that is seen today for skilled assessment    Subjective     Location: [facility] nursing [facility]  All nursing and progress notes reviewed.    Hien is awake alert with mild confusion.  She is sitting up in wheelchair working with her therapist.  She complains of chronic generalized discomfort and remains on pain medication as needed.  She continues to work in therapies as tolerated. Nursing has no concerns and will let Dr TYLER or PA know of any changes.    Past Medical History:   Diagnosis Date    Aorto-iliac atherosclerosis ([facility]) 2020    Arthritis     Atherosclerosis of native arteries of extremity with rest pain ([facility]) 2020    Bladder prolapse     CAD (coronary artery disease)     MI      Carotid artery stenosis     Right carotid stenosis.    Carotid bruit 1/10/2013    Carotid stenosis, left 2012    Cerebrovascular accident (stroke) ([facility])     pt states 13 yrs ago    Decreased radial pulse 2017    Gastric reflux     Glaucoma     History of blood transfusion     Hyperlipemia     Hypertension     Insomnia     Post-operative state 2020    PVD (peripheral vascular disease) with claudication ([facility]) 2020    Restless leg syndrome     Sinusitis        Allergies   Allergen Reactions    Iodine Shortness Of Breath    Macrodantin [Nitrofurantoin] Shortness Of Breath    Prednisone Shortness Of Breath and Palpitations    Sulfa Antibiotics Shortness Of Breath    5-Alpha Reductase Inhibitors     Keflex [Cephalexin]     Phenergan [Promethazine Hcl]       Current Outpatient Medications   Medication Sig Dispense Refill    miconazole (MICOTIN) 2 % powder Apply topically 2 times daily.      bisacodyl (DULCOLAX) 10 MG suppository Place 1 suppository rectally daily as needed for Constipation 30 suppository 0    rosuvastatin (CRESTOR) 10 MG tablet Take 1 tablet by mouth daily 30 tablet 3    ALPRAZolam (XANAX) 0.25 MG tablet Take 0.25 mg by

## 2024-11-23 NOTE — PROGRESS NOTES
A user error has taken place: encounter opened in error, closed for administrative reasons.    
diagnosis    Review and continue Xanax 0.25 mg twice a day for anxiety    Review and continue cyclobenzaprine 10 mg every 8 hours for muscle spasms           Over 30 min was spent between patient care, chart review, discussing patient management with nursing staff and interpreting diagnostics      An electronic signature was used to authenticate this note.    --GRACE Marsical - CNP   This office note has been dictated.

## 2024-11-23 NOTE — PROGRESS NOTES
Hien Shi (:  1945) is a 79 y.o. female that is seen today for skilled assessment    Subjective     Location: [facility] nursing [facility]  All nursing and progress notes reviewed.    Hien is awake alert with mild confusion.  She is sitting up in wheelchair working with her therapist.  She complains of chronic generalized discomfort at this time.  She remains on pain medication as needed.  She did have complaints of feeling like she had spiders crawling on her on the Percocet 5-325 mg every 12 hours as needed, changed medication to a half a tablet every 12 hours as needed.  She continues to work in therapies as tolerated. Nursing has no concerns and will let Dr TYLER or PA know of any changes.    Past Medical History:   Diagnosis Date    Aorto-iliac atherosclerosis ([facility]) 2020    Arthritis     Atherosclerosis of native arteries of extremity with rest pain ([facility]) 2020    Bladder prolapse     CAD (coronary artery disease)     MI      Carotid artery stenosis     Right carotid stenosis.    Carotid bruit 1/10/2013    Carotid stenosis, left 2012    Cerebrovascular accident (stroke) ([facility])     pt states 13 yrs ago    Decreased radial pulse 2017    Gastric reflux     Glaucoma     History of blood transfusion     Hyperlipemia     Hypertension     Insomnia     Post-operative state 2020    PVD (peripheral vascular disease) with claudication ([facility]) 2020    Restless leg syndrome     Sinusitis        Allergies   Allergen Reactions    Iodine Shortness Of Breath    Macrodantin [Nitrofurantoin] Shortness Of Breath    Prednisone Shortness Of Breath and Palpitations    Sulfa Antibiotics Shortness Of Breath    5-Alpha Reductase Inhibitors     Keflex [Cephalexin]     Phenergan [Promethazine Hcl]       Current Outpatient Medications   Medication Sig Dispense Refill    miconazole (MICOTIN) 2 % powder Apply topically 2 times daily.      bisacodyl (DULCOLAX) 10 MG suppository Place 1

## 2024-11-23 NOTE — PROGRESS NOTES
Hien Shi (:  1945) is a 79 y.o. female that is seen today for skilled assessment    Subjective     Location: [facility]  All nursing and progress notes reviewed.    Hien is resting in bed in room with her TV on.  She is awake alert with mild confusion.  She has complaints of bilateral knee and leg discomfort, will add percocet 5-325 mg q12 as needed. She agrees with plan. She continues to work in therapies as tolerated. No concerns from nursing. Nursing will let Dr SCOTT or PA know of any changes.    Past Medical History:   Diagnosis Date    Aorto-iliac atherosclerosis ([facility]) 2020    Arthritis     Atherosclerosis of native arteries of extremity with rest pain ([facility]) 2020    Bladder prolapse     CAD (coronary artery disease)     MI      Carotid artery stenosis     Right carotid stenosis.    Carotid bruit 1/10/2013    Carotid stenosis, left 2012    Cerebrovascular accident (stroke) ([facility])     pt states 13 yrs ago    Decreased radial pulse 2017    Gastric reflux     Glaucoma     History of blood transfusion     Hyperlipemia     Hypertension     Insomnia     Post-operative state 2020    PVD (peripheral vascular disease) with claudication ([facility]) 2020    Restless leg syndrome     Sinusitis        Allergies   Allergen Reactions    Iodine Shortness Of Breath    Macrodantin [Nitrofurantoin] Shortness Of Breath    Prednisone Shortness Of Breath and Palpitations    Sulfa Antibiotics Shortness Of Breath    5-Alpha Reductase Inhibitors     Keflex [Cephalexin]     Phenergan [Promethazine Hcl]       Current Outpatient Medications   Medication Sig Dispense Refill    miconazole (MICOTIN) 2 % powder Apply topically 2 times daily.      bisacodyl (DULCOLAX) 10 MG suppository Place 1 suppository rectally daily as needed for Constipation 30 suppository 0    rosuvastatin (CRESTOR) 10 MG tablet Take 1 tablet by mouth daily 30 tablet 3    ALPRAZolam (XANAX) 0.25 MG

## 2024-11-23 NOTE — PROGRESS NOTES
Cardiovascular:  Negative for chest pain and palpitations.   Gastrointestinal:  Negative for abdominal distention, abdominal pain, constipation, diarrhea, nausea and vomiting.   Musculoskeletal:  Positive for arthralgias, gait problem and myalgias. Negative for neck pain and neck stiffness.   Neurological:  Positive for weakness. Negative for dizziness, seizures, syncope, light-headedness and headaches.          Objective   Physical Exam  Vitals and nursing note reviewed.   Constitutional:       General: She is not in acute distress.     Appearance: Normal appearance. She is not ill-appearing.   HENT:      Head: Normocephalic and atraumatic.      Right Ear: External ear normal.      Left Ear: External ear normal.      Nose: Nose normal.      Mouth/Throat:      Mouth: Mucous membranes are moist.      Pharynx: Oropharynx is clear.   Eyes:      General:         Right eye: No discharge.         Left eye: No discharge.      Conjunctiva/sclera: Conjunctivae normal.      Pupils: Pupils are equal, round, and reactive to light.   Cardiovascular:      Rate and Rhythm: Normal rate and regular rhythm.      Heart sounds: Normal heart sounds. No murmur heard.     No friction rub. No gallop.   Pulmonary:      Effort: No respiratory distress.      Breath sounds: Normal breath sounds. No wheezing, rhonchi or rales.   Abdominal:      General: Bowel sounds are normal. There is no distension.      Palpations: Abdomen is soft.      Tenderness: There is no abdominal tenderness. There is no guarding or rebound.   Musculoskeletal:      Cervical back: No rigidity or tenderness.      Comments: Weakness  + edema Bilateral lower extremeties   Skin:     General: Skin is warm and dry.   Neurological:      Mental Status: She is alert.      Motor: Weakness present.      Gait: Gait abnormal.      Comments: Mild confusion   Psychiatric:         Mood and Affect: Mood normal.         Behavior: Behavior normal.     Recent Labs     11/01/24  [id number]

## 2024-11-23 NOTE — PROGRESS NOTES
Positive for arthralgias, gait problem and myalgias. Negative for neck pain and neck stiffness.   Neurological:  Positive for weakness. Negative for dizziness, seizures, syncope, light-headedness and headaches.          Objective   Physical Exam  Vitals and nursing note reviewed.   Constitutional:       General: She is not in acute distress.     Appearance: Normal appearance. She is not ill-appearing.   HENT:      Head: Normocephalic and atraumatic.      Right Ear: External ear normal.      Left Ear: External ear normal.      Nose: Nose normal.      Mouth/Throat:      Mouth: Mucous membranes are moist.      Pharynx: Oropharynx is clear.   Eyes:      General:         Right eye: No discharge.         Left eye: No discharge.      Conjunctiva/sclera: Conjunctivae normal.      Pupils: Pupils are equal, round, and reactive to light.   Cardiovascular:      Rate and Rhythm: Normal rate and regular rhythm.      Heart sounds: Normal heart sounds. No murmur heard.     No friction rub. No gallop.   Pulmonary:      Effort: No respiratory distress.      Breath sounds: Normal breath sounds. No wheezing, rhonchi or rales.   Abdominal:      General: Bowel sounds are normal. There is no distension.      Palpations: Abdomen is soft.      Tenderness: There is no abdominal tenderness. There is no guarding or rebound.   Musculoskeletal:      Cervical back: No rigidity or tenderness.      Comments: Weakness  + edema Bilateral lower extremeties   Skin:     General: Skin is warm and dry.   Neurological:      Mental Status: She is alert.      Motor: Weakness present.      Gait: Gait abnormal.      Comments: Mild confusion   Psychiatric:         Mood and Affect: Mood normal.         Behavior: Behavior normal.     Recent Labs     11/01/24  0713 10/31/24  1624 10/30/24  0440   WBC 9.1 10.5 5.3   HGB 10.0* 10.8* 9.4*   HCT 33.0* 35.1 30.2*   MCV 94.6 92.6 93.8    292 262      Lab Results   Component Value Date     11/01/2024    K

## 2024-11-23 NOTE — PROGRESS NOTES
Hien Shi (:  1945) is a 79 y.o. female that is seen today for skilled assessment    Subjective     Location: [facility] nursing [facility]  All nursing and progress notes reviewed.    Hien is awake alert with mild confusion.  She is sitting up in wheelchair in room with her TV on.  She has complaints of right shoulder discomfort and continues to have a chronic knee and leg discomfort.  Will order x-ray for right shoulder.  She remains on pain medication as needed. She continues to work in therapies as tolerated. Nursing has no concerns and will let Dr SCOTT or PA know of any changes.    Past Medical History:   Diagnosis Date    Aorto-iliac atherosclerosis ([facility]) 2020    Arthritis     Atherosclerosis of native arteries of extremity with rest pain ([facility]) 2020    Bladder prolapse     CAD (coronary artery disease)     MI      Carotid artery stenosis     Right carotid stenosis.    Carotid bruit 1/10/2013    Carotid stenosis, left 2012    Cerebrovascular accident (stroke) ([facility])     pt states 13 yrs ago    Decreased radial pulse 2017    Gastric reflux     Glaucoma     History of blood transfusion     Hyperlipemia     Hypertension     Insomnia     Post-operative state 2020    PVD (peripheral vascular disease) with claudication ([facility]) 2020    Restless leg syndrome     Sinusitis        Allergies   Allergen Reactions    Iodine Shortness Of Breath    Macrodantin [Nitrofurantoin] Shortness Of Breath    Prednisone Shortness Of Breath and Palpitations    Sulfa Antibiotics Shortness Of Breath    5-Alpha Reductase Inhibitors     Keflex [Cephalexin]     Phenergan [Promethazine Hcl]       Current Outpatient Medications   Medication Sig Dispense Refill    miconazole (MICOTIN) 2 % powder Apply topically 2 times daily.      bisacodyl (DULCOLAX) 10 MG suppository Place 1 suppository rectally daily as needed for Constipation 30 suppository 0    rosuvastatin (CRESTOR) 10 MG tablet Take

## 2024-12-02 PROCEDURE — 99285 EMERGENCY DEPT VISIT HI MDM: CPT

## 2024-12-02 ASSESSMENT — PAIN - FUNCTIONAL ASSESSMENT: PAIN_FUNCTIONAL_ASSESSMENT: NONE - DENIES PAIN

## 2024-12-03 ENCOUNTER — HOSPITAL ENCOUNTER (OUTPATIENT)
Age: 79
Setting detail: OBSERVATION
Discharge: NURSING, REHAB OR LONG-TERM CARE | End: 2024-12-04
Attending: STUDENT IN AN ORGANIZED HEALTH CARE EDUCATION/TRAINING PROGRAM | Admitting: FAMILY MEDICINE
Payer: COMMERCIAL

## 2024-12-03 ENCOUNTER — APPOINTMENT (OUTPATIENT)
Dept: GENERAL RADIOLOGY | Age: 79
End: 2024-12-03
Payer: COMMERCIAL

## 2024-12-03 DIAGNOSIS — R53.1 GENERALIZED WEAKNESS: ICD-10-CM

## 2024-12-03 DIAGNOSIS — R53.83 FATIGUE, UNSPECIFIED TYPE: ICD-10-CM

## 2024-12-03 DIAGNOSIS — R05.9 COUGH, UNSPECIFIED TYPE: ICD-10-CM

## 2024-12-03 DIAGNOSIS — B34.8 PARAINFLUENZA: ICD-10-CM

## 2024-12-03 DIAGNOSIS — J18.9 PNEUMONIA DUE TO INFECTIOUS ORGANISM, UNSPECIFIED LATERALITY, UNSPECIFIED PART OF LUNG: Primary | ICD-10-CM

## 2024-12-03 DIAGNOSIS — R07.9 CHEST PAIN, UNSPECIFIED TYPE: ICD-10-CM

## 2024-12-03 PROBLEM — J15.9 COMMUNITY ACQUIRED BACTERIAL PNEUMONIA: Status: ACTIVE | Noted: 2024-12-03

## 2024-12-03 PROBLEM — R53.81 PHYSICAL DECONDITIONING: Status: ACTIVE | Noted: 2024-12-03

## 2024-12-03 LAB
ALBUMIN SERPL-MCNC: 3.5 G/DL (ref 3.5–5.2)
ALP SERPL-CCNC: 122 U/L (ref 35–104)
ALT SERPL-CCNC: 7 U/L (ref 0–32)
ANION GAP SERPL CALCULATED.3IONS-SCNC: 9 MMOL/L (ref 7–16)
AST SERPL-CCNC: 19 U/L (ref 0–31)
B PARAP IS1001 DNA NPH QL NAA+NON-PROBE: NOT DETECTED
B PERT DNA SPEC QL NAA+PROBE: NOT DETECTED
BASOPHILS # BLD: 0.03 K/UL (ref 0–0.2)
BASOPHILS NFR BLD: 1 % (ref 0–2)
BILIRUB SERPL-MCNC: 0.5 MG/DL (ref 0–1.2)
BNP SERPL-MCNC: 635 PG/ML (ref 0–450)
BUN SERPL-MCNC: 15 MG/DL (ref 6–23)
C PNEUM DNA NPH QL NAA+NON-PROBE: NOT DETECTED
CALCIUM SERPL-MCNC: 9.1 MG/DL (ref 8.6–10.2)
CHLORIDE SERPL-SCNC: 103 MMOL/L (ref 98–107)
CO2 SERPL-SCNC: 25 MMOL/L (ref 22–29)
CREAT SERPL-MCNC: 1 MG/DL (ref 0.5–1)
EKG ATRIAL RATE: 76 BPM
EKG P AXIS: 79 DEGREES
EKG P-R INTERVAL: 140 MS
EKG Q-T INTERVAL: 406 MS
EKG QRS DURATION: 110 MS
EKG QTC CALCULATION (BAZETT): 456 MS
EKG R AXIS: 79 DEGREES
EKG T AXIS: 25 DEGREES
EKG VENTRICULAR RATE: 76 BPM
EOSINOPHIL # BLD: 0.37 K/UL (ref 0.05–0.5)
EOSINOPHILS RELATIVE PERCENT: 6 % (ref 0–6)
ERYTHROCYTE [DISTWIDTH] IN BLOOD BY AUTOMATED COUNT: 15.2 % (ref 11.5–15)
FLUAV RNA NPH QL NAA+NON-PROBE: NOT DETECTED
FLUBV RNA NPH QL NAA+NON-PROBE: NOT DETECTED
GFR, ESTIMATED: 60 ML/MIN/1.73M2
GLUCOSE SERPL-MCNC: 104 MG/DL (ref 74–99)
HADV DNA NPH QL NAA+NON-PROBE: NOT DETECTED
HCOV 229E RNA NPH QL NAA+NON-PROBE: NOT DETECTED
HCOV HKU1 RNA NPH QL NAA+NON-PROBE: NOT DETECTED
HCOV NL63 RNA NPH QL NAA+NON-PROBE: NOT DETECTED
HCOV OC43 RNA NPH QL NAA+NON-PROBE: NOT DETECTED
HCT VFR BLD AUTO: 32.1 % (ref 34–48)
HGB BLD-MCNC: 9.7 G/DL (ref 11.5–15.5)
HMPV RNA NPH QL NAA+NON-PROBE: NOT DETECTED
HPIV1 RNA NPH QL NAA+NON-PROBE: NOT DETECTED
HPIV2 RNA NPH QL NAA+NON-PROBE: NOT DETECTED
HPIV3 RNA NPH QL NAA+NON-PROBE: NOT DETECTED
HPIV4 RNA NPH QL NAA+NON-PROBE: DETECTED
IMM GRANULOCYTES # BLD AUTO: <0.03 K/UL (ref 0–0.58)
IMM GRANULOCYTES NFR BLD: 0 % (ref 0–5)
LYMPHOCYTES NFR BLD: 1.28 K/UL (ref 1.5–4)
LYMPHOCYTES RELATIVE PERCENT: 22 % (ref 20–42)
M PNEUMO DNA NPH QL NAA+NON-PROBE: NOT DETECTED
MCH RBC QN AUTO: 27.2 PG (ref 26–35)
MCHC RBC AUTO-ENTMCNC: 30.2 G/DL (ref 32–34.5)
MCV RBC AUTO: 89.9 FL (ref 80–99.9)
MONOCYTES NFR BLD: 0.81 K/UL (ref 0.1–0.95)
MONOCYTES NFR BLD: 14 % (ref 2–12)
NEUTROPHILS NFR BLD: 57 % (ref 43–80)
NEUTS SEG NFR BLD: 3.32 K/UL (ref 1.8–7.3)
PLATELET # BLD AUTO: 247 K/UL (ref 130–450)
PMV BLD AUTO: 9.5 FL (ref 7–12)
POTASSIUM SERPL-SCNC: 4.4 MMOL/L (ref 3.5–5)
PROT SERPL-MCNC: 6.8 G/DL (ref 6.4–8.3)
RBC # BLD AUTO: 3.57 M/UL (ref 3.5–5.5)
RSV RNA NPH QL NAA+NON-PROBE: NOT DETECTED
RV+EV RNA NPH QL NAA+NON-PROBE: NOT DETECTED
SARS-COV-2 RNA NPH QL NAA+NON-PROBE: NOT DETECTED
SODIUM SERPL-SCNC: 137 MMOL/L (ref 132–146)
SPECIMEN DESCRIPTION: ABNORMAL
TROPONIN I SERPL HS-MCNC: <6 NG/L (ref 0–9)
TROPONIN I SERPL HS-MCNC: <6 NG/L (ref 0–9)
WBC OTHER # BLD: 5.8 K/UL (ref 4.5–11.5)

## 2024-12-03 PROCEDURE — 71045 X-RAY EXAM CHEST 1 VIEW: CPT

## 2024-12-03 PROCEDURE — 84484 ASSAY OF TROPONIN QUANT: CPT

## 2024-12-03 PROCEDURE — 83880 ASSAY OF NATRIURETIC PEPTIDE: CPT

## 2024-12-03 PROCEDURE — 96375 TX/PRO/DX INJ NEW DRUG ADDON: CPT

## 2024-12-03 PROCEDURE — 93010 ELECTROCARDIOGRAM REPORT: CPT | Performed by: INTERNAL MEDICINE

## 2024-12-03 PROCEDURE — 93005 ELECTROCARDIOGRAM TRACING: CPT | Performed by: STUDENT IN AN ORGANIZED HEALTH CARE EDUCATION/TRAINING PROGRAM

## 2024-12-03 PROCEDURE — 87449 NOS EACH ORGANISM AG IA: CPT

## 2024-12-03 PROCEDURE — 87899 AGENT NOS ASSAY W/OPTIC: CPT

## 2024-12-03 PROCEDURE — 6370000000 HC RX 637 (ALT 250 FOR IP): Performed by: STUDENT IN AN ORGANIZED HEALTH CARE EDUCATION/TRAINING PROGRAM

## 2024-12-03 PROCEDURE — 96367 TX/PROPH/DG ADDL SEQ IV INF: CPT

## 2024-12-03 PROCEDURE — 2580000003 HC RX 258: Performed by: STUDENT IN AN ORGANIZED HEALTH CARE EDUCATION/TRAINING PROGRAM

## 2024-12-03 PROCEDURE — 2500000003 HC RX 250 WO HCPCS: Performed by: FAMILY MEDICINE

## 2024-12-03 PROCEDURE — 96365 THER/PROPH/DIAG IV INF INIT: CPT

## 2024-12-03 PROCEDURE — 80053 COMPREHEN METABOLIC PANEL: CPT

## 2024-12-03 PROCEDURE — 0202U NFCT DS 22 TRGT SARS-COV-2: CPT

## 2024-12-03 PROCEDURE — 6360000002 HC RX W HCPCS: Performed by: STUDENT IN AN ORGANIZED HEALTH CARE EDUCATION/TRAINING PROGRAM

## 2024-12-03 PROCEDURE — 6370000000 HC RX 637 (ALT 250 FOR IP): Performed by: NURSE PRACTITIONER

## 2024-12-03 PROCEDURE — G0378 HOSPITAL OBSERVATION PER HR: HCPCS

## 2024-12-03 PROCEDURE — 85025 COMPLETE CBC W/AUTO DIFF WBC: CPT

## 2024-12-03 PROCEDURE — 99222 1ST HOSP IP/OBS MODERATE 55: CPT | Performed by: NURSE PRACTITIONER

## 2024-12-03 PROCEDURE — 96366 THER/PROPH/DIAG IV INF ADDON: CPT

## 2024-12-03 PROCEDURE — 2580000003 HC RX 258: Performed by: FAMILY MEDICINE

## 2024-12-03 PROCEDURE — 2500000003 HC RX 250 WO HCPCS: Performed by: STUDENT IN AN ORGANIZED HEALTH CARE EDUCATION/TRAINING PROGRAM

## 2024-12-03 PROCEDURE — 6360000002 HC RX W HCPCS: Performed by: NURSE PRACTITIONER

## 2024-12-03 RX ORDER — SODIUM CHLORIDE 0.9 % (FLUSH) 0.9 %
5-40 SYRINGE (ML) INJECTION PRN
Status: DISCONTINUED | OUTPATIENT
Start: 2024-12-03 | End: 2024-12-04 | Stop reason: HOSPADM

## 2024-12-03 RX ORDER — ROSUVASTATIN CALCIUM 5 MG/1
10 TABLET, COATED ORAL DAILY
Status: DISCONTINUED | OUTPATIENT
Start: 2024-12-03 | End: 2024-12-04 | Stop reason: HOSPADM

## 2024-12-03 RX ORDER — ONDANSETRON 4 MG/1
4 TABLET, ORALLY DISINTEGRATING ORAL EVERY 8 HOURS PRN
Status: DISCONTINUED | OUTPATIENT
Start: 2024-12-03 | End: 2024-12-04 | Stop reason: HOSPADM

## 2024-12-03 RX ORDER — POTASSIUM CHLORIDE 7.45 MG/ML
10 INJECTION INTRAVENOUS PRN
Status: DISCONTINUED | OUTPATIENT
Start: 2024-12-03 | End: 2024-12-04 | Stop reason: HOSPADM

## 2024-12-03 RX ORDER — KETOROLAC TROMETHAMINE 30 MG/ML
15 INJECTION, SOLUTION INTRAMUSCULAR; INTRAVENOUS ONCE
Status: COMPLETED | OUTPATIENT
Start: 2024-12-03 | End: 2024-12-03

## 2024-12-03 RX ORDER — CEFDINIR 300 MG/1
300 CAPSULE ORAL 2 TIMES DAILY
Qty: 14 CAPSULE | Refills: 0 | Status: SHIPPED | OUTPATIENT
Start: 2024-12-03 | End: 2024-12-10

## 2024-12-03 RX ORDER — BENZONATATE 100 MG/1
100 CAPSULE ORAL 3 TIMES DAILY PRN
Status: DISCONTINUED | OUTPATIENT
Start: 2024-12-03 | End: 2024-12-04 | Stop reason: HOSPADM

## 2024-12-03 RX ORDER — POLYETHYLENE GLYCOL 3350 17 G/17G
17 POWDER, FOR SOLUTION ORAL DAILY PRN
Status: DISCONTINUED | OUTPATIENT
Start: 2024-12-03 | End: 2024-12-04 | Stop reason: HOSPADM

## 2024-12-03 RX ORDER — HYDROXYCHLOROQUINE SULFATE 200 MG/1
200 TABLET, FILM COATED ORAL 2 TIMES DAILY
Status: DISCONTINUED | OUTPATIENT
Start: 2024-12-03 | End: 2024-12-04 | Stop reason: HOSPADM

## 2024-12-03 RX ORDER — LIDOCAINE 4 G/G
1 PATCH TOPICAL DAILY
Status: DISCONTINUED | OUTPATIENT
Start: 2024-12-03 | End: 2024-12-04 | Stop reason: HOSPADM

## 2024-12-03 RX ORDER — ENOXAPARIN SODIUM 100 MG/ML
40 INJECTION SUBCUTANEOUS DAILY
Status: DISCONTINUED | OUTPATIENT
Start: 2024-12-03 | End: 2024-12-04 | Stop reason: HOSPADM

## 2024-12-03 RX ORDER — LATANOPROST 50 UG/ML
1 SOLUTION/ DROPS OPHTHALMIC DAILY
Status: DISCONTINUED | OUTPATIENT
Start: 2024-12-03 | End: 2024-12-04 | Stop reason: HOSPADM

## 2024-12-03 RX ORDER — METOPROLOL SUCCINATE 50 MG/1
100 TABLET, EXTENDED RELEASE ORAL DAILY
Status: DISCONTINUED | OUTPATIENT
Start: 2024-12-03 | End: 2024-12-04 | Stop reason: HOSPADM

## 2024-12-03 RX ORDER — ACETAMINOPHEN 650 MG/1
650 SUPPOSITORY RECTAL EVERY 6 HOURS PRN
Status: DISCONTINUED | OUTPATIENT
Start: 2024-12-03 | End: 2024-12-04 | Stop reason: HOSPADM

## 2024-12-03 RX ORDER — CELECOXIB 100 MG/1
100 CAPSULE ORAL 2 TIMES DAILY
Status: DISCONTINUED | OUTPATIENT
Start: 2024-12-03 | End: 2024-12-04 | Stop reason: HOSPADM

## 2024-12-03 RX ORDER — ERGOCALCIFEROL 1.25 MG/1
50000 CAPSULE, LIQUID FILLED ORAL WEEKLY
Status: DISCONTINUED | OUTPATIENT
Start: 2024-12-10 | End: 2024-12-04 | Stop reason: HOSPADM

## 2024-12-03 RX ORDER — SODIUM CHLORIDE 9 MG/ML
INJECTION, SOLUTION INTRAVENOUS PRN
Status: DISCONTINUED | OUTPATIENT
Start: 2024-12-03 | End: 2024-12-04 | Stop reason: HOSPADM

## 2024-12-03 RX ORDER — MAGNESIUM SULFATE IN WATER 40 MG/ML
2000 INJECTION, SOLUTION INTRAVENOUS PRN
Status: DISCONTINUED | OUTPATIENT
Start: 2024-12-03 | End: 2024-12-04 | Stop reason: HOSPADM

## 2024-12-03 RX ORDER — GUAIFENESIN/DEXTROMETHORPHAN 100-10MG/5
5 SYRUP ORAL EVERY 6 HOURS
Status: DISCONTINUED | OUTPATIENT
Start: 2024-12-03 | End: 2024-12-04 | Stop reason: HOSPADM

## 2024-12-03 RX ORDER — ACETAMINOPHEN 325 MG/1
650 TABLET ORAL EVERY 6 HOURS PRN
Status: DISCONTINUED | OUTPATIENT
Start: 2024-12-03 | End: 2024-12-04 | Stop reason: HOSPADM

## 2024-12-03 RX ORDER — ONDANSETRON 2 MG/ML
4 INJECTION INTRAMUSCULAR; INTRAVENOUS EVERY 6 HOURS PRN
Status: DISCONTINUED | OUTPATIENT
Start: 2024-12-03 | End: 2024-12-04 | Stop reason: HOSPADM

## 2024-12-03 RX ORDER — KETOROLAC TROMETHAMINE 30 MG/ML
15 INJECTION, SOLUTION INTRAMUSCULAR; INTRAVENOUS EVERY 6 HOURS PRN
Status: DISCONTINUED | OUTPATIENT
Start: 2024-12-03 | End: 2024-12-04 | Stop reason: HOSPADM

## 2024-12-03 RX ORDER — ALPRAZOLAM 0.25 MG/1
0.25 TABLET ORAL 2 TIMES DAILY
Status: DISCONTINUED | OUTPATIENT
Start: 2024-12-03 | End: 2024-12-04 | Stop reason: HOSPADM

## 2024-12-03 RX ORDER — POTASSIUM CHLORIDE 1500 MG/1
40 TABLET, EXTENDED RELEASE ORAL PRN
Status: DISCONTINUED | OUTPATIENT
Start: 2024-12-03 | End: 2024-12-04 | Stop reason: HOSPADM

## 2024-12-03 RX ORDER — ACETAMINOPHEN 325 MG/1
650 TABLET ORAL ONCE
Status: COMPLETED | OUTPATIENT
Start: 2024-12-03 | End: 2024-12-03

## 2024-12-03 RX ORDER — FAMOTIDINE 20 MG/1
40 TABLET, FILM COATED ORAL DAILY
Status: DISCONTINUED | OUTPATIENT
Start: 2024-12-03 | End: 2024-12-04 | Stop reason: HOSPADM

## 2024-12-03 RX ORDER — SODIUM CHLORIDE 0.9 % (FLUSH) 0.9 %
5-40 SYRINGE (ML) INJECTION EVERY 12 HOURS SCHEDULED
Status: DISCONTINUED | OUTPATIENT
Start: 2024-12-03 | End: 2024-12-04 | Stop reason: HOSPADM

## 2024-12-03 RX ORDER — ASPIRIN 81 MG/1
81 TABLET ORAL DAILY
Status: DISCONTINUED | OUTPATIENT
Start: 2024-12-03 | End: 2024-12-04 | Stop reason: HOSPADM

## 2024-12-03 RX ORDER — DOXYCYCLINE HYCLATE 100 MG
100 TABLET ORAL 2 TIMES DAILY
Qty: 14 TABLET | Refills: 0 | Status: SHIPPED | OUTPATIENT
Start: 2024-12-03 | End: 2024-12-10

## 2024-12-03 RX ADMIN — ACETAMINOPHEN 650 MG: 325 TABLET ORAL at 04:33

## 2024-12-03 RX ADMIN — KETOROLAC TROMETHAMINE 15 MG: 30 INJECTION, SOLUTION INTRAMUSCULAR at 12:31

## 2024-12-03 RX ADMIN — CEFTRIAXONE SODIUM 2000 MG: 2 INJECTION, POWDER, FOR SOLUTION INTRAMUSCULAR; INTRAVENOUS at 01:42

## 2024-12-03 RX ADMIN — GUAIFENESIN SYRUP AND DEXTROMETHORPHAN 5 ML: 100; 10 SYRUP ORAL at 12:30

## 2024-12-03 RX ADMIN — GUAIFENESIN SYRUP AND DEXTROMETHORPHAN 5 ML: 100; 10 SYRUP ORAL at 18:27

## 2024-12-03 RX ADMIN — DOXYCYCLINE 100 MG: 100 INJECTION, POWDER, LYOPHILIZED, FOR SOLUTION INTRAVENOUS at 12:33

## 2024-12-03 RX ADMIN — DOXYCYCLINE 100 MG: 100 INJECTION, POWDER, LYOPHILIZED, FOR SOLUTION INTRAVENOUS at 02:30

## 2024-12-03 ASSESSMENT — PAIN DESCRIPTION - FREQUENCY: FREQUENCY: INTERMITTENT

## 2024-12-03 ASSESSMENT — PAIN SCALES - GENERAL
PAINLEVEL_OUTOF10: 2
PAINLEVEL_OUTOF10: 0

## 2024-12-03 ASSESSMENT — PAIN DESCRIPTION - LOCATION
LOCATION: CHEST
LOCATION: CHEST

## 2024-12-03 ASSESSMENT — PAIN DESCRIPTION - PAIN TYPE: TYPE: ACUTE PAIN

## 2024-12-03 ASSESSMENT — PAIN DESCRIPTION - DESCRIPTORS: DESCRIPTORS: ACHING

## 2024-12-03 ASSESSMENT — PAIN - FUNCTIONAL ASSESSMENT: PAIN_FUNCTIONAL_ASSESSMENT: 0-10

## 2024-12-03 ASSESSMENT — PAIN DESCRIPTION - ORIENTATION: ORIENTATION: RIGHT;LEFT;MID

## 2024-12-03 NOTE — ED NOTES
Patient states that [facility]. She is wheelchair bound

## 2024-12-03 NOTE — DISCHARGE INSTRUCTIONS
Complete course of antibiotics.  Follow-up with family doctor.  Return for any worsening symptoms or other acute symptoms or concerns

## 2024-12-03 NOTE — ED NOTES
Patient incontinent of urine at this time. New brief and pads provided. Patient repositioned in bed

## 2024-12-03 NOTE — H&P
[facility] Hospitalist Group History and Physical      CHIEF COMPLAINT: Cough    History of Present Illness: This is an 79-year-old female with a past medical history of arthritis, CAD, carotid artery stenosis, CVA, GERD, glaucoma, HLD, HTN, and PVD who presented to the ED via EMS from SNF with complaints of a productive cough for 4 weeks.  Patient states she has had a productive cough for quite some time she states she is producing a small amount of yellow sputum.  Patient does states that she was prescribed medication at the SNF without relief of symptoms.  Patient is complaining of bilateral rib pain that is exacerbated by coughing.  She reports a poor appetite.  Patient states she used to ambulate with a walker but has been wheelchair-bound for the past 5 weeks.  Her weakness started just prior to her recent illness.  She denies any fevers, chills, nausea, vomiting, diarrhea.  Patient has no known sick contacts but does reside at a facility.    ED course is as follows: Vital signs-T97.4, HR 87, /65, SpO2 95% on room air.  , Hgb 9.7 the last labs mostly unremarkable.  RVP is positive for parainfluenza.  CXR shows patchy interstitial infiltrates to the right upper lobe of the lung.  Received Rocephin 2 g and doxycycline 100 mg as well as 650 mg of Tylenol in the ED.    Patient will be admitted for further management.    Informant(s) for H&P: Patient    REVIEW OF SYSTEMS:  A comprehensive review of systems was negative except for: what is in the HPI    PMH:  Past Medical History:   Diagnosis Date    Aorto-iliac atherosclerosis ([facility]) 7/1/2020    Arthritis     Atherosclerosis of native arteries of extremity with rest pain ([facility]) 7/16/2020    Bladder prolapse     CAD (coronary artery disease)     MI  2011    Carotid artery stenosis     Right carotid stenosis.    Carotid bruit 1/10/2013    Carotid stenosis, left 12/13/2012    Cerebrovascular accident (stroke) ([facility])     pt states 13 yrs ago

## 2024-12-03 NOTE — ED PROVIDER NOTES
[facility] EMERGENCY DEPARTMENT  EMERGENCY DEPARTMENT ENCOUNTER        Pt Name: Hien Shi  MRN: [id number]  Birthdate 1945  Date of evaluation: 12/2/2024  Provider: Chantale Diaz DO  PCP: Leeann Chamorro APRN - CNP  Note Started: 8:27 AM EST 12/3/24    CHIEF COMPLAINT       Chief Complaint   Patient presents with    Cough     Productive x 4 weeks per patient.she states that it is yellow in color. She was just started on cough meds at the facility without relief. Pt complains of pain to the right ribs from coughing        HISTORY OF PRESENT ILLNESS: 1 or more Elements   History From: patient    Limitations to history : None    Hien Shi is a 79 y.o. female with a history of CAD, hypertension, hyperlipidemia, fibromyalgia presenting to the emergency department via EMS from nursing facility for a cough.  Patient has had a productive cough for the past 4 weeks.  States it is yellow in color.  She states that she was just started on medications at her facility without any relief.  She does not remember which medications she was prescribed at her facility.  Patient complains of pain in her ribs due to the coughing at times.  She states the pain is diffusely located throughout all of her ribs chest when she coughs and she does not have any pain at rest.  She states that she is tired because she has been coughing so much over the past several weeks.  She states that she is uncertain if she is on any antibiotics.  Patient denies any fevers, chills, palpitations, abdominal pain, nausea, vomiting, diarrhea, sick contacts, recent hospitalization, recent illness, or other acute symptoms or concerns.    Nursing Notes were all reviewed and agreed with or any disagreements were addressed in the HPI.    REVIEW OF SYSTEMS :      Review of Systems    Positives and Pertinent negatives as per HPI.     SURGICAL HISTORY     Past Surgical History:   Procedure Laterality

## 2024-12-04 VITALS
TEMPERATURE: 97.5 F | OXYGEN SATURATION: 95 % | DIASTOLIC BLOOD PRESSURE: 55 MMHG | RESPIRATION RATE: 16 BRPM | SYSTOLIC BLOOD PRESSURE: 141 MMHG | HEART RATE: 69 BPM | HEIGHT: 65 IN | BODY MASS INDEX: 27.32 KG/M2 | WEIGHT: 164 LBS

## 2024-12-04 LAB
ANION GAP SERPL CALCULATED.3IONS-SCNC: 16 MMOL/L (ref 7–16)
BASOPHILS # BLD: 0.03 K/UL (ref 0–0.2)
BASOPHILS NFR BLD: 1 % (ref 0–2)
BUN SERPL-MCNC: 16 MG/DL (ref 6–23)
CALCIUM SERPL-MCNC: 9.4 MG/DL (ref 8.6–10.2)
CHLORIDE SERPL-SCNC: 110 MMOL/L (ref 98–107)
CO2 SERPL-SCNC: 20 MMOL/L (ref 22–29)
CREAT SERPL-MCNC: 0.9 MG/DL (ref 0.5–1)
EOSINOPHIL # BLD: 0.29 K/UL (ref 0.05–0.5)
EOSINOPHILS RELATIVE PERCENT: 6 % (ref 0–6)
ERYTHROCYTE [DISTWIDTH] IN BLOOD BY AUTOMATED COUNT: 15.3 % (ref 11.5–15)
GFR, ESTIMATED: 64 ML/MIN/1.73M2
GLUCOSE SERPL-MCNC: 93 MG/DL (ref 74–99)
HCT VFR BLD AUTO: 29.1 % (ref 34–48)
HGB BLD-MCNC: 8.9 G/DL (ref 11.5–15.5)
IMM GRANULOCYTES # BLD AUTO: <0.03 K/UL (ref 0–0.58)
IMM GRANULOCYTES NFR BLD: 0 % (ref 0–5)
L PNEUMO1 AG UR QL IA.RAPID: NEGATIVE
LYMPHOCYTES NFR BLD: 0.95 K/UL (ref 1.5–4)
LYMPHOCYTES RELATIVE PERCENT: 19 % (ref 20–42)
MCH RBC QN AUTO: 27 PG (ref 26–35)
MCHC RBC AUTO-ENTMCNC: 30.6 G/DL (ref 32–34.5)
MCV RBC AUTO: 88.2 FL (ref 80–99.9)
MONOCYTES NFR BLD: 0.67 K/UL (ref 0.1–0.95)
MONOCYTES NFR BLD: 14 % (ref 2–12)
NEUTROPHILS NFR BLD: 60 % (ref 43–80)
NEUTS SEG NFR BLD: 2.96 K/UL (ref 1.8–7.3)
PLATELET # BLD AUTO: 194 K/UL (ref 130–450)
PMV BLD AUTO: 9.7 FL (ref 7–12)
POTASSIUM SERPL-SCNC: 3.8 MMOL/L (ref 3.5–5)
RBC # BLD AUTO: 3.3 M/UL (ref 3.5–5.5)
S PNEUM AG SPEC QL: NEGATIVE
SODIUM SERPL-SCNC: 146 MMOL/L (ref 132–146)
SPECIMEN SOURCE: NORMAL
WBC OTHER # BLD: 4.9 K/UL (ref 4.5–11.5)

## 2024-12-04 PROCEDURE — 85025 COMPLETE CBC W/AUTO DIFF WBC: CPT

## 2024-12-04 PROCEDURE — 6370000000 HC RX 637 (ALT 250 FOR IP): Performed by: NURSE PRACTITIONER

## 2024-12-04 PROCEDURE — 2580000003 HC RX 258: Performed by: FAMILY MEDICINE

## 2024-12-04 PROCEDURE — 6360000002 HC RX W HCPCS: Performed by: NURSE PRACTITIONER

## 2024-12-04 PROCEDURE — 80048 BASIC METABOLIC PNL TOTAL CA: CPT

## 2024-12-04 PROCEDURE — 2500000003 HC RX 250 WO HCPCS: Performed by: FAMILY MEDICINE

## 2024-12-04 PROCEDURE — 96376 TX/PRO/DX INJ SAME DRUG ADON: CPT

## 2024-12-04 PROCEDURE — 36415 COLL VENOUS BLD VENIPUNCTURE: CPT

## 2024-12-04 PROCEDURE — G0378 HOSPITAL OBSERVATION PER HR: HCPCS

## 2024-12-04 PROCEDURE — 96366 THER/PROPH/DIAG IV INF ADDON: CPT

## 2024-12-04 PROCEDURE — 6360000002 HC RX W HCPCS: Performed by: FAMILY MEDICINE

## 2024-12-04 PROCEDURE — 6370000000 HC RX 637 (ALT 250 FOR IP): Performed by: FAMILY MEDICINE

## 2024-12-04 PROCEDURE — 92610 EVALUATE SWALLOWING FUNCTION: CPT

## 2024-12-04 RX ADMIN — METOPROLOL SUCCINATE 100 MG: 50 TABLET, EXTENDED RELEASE ORAL at 09:44

## 2024-12-04 RX ADMIN — HYDROXYCHLOROQUINE SULFATE 200 MG: 200 TABLET ORAL at 09:45

## 2024-12-04 RX ADMIN — ALPRAZOLAM 0.25 MG: 0.25 TABLET ORAL at 09:44

## 2024-12-04 RX ADMIN — SODIUM CHLORIDE, PRESERVATIVE FREE 10 ML: 5 INJECTION INTRAVENOUS at 09:48

## 2024-12-04 RX ADMIN — LATANOPROST 1 DROP: 50 SOLUTION OPHTHALMIC at 02:30

## 2024-12-04 RX ADMIN — GUAIFENESIN SYRUP AND DEXTROMETHORPHAN 5 ML: 100; 10 SYRUP ORAL at 16:17

## 2024-12-04 RX ADMIN — METOPROLOL SUCCINATE 100 MG: 50 TABLET, EXTENDED RELEASE ORAL at 02:31

## 2024-12-04 RX ADMIN — GUAIFENESIN SYRUP AND DEXTROMETHORPHAN 5 ML: 100; 10 SYRUP ORAL at 09:48

## 2024-12-04 RX ADMIN — ALPRAZOLAM 0.25 MG: 0.25 TABLET ORAL at 02:31

## 2024-12-04 RX ADMIN — BENZONATATE 100 MG: 100 CAPSULE ORAL at 09:57

## 2024-12-04 RX ADMIN — FAMOTIDINE 40 MG: 20 TABLET, FILM COATED ORAL at 02:31

## 2024-12-04 RX ADMIN — GUAIFENESIN SYRUP AND DEXTROMETHORPHAN 5 ML: 100; 10 SYRUP ORAL at 02:31

## 2024-12-04 RX ADMIN — ROSUVASTATIN CALCIUM 10 MG: 5 TABLET, FILM COATED ORAL at 02:31

## 2024-12-04 RX ADMIN — DOXYCYCLINE 100 MG: 100 INJECTION, POWDER, LYOPHILIZED, FOR SOLUTION INTRAVENOUS at 02:29

## 2024-12-04 RX ADMIN — ASPIRIN 81 MG: 81 TABLET, COATED ORAL at 09:45

## 2024-12-04 RX ADMIN — ASPIRIN 81 MG: 81 TABLET, COATED ORAL at 02:31

## 2024-12-04 RX ADMIN — SODIUM CHLORIDE, PRESERVATIVE FREE 10 ML: 5 INJECTION INTRAVENOUS at 02:25

## 2024-12-04 RX ADMIN — Medication 3 MG: at 02:31

## 2024-12-04 RX ADMIN — KETOROLAC TROMETHAMINE 15 MG: 30 INJECTION, SOLUTION INTRAMUSCULAR at 09:57

## 2024-12-04 RX ADMIN — CEFTRIAXONE SODIUM 2000 MG: 2 INJECTION, POWDER, FOR SOLUTION INTRAMUSCULAR; INTRAVENOUS at 02:25

## 2024-12-04 RX ADMIN — ROSUVASTATIN CALCIUM 10 MG: 5 TABLET, FILM COATED ORAL at 09:43

## 2024-12-04 ASSESSMENT — PAIN DESCRIPTION - LOCATION: LOCATION: BACK

## 2024-12-04 ASSESSMENT — PAIN - FUNCTIONAL ASSESSMENT: PAIN_FUNCTIONAL_ASSESSMENT: ACTIVITIES ARE NOT PREVENTED

## 2024-12-04 ASSESSMENT — PAIN DESCRIPTION - ORIENTATION: ORIENTATION: MID

## 2024-12-04 ASSESSMENT — PAIN SCALES - GENERAL
PAINLEVEL_OUTOF10: 5
PAINLEVEL_OUTOF10: 7

## 2024-12-04 ASSESSMENT — PAIN DESCRIPTION - DESCRIPTORS: DESCRIPTORS: ACHING;CRAMPING;DISCOMFORT

## 2024-12-04 NOTE — CARE COORDINATION
Record  Patient Orientation: Self, Person    Patient Cognition: Alert    Hospitalization in the last 30 days (Readmission):  No    If yes, Readmission Assessment in  Navigator will be completed.    Advance Directives:      Code Status: Full Code   Patient's Primary Decision Maker is: Legal Next of Kin    Primary Decision Maker: Jessy Lopez - Child - [phone]    Primary Decision Maker: Lexi Méndez - Child - [phone]    Primary Decision Maker: Georgette Allen - Child - [phone]    Discharge Planning:    Patient lives with: Alone Type of Home: Skilled Nursing Facility  Primary Care Giver: Other (Comment) ([facility])  Patient Support Systems include: Children, Family Members   Current Financial resources:    Current community resources:    Current services prior to admission: None            Current DME:              Type of Home Care services:  None    ADLS  Prior functional level: Assistance with the following:, Bathing, Dressing, Toileting, Mobility  Current functional level: Assistance with the following:, Bathing, Dressing, Toileting, Mobility    PT AM-PAC:   /24  OT AM-PAC:   /24    Family can provide assistance at DC: No  Would you like Case Management to discuss the discharge plan with any other family members/significant others, and if so, who? No  Plans to Return to Present Housing: Yes  Other Identified Issues/Barriers to RETURNING to current housing: n/a  Potential Assistance needed at discharge: Skilled Nursing Facility            Potential DME:    Patient expects to discharge to: Skilled nursing facility  Plan for transportation at discharge:      Financial    Payor: [facility] / Plan: [facility] DUAL / Product Type: *No Product type* /     Does insurance require precert for SNF: Yes    Potential assistance Purchasing Medications:    Meds-to-Beds request: Yes      [facility]/pharmacy #[id number] - [place] - [address] [phone] - F

## 2024-12-04 NOTE — PROGRESS NOTES
SPEECH LANGUAGE PATHOLOGY  DAILY PROGRESS NOTE        PATIENT NAME:  Hien Shi      ROOM:  6408/6408-B :  1945         TODAY'S DATE:  2024       Bedside swallow evaluation attempted.   PT coughing prior to initiation of evaluation.   Would only allow a small sip of water, which there was no cough immediatly following the swallow.  Pt refused any other liquids/foods and stated she is just tired and not hungry.    A MBSS was recommended to fully evaluate swallowing function .  PT adamantly refused despite explanation of rationale for its completion.    Unable to recommend diet based on pt refusal    Please re-order when pt agreeable to participate.    SPoke with nurse in person with the above.

## 2024-12-04 NOTE — PROGRESS NOTES
4 Eyes Skin Assessment     NAME:  Hien Shi  YOB: 1945  MEDICAL RECORD NUMBER:  [id number]    The patient is being assessed for  Admission    I agree that at least one RN has performed a thorough Head to Toe Skin Assessment on the patient. ALL assessment sites listed below have been assessed.      Areas assessed by both nurses:    Head, Face, Ears, Shoulders, Back, Chest, Arms, Elbows, Hands, Sacrum. Buttock, Coccyx, Ischium, Legs. Feet and Heels, and Under Medical Devices         Does the Patient have a Wound? No noted wound(s)       Greg Prevention initiated by RN: Yes  Wound Care Orders initiated by RN: No    Pressure Injury (Stage 3,4, Unstageable, DTI, NWPT, and Complex wounds) if present, place Wound referral order by RN under : No    New Ostomies, if present place, Ostomy referral order under : No     Nurse 1 eSignature: Electronically signed by Soumya Sanz RN on 12/4/24 at 1:19 AM EST    **SHARE this note so that the co-signing nurse can place an eSignature**    Nurse 2 eSignature: Electronically signed by Margot Krishnan RN on 12/4/24 at 1:28 AM EST

## 2024-12-04 NOTE — PLAN OF CARE
Problem: Skin/Tissue Integrity  Goal: Absence of new skin breakdown  Description: 1.  Monitor for areas of redness and/or skin breakdown  2.  Assess vascular access sites hourly  3.  Every 4-6 hours minimum:  Change oxygen saturation probe site  4.  Every 4-6 hours:  If on nasal continuous positive airway pressure, respiratory therapy assess nares and determine need for appliance change or resting period.  Outcome: Progressing     Problem: Chronic Conditions and Co-morbidities  Goal: Patient's chronic conditions and co-morbidity symptoms are monitored and maintained or improved  Outcome: Progressing     Problem: Discharge Planning  Goal: Discharge to home or other facility with appropriate resources  Outcome: Progressing     Problem: Safety - Adult  Goal: Free from fall injury  Outcome: Progressing     Problem: ABCDS Injury Assessment  Goal: Absence of physical injury  Outcome: Progressing     Problem: Pain  Goal: Verbalizes/displays adequate comfort level or baseline comfort level  Outcome: Progressing

## 2024-12-04 NOTE — PROGRESS NOTES
Nurse to nurse given to Cathy at [facility]. All questions answered.  time scheduled for 3-5 pm via physicians.

## 2024-12-04 NOTE — PROGRESS NOTES
Occupational Therapy  OT SESSION ATTEMPT     Date:2024  Patient Name: Hien Shi  MRN: [id number]  : 1945  Room: [address] [place], [place] [address]     Attempted OT session this date:    [] unavailable due to other medical staff currently with pt   [] on hold per nursing staff   [] on hold per nursing staff secondary to lab / radiology results    [x] declined Occupational Therapy  this date due to fatigue.  Benefits of participation in therapy reviewed with pt.    [] off unit   [] Other:     Will reattempt OT evaluation at a later time.    Saul Fields OTR/L #[id number]

## 2024-12-04 NOTE — PROGRESS NOTES
Physical Therapy    PT order received and medical chart reviewed 12/4. Pt declined PT evaluation at this time due to lack of sleep/not feeling well. Will re-attempt at a later time/date. Thank you.    Anthony Ballesteros, PT, DPT  [id number]

## 2024-12-04 NOTE — DISCHARGE INSTR - COC
Continuity of Care Form    Patient Name: Hien Shi   :  1945  MRN:  [id number]    Admit date:  12/3/2024  Discharge date:  ***    Code Status Order: Full Code   Advance Directives:   Advance Care Flowsheet Documentation             Admitting Physician:  Travis Rowland MD  PCP: Leeann Chamorro APRN - CNP    Discharging Nurse: ***  Discharging Hospital Unit/Room#: 6408/6408-B  Discharging Unit Phone Number: ***    Emergency Contact:   Extended Emergency Contact Information  Primary Emergency Contact: LopezJessy  Address: [address] [place] [address]  Home Phone: [phone]  Mobile Phone: [phone]  Relation: Child  Secondary Emergency Contact: Lexi Méndez   [facility]  Home Phone: [phone]  Mobile Phone: [phone]  Relation: Child    Past Surgical History:  Past Surgical History:   Procedure Laterality Date    ANTERIOR FIXATION OF THORACIC SPINE      AORTO-ILIAC BYPASS GRAFT N/A 9/15/2020    AORTO BIFEMORAL  BYPASS performed by Julien Pedraza MD at [facility] OR    CARDIAC SURGERY      CAROTID ENDARTERECTOMY      right    CAROTID ENDARTERECTOMY  2012    left carotid endarterectomy done by Dr. Pedraza    CARPAL TUNNEL RELEASE      left wrist    CATARACT REMOVAL      CERVICAL DISC SURGERY      C5 and C6    CORONARY ANGIOPLASTY WITH STENT PLACEMENT  2011    Dr. Jacobs  RCA    CYSTOSCOPY      in past for kidney stones    DENTAL SURGERY      DIAGNOSTIC CARDIAC CATH LAB PROCEDURE  11    Emergent cath/PTCA with deployment of 2 overlapping DE stents to the distal, mid and proximal RCA.    HYSTERECTOMY (CERVIX STATUS UNKNOWN)      JOINT REPLACEMENT      right knee twice, left knee    JOINT REPLACEMENT Right     hip    KNEE ARTHROPLASTY Left 13    KNEE SURGERY      right knee x 2    LITHOTRIPSY Right 2019    RIGHT ESWL EXTRACORPOREAL SHOCK WAVE LITHOTRIPSY CYSTOSCOPY STENT INSERTION performed by Tyrel SHORE

## 2024-12-06 ENCOUNTER — OUTSIDE SERVICES (OUTPATIENT)
Dept: PRIMARY CARE CLINIC | Age: 79
End: 2024-12-06

## 2024-12-06 DIAGNOSIS — I10 PRIMARY HYPERTENSION: ICD-10-CM

## 2024-12-06 DIAGNOSIS — I25.10 CORONARY ARTERY DISEASE INVOLVING NATIVE CORONARY ARTERY OF NATIVE HEART WITHOUT ANGINA PECTORIS: ICD-10-CM

## 2024-12-06 DIAGNOSIS — J12.2 PARAINFLUENZAL PNEUMONIA: Primary | ICD-10-CM

## 2024-12-06 DIAGNOSIS — M06.9 RHEUMATOID ARTHRITIS INVOLVING BOTH HANDS, UNSPECIFIED WHETHER RHEUMATOID FACTOR PRESENT (HCC): ICD-10-CM

## 2024-12-06 DIAGNOSIS — I73.9 PVD (PERIPHERAL VASCULAR DISEASE) WITH CLAUDICATION (HCC): ICD-10-CM

## 2024-12-06 DIAGNOSIS — E78.2 MIXED HYPERLIPIDEMIA: ICD-10-CM

## 2024-12-06 ASSESSMENT — ENCOUNTER SYMPTOMS
SORE THROAT: 0
CHEST TIGHTNESS: 0
WHEEZING: 1
SHORTNESS OF BREATH: 0
VOICE CHANGE: 0
VOMITING: 0
ABDOMINAL PAIN: 0
NAUSEA: 0

## 2024-12-06 NOTE — PROGRESS NOTES
LITHOTRIPSY CYSTOSCOPY STENT INSERTION performed by Tyrel Whittington MD at [facility] [place]    LITHOTRIPSY      SKIN BIOPSY      TONSILLECTOMY        No family history on file.   Social History     Socioeconomic History    Marital status:    Occupational History    Occupation: disabled- [employer]   Tobacco Use    Smoking status: Former     Current packs/day: 0.00     Average packs/day: 2.5 packs/day for 40.0 years (100.0 ttl pk-yrs)     Types: Cigarettes     Start date: 9/3/1960     Quit date: 9/3/2000     Years since quittin.2    Smokeless tobacco: Never   Vaping Use    Vaping status: Never Used   Substance and Sexual Activity    Alcohol use: No     Comment: drinks 3 cups of tea daily    Drug use: No     Social Determinants of Health     Food Insecurity: No Food Insecurity (2024)    Hunger Vital Sign     Worried About Running Out of Food in the Last Year: Never true     Ran Out of Food in the Last Year: Never true   Transportation Needs: No Transportation Needs (2024)    PRAPARE - Transportation     Lack of Transportation (Medical): No     Lack of Transportation (Non-Medical): No   Housing Stability: Low Risk  (2024)    Housing Stability Vital Sign     Unable to Pay for Housing in the Last Year: No     Number of Times Moved in the Last Year: 1     Homeless in the Last Year: No        HPI79-year-old female with a past medical history of arthritis, CAD, carotid artery stenosis, CVA, GERD, glaucoma, HLD, HTN, and PVD who went to the hospital from [facility] with productive cough, patient was found to have positive parainfluenza and chest x-ray showed patchy interstitial infiltrates right upper lobe of the lung patient started on Rocephin and doxycycline    Current Outpatient Medications   Medication Sig Dispense Refill    cefdinir (OMNICEF) 300 MG capsule Take 1 capsule by mouth 2 times daily for 7 days 14 capsule 0    doxycycline hyclate (VIBRA-TABS) 100 MG tablet Take 1 tablet by mouth 2 times daily for

## 2024-12-19 ENCOUNTER — OUTSIDE SERVICES (OUTPATIENT)
Dept: PRIMARY CARE CLINIC | Age: 79
End: 2024-12-19

## 2024-12-19 DIAGNOSIS — I25.10 CORONARY ARTERY DISEASE INVOLVING NATIVE CORONARY ARTERY OF NATIVE HEART WITHOUT ANGINA PECTORIS: ICD-10-CM

## 2024-12-19 DIAGNOSIS — I73.9 PVD (PERIPHERAL VASCULAR DISEASE) WITH CLAUDICATION (HCC): ICD-10-CM

## 2024-12-19 DIAGNOSIS — R53.1 GENERALIZED WEAKNESS: ICD-10-CM

## 2024-12-19 DIAGNOSIS — G62.9 NEUROPATHY: ICD-10-CM

## 2024-12-19 DIAGNOSIS — I10 ESSENTIAL (PRIMARY) HYPERTENSION: ICD-10-CM

## 2024-12-19 DIAGNOSIS — G25.81 RESTLESS LEG: ICD-10-CM

## 2024-12-19 DIAGNOSIS — M79.7 FIBROMYALGIA: ICD-10-CM

## 2024-12-19 DIAGNOSIS — R53.81 PHYSICAL DECONDITIONING: ICD-10-CM

## 2024-12-19 DIAGNOSIS — E78.2 MIXED HYPERLIPIDEMIA: ICD-10-CM

## 2024-12-19 DIAGNOSIS — M06.9 RHEUMATOID ARTHRITIS INVOLVING BOTH HANDS, UNSPECIFIED WHETHER RHEUMATOID FACTOR PRESENT (HCC): ICD-10-CM

## 2024-12-19 DIAGNOSIS — J12.2 PARAINFLUENZAL PNEUMONIA: Primary | ICD-10-CM

## 2024-12-19 DIAGNOSIS — R26.2 AMBULATORY DYSFUNCTION: ICD-10-CM

## 2024-12-19 NOTE — PROGRESS NOTES
Visit Date: 2024  Hien Shi (:  1945) is a 79 y.o. female.    Subjective   SUBJECTIVE/OBJECTIVE:  Past Medical History:   Diagnosis Date    Aorto-iliac atherosclerosis ([facility]) 2020    Arthritis     Atherosclerosis of native arteries of extremity with rest pain ([facility]) 2020    Bladder prolapse     CAD (coronary artery disease)     MI      Carotid artery stenosis     Right carotid stenosis.    Carotid bruit 1/10/2013    Carotid stenosis, left 2012    Cerebrovascular accident (stroke) ([facility])     pt states 13 yrs ago    Decreased radial pulse 2017    Gastric reflux     Glaucoma     History of blood transfusion     Hyperlipemia     Hypertension     Insomnia     Post-operative state 2020    PVD (peripheral vascular disease) with claudication ([facility]) 2020    Restless leg syndrome     Sinusitis       Past Surgical History:   Procedure Laterality Date    ANTERIOR FIXATION OF THORACIC SPINE      AORTO-ILIAC BYPASS GRAFT N/A 9/15/2020    AORTO BIFEMORAL  BYPASS performed by Julien Pedraza MD at [facility] [place]    CARDIAC SURGERY      CAROTID ENDARTERECTOMY      right    CAROTID ENDARTERECTOMY  2012    left carotid endarterectomy done by Dr. Pedraza    CARPAL TUNNEL RELEASE      left wrist    CATARACT REMOVAL      CERVICAL DISC SURGERY      C5 and C6    CORONARY ANGIOPLASTY WITH STENT PLACEMENT  2011    Dr. Jacobs  RCA    CYSTOSCOPY      in past for kidney stones    DENTAL SURGERY      DIAGNOSTIC CARDIAC CATH LAB PROCEDURE  11    Emergent cath/PTCA with deployment of 2 overlapping DE stents to the distal, mid and proximal RCA.    HYSTERECTOMY (CERVIX STATUS UNKNOWN)      JOINT REPLACEMENT      right knee twice, left knee    JOINT REPLACEMENT Right     hip    KNEE ARTHROPLASTY Left 13    KNEE SURGERY      right knee x 2    LITHOTRIPSY Right 2019    RIGHT ESWL EXTRACORPOREAL SHOCK WAVE LITHOTRIPSY CYSTOSCOPY STENT INSERTION performed by Tyrel Whittington,

## 2025-01-02 VITALS
RESPIRATION RATE: 16 BRPM | OXYGEN SATURATION: 94 % | SYSTOLIC BLOOD PRESSURE: 144 MMHG | TEMPERATURE: 98.3 F | WEIGHT: 154 LBS | DIASTOLIC BLOOD PRESSURE: 74 MMHG | HEART RATE: 72 BPM | BODY MASS INDEX: 25.63 KG/M2

## 2025-01-08 ENCOUNTER — OUTSIDE SERVICES (OUTPATIENT)
Dept: PRIMARY CARE CLINIC | Age: 80
End: 2025-01-08

## 2025-01-08 DIAGNOSIS — R26.2 AMBULATORY DYSFUNCTION: ICD-10-CM

## 2025-01-08 DIAGNOSIS — R53.1 GENERALIZED WEAKNESS: ICD-10-CM

## 2025-01-08 DIAGNOSIS — I25.10 CORONARY ARTERY DISEASE INVOLVING NATIVE CORONARY ARTERY OF NATIVE HEART WITHOUT ANGINA PECTORIS: ICD-10-CM

## 2025-01-08 DIAGNOSIS — E78.2 MIXED HYPERLIPIDEMIA: ICD-10-CM

## 2025-01-08 DIAGNOSIS — I10 ESSENTIAL (PRIMARY) HYPERTENSION: ICD-10-CM

## 2025-01-08 DIAGNOSIS — G25.81 RESTLESS LEG: ICD-10-CM

## 2025-01-08 DIAGNOSIS — F41.9 ANXIETY DISORDER, UNSPECIFIED TYPE: ICD-10-CM

## 2025-01-08 DIAGNOSIS — G62.9 NEUROPATHY: ICD-10-CM

## 2025-01-08 DIAGNOSIS — R53.81 PHYSICAL DECONDITIONING: ICD-10-CM

## 2025-01-08 DIAGNOSIS — M06.9 RHEUMATOID ARTHRITIS INVOLVING BOTH HANDS, UNSPECIFIED WHETHER RHEUMATOID FACTOR PRESENT (HCC): Primary | ICD-10-CM

## 2025-01-08 DIAGNOSIS — M79.7 FIBROMYALGIA: ICD-10-CM

## 2025-01-08 DIAGNOSIS — I73.9 PVD (PERIPHERAL VASCULAR DISEASE) WITH CLAUDICATION (HCC): ICD-10-CM

## 2025-01-09 VITALS
WEIGHT: 156 LBS | RESPIRATION RATE: 18 BRPM | TEMPERATURE: 98.2 F | OXYGEN SATURATION: 95 % | BODY MASS INDEX: 25.96 KG/M2 | DIASTOLIC BLOOD PRESSURE: 77 MMHG | HEART RATE: 85 BPM | SYSTOLIC BLOOD PRESSURE: 143 MMHG

## 2025-01-09 ASSESSMENT — ENCOUNTER SYMPTOMS
VOICE CHANGE: 0
NAUSEA: 0
SORE THROAT: 0
CHEST TIGHTNESS: 0
VOMITING: 0
SHORTNESS OF BREATH: 0
ABDOMINAL PAIN: 0

## 2025-01-09 NOTE — PROGRESS NOTES
Hemoglobin A1C   Date Value Ref Range Status   09/11/2019 5.1 4.0 - 5.6 % Final     Lab Results   Component Value Date    CHOL 316 (H) 09/11/2019    CHOL 274 (H) 10/09/2018    CHOL 265 (H) 08/22/2016     Lab Results   Component Value Date    TRIG 184 (H) 09/11/2019    TRIG 160 (H) 10/09/2018    TRIG 244 (H) 08/22/2016     Lab Results   Component Value Date    HDL 61 09/11/2019    HDL 65 10/09/2018    HDL 47 08/22/2016     No components found for: \"LDLCHOLESTEROL\", \"LDLCALC\"  Lab Results   Component Value Date    VLDL 37 09/11/2019    VLDL 32 10/09/2018    VLDL 49 08/22/2016     No results found for: \"CHOLHDLRATIO\"  Lab Results   Component Value Date    TSH 0.81 10/31/2024            Assessment & Plan   ASSESSMENT/PLAN:  1. Rheumatoid arthritis involving both hands, unspecified whether rheumatoid factor present (HCC)  2. PVD (peripheral vascular disease) with claudication (HCC)  3. Essential (primary) hypertension  Comments:  Continue metoprolol, Norvasc  4. Mixed hyperlipidemia  Comments:  Continue atorvastatin  5. Coronary artery disease involving native coronary artery of native heart without angina pectoris  Comments:  Continue aspirin  6. Neuropathy  Comments:  Continue gabapentin  7. Fibromyalgia  8. Restless leg  9. Anxiety disorder, unspecified type  Comments:  Continue Xanax  10. Generalized weakness  11. Physical deconditioning  12. Ambulatory dysfunction        Chart, labs, and medications reviewed  PT     More than 30-minute spent in patient evaluation, reviewing prior records, discussing plan and orders with the patient and staff, and documenting.           An electronic signature was used to authenticate this note.    --Mei Ojeda

## 2025-02-09 ENCOUNTER — OUTSIDE SERVICES (OUTPATIENT)
Dept: PRIMARY CARE CLINIC | Age: 80
End: 2025-02-09
Payer: COMMERCIAL

## 2025-02-09 DIAGNOSIS — M06.9 RHEUMATOID ARTHRITIS INVOLVING BOTH HANDS, UNSPECIFIED WHETHER RHEUMATOID FACTOR PRESENT (HCC): ICD-10-CM

## 2025-02-09 DIAGNOSIS — G25.81 RESTLESS LEG: ICD-10-CM

## 2025-02-09 DIAGNOSIS — I73.9 PVD (PERIPHERAL VASCULAR DISEASE) WITH CLAUDICATION (HCC): ICD-10-CM

## 2025-02-09 DIAGNOSIS — M79.602 LEFT ARM PAIN: Primary | ICD-10-CM

## 2025-02-09 DIAGNOSIS — I10 ESSENTIAL (PRIMARY) HYPERTENSION: ICD-10-CM

## 2025-02-09 DIAGNOSIS — K21.9 GASTROESOPHAGEAL REFLUX DISEASE WITHOUT ESOPHAGITIS: ICD-10-CM

## 2025-02-09 DIAGNOSIS — E78.2 MIXED HYPERLIPIDEMIA: ICD-10-CM

## 2025-02-09 DIAGNOSIS — R26.2 AMBULATORY DYSFUNCTION: ICD-10-CM

## 2025-02-09 DIAGNOSIS — R53.1 GENERALIZED WEAKNESS: ICD-10-CM

## 2025-02-09 DIAGNOSIS — F41.9 ANXIETY DISORDER, UNSPECIFIED TYPE: ICD-10-CM

## 2025-02-09 DIAGNOSIS — G62.9 NEUROPATHY: ICD-10-CM

## 2025-02-09 DIAGNOSIS — M79.7 FIBROMYALGIA: ICD-10-CM

## 2025-02-09 DIAGNOSIS — I25.10 CORONARY ARTERY DISEASE INVOLVING NATIVE CORONARY ARTERY OF NATIVE HEART WITHOUT ANGINA PECTORIS: ICD-10-CM

## 2025-02-09 PROCEDURE — 99309 SBSQ NF CARE MODERATE MDM 30: CPT | Performed by: INTERNAL MEDICINE

## 2025-02-11 ENCOUNTER — OUTSIDE SERVICES (OUTPATIENT)
Dept: PRIMARY CARE CLINIC | Age: 80
End: 2025-02-11

## 2025-02-11 DIAGNOSIS — I10 PRIMARY HYPERTENSION: ICD-10-CM

## 2025-02-11 DIAGNOSIS — I25.10 CORONARY ARTERY DISEASE INVOLVING NATIVE CORONARY ARTERY OF NATIVE HEART WITHOUT ANGINA PECTORIS: ICD-10-CM

## 2025-02-11 DIAGNOSIS — I10 ESSENTIAL (PRIMARY) HYPERTENSION: ICD-10-CM

## 2025-02-11 DIAGNOSIS — M06.9 RHEUMATOID ARTHRITIS INVOLVING BOTH HANDS, UNSPECIFIED WHETHER RHEUMATOID FACTOR PRESENT (HCC): Primary | ICD-10-CM

## 2025-02-11 DIAGNOSIS — E78.2 MIXED HYPERLIPIDEMIA: ICD-10-CM

## 2025-02-11 DIAGNOSIS — I73.9 PVD (PERIPHERAL VASCULAR DISEASE) WITH CLAUDICATION (HCC): ICD-10-CM

## 2025-02-11 NOTE — PROGRESS NOTES
Rheumatoid arthritis involving both hands, unspecified whether rheumatoid factor present (HCC)  2. PVD (peripheral vascular disease) with claudication (HCC)  3. Essential (primary) hypertension  4. Mixed hyperlipidemia  5. Coronary artery disease involving native coronary artery of native heart without angina pectoris           Seen today for routine assessment  Chart reviewed: Continue with orders per chart in point click care  HTN: Continue amlodipine 5 mg daily and metoprolol 100 mg daily  Hyperlipidemia: Continue atorvastatin 20 mg daily  Rheumatoid arthritis: Continue hydroxychloroquine 200 mg daily  Labs: Collect per facility guidelines   Acute medical concerns provider on-call     Over 30 min was spent between patient care, chart review, discussing patient management with nursing staff and interpreting diagnostics      An electronic signature was used to authenticate this note.    --Chato Hough PA-C   This office note has been dictated.

## 2025-02-12 ASSESSMENT — ENCOUNTER SYMPTOMS
COUGH: 0
SORE THROAT: 0
ABDOMINAL PAIN: 0
SHORTNESS OF BREATH: 0
VOMITING: 0
DIARRHEA: 0
PHOTOPHOBIA: 0
WHEEZING: 0
CONSTIPATION: 0
BACK PAIN: 0
RHINORRHEA: 0

## 2025-02-13 VITALS
BODY MASS INDEX: 25.96 KG/M2 | DIASTOLIC BLOOD PRESSURE: 52 MMHG | WEIGHT: 156 LBS | HEART RATE: 67 BPM | OXYGEN SATURATION: 98 % | TEMPERATURE: 98.4 F | SYSTOLIC BLOOD PRESSURE: 140 MMHG | RESPIRATION RATE: 18 BRPM

## 2025-02-13 ASSESSMENT — ENCOUNTER SYMPTOMS
NAUSEA: 0
SORE THROAT: 0
ABDOMINAL PAIN: 0
CHEST TIGHTNESS: 0
SHORTNESS OF BREATH: 0
VOICE CHANGE: 0
VOMITING: 0

## 2025-02-13 NOTE — PROGRESS NOTES
Visit Date: 2025  Hien Shi (:  1945) is a 79 y.o. female.    Subjective   SUBJECTIVE/OBJECTIVE:  Past Medical History:   Diagnosis Date    Aorto-iliac atherosclerosis ([facility]) 2020    Arthritis     Atherosclerosis of native arteries of extremity with rest pain ([facility]) 2020    Bladder prolapse     CAD (coronary artery disease)     MI      Carotid artery stenosis     Right carotid stenosis.    Carotid bruit 1/10/2013    Carotid stenosis, left 2012    Cerebrovascular accident (stroke) ([facility])     pt states 13 yrs ago    Decreased radial pulse 2017    Gastric reflux     Glaucoma     History of blood transfusion     Hyperlipemia     Hypertension     Insomnia     Post-operative state 2020    PVD (peripheral vascular disease) with claudication ([facility]) 2020    Restless leg syndrome     Sinusitis       Past Surgical History:   Procedure Laterality Date    ANTERIOR FIXATION OF THORACIC SPINE      AORTO-ILIAC BYPASS GRAFT N/A 9/15/2020    AORTO BIFEMORAL  BYPASS performed by Julien Pedraza MD at [facility] OR    CARDIAC SURGERY      CAROTID ENDARTERECTOMY      right    CAROTID ENDARTERECTOMY  2012    left carotid endarterectomy done by Dr. Pedraza    CARPAL TUNNEL RELEASE      left wrist    CATARACT REMOVAL      CERVICAL DISC SURGERY      C5 and C6    CORONARY ANGIOPLASTY WITH STENT PLACEMENT  2011    Dr. Jacobs  RCA    CYSTOSCOPY      in past for kidney stones    DENTAL SURGERY      DIAGNOSTIC CARDIAC CATH LAB PROCEDURE  11    Emergent cath/PTCA with deployment of 2 overlapping DE stents to the distal, mid and proximal RCA.    HYSTERECTOMY (CERVIX STATUS UNKNOWN)      JOINT REPLACEMENT      right knee twice, left knee    JOINT REPLACEMENT Right     hip    KNEE ARTHROPLASTY Left 13    KNEE SURGERY      right knee x 2    LITHOTRIPSY Right 2019    RIGHT ESWL EXTRACORPOREAL SHOCK WAVE LITHOTRIPSY CYSTOSCOPY STENT INSERTION performed by Tyrel Whittington,

## 2025-04-08 ENCOUNTER — OUTSIDE SERVICES (OUTPATIENT)
Dept: PRIMARY CARE CLINIC | Age: 80
End: 2025-04-08
Payer: COMMERCIAL

## 2025-04-08 DIAGNOSIS — I10 ESSENTIAL (PRIMARY) HYPERTENSION: ICD-10-CM

## 2025-04-08 DIAGNOSIS — E78.2 MIXED HYPERLIPIDEMIA: ICD-10-CM

## 2025-04-08 DIAGNOSIS — M06.9 RHEUMATOID ARTHRITIS INVOLVING BOTH HANDS, UNSPECIFIED WHETHER RHEUMATOID FACTOR PRESENT (HCC): Primary | ICD-10-CM

## 2025-04-08 DIAGNOSIS — I73.9 PVD (PERIPHERAL VASCULAR DISEASE) WITH CLAUDICATION: ICD-10-CM

## 2025-04-08 DIAGNOSIS — I25.10 CORONARY ARTERY DISEASE INVOLVING NATIVE CORONARY ARTERY OF NATIVE HEART WITHOUT ANGINA PECTORIS: ICD-10-CM

## 2025-04-08 PROCEDURE — 99309 SBSQ NF CARE MODERATE MDM 30: CPT

## 2025-04-16 ASSESSMENT — ENCOUNTER SYMPTOMS
COUGH: 0
CONSTIPATION: 0
WHEEZING: 0
VOMITING: 0
PHOTOPHOBIA: 0
SORE THROAT: 0
RHINORRHEA: 0
DIARRHEA: 0
SHORTNESS OF BREATH: 0
ABDOMINAL PAIN: 0
BACK PAIN: 0

## 2025-04-16 NOTE — PROGRESS NOTES
Hien Shi (:  1945) is a 79 y.o. female.    Subjective     Hien seen today for routine follow-up.  Nursing reports resident decided to change CODE STATUS as from full code to DNR CC.  Patient otherwise at baseline today with no acute plaints.  No other concerns per nursing.    Location: [facility]  All nursing and progress notes reviewed.    Past Medical History:   Diagnosis Date    Aorto-iliac atherosclerosis 2020    Arthritis     Atherosclerosis of native arteries of extremity with rest pain (HCC) 2020    Bladder prolapse     CAD (coronary artery disease)     MI      Carotid artery stenosis     Right carotid stenosis.    Carotid bruit 1/10/2013    Carotid stenosis, left 2012    Cerebrovascular accident (stroke) ([facility])     pt states 13 yrs ago    Decreased radial pulse 2017    Gastric reflux     Glaucoma     History of blood transfusion     Hyperlipemia     Hypertension     Insomnia     Post-operative state 2020    PVD (peripheral vascular disease) with claudication 2020    Restless leg syndrome     Sinusitis        Allergies   Allergen Reactions    Iodine Shortness Of Breath    Macrodantin [Nitrofurantoin] Shortness Of Breath    Prednisone Shortness Of Breath and Palpitations    Sulfa Antibiotics Shortness Of Breath    5-Alpha Reductase Inhibitors     Keflex [Cephalexin]     Phenergan [Promethazine Hcl]       Review of Systems   Constitutional:  Negative for appetite change, chills, diaphoresis, fatigue and fever.   HENT:  Negative for congestion, hearing loss, rhinorrhea and sore throat.    Eyes:  Negative for photophobia and visual disturbance.   Respiratory:  Negative for cough, shortness of breath and wheezing.    Cardiovascular:  Negative for chest pain and palpitations.   Gastrointestinal:  Negative for abdominal pain, constipation, diarrhea and vomiting.   Musculoskeletal:  Positive for gait problem. Negative for arthralgias, back pain, neck pain and neck

## 2025-04-21 ENCOUNTER — OUTSIDE SERVICES (OUTPATIENT)
Dept: PRIMARY CARE CLINIC | Age: 80
End: 2025-04-21
Payer: COMMERCIAL

## 2025-04-21 DIAGNOSIS — M06.9 RHEUMATOID ARTHRITIS INVOLVING BOTH HANDS, UNSPECIFIED WHETHER RHEUMATOID FACTOR PRESENT (HCC): ICD-10-CM

## 2025-04-21 DIAGNOSIS — F41.9 ANXIETY AND DEPRESSION: ICD-10-CM

## 2025-04-21 DIAGNOSIS — F32.A ANXIETY AND DEPRESSION: ICD-10-CM

## 2025-04-21 DIAGNOSIS — E78.2 MIXED HYPERLIPIDEMIA: ICD-10-CM

## 2025-04-21 DIAGNOSIS — I10 PRIMARY HYPERTENSION: Primary | ICD-10-CM

## 2025-04-21 DIAGNOSIS — K59.00 CONSTIPATION, UNSPECIFIED CONSTIPATION TYPE: ICD-10-CM

## 2025-04-21 PROCEDURE — 99309 SBSQ NF CARE MODERATE MDM 30: CPT | Performed by: FAMILY MEDICINE

## 2025-04-23 PROBLEM — F41.9 ANXIETY AND DEPRESSION: Status: ACTIVE | Noted: 2025-04-23

## 2025-04-23 PROBLEM — F32.A ANXIETY AND DEPRESSION: Status: ACTIVE | Noted: 2025-04-23

## 2025-04-23 NOTE — PROGRESS NOTES
Hien Shi (:  1945) is a 79 y.o. female.    Subjective     Location: [address] [place] [address]  All nursing and progress notes reviewed.    Pt seen for monthly assessment   Reports she is doing alright  Does admit to crying a lot, lost her son about 3 years ago and states it's not something she can get over  She does endorse a chronic dull ache in her chest  States it feels like her heart broke when he passed  Denies any acute chest pain, squeezing etc.  No trouble breathing  No belly pain  Appetite overall ok  Pt does endorse intermittent constipation with some rectal bleeding times from apparently known hemorrhoids      Past Medical History:   Diagnosis Date    Aorto-iliac atherosclerosis 2020    Arthritis     Atherosclerosis of native arteries of extremity with rest pain (HCC) 2020    Bladder prolapse     CAD (coronary artery disease)     MI      Carotid artery stenosis     Right carotid stenosis.    Carotid bruit 1/10/2013    Carotid stenosis, left 2012    Cerebrovascular accident (stroke) ([facility])     pt states 13 yrs ago    Decreased radial pulse 2017    Gastric reflux     Glaucoma     History of blood transfusion     Hyperlipemia     Hypertension     Insomnia     Post-operative state 2020    PVD (peripheral vascular disease) with claudication 2020    Restless leg syndrome     Sinusitis        Allergies   Allergen Reactions    Iodine Shortness Of Breath    Macrodantin [Nitrofurantoin] Shortness Of Breath    Prednisone Shortness Of Breath and Palpitations    Sulfa Antibiotics Shortness Of Breath    5-Alpha Reductase Inhibitors     Keflex [Cephalexin]     Phenergan [Promethazine Hcl]       Current Outpatient Medications   Medication Sig Dispense Refill    miconazole (MICOTIN) 2 % powder Apply topically 2 times daily.      rosuvastatin (CRESTOR) 10 MG tablet Take 1 tablet by mouth daily 30 tablet 3    ALPRAZolam (XANAX) 0.25 MG tablet Take 1 tablet by mouth in the morning and at

## 2025-05-06 ENCOUNTER — APPOINTMENT (OUTPATIENT)
Dept: GENERAL RADIOLOGY | Age: 80
End: 2025-05-06
Payer: COMMERCIAL

## 2025-05-06 ENCOUNTER — OUTSIDE SERVICES (OUTPATIENT)
Dept: PRIMARY CARE CLINIC | Age: 80
End: 2025-05-06
Payer: COMMERCIAL

## 2025-05-06 ENCOUNTER — HOSPITAL ENCOUNTER (EMERGENCY)
Age: 80
Discharge: HOME | End: 2025-05-06
Attending: EMERGENCY MEDICINE
Payer: COMMERCIAL

## 2025-05-06 VITALS
TEMPERATURE: 97.4 F | DIASTOLIC BLOOD PRESSURE: 83 MMHG | RESPIRATION RATE: 16 BRPM | OXYGEN SATURATION: 97 % | SYSTOLIC BLOOD PRESSURE: 147 MMHG | HEART RATE: 63 BPM

## 2025-05-06 DIAGNOSIS — I10 PRIMARY HYPERTENSION: Primary | ICD-10-CM

## 2025-05-06 DIAGNOSIS — M06.9 RHEUMATOID ARTHRITIS INVOLVING BOTH HANDS, UNSPECIFIED WHETHER RHEUMATOID FACTOR PRESENT (HCC): ICD-10-CM

## 2025-05-06 DIAGNOSIS — G89.29 CHRONIC RIGHT SHOULDER PAIN: ICD-10-CM
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
## 2025-05-06 DIAGNOSIS — M25.511 CHRONIC RIGHT SHOULDER PAIN: ICD-10-CM

## 2025-05-06 DIAGNOSIS — E78.2 MIXED HYPERLIPIDEMIA: ICD-10-CM

## 2025-05-06 DIAGNOSIS — E78.5 HYPERLIPIDEMIA, UNSPECIFIED HYPERLIPIDEMIA TYPE: ICD-10-CM

## 2025-05-06 PROCEDURE — 99283 EMERGENCY DEPT VISIT LOW MDM: CPT

## 2025-05-06 PROCEDURE — 99309 SBSQ NF CARE MODERATE MDM 30: CPT

## 2025-05-06 PROCEDURE — 73030 X-RAY EXAM OF SHOULDER: CPT

## 2025-05-06 PROCEDURE — 73060 X-RAY EXAM OF HUMERUS: CPT

## 2025-05-06 ASSESSMENT — PAIN - FUNCTIONAL ASSESSMENT: PAIN_FUNCTIONAL_ASSESSMENT: NONE - DENIES PAIN

## 2025-05-06 NOTE — ED PROVIDER NOTES
Department of Emergency Medicine     Written by: Car Herrera DO  Patient Name: Hien Shi  Admit Date: 2025  2:57 PM  MRN: [id number]                   : 1945      CHIEF COMPLAINT     Chief Complaint   Patient presents with    Fall     Pt sent in from [facility] for possible right humerus fx.      HPI     Hien Shi is a 79 y.o. female who presents to the emergency department today complaining of abnormal x-ray at the nursing home.  Patient says she fell 3 months ago in February and x-rays at that time were negative.  She had x-rays repeated of her right arm today and was found to have a proximal humerus fracture of unknown time.  Patient states she has pain in her right shoulder but also her left shoulder because she has chronic right chest.  She denies any new falls since that fall out of bed in February.  Patient denies any lightheadedness or dizziness, fever or chills, nausea or vomiting, chest pain, shortness of breath, abdominal pain, hematuria or dysuria, constipation or diarrhea.    Nursing notes were all reviewed and agreed with or any disagreements were addressed in the HPI.    REVIEW OF SYSTEMS:    Pertinent positives and negatives mentioned in the HPI/MDM.     REVIEW OF OUTSIDE RECORDS: Chart reviewed from 2020 for hospital admission the patient was admitted for pneumonia.    SOCIAL DETERMINANTS OF HEALTH: Patient lives at a local nursing facility.    PAST HISTORY     I personally reviewed the following history:    Past Medical History:  has a past medical history of Aorto-iliac atherosclerosis, Arthritis, Atherosclerosis of native arteries of extremity with rest pain (HCC), Bladder prolapse, CAD (coronary artery disease), Carotid artery stenosis, Carotid bruit, Carotid stenosis, left, Cerebrovascular accident (stroke) (HCC), Decreased radial pulse, Gastric reflux, Glaucoma, History of blood transfusion, Hyperlipemia, Hypertension, Insomnia, Post-operative state, PVD  (peripheral vascular disease) with claudication, Restless leg syndrome, and Sinusitis.    Past Surgical History:  has a past surgical history that includes Cataract removal; Hysterectomy; knee surgery; skin biopsy; Carpal tunnel release; Dental surgery; Tonsillectomy; Coronary angioplasty with stent (09/06/2011); Carotid endarterectomy (1998); Diagnostic Cardiac Cath Lab Procedure (9/6/11); Anterior Fixation of Thoracic Spine; Cervical disc surgery (2002); Carotid endarterectomy (12/18/2012); Knee Arthroplasty (Left, 4-11-13); Cystoscopy; Lithotripsy (Right, 11/7/2019); Lithotripsy; joint replacement; joint replacement (Right); Aorto-iliac Bypass Graft (N/A, 9/15/2020); and Cardiac surgery.    Social History:  reports that she quit smoking about 24 years ago. Her smoking use included cigarettes. She started smoking about 64 years ago. She has a 100 pack-year smoking history. She has never used smokeless tobacco. She reports that she does not drink alcohol and does not use drugs.    Family History: family history is not on file.     The patient’s home medications have been reviewed.    Allergies: Iodine, Macrodantin [nitrofurantoin], Prednisone, Sulfa antibiotics, 5-alpha reductase inhibitors, Keflex [cephalexin], and Phenergan [promethazine hcl]    NURSING NOTES AND VITALS REVIEWED     Date / Time Roomed:  5/6/2025  2:57 PM  ED Bed Assignment:  [facility]    The nursing notes within the ED encounter and initial vital signs as below have been reviewed.   Patient Vitals for the past 24 hrs:   BP Temp Temp src Pulse Resp SpO2   05/06/25 1458 (!) 147/83 97.4 °F (36.3 °C) Oral 63 16 97 %       PHYSICAL EXAM     Physical Exam  Vitals and nursing note reviewed.   Constitutional:       General: She is not in acute distress.  HENT:      Head: Normocephalic and atraumatic.   Eyes:      Extraocular Movements: Extraocular movements intact.      Pupils: Pupils are equal, round, and reactive to light.   Cardiovascular:      Rate

## 2025-05-27 ENCOUNTER — OUTSIDE SERVICES (OUTPATIENT)
Dept: PRIMARY CARE CLINIC | Age: 80
End: 2025-05-27

## 2025-05-27 DIAGNOSIS — I25.10 CORONARY ARTERY DISEASE INVOLVING NATIVE CORONARY ARTERY OF NATIVE HEART WITHOUT ANGINA PECTORIS: ICD-10-CM

## 2025-05-27 DIAGNOSIS — E78.2 MIXED HYPERLIPIDEMIA: ICD-10-CM

## 2025-05-27 DIAGNOSIS — I10 PRIMARY HYPERTENSION: Primary | ICD-10-CM

## 2025-05-27 DIAGNOSIS — M06.9 RHEUMATOID ARTHRITIS INVOLVING BOTH HANDS, UNSPECIFIED WHETHER RHEUMATOID FACTOR PRESENT (HCC): ICD-10-CM

## 2025-05-28 NOTE — PROGRESS NOTES
Visit Date: 2025  Hien Shi (:  1945) is a 79 y.o. female.    Subjective   SUBJECTIVE/OBJECTIVE:  Past Medical History:   Diagnosis Date    Aorto-iliac atherosclerosis 2020    Arthritis     Atherosclerosis of native arteries of extremity with rest pain (HCC) 2020    Bladder prolapse     CAD (coronary artery disease)     MI      Carotid artery stenosis     Right carotid stenosis.    Carotid bruit 1/10/2013    Carotid stenosis, left 2012    Cerebrovascular accident (stroke) (HCC)     pt states 13 yrs ago    Decreased radial pulse 2017    Gastric reflux     Glaucoma     History of blood transfusion     Hyperlipemia     Hypertension     Insomnia     Post-operative state 2020    PVD (peripheral vascular disease) with claudication 2020    Restless leg syndrome     Sinusitis       Past Surgical History:   Procedure Laterality Date    ANTERIOR FIXATION OF THORACIC SPINE      AORTO-ILIAC BYPASS GRAFT N/A 9/15/2020    AORTO BIFEMORAL  BYPASS performed by Julien Pedraza MD at [facility] [place]    CARDIAC SURGERY      CAROTID ENDARTERECTOMY      right    CAROTID ENDARTERECTOMY  2012    left carotid endarterectomy done by Dr. Pedraza    CARPAL TUNNEL RELEASE      left wrist    CATARACT REMOVAL      CERVICAL DISC SURGERY      C5 and C6    CORONARY ANGIOPLASTY WITH STENT PLACEMENT  2011    Dr. Jacobs  RCA    CYSTOSCOPY      in past for kidney stones    DENTAL SURGERY      DIAGNOSTIC CARDIAC CATH LAB PROCEDURE  11    Emergent cath/PTCA with deployment of 2 overlapping DE stents to the distal, mid and proximal RCA.    HYSTERECTOMY (CERVIX STATUS UNKNOWN)      JOINT REPLACEMENT      right knee twice, left knee    JOINT REPLACEMENT Right     hip    KNEE ARTHROPLASTY Left 13    KNEE SURGERY      right knee x 2    LITHOTRIPSY Right 2019    RIGHT ESWL EXTRACORPOREAL SHOCK WAVE LITHOTRIPSY CYSTOSCOPY STENT INSERTION performed by Tyrel Whittington MD at [facility]

## 2025-06-03 ASSESSMENT — ENCOUNTER SYMPTOMS
SHORTNESS OF BREATH: 0
WHEEZING: 0
DIARRHEA: 0
ABDOMINAL PAIN: 0
SORE THROAT: 0
VOMITING: 0
CONSTIPATION: 0
RHINORRHEA: 0
BACK PAIN: 0
PHOTOPHOBIA: 0
COUGH: 0

## 2025-06-03 NOTE — PROGRESS NOTES
1. Primary hypertension  2. Mixed hyperlipidemia  3. Rheumatoid arthritis involving both hands, unspecified whether rheumatoid factor present (HCC)  4. Hyperlipidemia, unspecified hyperlipidemia type         Seen today for routine assessment  Baseline today   Chart reviewed: Continue with orders per chart in point click care  HTN: Continue amlodipine 5 mg daily and metoprolol 100 mg daily  Hyperlipidemia: Continue atorvastatin 20 mg daily  Rheumatoid arthritis: Continue hydroxychloroquine 200 mg daily  Labs: Collect per facility guidelines   Acute medical concerns provider on-call     Over 30 min was spent between patient care, chart review, discussing patient management with nursing staff and interpreting diagnostics      An electronic signature was used to authenticate this note.    --Chato Hough PA-C   This office note has been dictated.

## 2025-06-23 ENCOUNTER — OFFICE VISIT (OUTPATIENT)
Dept: PRIMARY CARE CLINIC | Age: 80
End: 2025-06-23
Payer: COMMERCIAL

## 2025-06-23 DIAGNOSIS — M06.9 RHEUMATOID ARTHRITIS INVOLVING BOTH HANDS, UNSPECIFIED WHETHER RHEUMATOID FACTOR PRESENT (HCC): ICD-10-CM

## 2025-06-23 DIAGNOSIS — I73.9 PVD (PERIPHERAL VASCULAR DISEASE) WITH CLAUDICATION: ICD-10-CM

## 2025-06-23 DIAGNOSIS — F32.A ANXIETY AND DEPRESSION: ICD-10-CM

## 2025-06-23 DIAGNOSIS — F41.8 ANXIETY WITH DEPRESSION: ICD-10-CM

## 2025-06-23 DIAGNOSIS — E78.2 MIXED HYPERLIPIDEMIA: ICD-10-CM

## 2025-06-23 DIAGNOSIS — I10 PRIMARY HYPERTENSION: Primary | ICD-10-CM

## 2025-06-23 DIAGNOSIS — F41.9 ANXIETY AND DEPRESSION: ICD-10-CM

## 2025-06-23 DIAGNOSIS — E78.5 HYPERLIPIDEMIA, UNSPECIFIED HYPERLIPIDEMIA TYPE: ICD-10-CM

## 2025-06-23 PROCEDURE — 1123F ACP DISCUSS/DSCN MKR DOCD: CPT | Performed by: INTERNAL MEDICINE

## 2025-06-23 PROCEDURE — 99309 SBSQ NF CARE MODERATE MDM 30: CPT | Performed by: INTERNAL MEDICINE

## 2025-06-24 VITALS
DIASTOLIC BLOOD PRESSURE: 68 MMHG | SYSTOLIC BLOOD PRESSURE: 114 MMHG | HEART RATE: 58 BPM | RESPIRATION RATE: 16 BRPM | OXYGEN SATURATION: 94 % | TEMPERATURE: 98 F

## 2025-06-24 ASSESSMENT — ENCOUNTER SYMPTOMS
SHORTNESS OF BREATH: 0
CHEST TIGHTNESS: 0
ABDOMINAL PAIN: 0
VOICE CHANGE: 0
VOMITING: 0
SORE THROAT: 0
NAUSEA: 0

## 2025-07-14 ENCOUNTER — OUTSIDE SERVICES (OUTPATIENT)
Dept: PRIMARY CARE CLINIC | Age: 80
End: 2025-07-14
Payer: COMMERCIAL

## 2025-07-14 DIAGNOSIS — F32.A ANXIETY AND DEPRESSION: ICD-10-CM

## 2025-07-14 DIAGNOSIS — R19.8 ABDOMINAL COMPLAINTS: Primary | ICD-10-CM

## 2025-07-14 DIAGNOSIS — I10 PRIMARY HYPERTENSION: ICD-10-CM

## 2025-07-14 DIAGNOSIS — M06.9 RHEUMATOID ARTHRITIS INVOLVING BOTH HANDS, UNSPECIFIED WHETHER RHEUMATOID FACTOR PRESENT (HCC): ICD-10-CM

## 2025-07-14 DIAGNOSIS — E78.2 MIXED HYPERLIPIDEMIA: ICD-10-CM

## 2025-07-14 DIAGNOSIS — F41.9 ANXIETY AND DEPRESSION: ICD-10-CM

## 2025-07-14 PROCEDURE — 99309 SBSQ NF CARE MODERATE MDM 30: CPT | Performed by: FAMILY MEDICINE

## 2025-07-14 NOTE — PROGRESS NOTES
Hien Shi (:  1945) is a 79 y.o. female.    Subjective     Location: [address] [place] [address]  All nursing and progress notes reviewed.    Patient was seen today for monthly assessment.  She reports to me that she has had some stomach trouble, intermittently over the past couple of weeks, with diarrhea and constipation interspersed with normal stools.  She believes she also has UTI, but is not sure whether or not she is currently being treated for anything.  Patient does endorse some cramping belly pain, and reports her appetite has been down.  She denies any chest pain or shortness of breath.  Per discussion with nursing staff today, there is no recent documentation of diarrhea.  Patient was also seen by psych; apparently she has been having some apparitions of a young boy in her room, though reportedly the patient is aware of this is a dream.  Psychiatry did increase her Zoloft dosing to 100mg.     Past Medical History:   Diagnosis Date    Aorto-iliac atherosclerosis 2020    Arthritis     Atherosclerosis of native arteries of extremity with rest pain (HCC) 2020    Bladder prolapse     CAD (coronary artery disease)     MI      Carotid artery stenosis     Right carotid stenosis.    Carotid bruit 1/10/2013    Carotid stenosis, left 2012    Cerebrovascular accident (stroke) (Formerly [facility])     pt states 13 yrs ago    Decreased radial pulse 2017    Gastric reflux     Glaucoma     History of blood transfusion     Hyperlipemia     Hypertension     Insomnia     Post-operative state 2020    PVD (peripheral vascular disease) with claudication 2020    Restless leg syndrome     Sinusitis        Allergies   Allergen Reactions    Iodine Shortness Of Breath    Macrodantin [Nitrofurantoin] Shortness Of Breath    Prednisone Shortness Of Breath and Palpitations    Sulfa Antibiotics Shortness Of Breath    5-Alpha Reductase Inhibitors     Keflex [Cephalexin]     Phenergan [Promethazine Hcl]       Current

## 2025-07-22 ENCOUNTER — OUTSIDE SERVICES (OUTPATIENT)
Dept: PRIMARY CARE CLINIC | Age: 80
End: 2025-07-22

## 2025-07-22 DIAGNOSIS — E78.2 MIXED HYPERLIPIDEMIA: ICD-10-CM

## 2025-07-22 DIAGNOSIS — I10 PRIMARY HYPERTENSION: Primary | ICD-10-CM

## 2025-07-22 DIAGNOSIS — M06.9 RHEUMATOID ARTHRITIS INVOLVING BOTH HANDS, UNSPECIFIED WHETHER RHEUMATOID FACTOR PRESENT (HCC): ICD-10-CM

## 2025-07-24 ASSESSMENT — ENCOUNTER SYMPTOMS
DIARRHEA: 0
CONSTIPATION: 0
PHOTOPHOBIA: 0
RHINORRHEA: 0
BACK PAIN: 0
VOMITING: 0
COUGH: 0
ABDOMINAL PAIN: 0
SORE THROAT: 0
SHORTNESS OF BREATH: 0
WHEEZING: 0

## 2025-07-24 NOTE — PROGRESS NOTES
Hien Shi (:  1945) is a 79 y.o. female.    Subjective     Hien seen today for routine follow-up.resting comfortably in bed. No acute complaints. Denies any pain or discomfort. No concerns per nursing.     Location: [facility]  All nursing and progress notes reviewed.    Past Medical History:   Diagnosis Date    Aorto-iliac atherosclerosis 2020    Arthritis     Atherosclerosis of native arteries of extremity with rest pain (HCC) 2020    Bladder prolapse     CAD (coronary artery disease)     MI      Carotid artery stenosis     Right carotid stenosis.    Carotid bruit 1/10/2013    Carotid stenosis, left 2012    Cerebrovascular accident (stroke) (HCC)     pt states 13 yrs ago    Decreased radial pulse 2017    Gastric reflux     Glaucoma     History of blood transfusion     Hyperlipemia     Hypertension     Insomnia     Post-operative state 2020    PVD (peripheral vascular disease) with claudication 2020    Restless leg syndrome     Sinusitis        Allergies   Allergen Reactions    Iodine Shortness Of Breath    Macrodantin [Nitrofurantoin] Shortness Of Breath    Prednisone Shortness Of Breath and Palpitations    Sulfa Antibiotics Shortness Of Breath    5-Alpha Reductase Inhibitors     Keflex [Cephalexin]     Phenergan [Promethazine Hcl]       Review of Systems   Constitutional:  Negative for appetite change, chills, diaphoresis, fatigue and fever.   HENT:  Negative for congestion, hearing loss, rhinorrhea and sore throat.    Eyes:  Negative for photophobia and visual disturbance.   Respiratory:  Negative for cough, shortness of breath and wheezing.    Cardiovascular:  Negative for chest pain and palpitations.   Gastrointestinal:  Negative for abdominal pain, constipation, diarrhea and vomiting.   Musculoskeletal:  Positive for gait problem. Negative for arthralgias, back pain, neck pain and neck stiffness.   Skin:  Negative for rash.   Neurological:  Positive for

## 2025-08-07 ENCOUNTER — OUTSIDE SERVICES (OUTPATIENT)
Dept: PRIMARY CARE CLINIC | Age: 80
End: 2025-08-07

## 2025-08-07 DIAGNOSIS — E78.2 MIXED HYPERLIPIDEMIA: ICD-10-CM

## 2025-08-07 DIAGNOSIS — I10 PRIMARY HYPERTENSION: Primary | ICD-10-CM

## 2025-08-07 DIAGNOSIS — M06.9 RHEUMATOID ARTHRITIS INVOLVING BOTH HANDS, UNSPECIFIED WHETHER RHEUMATOID FACTOR PRESENT (HCC): ICD-10-CM

## 2025-08-07 DIAGNOSIS — K21.9 GASTROESOPHAGEAL REFLUX DISEASE, UNSPECIFIED WHETHER ESOPHAGITIS PRESENT: ICD-10-CM

## 2025-08-20 ASSESSMENT — ENCOUNTER SYMPTOMS
WHEEZING: 0
BACK PAIN: 0
SORE THROAT: 0
DIARRHEA: 0
SHORTNESS OF BREATH: 0
ABDOMINAL PAIN: 0
VOMITING: 0
RHINORRHEA: 0
COUGH: 0
CONSTIPATION: 0
PHOTOPHOBIA: 0

## 2025-08-21 PROCEDURE — 99309 SBSQ NF CARE MODERATE MDM 30: CPT | Performed by: INTERNAL MEDICINE

## 2025-08-25 ENCOUNTER — OUTSIDE SERVICES (OUTPATIENT)
Dept: PRIMARY CARE CLINIC | Age: 80
End: 2025-08-25
Payer: COMMERCIAL

## 2025-08-25 VITALS
RESPIRATION RATE: 16 BRPM | DIASTOLIC BLOOD PRESSURE: 64 MMHG | TEMPERATURE: 98 F | HEART RATE: 64 BPM | SYSTOLIC BLOOD PRESSURE: 118 MMHG | OXYGEN SATURATION: 96 %

## 2025-08-25 DIAGNOSIS — I70.222 ATHEROSCLEROSIS OF NATIVE ARTERIES OF EXTREMITIES WITH REST PAIN, LEFT LEG (HCC): ICD-10-CM

## 2025-08-25 DIAGNOSIS — J44.9 CHRONIC OBSTRUCTIVE PULMONARY DISEASE, UNSPECIFIED COPD TYPE (HCC): ICD-10-CM

## 2025-08-25 DIAGNOSIS — J96.02 ACUTE RESPIRATORY FAILURE WITH HYPOXIA AND HYPERCAPNIA (HCC): ICD-10-CM

## 2025-08-25 DIAGNOSIS — M17.4 OTHER SECONDARY OSTEOARTHRITIS OF BOTH KNEES: Primary | Chronic | ICD-10-CM

## 2025-08-25 DIAGNOSIS — J96.01 ACUTE RESPIRATORY FAILURE WITH HYPOXIA AND HYPERCAPNIA (HCC): ICD-10-CM

## 2025-08-25 DIAGNOSIS — E78.2 MIXED HYPERLIPIDEMIA: ICD-10-CM

## 2025-08-25 DIAGNOSIS — M06.9 RHEUMATOID ARTHRITIS INVOLVING BOTH HANDS, UNSPECIFIED WHETHER RHEUMATOID FACTOR PRESENT (HCC): ICD-10-CM

## 2025-08-25 ASSESSMENT — ENCOUNTER SYMPTOMS
DIARRHEA: 0
SHORTNESS OF BREATH: 0
VOMITING: 0
ABDOMINAL PAIN: 0
VOICE CHANGE: 0
CONSTIPATION: 0
SORE THROAT: 0
NAUSEA: 0
CHEST TIGHTNESS: 0

## (undated) DEVICE — TOWEL,OR,DSP,ST,BLUE,STD,6/PK,12PK/CS: Brand: MEDLINE

## (undated) DEVICE — SOLUTION IV 500ML 0.9% SOD CHL PH 5 INJ USP VIAFLX PLAS

## (undated) DEVICE — SET PROC W/ 250ML BOWL 30UM FLTR RESVR BRAT 2

## (undated) DEVICE — SOLUTION IV IRRIG WATER 1000ML POUR BRL 2F7114

## (undated) DEVICE — BLOOD TRANSFUSION FILTER: Brand: HAEMONETICS

## (undated) DEVICE — SET SURG BASIN MAYO REUSABLE

## (undated) DEVICE — RETRACTOR ART

## (undated) DEVICE — Z DISCONTINUED USE 2744636  DRESSING AQUACEL 14 IN ALG W3.5XL14IN POLYUR FLM CVR W/ HYDRCOLL

## (undated) DEVICE — Z INACTIVE USE 2641837 CLIP LIG M BLU TI HRT SHP WIRE HORZ 600 PER BX

## (undated) DEVICE — CYSTO PACK: Brand: MEDLINE INDUSTRIES, INC.

## (undated) DEVICE — E-Z CLEAN, NON-STICK, PTFE COATED, ELECTROSURGICAL BLADE ELECTRODE, 6.5 INCH (16.5 CM): Brand: MEGADYNE

## (undated) DEVICE — LABEL MED 4 IN SURG PANEL W/ PEN STRL

## (undated) DEVICE — YANKAUER,OPEN TIP,W/O VENT,STERILE: Brand: MEDLINE INDUSTRIES, INC.

## (undated) DEVICE — GLOVE ORANGE PI 7 1/2   MSG9075

## (undated) DEVICE — CLAMP INSERT: Brand: STEALTH® CLAMP INSERT

## (undated) DEVICE — GLOVE SURG SZ 7.5 L11.73IN FNGR THK9.8MIL STRW LTX POLYMER

## (undated) DEVICE — SWABSTICK SURG PREP BENZOIN TINCTURE SINGLE ST

## (undated) DEVICE — 1.5L THIN WALL CAN: Brand: CRD

## (undated) DEVICE — 3M™ MEDIPORE™ + PAD 3573: Brand: 3M™ MEDIPORE™

## (undated) DEVICE — SET INSTR ART 1

## (undated) DEVICE — SPONGE,PEANUT,XRAY,ST,SM,3/8",5/CARD: Brand: MEDLINE INDUSTRIES, INC.

## (undated) DEVICE — CATHETER IV 16GA 205ML/MIN L1.77IN OD1.74MM ID1.359MM GRY

## (undated) DEVICE — CAMERA STRYKER 1488 HD GEN

## (undated) DEVICE — CLOTH SURG PREP PREOPERATIVE CHLORHEXIDINE GLUC 2% READYPREP

## (undated) DEVICE — GLOVE SURG SZ 8 L12IN FNGR THK94MIL TRNSLUC YEL LTX HYDRGEL

## (undated) DEVICE — PATIENT RETURN ELECTRODE, SINGLE-USE, CONTACT QUALITY MONITORING, ADULT, WITH 9FT CORD, FOR PATIENTS WEIGING OVER 33LBS. (15KG): Brand: MEGADYNE

## (undated) DEVICE — BLADE CLIPPER GEN PURP NS

## (undated) DEVICE — CLIP INT SM TI EZ LD LIG SYS WECK HORZ

## (undated) DEVICE — SOLUTION IV IRRIG POUR BRL 0.9% SODIUM CHL 2F7124

## (undated) DEVICE — DILATOR ART

## (undated) DEVICE — GAUZE,SPONGE,4"X4",16PLY,XRAY,STRL,LF: Brand: MEDLINE

## (undated) DEVICE — Z DISCONTINUED USE 2425483 (LOW STOCK PER MEDLINE) TAPE UMB L18IN DIA1/8IN WHT COT NONABSORBABLE W/O NDL FOR

## (undated) DEVICE — TOTAL TRAY, 16FR 10ML SIL FOLEY, URN: Brand: MEDLINE

## (undated) DEVICE — SURGICAL PROCEDURE PACK VASC MAJ CUST

## (undated) DEVICE — BAG DRNGE COMB PK

## (undated) DEVICE — GOWN,SIRUS,FABRNF,XL,20/CS: Brand: MEDLINE

## (undated) DEVICE — MAGNETIC INSTR DRAPE 20X16: Brand: MEDLINE INDUSTRIES, INC.

## (undated) DEVICE — Device

## (undated) DEVICE — SET SURG INSTR ART III

## (undated) DEVICE — TOWEL,OR,DSP,ST,GREEN,DLX,XR,4/PK,20PK/C: Brand: MEDLINE

## (undated) DEVICE — DRAPE 24X23IN RADIOLOGY CASS ADHES STRIP

## (undated) DEVICE — CLIP INT M L GRN TI TRNSVRS GRV CHEVRON SHP W/ PRECIS TIP

## (undated) DEVICE — 3M™ STERI-DRAPE™ CARDIOVASCULAR SHEET WITH IOBAN™ 2 INCISE FILM 6677: Brand: STERI-DRAPE™ IOBAN™ 2

## (undated) DEVICE — TRAY,SKIN SCRUB,DRY,W/GAUZE: Brand: MEDLINE

## (undated) DEVICE — Z DUP USE 2139333 GUIDEWIRE UROLOGICAL STR STD 0.035 IN X150 CM REG ZIPWIRE LF

## (undated) DEVICE — PACK,UNIV, II AURORA: Brand: MEDLINE

## (undated) DEVICE — Z DISCONTINUED PER MEDLINE USE 2425483 TAPE UMB L30IN DIA1/8IN WHT COT NONABSORBABLE W/O NDL FOR

## (undated) DEVICE — INTENDED FOR TISSUE SEPARATION, AND OTHER PROCEDURES THAT REQUIRE A SHARP SURGICAL BLADE TO PUNCTURE OR CUT.: Brand: BARD-PARKER ® STAINLESS STEEL BLADES

## (undated) DEVICE — BAG DRAINAGE CONTAINER 15 LT FLUID COLLCTN

## (undated) DEVICE — 3M™ IOBAN™ 2 ANTIMICROBIAL INCISE DRAPE 6650EZ: Brand: IOBAN™ 2

## (undated) DEVICE — COVER,TABLE,60X90,STERILE: Brand: MEDLINE

## (undated) DEVICE — DRAPE THER FLUID WARMING 66X44 IN FLAT SLUSH DBL DISC ORS

## (undated) DEVICE — GOWN,AURORA,NONREINF,RAGLAN,L,STERILE: Brand: MEDLINE

## (undated) DEVICE — BLANKET WRM W35.4XL86.6IN FULL UNDERBODY + FORC AIR

## (undated) DEVICE — CATHETER ETER URETH 24FR L16IN RED RUB INTMIT ROB MOD BARDX

## (undated) DEVICE — GOWN,SIRUS,FABRNF,L,20/CS: Brand: MEDLINE

## (undated) DEVICE — DOUBLE BASIN SET: Brand: MEDLINE INDUSTRIES, INC.

## (undated) DEVICE — LOOP VES W25MM THK1MM MAXI RED SIL FLD REPELLENT 100 PER

## (undated) DEVICE — OPEN-END URETERAL CATHETER: Brand: COOK

## (undated) DEVICE — Z DISCONTINUED PER MEDLINE USE 2741943 DRESSING AQUACEL 10 IN ALG W9XL25CM SIL CVR WTRPRF VIR BACT BARR ANTIMIC

## (undated) DEVICE — SPONGE LAP W18XL18IN WHT COT 4 PLY FLD STRUNG RADPQ DISP ST